# Patient Record
Sex: MALE | Race: WHITE | NOT HISPANIC OR LATINO | Employment: OTHER | URBAN - NONMETROPOLITAN AREA
[De-identification: names, ages, dates, MRNs, and addresses within clinical notes are randomized per-mention and may not be internally consistent; named-entity substitution may affect disease eponyms.]

---

## 2017-01-08 DIAGNOSIS — I10 HTN (HYPERTENSION): Primary | ICD-10-CM

## 2017-01-08 DIAGNOSIS — E11.9 DIABETES MELLITUS, TYPE 2 (H): ICD-10-CM

## 2017-01-10 RX ORDER — GLIMEPIRIDE 1 MG/1
TABLET ORAL
Qty: 90 TABLET | Refills: 0 | Status: SHIPPED | OUTPATIENT
Start: 2017-01-10 | End: 2017-05-03

## 2017-01-10 RX ORDER — POTASSIUM CHLORIDE 1500 MG/1
TABLET, EXTENDED RELEASE ORAL
Qty: 180 TABLET | Refills: 0 | Status: SHIPPED | OUTPATIENT
Start: 2017-01-10 | End: 2017-04-10

## 2017-01-10 NOTE — TELEPHONE ENCOUNTER
Amaryl         Last Written Prescription Date: 12/30/15  Last Fill Quantity: 90, # refills: 1  Last Office Visit with [facility] [place] or [facility] prescribing provider:  12/12/16        BP Readings from Last 3 Encounters:   12/12/16 122/62   10/06/16 130/70   09/30/16 153/90     MICROL       <5   7/15/2016  No results found for this basename: microalbumin  CREATININE   Date Value Ref Range Status   12/12/2016 0.74 0.66 - 1.25 mg/dL Final   ]  GFR ESTIMATE   Date Value Ref Range Status   12/12/2016 >90  Non  GFR Calc   >60 mL/min/1.7m2 Final   09/30/2016 >90  Non  GFR Calc   >60 mL/min/1.7m2 Final   07/15/2016 >90  Non  GFR Calc   >60 mL/min/1.7m2 Final     GFR ESTIMATE IF BLACK   Date Value Ref Range Status   12/12/2016 >90   GFR Calc   >60 mL/min/1.7m2 Final   09/30/2016 >90   GFR Calc   >60 mL/min/1.7m2 Final   07/15/2016 >90   GFR Calc   >60 mL/min/1.7m2 Final     CHOL      162   12/12/2016  HDL       44   12/12/2016  LDL       93   12/12/2016  TRIG      126   12/12/2016  CHOLHDLRATIO      3.8   3/10/2015  AST       44   12/12/2016  ALT       75   12/12/2016  A1C      6.1   12/12/2016  A1C      6.2   7/15/2016  A1C      6.4   2/8/2016  A1C      6.0   8/11/2015  A1C      5.9   3/10/2015  POTASSIUM   Date Value Ref Range Status   12/12/2016 3.4 3.4 - 5.3 mmol/L Final       Klor-Con M 20 ER      Last Written Prescription Date: 10/12/16  Last Fill Quantity: 180, # refills: 0  Last Office Visit with [facility] or [facility] prescribing provider: 12/12/16       POTASSIUM   Date Value Ref Range Status   12/12/2016 3.4 3.4 - 5.3 mmol/L Final     CREATININE   Date Value Ref Range Status   12/12/2016 0.74 0.66 - 1.25 mg/dL Final     BP Readings from Last 3 Encounters:   12/12/16 122/62   10/06/16 130/70   09/30/16 153/90

## 2017-01-30 DIAGNOSIS — E78.5 HYPERLIPIDEMIA LDL GOAL <100: Primary | ICD-10-CM

## 2017-02-01 RX ORDER — EZETIMIBE 10 MG
TABLET ORAL
Qty: 90 TABLET | Refills: 2 | Status: SHIPPED | OUTPATIENT
Start: 2017-02-01 | End: 2017-10-16

## 2017-02-06 ENCOUNTER — OFFICE VISIT (OUTPATIENT)
Dept: FAMILY MEDICINE | Facility: OTHER | Age: 77
End: 2017-02-06
Attending: FAMILY MEDICINE
Payer: MEDICARE

## 2017-02-06 VITALS
BODY MASS INDEX: 33 KG/M2 | RESPIRATION RATE: 17 BRPM | HEART RATE: 76 BPM | TEMPERATURE: 96.4 F | DIASTOLIC BLOOD PRESSURE: 74 MMHG | SYSTOLIC BLOOD PRESSURE: 132 MMHG | WEIGHT: 230 LBS

## 2017-02-06 DIAGNOSIS — S13.9XXA SPRAIN OF NECK, INITIAL ENCOUNTER: ICD-10-CM

## 2017-02-06 DIAGNOSIS — M54.2 CERVICALGIA: Primary | ICD-10-CM

## 2017-02-06 PROCEDURE — 99213 OFFICE O/P EST LOW 20 MIN: CPT | Performed by: FAMILY MEDICINE

## 2017-02-06 PROCEDURE — 99212 OFFICE O/P EST SF 10 MIN: CPT

## 2017-02-06 RX ORDER — CYCLOBENZAPRINE HCL 5 MG
TABLET ORAL
Qty: 30 TABLET | Refills: 1 | Status: SHIPPED | OUTPATIENT
Start: 2017-02-06 | End: 2017-05-16

## 2017-02-06 ASSESSMENT — PAIN SCALES - GENERAL: PAINLEVEL: SEVERE PAIN (6)

## 2017-02-06 NOTE — PATIENT INSTRUCTIONS
Neck Problems: Relieving Your Symptoms  The first goal of treatment is to relieve your symptoms. Your healthcare provider may recommend self-care treatments. These include resting, applying ice and heat, taking medicine, and doing exercises. Your healthcare provider may also recommend that you see a physical therapist who can teach you ways to care for and strengthen your neck.     Heat relaxes sore muscles and helps relieve spasms.   Self-care treatments  Pain can end quickly or last awhile. Either way, you ll want relief as soon as possible. Your healthcare provider can tell you which treatments to do at home to help relieve your pain.    Lying down for a short time takes pressure from the head off the neck.    Ice and heat can help reduce pain. To bring down swelling, rest an ice pack wrapped in a thin towel on your neck for 10 to 15 minutes. To relax sore muscles, apply a warm, wet towel to the area. Or you can take a warm bath or shower.    Over-the-counter medicines, such as ibuprofen, naproxen, and aspirin, can help reduce pain and swelling. Acetaminophen can help relieve pain. Use these only as directed.    Exercises can relax muscles and ease stiffness. To prepare, drape a warm, wet towel around your neck and shoulders for 5 minutes. Remove the towel. Then do any exercises recommended to you by your healthcare provider.  Physical therapy  If self-care treatments aren t helping relieve neck pain, your healthcare provider may suggest physical therapy. Physical therapy is done by a specialist trained to treat injuries. Your physical therapist (PT) will teach you how to strengthen muscles, improve the spine s alignment, and help you move properly. Treatment methods used in physical therapy may include:    Heat. A special heating pad called a neck pack may be applied to your neck.    Exercises. Your PT will teach you exercises to help strengthen your neck and improve its range of motion.    Joint  mobilization. The PT gently moves your vertebrae to help restore motion in your neck joints and reduce neck pain.    Soft tissue mobilization. The PT massages and stretches the muscles in your neck and shoulders.    Electrical stimulation. Electrical impulses are sent into your neck. This helps reduce soreness and inflammation.    Education in body mechanics. The PT shows you ways to position and move your body that protect the neck.  Other treatments  If physical therapy doesn t relieve your neck pain, your healthcare provider may suggest other treatments. For example, medicines or injections can help relieve pain and swelling. In some cases, surgery may be needed to treat neck problems.    [id number] [employer]. [address] [place] [address], [place] [address]. All rights reserved. This information is not intended as a substitute for professional medical care. Always follow your healthcare professional's instructions.        Neck Sprain or Strain  A sudden force that causes turning or bending of the neck can cause sprain or strain. An example would be the force from a car accident. This can stretch or tear muscles called a strain. It can also stretch or tear ligaments called a sprain. Either of these can cause neck pain. Sometimes neck pain occurs after a simple awkward movement. In either case, muscle spasm is commonly present and contributes to the pain.    Unless you had a forceful physical injury (for example, a car accident or fall), X-rays are usually not ordered for the initial evaluation of neck pain. If pain continues and dose not respond to medical treatment, X-rays and other tests may be performed at a later time.  Home care    You may feel more soreness and spasm the first few days after the injury. Rest until symptoms begin to improve.    When lying down, use a comfortable pillow or a rolled towel that supports the head and keeps the spine in a neutral position. The position of the head should  not be tilted forward or backward.    Apply an ice pack over the injured area for 15 to 20 minutes every 3 to 6 hours. You should do this for the first 24 to 48 hours. You can make an ice pack by filling a plastic bag that seals at the top with ice cubes and then wrapping it with a thin towel. After 48 hours, apply heat (warm shower or warm bath) for 15 to 20 minutes several times a day, or alternate ice and heat.    You may use over-the-counter pain medicine to control pain, unless another pain medicine was prescribed. If you have chronic liver or kidney disease or ever had a stomach ulcer or GI bleeding, talk with your healthcare provider before using these medicines.    If a soft cervical collar was prescribed, it should be worn only for periods of increased pain. It should not be worn for more than 3 hours a day, or for a period longer than 1 to 2 weeks.  Follow-up care  Follow up with your healthcare provider as directed. Physical therapy may be needed.  Sometimes fractures don t show up on the first X-ray. Bruises and sprains can sometimes hurt as much as a fracture. These injuries can take time to heal completely. If your symptoms don t improve or they get worse, talk with your healthcare provider. You may need a repeat X-ray or other tests. If X-rays were taken, you will be told of any new findings that may affect your care.  Call 911  Call 911 if you have:     Neck swelling, difficulty or painful swallowing    Difficulty breathing    Chest pain  When to seek medical advice  Call your healthcare provider right away if any of these occur:    Pain becomes worse or spreads into your arms    Weakness or numbness in one or both arms    [id number] [employer]. [address] [place] [address], [place] [address]. All rights reserved. This information is not intended as a substitute for professional medical care. Always follow your healthcare professional's instructions.

## 2017-02-06 NOTE — NURSING NOTE
"Chief Complaint   Patient presents with     Musculoskeletal Problem       Initial /74 mmHg  Pulse 76  Temp(Src) 96.4  F (35.8  C)  Resp 17  Wt 230 lb (104.327 kg) Estimated body mass index is 33 kg/(m^2) as calculated from the following:    Height as of 12/12/16: 5' 10\" (1.778 m).    Weight as of this encounter: 230 lb (104.327 kg).  Medication Reconciliation: complete  "

## 2017-02-06 NOTE — PROGRESS NOTES
SUBJECTIVE:                                                    Carrillo Carranza is a 76 year old male who presents to clinic today for the following health issues:      Musculoskeletal problem/pain      Duration: 1 week    Description  Location:  Neck, base of neck     Intensity:  moderate    Accompanying signs and symptoms: radiation of pain to bilateral shoulder blades, stiffness     History  Previous similar problem: no   Previous evaluation:  none    Precipitating or alleviating factors:  Trauma or overuse: no   Aggravating factors include: sitting, movement of arms     Therapies tried and outcome: NSAID - Advil      Pt has been doing a lot of work cutting wood  Pain is base of neck and going to both shoulders  Hard time sleeping   No pain or weakness in arms  No Fall or trauma- lots of use.   Advil not helping       Problem list and histories reviewed & adjusted, as indicated.  Additional history: as documented    Problem list, Medication list, Allergies, and Medical/Social/Surgical histories reviewed in EPIC and updated as appropriate.    ROS:  C: NEGATIVE for fever, chills, change in weight  R: NEGATIVE for significant cough or SOB  CV: NEGATIVE for chest pain, palpitations or peripheral edema    OBJECTIVE:                                                    /74 mmHg  Pulse 76  Temp(Src) 96.4  F (35.8  C)  Resp 17  Wt 230 lb (104.327 kg)  Body mass index is 33 kg/(m^2).   GENERAL: healthy, alert, well nourished, well hydrated, no distress  NECK: posterior  tenderness, no adenopathy, no asymmetry, no masses, no stiffness; thyroid- normal to palpation, decrease ROM and stiffness  MS: extremities- UE- no gross deformities noted, no edema, Good strength          ASSESSMENT/PLAN:                                                        ICD-10-CM    1. Cervicalgia M54.2 cyclobenzaprine (FLEXERIL) 5 MG tablet     PHYSICAL THERAPY REFERRAL   2. Sprain of neck, initial encounter S13.9XXA cyclobenzaprine  (FLEXERIL) 5 MG tablet     PHYSICAL THERAPY REFERRAL     Does not seem Disck related. Seems msk/soft tissue. Discussed in length conservative measures of OTC medications for pain, Ice/Heat, elevation and the concept of R.I.C.E.. Continue behavioral changes and proper body mechanics to allow for healing. Follow up as directed.   Symptomatic treatment was discussed along when patient should call and/or come back into the clinic or go to ER/Urgent care. All questions answered.   OTC advil 3 tabs TID discussed. F/u prn.     See Patient Instructions    Mata Richardson MD  [facility]

## 2017-02-13 ENCOUNTER — HOSPITAL ENCOUNTER (OUTPATIENT)
Dept: PHYSICAL THERAPY | Facility: HOSPITAL | Age: 77
Setting detail: THERAPIES SERIES
End: 2017-02-13
Attending: FAMILY MEDICINE
Payer: MEDICARE

## 2017-02-13 PROCEDURE — G8982 BODY POS GOAL STATUS: HCPCS | Mod: GP,CI

## 2017-02-13 PROCEDURE — 40000718 ZZHC STATISTIC PT DEPARTMENT ORTHO VISIT

## 2017-02-13 PROCEDURE — 97140 MANUAL THERAPY 1/> REGIONS: CPT | Mod: GP

## 2017-02-13 PROCEDURE — G8981 BODY POS CURRENT STATUS: HCPCS | Mod: GP,CJ

## 2017-02-13 PROCEDURE — 97161 PT EVAL LOW COMPLEX 20 MIN: CPT | Mod: GP

## 2017-02-13 PROCEDURE — 97110 THERAPEUTIC EXERCISES: CPT | Mod: GP

## 2017-02-13 NOTE — PROGRESS NOTES
02/13/17 1259   General Information   Type of Visit Initial OP Ortho PT Evaluation   Start of Care Date 02/13/17   Referring Physician Dr. Richardson   Patient/Family Goals Statement get rid of pain   Orders Evaluate and Treat   Orders Comment Eval and Treat, good HEP   Insurance Type Medicare   Medical Diagnosis cervicalgia, sprain of neck   Surgical/Medical history reviewed Yes   Precautions/Limitations no known precautions/limitations   General Information Comments PMH: diabetes, past episode of vertigo that resolved.    Body Part(s)   Body Part(s) Cervical Spine   Presentation and Etiology   Pertinent history of current problem (include personal factors and/or comorbidities that impact the POC) Patient was cutting and splitting wood last Monday and he had pain in neck down into shoulder blades afterward. Patient was unable to sleep due to pain. Pain has now improved, but still present impacting L and R sides evenly. Yesterday patient lifted big bags of salt into the conditioner and developed pain/soreness after. Patient is very active and splits wood and lifts during outdoor yardwork often. Denies numbness and tinling in hands. Patient initially treated symptoms with rest, heat and Advil. Muscle relaxer also used occasionally. Patient has not had to take pain meds or advil in last two days. Is starting to sleep better. Patient is R hand dominant. Patient has tension down both shoulder blades and up neck to base of skull. No HA present. Patient has also noted some clicking in neck on R side. Can replicate clicks in neck and with turning head this morning patient experienced a zinger with pain extendeing down side of neck and feeling like a rubberband type sensation. 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 Patient is a side sleeper and reports that he consumes a large amount of water.    Impairments A. Pain   Functional Limitations perform  activities of daily living;perform desired leisure / sports activities   Symptom Location Bilateral neck and bilateral shoulder blade regions   How/Where did it occur With repetition/overuse   Onset date of current episode/exacerbation 02/06/17   Chronicity New   Pain rating (0-10 point scale) Best (/10);Worst (/10)   Best (/10) 1   Worst (/10) 10   Pain quality C. Aching;A. Sharp   Frequency of pain/symptoms B. Intermittent   Pain/symptoms exacerbated by C. Lifting;G. Certain positions;H. Overhead reach;D. Carrying   Pain/symptoms eased by C. Rest;I. OTC medication(s)   Progression of symptoms since onset: Improved   Prior Level of Function   Prior Level of Function-Mobility Prior to onset of symptoms patient had no pain or limitation with lifting, reaching, splitting wood and carrying tasks.    Current Level of Function   Patient role/employment history A. Employed   Employment Comments part time at [employer]   Fall Risk Screen   Per patient - Fall with injury in past year? No   Is patient a fall risk? No   Cervical Spine   Observation No acute distress   Posture Forward head posture noted   Cervical Flexion %   Cervical Extension ROM 60% limited by pain   Cervical Right Side Bending ROM 80%   Cervical Left Side Bending ROM 60% limited by pain   Cervical Right Rotation ROM 60% limited by pain   Cervical Left Rotation ROM 80%   Shoulder AROM Screen No significant deficits identified.    Shoulder Shrug (C2-C4) Strength 5   Shoulder Abd (C5) Strength 5   Shoulder Add (C7) Strength 5   Shoulder ER (C5, C6) Strength 5   Shoulder IR (C5, C6) Strength 5   Elbow Flexion (C5, C6) Strength 5   Elbow Extension (C7) Strength 5   Wrist Extension (C6) Strength 5   Wrist Flexion (C7) Strength 5   Thumb Abd (C8) Strength 5   5th Finger Add (T1) Strength 5   Shoulder/Wrist/Hand Strength Comments No significant deficits present between R and L UE   Upper Trapezius Flexibility Significant tension present bilaterally.     Levator Scapula Flexibility No significant tension appreciated.    Scalene Flexibility Significant tension present bilaterally   Cervical Flexibility Comments AROM limited by scalene tension.    Cervical Distraction Test No significant change in symptoms.    Segmental Mobility-Cervical Decreased mobility present at CT junction T1, C7, C6, C5   Palpation Tenderness present at bilateral scalenes and UT   Planned Therapy Interventions   Planned Therapy Interventions manual therapy;ROM;strengthening;stretching;neuromuscular re-education   Planned Therapy Interventions Comment Ther ex and manual techniques to decrease pain, decrease tension and improve cervical mobility.    Planned Modality Interventions   Planned Modality Interventions Hot packs;Traction;Ultrasound   Planned Modality Interventions Comments as needed.    Clinical Impression   Criteria for Skilled Therapeutic Interventions Met yes, treatment indicated   PT Diagnosis Patient presents with acute cervical strain with limited passive and AROM and muscle tension impacting patient's functional activity level.    Influenced by the following impairments Pain, muscle tension, loss of AROM and PROM   Functional limitations due to impairments Pain with sleep. Pain and decreased ability to perform lifting, carrying and reaching tasks. Decreased tolerance to cervical rotation and neck movement during daily tasks.    Clinical Presentation Stable/Uncomplicated   Clinical Presentation Rationale One body region impacted. No comorbidities present.    Clinical Decision Making (Complexity) Low complexity   Therapy Frequency 2 times/Week   Predicted Duration of Therapy Intervention (days/wks) 60 days   Risk & Benefits of therapy have been explained Yes   Patient, Family & other staff in agreement with plan of care Yes   Clinical Impression Comments Patient presents with acute cervical strain impacting bilateral UT and scalene. Patient has segmental hypomobility also present  at CT junction and lower cervical segments. Postural contribution present. Patient is very active and would like to return to high activity level pain free.    Education Assessment   Barriers to Learning No barriers   ORTHO GOALS   PT Ortho Eval Goals 1;2;3;4   Ortho Goal 1   Goal Identifier STG 1   Goal Description Patient will be independent and compliant with HEP.    Target Date 02/27/17   Ortho Goal 2   Goal Identifier LTG 1   Goal Description Patient will display pain free functional cervical motion allowing patient to turn and look without limitation of pain during driving tasks, household tasks and outdoor yardwork.    Target Date 04/14/17   Ortho Goal 3   Goal Identifier LTG 2   Goal Description Patient will be able to perform lifting, reaching and carrying tasks at prior activity level without limtiation from symptoms during or after.    Target Date 04/14/17   Ortho Goal 4   Goal Identifier LTG 3   Goal Description Patient will be able to sleep throughout the night consistently without limitation from cervical pain.    Target Date 04/14/17   Total Evaluation Time   Total Evaluation Time 20   Therapy Certification   Certification date from 02/13/17   Certification date to 04/14/17   Medical Diagnosis cervicalgia, sprain of neck   I certify the need for these services furnished under this plan of treatment and while under my care. (Physician co-signature of this document indicates review and certification of the therapy plan).

## 2017-02-13 NOTE — PROGRESS NOTES
02/13/17 1400   Neck Disability Index (  Alok ROBLES and Reena GROSS. 1991. All rights reserved.; used with permission)   SECTION 1 - PAIN INTENSITY 3   SECTION 2 - PERSONAL CARE 0   SECTION 3 - LIFTING 1   SECTION 4 - READING 0   SECTION 5 - HEADACHES 0   SECTION 6 - CONCENTRATION 0   SECTION 7 - WORK 3   SECTION 8 - DRIVING 1   SECTION 9 - SLEEPING 3   SECTION 10 - RECREATION 1   Count 10   Sum 12   Raw Score: /50 24   Neck Disability Index Score: (%) 24 %

## 2017-02-16 ENCOUNTER — HOSPITAL ENCOUNTER (OUTPATIENT)
Dept: PHYSICAL THERAPY | Facility: HOSPITAL | Age: 77
Setting detail: THERAPIES SERIES
End: 2017-02-16
Attending: FAMILY MEDICINE
Payer: MEDICARE

## 2017-02-16 PROCEDURE — 97110 THERAPEUTIC EXERCISES: CPT | Mod: GP

## 2017-02-16 PROCEDURE — 97035 APP MDLTY 1+ULTRASOUND EA 15: CPT | Mod: GP

## 2017-02-16 PROCEDURE — 40000718 ZZHC STATISTIC PT DEPARTMENT ORTHO VISIT

## 2017-02-20 DIAGNOSIS — E11.9 TYPE 2 DIABETES MELLITUS WITHOUT COMPLICATION (H): ICD-10-CM

## 2017-02-20 RX ORDER — METFORMIN HCL 500 MG
TABLET, EXTENDED RELEASE 24 HR ORAL
Qty: 180 TABLET | Refills: 1 | Status: SHIPPED | OUTPATIENT
Start: 2017-02-20 | End: 2017-05-16

## 2017-02-20 NOTE — TELEPHONE ENCOUNTER
Metformin 500mg tab         Last Written Prescription Date: 11-  Last Fill Quantity: 180, # refills: 0  Last Office Visit with G, P or  Health prescribing provider:  2-6-2017   Next 5 appointments (look out 90 days)     May 16, 2017  1:30 PM CDT   (Arrive by 1:15 PM)   SHORT with Mata Richardson MD   [facility]    [address] [facility] [address]   [phone]                   BP Readings from Last 3 Encounters:   02/06/17 132/74   12/12/16 122/62   10/06/16 130/70     Lab Results   Component Value Date    MICROL <5 07/15/2016     No results found for: MICROALBUMIN  Creatinine   Date Value Ref Range Status   12/12/2016 0.74 0.66 - 1.25 mg/dL Final   ]  GFR Estimate   Date Value Ref Range Status   12/12/2016 >90  Non  GFR Calc   >60 mL/min/1.7m2 Final   09/30/2016 >90  Non  GFR Calc   >60 mL/min/1.7m2 Final   07/15/2016 >90  Non  GFR Calc   >60 mL/min/1.7m2 Final     GFR Estimate If Black   Date Value Ref Range Status   12/12/2016 >90   GFR Calc   >60 mL/min/1.7m2 Final   09/30/2016 >90   GFR Calc   >60 mL/min/1.7m2 Final   07/15/2016 >90   GFR Calc   >60 mL/min/1.7m2 Final     Lab Results   Component Value Date    CHOL 162 12/12/2016     Lab Results   Component Value Date    HDL 44 12/12/2016     Lab Results   Component Value Date    LDL 93 12/12/2016     Lab Results   Component Value Date    TRIG 126 12/12/2016     Lab Results   Component Value Date    CHOLHDLRATIO 3.8 03/10/2015     Lab Results   Component Value Date    AST 44 12/12/2016     Lab Results   Component Value Date    ALT 75 12/12/2016     Lab Results   Component Value Date    A1C 6.1 12/12/2016    A1C 6.2 07/15/2016    A1C 6.4 02/08/2016    A1C 6.0 08/11/2015    A1C 5.9 03/10/2015     Potassium   Date Value Ref Range Status   12/12/2016 3.4 3.4 - 5.3 mmol/L Final

## 2017-02-21 ENCOUNTER — HOSPITAL ENCOUNTER (OUTPATIENT)
Dept: PHYSICAL THERAPY | Facility: HOSPITAL | Age: 77
Setting detail: THERAPIES SERIES
End: 2017-02-21
Attending: FAMILY MEDICINE
Payer: MEDICARE

## 2017-02-21 PROCEDURE — 97110 THERAPEUTIC EXERCISES: CPT | Mod: GP

## 2017-02-21 PROCEDURE — 97035 APP MDLTY 1+ULTRASOUND EA 15: CPT | Mod: GP

## 2017-02-21 PROCEDURE — 40000718 ZZHC STATISTIC PT DEPARTMENT ORTHO VISIT

## 2017-02-24 ENCOUNTER — HOSPITAL ENCOUNTER (OUTPATIENT)
Dept: PHYSICAL THERAPY | Facility: HOSPITAL | Age: 77
Setting detail: THERAPIES SERIES
End: 2017-02-24
Attending: FAMILY MEDICINE
Payer: MEDICARE

## 2017-02-24 PROCEDURE — 97035 APP MDLTY 1+ULTRASOUND EA 15: CPT | Mod: GP

## 2017-02-24 PROCEDURE — 97110 THERAPEUTIC EXERCISES: CPT | Mod: GP

## 2017-02-24 PROCEDURE — 40000718 ZZHC STATISTIC PT DEPARTMENT ORTHO VISIT

## 2017-02-27 ENCOUNTER — HOSPITAL ENCOUNTER (OUTPATIENT)
Dept: PHYSICAL THERAPY | Facility: HOSPITAL | Age: 77
Setting detail: THERAPIES SERIES
End: 2017-02-27
Attending: FAMILY MEDICINE
Payer: MEDICARE

## 2017-02-27 PROCEDURE — 40000718 ZZHC STATISTIC PT DEPARTMENT ORTHO VISIT

## 2017-02-27 PROCEDURE — 97140 MANUAL THERAPY 1/> REGIONS: CPT | Mod: GP

## 2017-02-27 PROCEDURE — 97110 THERAPEUTIC EXERCISES: CPT | Mod: GP

## 2017-03-02 ENCOUNTER — HOSPITAL ENCOUNTER (OUTPATIENT)
Dept: PHYSICAL THERAPY | Facility: HOSPITAL | Age: 77
Setting detail: THERAPIES SERIES
End: 2017-03-02
Attending: FAMILY MEDICINE
Payer: MEDICARE

## 2017-03-02 PROCEDURE — 40000718 ZZHC STATISTIC PT DEPARTMENT ORTHO VISIT

## 2017-03-02 PROCEDURE — 97110 THERAPEUTIC EXERCISES: CPT | Mod: GP

## 2017-03-02 PROCEDURE — 97140 MANUAL THERAPY 1/> REGIONS: CPT | Mod: GP

## 2017-03-07 ENCOUNTER — HOSPITAL ENCOUNTER (OUTPATIENT)
Dept: PHYSICAL THERAPY | Facility: HOSPITAL | Age: 77
Setting detail: THERAPIES SERIES
End: 2017-03-07
Attending: FAMILY MEDICINE
Payer: MEDICARE

## 2017-03-07 PROCEDURE — 40000268 ZZH STATISTIC NO CHARGES

## 2017-03-07 PROCEDURE — G8983 BODY POS D/C STATUS: HCPCS | Mod: GP,CH

## 2017-03-07 PROCEDURE — G8982 BODY POS GOAL STATUS: HCPCS | Mod: GP,CI

## 2017-03-07 NOTE — PROGRESS NOTES
03/07/17 1300   Neck Disability Index (  Alok MARTINEZ. and Reena GROSS. 1991. All rights reserved.; used with permission)   SECTION 1 - PAIN INTENSITY 0   SECTION 2 - PERSONAL CARE 0   SECTION 3 - LIFTING 0   SECTION 4 - READING 0   SECTION 5 - HEADACHES 0   SECTION 6 - CONCENTRATION 0   SECTION 7 - WORK 0   SECTION 8 - DRIVING 0   SECTION 9 - SLEEPING 0   SECTION 10 - RECREATION 0   Count 10   Sum 0   Raw Score: /50 0   Neck Disability Index Score: (%) 0 %

## 2017-04-10 DIAGNOSIS — I10 HTN (HYPERTENSION): ICD-10-CM

## 2017-04-11 RX ORDER — POTASSIUM CHLORIDE 1500 MG/1
TABLET, EXTENDED RELEASE ORAL
Qty: 180 TABLET | Refills: 1 | Status: SHIPPED | OUTPATIENT
Start: 2017-04-11 | End: 2017-10-11

## 2017-04-11 NOTE — TELEPHONE ENCOUNTER
Potassium      Last Written Prescription Date: 1/10/2017  Last Fill Quantity: 180, # refills: 0  Last Office Visit with G, P or [facility] prescribing provider: 02/06/2017  Next 5 appointments (look out 90 days)     May 16, 2017  1:30 PM CDT   (Arrive by 1:15 PM)   SHORT with Mata Richardson MD   [facility] ([facility])    [address] [place] [id number]   [phone]                   Potassium   Date Value Ref Range Status   12/12/2016 3.4 3.4 - 5.3 mmol/L Final     Creatinine   Date Value Ref Range Status   12/12/2016 0.74 0.66 - 1.25 mg/dL Final     BP Readings from Last 3 Encounters:   02/06/17 132/74   12/12/16 122/62   10/06/16 130/70

## 2017-04-20 ENCOUNTER — TELEPHONE (OUTPATIENT)
Dept: FAMILY MEDICINE | Facility: OTHER | Age: 77
End: 2017-04-20

## 2017-04-20 DIAGNOSIS — E11.9 TYPE 2 DIABETES MELLITUS WITHOUT COMPLICATION, WITHOUT LONG-TERM CURRENT USE OF INSULIN (H): Primary | ICD-10-CM

## 2017-04-20 NOTE — TELEPHONE ENCOUNTER
Patient coming in for his annual diabetes appointment with Baylee Calixto and the previous referral  will Dr Richardson put in a new referral for diabetes and the patient appointment is on May 22, 2017.

## 2017-05-03 DIAGNOSIS — E11.9 DIABETES MELLITUS, TYPE 2 (H): ICD-10-CM

## 2017-05-08 RX ORDER — GLIMEPIRIDE 1 MG/1
TABLET ORAL
Qty: 90 TABLET | Refills: 0 | Status: SHIPPED | OUTPATIENT
Start: 2017-05-08 | End: 2017-08-15

## 2017-05-16 ENCOUNTER — OFFICE VISIT (OUTPATIENT)
Dept: FAMILY MEDICINE | Facility: OTHER | Age: 77
End: 2017-05-16
Attending: FAMILY MEDICINE
Payer: MEDICARE

## 2017-05-16 VITALS
WEIGHT: 230 LBS | HEART RATE: 68 BPM | OXYGEN SATURATION: 94 % | SYSTOLIC BLOOD PRESSURE: 137 MMHG | RESPIRATION RATE: 17 BRPM | TEMPERATURE: 97.5 F | DIASTOLIC BLOOD PRESSURE: 74 MMHG | BODY MASS INDEX: 33 KG/M2

## 2017-05-16 DIAGNOSIS — E78.5 HYPERLIPIDEMIA WITH TARGET LDL LESS THAN 100: ICD-10-CM

## 2017-05-16 DIAGNOSIS — I10 ESSENTIAL HYPERTENSION WITH GOAL BLOOD PRESSURE LESS THAN 140/90: ICD-10-CM

## 2017-05-16 DIAGNOSIS — Z23 NEED FOR VACCINATION: ICD-10-CM

## 2017-05-16 DIAGNOSIS — N40.1 BPH WITH OBSTRUCTION/LOWER URINARY TRACT SYMPTOMS: ICD-10-CM

## 2017-05-16 DIAGNOSIS — N13.8 BPH WITH OBSTRUCTION/LOWER URINARY TRACT SYMPTOMS: ICD-10-CM

## 2017-05-16 DIAGNOSIS — R35.1 NOCTURIA: ICD-10-CM

## 2017-05-16 DIAGNOSIS — E11.9 TYPE 2 DIABETES MELLITUS WITHOUT COMPLICATION, WITHOUT LONG-TERM CURRENT USE OF INSULIN (H): Primary | ICD-10-CM

## 2017-05-16 LAB
ALBUMIN SERPL-MCNC: 3.8 G/DL (ref 3.4–5)
ALP SERPL-CCNC: 70 U/L (ref 40–150)
ALT SERPL W P-5'-P-CCNC: 88 U/L (ref 0–70)
ANION GAP SERPL CALCULATED.3IONS-SCNC: 8 MMOL/L (ref 3–14)
AST SERPL W P-5'-P-CCNC: 55 U/L (ref 0–45)
BASOPHILS # BLD AUTO: 0 10E9/L (ref 0–0.2)
BASOPHILS NFR BLD AUTO: 0.7 %
BILIRUB SERPL-MCNC: 0.5 MG/DL (ref 0.2–1.3)
BUN SERPL-MCNC: 15 MG/DL (ref 7–30)
CALCIUM SERPL-MCNC: 9 MG/DL (ref 8.5–10.1)
CHLORIDE SERPL-SCNC: 100 MMOL/L (ref 94–109)
CO2 SERPL-SCNC: 29 MMOL/L (ref 20–32)
CREAT SERPL-MCNC: 0.65 MG/DL (ref 0.66–1.25)
DIFFERENTIAL METHOD BLD: ABNORMAL
EOSINOPHIL # BLD AUTO: 0.2 10E9/L (ref 0–0.7)
EOSINOPHIL NFR BLD AUTO: 4.1 %
ERYTHROCYTE [DISTWIDTH] IN BLOOD BY AUTOMATED COUNT: 12.2 % (ref 10–15)
EST. AVERAGE GLUCOSE BLD GHB EST-MCNC: 140 MG/DL
GFR SERPL CREATININE-BSD FRML MDRD: ABNORMAL ML/MIN/1.7M2
GLUCOSE SERPL-MCNC: 187 MG/DL (ref 70–99)
HBA1C MFR BLD: 6.5 % (ref 4.3–6)
HCT VFR BLD AUTO: 42.1 % (ref 40–53)
HGB BLD-MCNC: 15.5 G/DL (ref 13.3–17.7)
IMM GRANULOCYTES # BLD: 0 10E9/L (ref 0–0.4)
IMM GRANULOCYTES NFR BLD: 0.2 %
LYMPHOCYTES # BLD AUTO: 1.8 10E9/L (ref 0.8–5.3)
LYMPHOCYTES NFR BLD AUTO: 33.7 %
MCH RBC QN AUTO: 31.4 PG (ref 26.5–33)
MCHC RBC AUTO-ENTMCNC: 36.8 G/DL (ref 31.5–36.5)
MCV RBC AUTO: 85 FL (ref 78–100)
MONOCYTES # BLD AUTO: 0.5 10E9/L (ref 0–1.3)
MONOCYTES NFR BLD AUTO: 9.6 %
NEUTROPHILS # BLD AUTO: 2.8 10E9/L (ref 1.6–8.3)
NEUTROPHILS NFR BLD AUTO: 51.7 %
NRBC # BLD AUTO: 0 10*3/UL
NRBC BLD AUTO-RTO: 0 /100
PLATELET # BLD AUTO: 195 10E9/L (ref 150–450)
POTASSIUM SERPL-SCNC: 3.6 MMOL/L (ref 3.4–5.3)
PROT SERPL-MCNC: 7.5 G/DL (ref 6.8–8.8)
PSA SERPL-MCNC: 1.21 UG/L (ref 0–4)
RBC # BLD AUTO: 4.93 10E12/L (ref 4.4–5.9)
SODIUM SERPL-SCNC: 137 MMOL/L (ref 133–144)
WBC # BLD AUTO: 5.4 10E9/L (ref 4–11)

## 2017-05-16 PROCEDURE — 40000788 ZZHCL STATISTIC ESTIMATED AVERAGE GLUCOSE: Mod: ZL | Performed by: FAMILY MEDICINE

## 2017-05-16 PROCEDURE — 83036 HEMOGLOBIN GLYCOSYLATED A1C: CPT | Mod: ZL | Performed by: FAMILY MEDICINE

## 2017-05-16 PROCEDURE — 99212 OFFICE O/P EST SF 10 MIN: CPT

## 2017-05-16 PROCEDURE — 90670 PCV13 VACCINE IM: CPT | Performed by: FAMILY MEDICINE

## 2017-05-16 PROCEDURE — 99214 OFFICE O/P EST MOD 30 MIN: CPT | Performed by: FAMILY MEDICINE

## 2017-05-16 PROCEDURE — 80053 COMPREHEN METABOLIC PANEL: CPT | Mod: ZL | Performed by: FAMILY MEDICINE

## 2017-05-16 PROCEDURE — 84153 ASSAY OF PSA TOTAL: CPT | Mod: ZL | Performed by: FAMILY MEDICINE

## 2017-05-16 PROCEDURE — 85025 COMPLETE CBC W/AUTO DIFF WBC: CPT | Mod: ZL | Performed by: FAMILY MEDICINE

## 2017-05-16 PROCEDURE — G0009 ADMIN PNEUMOCOCCAL VACCINE: HCPCS | Performed by: FAMILY MEDICINE

## 2017-05-16 PROCEDURE — 36415 COLL VENOUS BLD VENIPUNCTURE: CPT | Mod: ZL | Performed by: FAMILY MEDICINE

## 2017-05-16 RX ORDER — METFORMIN HCL 500 MG
TABLET, EXTENDED RELEASE 24 HR ORAL
Qty: 180 TABLET | Refills: 3 | Status: SHIPPED | OUTPATIENT
Start: 2017-05-16 | End: 2018-05-01

## 2017-05-16 RX ORDER — DUTASTERIDE 0.5 MG/1
0.5 CAPSULE, LIQUID FILLED ORAL DAILY
Qty: 90 CAPSULE | Refills: 3 | Status: SHIPPED | OUTPATIENT
Start: 2017-05-16 | End: 2017-09-14

## 2017-05-16 ASSESSMENT — PAIN SCALES - GENERAL: PAINLEVEL: NO PAIN (0)

## 2017-05-16 ASSESSMENT — PATIENT HEALTH QUESTIONNAIRE - PHQ9: 5. POOR APPETITE OR OVEREATING: NOT AT ALL

## 2017-05-16 ASSESSMENT — ANXIETY QUESTIONNAIRES
2. NOT BEING ABLE TO STOP OR CONTROL WORRYING: NOT AT ALL
3. WORRYING TOO MUCH ABOUT DIFFERENT THINGS: NOT AT ALL
6. BECOMING EASILY ANNOYED OR IRRITABLE: NOT AT ALL
7. FEELING AFRAID AS IF SOMETHING AWFUL MIGHT HAPPEN: NOT AT ALL
1. FEELING NERVOUS, ANXIOUS, OR ON EDGE: SEVERAL DAYS
5. BEING SO RESTLESS THAT IT IS HARD TO SIT STILL: NOT AT ALL
GAD7 TOTAL SCORE: 1
IF YOU CHECKED OFF ANY PROBLEMS ON THIS QUESTIONNAIRE, HOW DIFFICULT HAVE THESE PROBLEMS MADE IT FOR YOU TO DO YOUR WORK, TAKE CARE OF THINGS AT HOME, OR GET ALONG WITH OTHER PEOPLE: NOT DIFFICULT AT ALL

## 2017-05-16 NOTE — MR AVS SNAPSHOT
After Visit Summary   5/16/2017    Carrillo Carranza    MRN: [id number]           Patient Information     Date Of Birth          1940        Visit Information        Provider Department      5/16/2017 1:30 PM Mata Richardson MD [facility]        Today's Diagnoses     Type 2 diabetes mellitus without complication, without long-term current use of insulin (H)    -  1    Essential hypertension with goal blood pressure less than 140/90        Hyperlipidemia with target LDL less than 100        Nocturia        Need for vaccination        BPH with obstruction/lower urinary tract symptoms           Follow-ups after your visit        Your next 10 appointments already scheduled     Oct 23, 2017  8:30 AM CDT   (Arrive by 8:15 AM)   SHORT with Mata Richardson MD   [facility] ([facility])    [address]  [facility] [address]   [phone]              Who to contact     If you have questions or need follow up information about today's clinic visit or your schedule please contact [facility] directly at [phone].  Normal or non-critical lab and imaging results will be communicated to you by 500Shopshart, letter or phone within 4 business days after the clinic has received the results. If you do not hear from us within 7 days, please contact the clinic through iYogit or phone. If you have a critical or abnormal lab result, we will notify you by phone as soon as possible.  Submit refill requests through MagicEvent or call your pharmacy and they will forward the refill request to us. Please allow 3 business days for your refill to be completed.          Additional Information About Your Visit        [id number] Information     MagicEvent gives you secure access to your electronic health record. If you see a primary care provider, you can also send messages to your care team and make appointments. If you have questions, please call your primary care clinic.  If you do  not have a primary care provider, please call [phone] and they will assist you.        Care EveryWhere ID     This is your Care EveryWhere ID. This could be used by other organizations to access your [facility] medical records  [id number]        Your Vitals Were     Pulse Temperature Respirations Pulse Oximetry BMI (Body Mass Index)       68 97.5  F (36.4  C) 17 94% 33 kg/m2        Blood Pressure from Last 3 Encounters:   05/22/17 146/79   05/16/17 137/74   02/06/17 132/74    Weight from Last 3 Encounters:   05/22/17 239 lb (108.4 kg)   05/16/17 230 lb (104.3 kg)   02/06/17 230 lb (104.3 kg)              We Performed the Following     ADMIN: Vaccine, Initial (94399)     C FOOT EXAM  NO CHARGE     CBC with platelets differential     Comprehensive metabolic panel     Estimated Average Glucose     Hemoglobin A1c     Pneumococcal vaccine 13 valent PCV13 IM (Prevnar) [[id number]]     PSA tumor marker          Today's Medication Changes          These changes are accurate as of: 5/16/17 11:59 PM.  If you have any questions, ask your nurse or doctor.               Start taking these medicines.        Dose/Directions    dutasteride 0.5 MG capsule   Commonly known as:  AVODART   Used for:  BPH with obstruction/lower urinary tract symptoms   Started by:  Mata Richardson MD        Dose:  0.5 mg   Take 1 capsule (0.5 mg) by mouth daily   Quantity:  90 capsule   Refills:  3       STATIN NOT PRESCRIBED (INTENTIONAL)   Used for:  Hyperlipidemia with target LDL less than 100   Started by:  Mata Richardson MD        Please choose reason not prescribed, below   Refills:  0         These medicines have changed or have updated prescriptions.        Dose/Directions    glimepiride 1 MG tablet   Commonly known as:  AMARYL   This may have changed:  See the new instructions.   Used for:  Diabetes mellitus, type 2 (H)        TAKE ONE TABLET BY MOUTH ONCE DAILY IN THE MORNING BEFORE  BREAKFAST   Quantity:  90 tablet   Refills:  0        metFORMIN 500 MG 24 hr tablet   Commonly known as:  GLUCOPHAGE-XR   This may have changed:  See the new instructions.   Used for:  Type 2 diabetes mellitus without complication, without long-term current use of insulin (H)   Changed by:  Mata Richardson MD        TAKE ONE TABLET BY MOUTH TWICE DAILY WITH MEALS   Quantity:  180 tablet   Refills:  3            Where to get your medicines      These medications were sent to [id number] - [place], [place] - [address]   [id number] , [place] [address]     Phone:  [phone]     dutasteride 0.5 MG capsule    metFORMIN 500 MG 24 hr tablet         Some of these will need a paper prescription and others can be bought over the counter.  Ask your nurse if you have questions.     You don't need a prescription for these medications     STATIN NOT PRESCRIBED (INTENTIONAL)                Primary Care Provider Office Phone # Fax #    Mata Richardson [phone]       [facility] [address] [place] [address]  [place] [address]        Thank you!     Thank you for choosing [facility] (formerly [facility])  for your care. Our goal is always to provide you with excellent care. Hearing back from our patients is one way we can continue to improve our services. Please take a few minutes to complete the written survey that you may receive in the mail after your visit with us. Thank you!             Your Updated Medication List - Protect others around you: Learn how to safely use, store and throw away your medicines at www.disposemymeds.org.          This list is accurate as of: 5/16/17 11:59 PM.  Always use your most recent med list.                   Brand Name Dispense Instructions for use    ACCU-CHEK ANGELI PLUS test strip   Generic drug:  blood glucose monitoring     100 each    USE ONE STRIP TO CHECH GLUCOSE ONCE DAILY       ADVIL PO      Take by mouth every 4 hours as needed for moderate pain       ASPIR-81 PO      Take 1 tablet by mouth daily        atenolol-chlorthalidone 50-25 MG per tablet    TENORETIC    90 tablet    TAKE 1 TABLET DAILY       blood glucose calibration solution     1 each    USE CONTROL SOLUTION AS DIRECTED ONCE MONTHLY TO CHECK ACCURACY OF METER       blood glucose monitoring lancets     102 Box    Use to test blood sugar once daily       dutasteride 0.5 MG capsule    AVODART    90 capsule    Take 1 capsule (0.5 mg) by mouth daily       glimepiride 1 MG tablet    AMARYL    90 tablet    TAKE ONE TABLET BY MOUTH ONCE DAILY IN THE MORNING BEFORE  BREAKFAST       magnesium oxide 400 MG Caps     180 capsule    Take 400 mg by mouth 2 times daily       metFORMIN 500 MG 24 hr tablet    GLUCOPHAGE-XR    180 tablet    TAKE ONE TABLET BY MOUTH TWICE DAILY WITH MEALS       MULTIVITAMINS PO      Take 1 tablet by mouth daily       OMEGA-3 FISH OIL PO      Take 1 g by mouth       pantoprazole 40 MG EC tablet    PROTONIX    90 tablet    TAKE 1 TABLET DAILY       potassium chloride 20 MEQ CR tablet   Generic drug:  potassium chloride SA     180 tablet    TAKE ONE TABLET BY MOUTH TWICE DAILY WITH MEALS       STATIN NOT PRESCRIBED (INTENTIONAL)      Please choose reason not prescribed, below       STOOL SOFTENER 100 MG capsule   Generic drug:  docusate sodium      Take 1 capsule by mouth 2 times daily       traZODone 50 MG tablet    DESYREL    90 tablet    Take 1 tablet (50 mg) by mouth At Bedtime       VITAMIN D PO      Take 1 capsule by mouth daily       ZETIA 10 MG tablet   Generic drug:  ezetimibe     90 tablet    TAKE 1 TABLET DAILY

## 2017-05-16 NOTE — PROGRESS NOTES
SUBJECTIVE:                                                    Carrillo Carranza is a 76 year old male who presents to clinic today for the following health issues:        Diabetes Follow-up      Patient is checking blood sugars: rarely.  Results range from 120's    Diabetic concerns: None     Symptoms of hypoglycemia (low blood sugar): none     Paresthesias (numbness or burning in feet) or sores: No     Date of last diabetic eye exam: due next month      Hyperlipidemia Follow-Up      Rate your low fat/cholesterol diet?: good    Taking statin? yes    Other lipid medications/supplements?:  Fish oil/Omega 3, dose  without side effects     Hypertension Follow-up      Outpatient blood pressures are not being checked.    Low Salt Diet: no added salt         Amount of exercise or physical activity: 6-7 days/week for an average of 30-45 minutes    Problems taking medications regularly: No    Medication side effects: none    Diet: diabetic      Abdominal Pain      Duration: 3 weeks     Description (location/character/radiation): midline        Associated flank pain: None    Intensity:  mild    Accompanying signs and symptoms:        Fever/Chills: no        Gas/Bloating: no        Nausea/vomitting: no        Diarrhea: no        Dysuria or Hematuria: no     History (previous similar pain/trauma/previous testing): patient wondering if it is anxiety but [place]     Precipitating or alleviating factors:       Pain worse with eating/BM/urination: no        Pain relieved by BM: no     Therapies tried and outcome: None    LMP:  not applicable    Resolved over several days- happened after long sauna and swim [place]          Patient also would like a prostate check.     Problem list and histories reviewed & adjusted, as indicated.  Additional history: as documented          Labs reviewed in EPIC    ROS:  C: NEGATIVE for fever, chills, change in weight  E/M: NEGATIVE for ear, mouth and throat  problems  R: NEGATIVE for significant cough or SOB  CV: NEGATIVE for chest pain, palpitations or peripheral edema  ROS otherwise negative    OBJECTIVE:                                                    /74  Pulse 68  Temp 97.5  F (36.4  C)  Resp 17  Wt 230 lb (104.3 kg)  SpO2 94%  BMI 33 kg/m2  Body mass index is 33 kg/(m^2).   GENERAL: healthy, alert, well nourished, well hydrated, no distress  HENT: ear canals- normal; TMs- normal; Nose- normal; Mouth- no ulcers, no lesions  NECK: no tenderness, no adenopathy, no asymmetry, no masses, no stiffness; thyroid- normal to palpation  RESP: lungs clear to auscultation - no rales, no rhonchi, no wheezes  CV: regular rates and rhythm, normal S1 S2, no S3 or S4 and no murmur, no click or rub -  ABDOMEN: soft, no tenderness, no  hepatosplenomegaly, no masses, normal bowel sounds  MS: extremities- no gross deformities noted, no edema  Diabetic foot exam: normal DP and PT pulses, no trophic changes or ulcerative lesions, normal sensory exam and normal monofilament exam    Results for orders placed or performed in visit on 05/16/17 (from the past 24 hour(s))   Comprehensive metabolic panel   Result Value Ref [facility]    Sodium 137 133 - 144 mmol/L    Potassium 3.6 3.4 - 5.3 mmol/L    Chloride 100 94 - 109 mmol/L    Carbon Dioxide 29 20 - 32 mmol/L    Anion Gap 8 3 - 14 mmol/L    Glucose 187 (H) 70 - 99 mg/dL    Urea Nitrogen 15 7 - 30 mg/dL    Creatinine 0.65 (L) 0.66 - 1.25 mg/dL    GFR Estimate >90  Non  GFR Calc   >60 mL/min/1.7m2    GFR Estimate If Black >90   GFR Calc   >60 mL/min/1.7m2    Calcium 9.0 8.5 - 10.1 mg/dL    Bilirubin Total 0.5 0.2 - 1.3 mg/dL    Albumin 3.8 3.4 - 5.0 g/dL    Protein Total 7.5 6.8 - 8.8 g/dL    Alkaline Phosphatase 70 40 - 150 U/L    ALT 88 (H) 0 - 70 U/L    AST 55 (H) 0 - 45 U/L   CBC with platelets differential   Result Value Ref [facility]    WBC 5.4 4.0 - 11.0 10e9/L    RBC Count 4.93 4.4 - 5.9 10e12/L     Hemoglobin 15.5 13.3 - 17.7 g/dL    Hematocrit 42.1 40.0 - 53.0 %    MCV 85 78 - 100 fl    MCH 31.4 26.5 - 33.0 pg    MCHC 36.8 (H) 31.5 - 36.5 g/dL    RDW 12.2 10.0 - 15.0 %    Platelet Count 195 150 - 450 10e9/L    Diff Method Automated Method     % Neutrophils 51.7 %    % Lymphocytes 33.7 %    % Monocytes 9.6 %    % Eosinophils 4.1 %    % Basophils 0.7 %    % Immature Granulocytes 0.2 %    Nucleated RBCs 0 0 /100    Absolute Neutrophil 2.8 1.6 - 8.3 10e9/L    Absolute Lymphocytes 1.8 0.8 - 5.3 10e9/L    Absolute Monocytes 0.5 0.0 - 1.3 10e9/L    Absolute Eosinophils 0.2 0.0 - 0.7 10e9/L    Absolute Basophils 0.0 0.0 - 0.2 10e9/L    Abs Immature Granulocytes 0.0 0 - 0.4 10e9/L    Absolute Nucleated RBC 0.0    PSA tumor marker   Result Value Ref [facility]    PSA 1.21 0 - 4 ug/L   Hemoglobin A1c   Result Value Ref [facility]    Hemoglobin A1C 6.5 (H) 4.3 - 6.0 %          ASSESSMENT/PLAN:                                                    1. Type 2 diabetes mellitus without complication, without long-term current use of insulin (H)  Awesome control - Continue current medications and behavioral changes.   F/u in 5-6 months   - Comprehensive metabolic panel; Future  - CBC with platelets differential; Future  - Hemoglobin A1c; Future  - Comprehensive metabolic panel  - CBC with platelets differential  - Hemoglobin A1c  - Estimated Average Glucose    2. Essential hypertension with goal blood pressure less than 140/90  Stable - Continue current medications and behavioral changes.   - Comprehensive metabolic panel; Future  - CBC with platelets differential; Future  - Comprehensive metabolic panel  - CBC with platelets differential    3. Hyperlipidemia with target LDL less than 100  Stable - not due till next visit.   - Comprehensive metabolic panel; Future  - CBC with platelets differential; Future  - Comprehensive metabolic panel  - CBC with platelets differential    4. Nocturia  Moderate sx of BPH see below  - psa ok   - PSA  tumor marker; Future  - PSA tumor marker    5. Need for vaccination  Shot given  - Pneumococcal vaccine 13 valent PCV13 IM (Prevnar) [[id number]]  - ADMIN: Vaccine, Initial (22054)    BPH with LUTS-- add avodart . Risk and benefits of avodart vs flomax  was discussed and verbal consent to proceed was given.       See Patient Instructions    Mata Richardson MD  [facility]

## 2017-05-16 NOTE — NURSING NOTE
"No chief complaint on file.      Initial /74  Pulse 68  Temp 97.5  F (36.4  C)  Resp 17  Wt 230 lb (104.3 kg)  SpO2 94%  BMI 33 kg/m2 Estimated body mass index is 33 kg/(m^2) as calculated from the following:    Height as of 12/12/16: 5' 10\" (1.778 m).    Weight as of this encounter: 230 lb (104.3 kg).  Medication Reconciliation: complete  "

## 2017-05-17 ASSESSMENT — ANXIETY QUESTIONNAIRES: GAD7 TOTAL SCORE: 1

## 2017-05-17 ASSESSMENT — PATIENT HEALTH QUESTIONNAIRE - PHQ9: SUM OF ALL RESPONSES TO PHQ QUESTIONS 1-9: 2

## 2017-05-22 ENCOUNTER — HOSPITAL ENCOUNTER (OUTPATIENT)
Dept: EDUCATION SERVICES | Facility: HOSPITAL | Age: 77
Discharge: HOME | End: 2017-05-22
Attending: FAMILY MEDICINE | Admitting: FAMILY MEDICINE
Payer: MEDICARE

## 2017-05-22 VITALS
HEIGHT: 70 IN | SYSTOLIC BLOOD PRESSURE: 146 MMHG | WEIGHT: 239 LBS | BODY MASS INDEX: 34.22 KG/M2 | OXYGEN SATURATION: 94 % | DIASTOLIC BLOOD PRESSURE: 79 MMHG | HEART RATE: 71 BPM

## 2017-05-22 PROCEDURE — G0109 DIAB MANAGE TRN IND/GROUP: HCPCS

## 2017-05-22 ASSESSMENT — PAIN SCALES - GENERAL: PAINLEVEL: NO PAIN (0)

## 2017-05-22 NOTE — NURSING NOTE
"Chief Complaint   Patient presents with     Diabetes     annual       Initial There were no vitals taken for this visit. Estimated body mass index is 33 kg/(m^2) as calculated from the following:    Height as of 12/12/16: 1.778 m (5' 10\").    Weight as of 5/16/17: 104.3 kg (230 lb).  Medication Reconciliation: complete   Radha Garza      "

## 2017-05-26 NOTE — PROGRESS NOTES
"Carrillo is here for annual review.     BG readings as follows: fastin-137. Denies lows.    Lab Results   Component Value Date    A1C 6.5 2017    A1C 6.1 2016    A1C 6.2 07/15/2016    A1C 6.4 2016    A1C 6.0 2015       Blood pressure 146/79, pulse 71, height 1.778 m (5' 10\"), weight 108.4 kg (239 lb), SpO2 94 %.    Current diabetes medications: Metformin 500 mg bid, Glimepiride 1/2 of a 1 mg tablet daily    Readiness to learn: eager    Barriers to learning: none    Pt actively participated and demonstrated adequate understanding of topics discussed. Reviewed food logs. Andrew is selecting appropriate foods and portion sizes.    Topics covered: reviewed blood glucose/A1c targets, testing times, problem solving techniques, discussed risks and reduction of complications related to diabetes and co-morbidities, reviewed diabetes check-list, foot care, sick day management, stress & depression risks and management, managing a chronic disease, quality check of meter, review medications, and importance of Diabetes self-management.     Meter accuracy checked.     Diabetes self-management support plan: Contact PCP and Diabetes Ed for questions/concerns    Plan: Continue current plan.    Follow up: Annually and prn    Time spent with patient: 60 minutes      "

## 2017-06-01 ENCOUNTER — TELEPHONE (OUTPATIENT)
Dept: FAMILY MEDICINE | Facility: OTHER | Age: 77
End: 2017-06-01

## 2017-06-01 NOTE — TELEPHONE ENCOUNTER
9:32 AM    Reason for Call: Phone Call    Description: Patient would like to discuss the extreme pain in lower back since starting the medication for the prostate. If you could call back at your earliest convenience [phone]. Patient is aware that Dr Richardson is out of office until 6-2-17    Was an appointment offered for this call? No    Preferred method for responding to this message: Telephone Call    If we cannot reach you directly, may we leave a detailed response at the number you provided? Yes    Can this message wait until your PCP/provider returns, if available today? YES    Brittney Fitzgerald

## 2017-06-02 NOTE — TELEPHONE ENCOUNTER
Has been having some left sided low back pain around kidney area for 1 week, is wondering if avodart could be causing this?

## 2017-06-14 DIAGNOSIS — I10 BENIGN ESSENTIAL HYPERTENSION: ICD-10-CM

## 2017-06-19 ENCOUNTER — TELEPHONE (OUTPATIENT)
Dept: FAMILY MEDICINE | Facility: OTHER | Age: 77
End: 2017-06-19

## 2017-06-19 RX ORDER — ATENOLOL AND CHLORTHALIDONE TABLET 50; 25 MG/1; MG/1
TABLET ORAL
Qty: 90 TABLET | Refills: 1 | Status: SHIPPED | OUTPATIENT
Start: 2017-06-19 | End: 2017-11-26

## 2017-06-19 NOTE — TELEPHONE ENCOUNTER
Reason for call:  Medication    1. Medication Name? atenolol-chlorthalidone (TENORETIC) 50-25 MG per tablet  2. Is this request for a refill? Yes  3. What Pharmacy do you use? [facility]  4. Have you contacted your pharmacy? Yes  06/13/2017  5. If yes, when?  (Please note that the turn-around-time for prescriptions is 72 business hours; I am sending your request at this time. SEND TO  [facility]  )  Description: Pt's spouse called to see if the pt's refill request was received when faxed the beginning of last week.  Nurse please advise.  Was an appointment offered for this a call? No   Preferred method for responding to this messageTelephone Call: [phone]  If we cannot reach you directly, may we leave a detailed response at the number you provided? Yes  Can this message wait until your PCP/Provider returns if not available today? Not applicable, PCP Padmini is in today

## 2017-07-03 ENCOUNTER — TELEPHONE (OUTPATIENT)
Dept: FAMILY MEDICINE | Facility: OTHER | Age: 77
End: 2017-07-03

## 2017-07-03 NOTE — TELEPHONE ENCOUNTER
9:17 AM    Reason for Call: OVERBOOK    Patient is having the following symptoms: Personal/prostate issue, uncomfortable/medication issue for 2 weeks.    The patient is requesting an appointment for future overbook with Dylan - unable to come in on Monday or Wednesday due to work    Was an appointment offered for this call? Yes 07/24    Preferred method for responding to this message: Telephone Call    If we cannot reach you directly, may we leave a detailed response at the number you provided? Yes    Can this message wait until your PCP/provider returns, if unavailable today? Yes - Patient is aware that provider is not in until next week. Stated that patient stopped taking his medication dutasteride, 2 weeks ago due to having issues, but the symptoms did not go away after he stopped taking it. Patient did not want to explain symptoms and just stated it was personal and was a prostate issue and that he was uncomfortable. Appt offered for 07/24, but pt does not want to wait 3 weeks.    Delmy Louise

## 2017-07-11 ENCOUNTER — OFFICE VISIT (OUTPATIENT)
Dept: FAMILY MEDICINE | Facility: OTHER | Age: 77
End: 2017-07-11
Attending: FAMILY MEDICINE
Payer: MEDICARE

## 2017-07-11 VITALS
OXYGEN SATURATION: 97 % | BODY MASS INDEX: 33.15 KG/M2 | WEIGHT: 231 LBS | SYSTOLIC BLOOD PRESSURE: 142 MMHG | TEMPERATURE: 96.6 F | HEART RATE: 69 BPM | DIASTOLIC BLOOD PRESSURE: 72 MMHG

## 2017-07-11 DIAGNOSIS — N40.1 BPH WITH OBSTRUCTION/LOWER URINARY TRACT SYMPTOMS: Primary | ICD-10-CM

## 2017-07-11 DIAGNOSIS — N13.8 BPH WITH OBSTRUCTION/LOWER URINARY TRACT SYMPTOMS: Primary | ICD-10-CM

## 2017-07-11 PROCEDURE — 99212 OFFICE O/P EST SF 10 MIN: CPT

## 2017-07-11 PROCEDURE — 99213 OFFICE O/P EST LOW 20 MIN: CPT | Performed by: FAMILY MEDICINE

## 2017-07-11 ASSESSMENT — ANXIETY QUESTIONNAIRES
4. TROUBLE RELAXING: NOT AT ALL
7. FEELING AFRAID AS IF SOMETHING AWFUL MIGHT HAPPEN: NOT AT ALL
5. BEING SO RESTLESS THAT IT IS HARD TO SIT STILL: NOT AT ALL
6. BECOMING EASILY ANNOYED OR IRRITABLE: NOT AT ALL
1. FEELING NERVOUS, ANXIOUS, OR ON EDGE: NOT AT ALL
GAD7 TOTAL SCORE: 0
2. NOT BEING ABLE TO STOP OR CONTROL WORRYING: NOT AT ALL
3. WORRYING TOO MUCH ABOUT DIFFERENT THINGS: NOT AT ALL

## 2017-07-11 ASSESSMENT — PAIN SCALES - GENERAL: PAINLEVEL: NO PAIN (0)

## 2017-07-11 NOTE — PROGRESS NOTES
SUBJECTIVE:                                                    Carrillo Carranza is a 76 year old male who presents to clinic today for the following health issues:      Medication problem    Duration: 5/16/17    Description (location/character/radiation): prostate problem after taking dutasteride    Intensity:  N/A    Accompanying signs and symptoms: will discuss with doctor    History (similar episodes/previous evaluation): None    Precipitating or alleviating factors: None    Therapies tried and outcome: None    Decrease libido and poor or no ejaculate     Stopped 3 weeks ago and still not better.           Problem list and histories reviewed & adjusted, as indicated.  Additional history:         Reviewed and updated as needed this visit by clinical staff  Tobacco  Allergies  Meds  Med Hx  Surg Hx  Fam Hx  Soc Hx      Reviewed and updated as needed this visit by Provider         ROS:  C: NEGATIVE for fever, chills, change in weight    OBJECTIVE:                                                    /72 (BP Location: Left arm, Patient Position: Chair, Cuff Size: Adult Large)  Pulse 69  Temp 96.6  F (35.9  C) (Tympanic)  Wt 231 lb (104.8 kg)  SpO2 97%  BMI 33.15 kg/m2  Body mass index is 33.15 kg/(m^2).   GENERAL: healthy, alert, well nourished, well hydrated, no distress         ASSESSMENT/PLAN:                                                    (N40.1,  N13.8) BPH with obstruction/lower urinary tract symptoms  (primary encounter diagnosis)  Comment: Medication side effects to Avodart  Plan: Reviewed PDR- common side effects. Stop avodart and should come back.  Consider proscar vs flomax if wants to try another when s.e. Resolve.             Mata Richardson MD  [facility]

## 2017-07-11 NOTE — NURSING NOTE
"Chief Complaint   Patient presents with     Medication Problem       Initial /72 (BP Location: Left arm, Patient Position: Chair, Cuff Size: Adult Large)  Pulse 69  Temp 96.6  F (35.9  C) (Tympanic)  Wt 231 lb (104.8 kg)  SpO2 97%  BMI 33.15 kg/m2 Estimated body mass index is 33.15 kg/(m^2) as calculated from the following:    Height as of 5/22/17: 5' 10\" (1.778 m).    Weight as of this encounter: 231 lb (104.8 kg).  Medication Reconciliation: complete     Reyna Garcia   "

## 2017-07-11 NOTE — MR AVS SNAPSHOT
After Visit Summary   7/11/2017    Carrillo Carranza    MRN: [id number]           Patient Information     Date Of Birth          1940        Visit Information        Provider Department      7/11/2017 1:15 PM Mata Richardson MD [facility]        Today's Diagnoses     BPH with obstruction/lower urinary tract symptoms    -  1       Follow-ups after your visit        Your next 10 appointments already scheduled     Aug 01, 2017 11:30 AM CDT   (Arrive by 11:15 AM)   New Visit with Sarah Lorenzo DPM   [facility] [place] [facility]    [address]  [place] [address]   [phone]            Oct 23, 2017  8:30 AM CDT   (Arrive by 8:15 AM)   SHORT with Mata Richardson MD   [facility] ([facility] [place] )    [address]  [place] [address]   [phone]              Who to contact     If you have questions or need follow up information about today's clinic visit or your schedule please contact [facility] directly at [phone].  Normal or non-critical lab and imaging results will be communicated to you by [employer], letter or phone within 4 business days after the clinic has received the results. If you do not hear from us within 7 days, please contact the clinic through [employer] or phone. If you have a critical or abnormal lab result, we will notify you by phone as soon as possible.  Submit refill requests through [employer] or call your pharmacy and they will forward the refill request to us. Please allow 3 business days for your refill to be completed.          Additional Information About Your Visit        MyChart Information     [employer] gives you secure access to your electronic health record. If you see a primary care provider, you can also send messages to your care team and make appointments. If you have questions, please call your primary care clinic.  If you do not have a primary care provider, please  call [phone] and they will assist you.        Care EveryWhere ID     This is your Care EveryWhere ID. This could be used by other organizations to access your [place] medical records  [id number]        Your Vitals Were     Pulse Temperature Pulse Oximetry BMI (Body Mass Index)          69 96.6  F (35.9  C) (Tympanic) 97% 33.15 kg/m2         Blood Pressure from Last 3 Encounters:   07/11/17 142/72   05/22/17 146/79   05/16/17 137/74    Weight from Last 3 Encounters:   07/11/17 231 lb (104.8 kg)   05/22/17 239 lb (108.4 kg)   05/16/17 230 lb (104.3 kg)              Today, you had the following     No orders found for display         Today's Medication Changes          These changes are accurate as of: 7/11/17  1:26 PM.  If you have any questions, ask your nurse or doctor.               These medicines have changed or have updated prescriptions.        Dose/Directions    glimepiride 1 MG tablet   Commonly known as:  AMARYL   This may have changed:  See the new instructions.   Used for:  Diabetes mellitus, type 2 (H)        TAKE ONE TABLET BY MOUTH ONCE DAILY IN THE MORNING BEFORE  BREAKFAST   Quantity:  90 tablet   Refills:  0                Primary Care Provider Office Phone # Fax #    Mata Richardson [phone]       [facility] [address] [facility] [address]        Equal Access to Services     RONAK GARCIA AH: Hadii kennedy saenz hadlorenao Sojade, waaxda luqadaha, qaybta kaalmada tay, david norton. So [facility] [phone].    ATENCIÓN: Si habla español, tiene a palacios disposición servicios gratuitos de asistencia lingüística. Quan al [phone].    We comply with applicable federal civil rights laws and [place] laws. We do not discriminate on the basis of race, color, national origin, age, disability sex, sexual orientation or gender identity.            Thank you!     Thank you for choosing [facility]  for your care. Our goal is always  to provide you with excellent care. Hearing back from our patients is one way we can continue to improve our services. Please take a few minutes to complete the written survey that you may receive in the mail after your visit with us. Thank you!             Your Updated Medication List - Protect others around you: Learn how to safely use, store and throw away your medicines at www.disposemymeds.org.          This list is accurate as of: 7/11/17  1:26 PM.  Always use your most recent med list.                   Brand Name Dispense Instructions for use Diagnosis    ACCU-CHEK ANGELI PLUS test strip   Generic drug:  blood glucose monitoring     100 each    USE ONE STRIP TO CHECH GLUCOSE ONCE DAILY    Diabetes mellitus, type 2 (H)       ADVIL PO      Take by mouth every 4 hours as needed for moderate pain        ASPIR-81 PO      Take 1 tablet by mouth daily        atenolol-chlorthalidone 50-25 MG per tablet    TENORETIC    90 tablet    TAKE 1 TABLET DAILY    Benign essential hypertension       blood glucose calibration solution     1 each    USE CONTROL SOLUTION AS DIRECTED ONCE MONTHLY TO CHECK ACCURACY OF METER    Diabetes mellitus, type 2 (H)       blood glucose monitoring lancets     102 Box    Use to test blood sugar once daily    Diabetes mellitus, type 2 (H)       dutasteride 0.5 MG capsule    AVODART    90 capsule    Take 1 capsule (0.5 mg) by mouth daily    BPH with obstruction/lower urinary tract symptoms       glimepiride 1 MG tablet    AMARYL    90 tablet    TAKE ONE TABLET BY MOUTH ONCE DAILY IN THE MORNING BEFORE  BREAKFAST    Diabetes mellitus, type 2 (H)       magnesium oxide 400 MG Caps     180 capsule    Take 400 mg by mouth 2 times daily    Hypomagnesemia       metFORMIN 500 MG 24 hr tablet    GLUCOPHAGE-XR    180 tablet    TAKE ONE TABLET BY MOUTH TWICE DAILY WITH MEALS    Type 2 diabetes mellitus without complication, without long-term current use of insulin (H)       MULTIVITAMINS PO      Take 1  tablet by mouth daily        OMEGA-3 FISH OIL PO      Take 1 g by mouth        pantoprazole 40 MG EC tablet    PROTONIX    90 tablet    TAKE 1 TABLET DAILY    Baca esophagus       potassium chloride 20 MEQ CR tablet   Generic drug:  potassium chloride SA     180 tablet    TAKE ONE TABLET BY MOUTH TWICE DAILY WITH MEALS    HTN (hypertension)       STATIN NOT PRESCRIBED (INTENTIONAL)      Please choose reason not prescribed, below    Hyperlipidemia with target LDL less than 100       STOOL SOFTENER 100 MG capsule   Generic drug:  docusate sodium      Take 1 capsule by mouth 2 times daily        traZODone 50 MG tablet    DESYREL    90 tablet    Take 1 tablet (50 mg) by mouth At Bedtime    Diagnosis unknown       VITAMIN D PO      Take 1 capsule by mouth daily        ZETIA 10 MG tablet   Generic drug:  ezetimibe     90 tablet    TAKE 1 TABLET DAILY    Hyperlipidemia LDL goal <100

## 2017-07-12 ASSESSMENT — ANXIETY QUESTIONNAIRES: GAD7 TOTAL SCORE: 0

## 2017-07-12 ASSESSMENT — PATIENT HEALTH QUESTIONNAIRE - PHQ9: SUM OF ALL RESPONSES TO PHQ QUESTIONS 1-9: 0

## 2017-07-14 ENCOUNTER — TRANSFERRED RECORDS (OUTPATIENT)
Dept: HEALTH INFORMATION MANAGEMENT | Facility: HOSPITAL | Age: 77
End: 2017-07-14

## 2017-07-17 DIAGNOSIS — E11.9 DIABETES MELLITUS, TYPE 2 (H): ICD-10-CM

## 2017-07-18 RX ORDER — BLOOD SUGAR DIAGNOSTIC
STRIP MISCELLANEOUS
Qty: 100 STRIP | Refills: 3 | Status: SHIPPED | OUTPATIENT
Start: 2017-07-18 | End: 2018-11-06

## 2017-08-01 ENCOUNTER — OFFICE VISIT (OUTPATIENT)
Dept: CARE COORDINATION | Facility: OTHER | Age: 77
End: 2017-08-01
Attending: FAMILY MEDICINE
Payer: MEDICARE

## 2017-08-01 PROCEDURE — 11750 EXCISION NAIL&NAIL MATRIX: CPT | Mod: T5 | Performed by: PODIATRIST

## 2017-08-01 NOTE — MR AVS SNAPSHOT
After Visit Summary   8/1/2017    Carrillo Carranza    MRN: [id number]           Patient Information     Date Of Birth          1940        Visit Information        Provider Department      8/1/2017 11:30 AM Sarah Lorenzo DPM [facility] [place]         Follow-ups after your visit        Your next 10 appointments already scheduled     Oct 23, 2017  8:30 AM CDT   (Arrive by 8:15 AM)   SHORT with Mata Richardson MD   [facility] [place] ([facility] [place] )    [address]  [place] [address]   [phone]              Who to contact     If you have questions or need follow up information about today's clinic visit or your schedule please contact [facility] directly at [phone].  Normal or non-critical lab and imaging results will be communicated to you by MyChart, letter or phone within 4 business days after the clinic has received the results. If you do not hear from us within 7 days, please contact the clinic through MyChart or phone. If you have a critical or abnormal lab result, we will notify you by phone as soon as possible.  Submit refill requests through [employer] or call your pharmacy and they will forward the refill request to us. Please allow 3 business days for your refill to be completed.          Additional Information About Your Visit        MyChart Information     [employer] gives you secure access to your electronic health record. If you see a primary care provider, you can also send messages to your care team and make appointments. If you have questions, please call your primary care clinic.  If you do not have a primary care provider, please call [phone] and they will assist you.        Care EveryWhere ID     This is your Care EveryWhere ID. This could be used by other organizations to access your [place] medical records  [id number]         Blood Pressure from Last 3 Encounters:   07/11/17 142/72   05/22/17 146/79   05/16/17  137/74    Weight from Last 3 Encounters:   07/11/17 104.8 kg (231 lb)   05/22/17 108.4 kg (239 lb)   05/16/17 104.3 kg (230 lb)              Today, you had the following     No orders found for display         Today's Medication Changes          These changes are accurate as of: 8/1/17 11:59 PM.  If you have any questions, ask your nurse or doctor.               These medicines have changed or have updated prescriptions.        Dose/Directions    glimepiride 1 MG tablet   Commonly known as:  AMARYL   This may have changed:  See the new instructions.   Used for:  Diabetes mellitus, type 2 (H)        TAKE ONE TABLET BY MOUTH ONCE DAILY IN THE MORNING BEFORE  BREAKFAST   Quantity:  90 tablet   Refills:  0                Primary Care Provider Office Phone # Fax #    Mata Richardson [phone]       [facility] [address] [facility] [address]        Equal Access to Services     [facility] : Hadii kennedy saenz hadasho Sojade, waaxda luqadaha, qaybta kaalmada adeegyada, david dawkins . So [facility] [phone].    ATENCIÓN: Si habla español, tiene a palacios disposición servicios gratuitos de asistencia lingüística. Quan al [phone].    We comply with applicable federal civil rights laws and [place] laws. We do not discriminate on the basis of race, color, national origin, age, disability sex, sexual orientation or gender identity.            Thank you!     Thank you for choosing [facility]  for your care. Our goal is always to provide you with excellent care. Hearing back from our patients is one way we can continue to improve our services. Please take a few minutes to complete the written survey that you may receive in the mail after your visit with us. Thank you!             Your Updated Medication List - Protect others around you: Learn how to safely use, store and throw away your medicines at www.disposemymeds.org.          This list is accurate as  of: 8/1/17 11:59 PM.  Always use your most recent med list.                   Brand Name Dispense Instructions for use Diagnosis    ACCU-CHEK ANGELI PLUS test strip   Generic drug:  blood glucose monitoring     100 strip    USE ONE STRIP ONCE DAILY    Diabetes mellitus, type 2 (H)       ADVIL PO      Take by mouth every 4 hours as needed for moderate pain        ASPIR-81 PO      Take 1 tablet by mouth daily        atenolol-chlorthalidone 50-25 MG per tablet    TENORETIC    90 tablet    TAKE 1 TABLET DAILY    Benign essential hypertension       blood glucose calibration solution     1 each    USE CONTROL SOLUTION AS DIRECTED ONCE MONTHLY TO CHECK ACCURACY OF METER    Diabetes mellitus, type 2 (H)       blood glucose monitoring lancets     102 Box    Use to test blood sugar once daily    Diabetes mellitus, type 2 (H)       dutasteride 0.5 MG capsule    AVODART    90 capsule    Take 1 capsule (0.5 mg) by mouth daily    BPH with obstruction/lower urinary tract symptoms       glimepiride 1 MG tablet    AMARYL    90 tablet    TAKE ONE TABLET BY MOUTH ONCE DAILY IN THE MORNING BEFORE  BREAKFAST    Diabetes mellitus, type 2 (H)       magnesium oxide 400 MG Caps     180 capsule    Take 400 mg by mouth 2 times daily    Hypomagnesemia       metFORMIN 500 MG 24 hr tablet    GLUCOPHAGE-XR    180 tablet    TAKE ONE TABLET BY MOUTH TWICE DAILY WITH MEALS    Type 2 diabetes mellitus without complication, without long-term current use of insulin (H)       MULTIVITAMINS PO      Take 1 tablet by mouth daily        OMEGA-3 FISH OIL PO      Take 1 g by mouth        pantoprazole 40 MG EC tablet    PROTONIX    90 tablet    TAKE 1 TABLET DAILY    Baca esophagus       potassium chloride 20 MEQ CR tablet   Generic drug:  potassium chloride SA     180 tablet    TAKE ONE TABLET BY MOUTH TWICE DAILY WITH MEALS    HTN (hypertension)       STATIN NOT PRESCRIBED (INTENTIONAL)      Please choose reason not prescribed, below    Hyperlipidemia with  target LDL less than 100       STOOL SOFTENER 100 MG capsule   Generic drug:  docusate sodium      Take 1 capsule by mouth 2 times daily        traZODone 50 MG tablet    DESYREL    90 tablet    Take 1 tablet (50 mg) by mouth At Bedtime    Diagnosis unknown       VITAMIN D PO      Take 1 capsule by mouth daily        ZETIA 10 MG tablet   Generic drug:  ezetimibe     90 tablet    TAKE 1 TABLET DAILY    Hyperlipidemia LDL goal <100

## 2017-08-15 DIAGNOSIS — E11.9 DIABETES MELLITUS, TYPE 2 (H): ICD-10-CM

## 2017-08-16 RX ORDER — GLIMEPIRIDE 1 MG/1
TABLET ORAL
Qty: 90 TABLET | Refills: 0 | Status: SHIPPED | OUTPATIENT
Start: 2017-08-16 | End: 2017-10-25

## 2017-08-27 NOTE — PROGRESS NOTES
Outpatient Physical Therapy Discharge Note     Patient: Carrillo Carranza  : 1940    Beginning/End Dates of Reporting Period:  17 to 3/7/2017    Referring Provider: Dr. Richardson    Therapy Diagnosis: cervicalgia, Sprain of neck     Client Self Report: Patient reports he is symptoms free and has been enjoying his exercises very much. New strengthening exercises are going well.         Goals:  Goal Identifier STG 1   Goal Description Patient will be independent and compliant with HEP.    Target Date 17   Date Met      Progress:     Goal Identifier LTG 1   Goal Description Patient will display pain free functional cervical motion allowing patient to turn and look without limitation of pain during driving tasks, household tasks and outdoor yardwork.    Target Date 17   Date Met      Progress:     Goal Identifier LTG 2   Goal Description Patient will be able to perform lifting, reaching and carrying tasks at prior activity level without limtiation from symptoms during or after.    Target Date 17   Date Met      Progress:     Goal Identifier LTG 3   Goal Description Patient will be able to sleep throughout the night consistently without limitation from cervical pain.    Target Date 17   Date Met      Progress:     Goal Identifier     Goal Description     Target Date     Date Met      Progress:     Goal Identifier     Goal Description     Target Date     Date Met      Progress:     Goal Identifier     Goal Description     Target Date     Date Met      Progress:     Goal Identifier     Goal Description     Target Date     Date Met      Progress:     Progress Toward Goals:   Progress this reporting period: Reviewed exercises and discussed pillows as patient had question about My Pillow. Patient is symptom free and is doing well with exercises. Recommending hold on PT services. Independent HEP.             Plan:  Discharge from therapy.    Discharge:    Reason for Discharge: Patient has  met all goals.    Equipment Issued: HEP    Discharge Plan: Patient to continue home program.

## 2017-09-12 DIAGNOSIS — R69 DIAGNOSIS UNKNOWN: ICD-10-CM

## 2017-09-12 RX ORDER — TRAZODONE HYDROCHLORIDE 50 MG/1
TABLET, FILM COATED ORAL
Qty: 90 TABLET | Refills: 3 | Status: SHIPPED | OUTPATIENT
Start: 2017-09-12 | End: 2018-09-23

## 2017-09-12 NOTE — TELEPHONE ENCOUNTER
Trazodone      Last Written Prescription Date: 3/07/2016  Last Fill Quantity: 90, # refills: 3  Last Office Visit with G, [facility] or [facility] prescribing provider: 7/11/2017  Next 5 appointments (look out 90 days)     Sep 14, 2017  2:30 PM CDT   (Arrive by 2:15 PM)   SHORT with Mata Richardson MD   [facility] ([facility]    [address] [place] [address]   [phone]            Oct 23, 2017  9:00 AM CDT   (Arrive by 8:45 AM)   SHORT with Mata Richardson MD   [facility] ([facility]    [address] [place] [address]   [phone]                   BP Readings from Last 3 Encounters:   07/11/17 142/72   05/22/17 146/79   05/16/17 137/74

## 2017-09-14 ENCOUNTER — OFFICE VISIT (OUTPATIENT)
Dept: FAMILY MEDICINE | Facility: OTHER | Age: 77
End: 2017-09-14
Attending: FAMILY MEDICINE
Payer: MEDICARE

## 2017-09-14 VITALS
SYSTOLIC BLOOD PRESSURE: 137 MMHG | WEIGHT: 233.8 LBS | OXYGEN SATURATION: 98 % | BODY MASS INDEX: 33.47 KG/M2 | DIASTOLIC BLOOD PRESSURE: 68 MMHG | TEMPERATURE: 96.8 F | HEART RATE: 67 BPM | HEIGHT: 70 IN

## 2017-09-14 DIAGNOSIS — N40.1 BPH WITH OBSTRUCTION/LOWER URINARY TRACT SYMPTOMS: ICD-10-CM

## 2017-09-14 DIAGNOSIS — R68.82 LOW LIBIDO: ICD-10-CM

## 2017-09-14 DIAGNOSIS — N13.8 BPH WITH OBSTRUCTION/LOWER URINARY TRACT SYMPTOMS: ICD-10-CM

## 2017-09-14 DIAGNOSIS — Z23 NEED FOR PROPHYLACTIC VACCINATION AND INOCULATION AGAINST INFLUENZA: Primary | ICD-10-CM

## 2017-09-14 PROCEDURE — 36415 COLL VENOUS BLD VENIPUNCTURE: CPT | Mod: ZL | Performed by: FAMILY MEDICINE

## 2017-09-14 PROCEDURE — 99212 OFFICE O/P EST SF 10 MIN: CPT | Mod: 25

## 2017-09-14 PROCEDURE — G0008 ADMIN INFLUENZA VIRUS VAC: HCPCS | Performed by: FAMILY MEDICINE

## 2017-09-14 PROCEDURE — 99213 OFFICE O/P EST LOW 20 MIN: CPT | Performed by: FAMILY MEDICINE

## 2017-09-14 PROCEDURE — 84270 ASSAY OF SEX HORMONE GLOBUL: CPT | Mod: ZL | Performed by: FAMILY MEDICINE

## 2017-09-14 PROCEDURE — 90662 IIV NO PRSV INCREASED AG IM: CPT | Performed by: FAMILY MEDICINE

## 2017-09-14 PROCEDURE — 84403 ASSAY OF TOTAL TESTOSTERONE: CPT | Mod: ZL | Performed by: FAMILY MEDICINE

## 2017-09-14 RX ORDER — TAMSULOSIN HYDROCHLORIDE 0.4 MG/1
0.4 CAPSULE ORAL DAILY
Qty: 30 CAPSULE | Refills: 11 | Status: SHIPPED | OUTPATIENT
Start: 2017-09-14 | End: 2018-09-11

## 2017-09-14 ASSESSMENT — ANXIETY QUESTIONNAIRES
6. BECOMING EASILY ANNOYED OR IRRITABLE: NOT AT ALL
GAD7 TOTAL SCORE: 0
1. FEELING NERVOUS, ANXIOUS, OR ON EDGE: NOT AT ALL
2. NOT BEING ABLE TO STOP OR CONTROL WORRYING: NOT AT ALL
3. WORRYING TOO MUCH ABOUT DIFFERENT THINGS: NOT AT ALL
4. TROUBLE RELAXING: NOT AT ALL
7. FEELING AFRAID AS IF SOMETHING AWFUL MIGHT HAPPEN: NOT AT ALL
5. BEING SO RESTLESS THAT IT IS HARD TO SIT STILL: NOT AT ALL

## 2017-09-14 ASSESSMENT — PAIN SCALES - GENERAL: PAINLEVEL: NO PAIN (0)

## 2017-09-14 ASSESSMENT — PATIENT HEALTH QUESTIONNAIRE - PHQ9: SUM OF ALL RESPONSES TO PHQ QUESTIONS 1-9: 0

## 2017-09-14 NOTE — MR AVS SNAPSHOT
After Visit Summary   9/14/2017    Carrillo Carranza    MRN: [id number]           Patient Information     Date Of Birth          1940        Visit Information        Provider Department      9/14/2017 2:30 PM Mata Richardson MD [facility] [place]        Today's Diagnoses     Need for prophylactic vaccination and inoculation against influenza    -  1    Low libido        BPH with obstruction/lower urinary tract symptoms           Follow-ups after your visit        Additional Services     UROLOGY ADULT REFERRAL       Your provider has referred you to: DR BRIDGES  only   Please be aware that coverage of these services is subject to the terms and limitations of your health insurance plan.  Call member services at your health plan with any benefit or coverage questions.      Please bring the following with you to your appointment:    (1) Any X-Rays, CTs or MRIs which have been performed.  Contact the facility where they were done to arrange for  prior to your scheduled appointment.    (2) List of current medications  (3) This referral request   (4) Any documents/labs given to you for this referral                  Your next 10 appointments already scheduled     Oct 25, 2017  8:00 AM CDT   (Arrive by 7:45 AM)   SHORT with MD Stewart Blum ([facility] [place] )    [address] [place] [address]   [phone]            Oct 25, 2017  9:00 AM CDT   (Arrive by 8:45 AM)   New Visit with MD Stewart Daniel ([facility] [place] )    [address]  [place] [address]   [phone]              Who to contact     If you have questions or need follow up information about today's clinic visit or your schedule please contact Merkel MARIJA MOONEY directly at [phone].  Normal or non-critical lab and imaging results will be communicated to you by MyChart, letter or phone within 4 business days after the  "clinic has received the results. If you do not hear from us within 7 days, please contact the clinic through ASYM III or phone. If you have a critical or abnormal lab result, we will notify you by phone as soon as possible.  Submit refill requests through ASYM III or call your pharmacy and they will forward the refill request to us. Please allow 3 business days for your refill to be completed.          Additional Information About Your Visit        ItsOnharWix Information     ASYM III gives you secure access to your electronic health record. If you see a primary care provider, you can also send messages to your care team and make appointments. If you have questions, please call your primary care clinic.  If you do not have a primary care provider, please call [phone] and they will assist you.        Care EveryWhere ID     This is your Care EveryWhere ID. This could be used by other organizations to access your [place] medical records  [id number]        Your Vitals Were     Pulse Temperature Height Pulse Oximetry BMI (Body Mass Index)       67 96.8  F (36  C) (Tympanic) 5' 10\" (1.778 m) 98% 33.55 kg/m2        Blood Pressure from Last 3 Encounters:   09/14/17 137/68   07/11/17 142/72   05/22/17 146/79    Weight from Last 3 Encounters:   09/14/17 233 lb 12.8 oz (106.1 kg)   07/11/17 231 lb (104.8 kg)   05/22/17 239 lb (108.4 kg)              We Performed the Following     HC FLU VACCINE, INCREASED ANTIGEN, PRESV FREE     Testosterone Free and Total     UROLOGY ADULT REFERRAL     Vaccine Administration, Initial [03121]          Today's Medication Changes          These changes are accurate as of: 9/14/17  2:46 PM.  If you have any questions, ask your nurse or doctor.               Start taking these medicines.        Dose/Directions    tamsulosin 0.4 MG capsule   Commonly known as:  FLOMAX   Used for:  BPH with obstruction/lower urinary tract symptoms   Started by:  Mata Richardson MD        Dose:  0.4 mg   Take 1 " capsule (0.4 mg) by mouth daily   Quantity:  30 capsule   Refills:  11         Stop taking these medicines if you haven't already. Please contact your care team if you have questions.     dutasteride 0.5 MG capsule   Commonly known as:  AVODART   Stopped by:  Mata Richardson MD                Where to get your medicines      These medications were sent to [id number] - [place], [place] - [address]   [id number] , [place] [address]     Phone:  [phone]     tamsulosin 0.4 MG capsule                Primary Care Provider Office Phone # Fax #    Mata Richardson [phone]       [facility] [address]  [place] [address]        Equal Access to Services     [facility]: Hadii aad ku hadasho Soomaali, waaxda luqadaha, qaybta kaalmada adeegyada, waxay arronin haymaxn lazaro dawkins . So [facility] [place] [facility] [phone].    ATENCIÓN: Si habla español, tiene a palacios disposición servicios gratuitos de asistencia lingüística. [facility] [phone].    We comply with applicable federal civil rights laws and [place] laws. We do not discriminate on the basis of race, color, national origin, age, disability sex, sexual orientation or gender identity.            Thank you!     Thank you for choosing [facility]  for your care. Our goal is always to provide you with excellent care. Hearing back from our patients is one way we can continue to improve our services. Please take a few minutes to complete the written survey that you may receive in the mail after your visit with us. Thank you!             Your Updated Medication List - Protect others around you: Learn how to safely use, store and throw away your medicines at www.disposemymeds.org.          This list is accurate as of: 9/14/17  2:46 PM.  Always use your most recent med list.                   Brand Name Dispense Instructions for use Diagnosis    ACCU-CHEK ANGELI PLUS test strip   Generic drug:  blood glucose monitoring      100 strip    USE ONE STRIP ONCE DAILY    Diabetes mellitus, type 2 (H)       ADVIL PO      Take by mouth every 4 hours as needed for moderate pain        ASPIR-81 PO      Take 1 tablet by mouth daily        atenolol-chlorthalidone 50-25 MG per tablet    TENORETIC    90 tablet    TAKE 1 TABLET DAILY    Benign essential hypertension       blood glucose calibration solution     1 each    USE CONTROL SOLUTION AS DIRECTED ONCE MONTHLY TO CHECK ACCURACY OF METER    Diabetes mellitus, type 2 (H)       blood glucose monitoring lancets     102 each    USE TO TEST BLOOD SUGAR ONCE DAILY    Diabetes mellitus, type 2 (H)       glimepiride 1 MG tablet    AMARYL    90 tablet    TAKE ONE TABLET BY MOUTH ONCE DAILY IN THE MORNING BEFORE BREAKFAST    Diabetes mellitus, type 2 (H)       magnesium oxide 400 MG Caps     180 capsule    Take 400 mg by mouth 2 times daily    Hypomagnesemia       metFORMIN 500 MG 24 hr tablet    GLUCOPHAGE-XR    180 tablet    TAKE ONE TABLET BY MOUTH TWICE DAILY WITH MEALS    Type 2 diabetes mellitus without complication, without long-term current use of insulin (H)       MULTIVITAMINS PO      Take 1 tablet by mouth daily        OMEGA-3 FISH OIL PO      Take 1 g by mouth        pantoprazole 40 MG EC tablet    PROTONIX    90 tablet    TAKE 1 TABLET DAILY    Baca esophagus       potassium chloride 20 MEQ CR tablet   Generic drug:  potassium chloride SA     180 tablet    TAKE ONE TABLET BY MOUTH TWICE DAILY WITH MEALS    HTN (hypertension)       STATIN NOT PRESCRIBED (INTENTIONAL)      Please choose reason not prescribed, below    Hyperlipidemia with target LDL less than 100       STOOL SOFTENER 100 MG capsule   Generic drug:  docusate sodium      Take 1 capsule by mouth 2 times daily        tamsulosin 0.4 MG capsule    FLOMAX    30 capsule    Take 1 capsule (0.4 mg) by mouth daily    BPH with obstruction/lower urinary tract symptoms       traZODone 50 MG tablet    DESYREL    90 tablet    TAKE ONE TABLET  BY MOUTH AT BEDTIME    Diagnosis unknown       VITAMIN D PO      Take 1 capsule by mouth daily        ZETIA 10 MG tablet   Generic drug:  ezetimibe     90 tablet    TAKE 1 TABLET DAILY    Hyperlipidemia LDL goal <100

## 2017-09-14 NOTE — PROGRESS NOTES
"  SUBJECTIVE:   Carrillo Carranza is a 76 year old male who presents to clinic today for the following health issues:      Prostate Problem      Duration: 5/16/17    Description (location/character/radiation): prostate problem happened after taking dutasteride     Intensity:  mild    Accompanying signs and symptoms:     History (similar episodes/previous evaluation): will discuss with doctor    Precipitating or alleviating factors: None    Therapies tried and outcome: None    Still feeling the same, has not gotten worse nor better.    Urinary stream is poor still  But still not better with low libido and decrease ejaculate.   No reason why so sudden other than starting avodart              Problem list and histories reviewed & adjusted, as indicated.  Additional history: as documented    Labs reviewed in EPIC    Reviewed and updated as needed this visit by clinical staffTobacco  Allergies  Meds  Med Hx  Surg Hx  Fam Hx  Soc Hx      Reviewed and updated as needed this visit by Provider         ROS:  C: NEGATIVE for fever, chills, change in weight  R: NEGATIVE for significant cough or SOB  CV: NEGATIVE for chest pain, palpitations or peripheral edema    OBJECTIVE:                                                    /68 (BP Location: Right arm, Patient Position: Chair, Cuff Size: Adult Large)  Pulse 67  Temp 96.8  F (36  C) (Tympanic)  Ht 5' 10\" (1.778 m)  Wt 233 lb 12.8 oz (106.1 kg)  SpO2 98%  BMI 33.55 kg/m2  Body mass index is 33.55 kg/(m^2).   GENERAL: healthy, alert, well nourished, well hydrated, no distress  ABDOMEN: soft, no tenderness, no  hepatosplenomegaly, no masses, normal bowel sounds         ASSESSMENT/PLAN:                                                    (Z23) Need for prophylactic vaccination and inoculation against influenza  (primary encounter diagnosis)  Comment: shot given   Plan: Vaccine Administration, Initial [61418],  FLU        VACCINE, INCREASED ANTIGEN, PRESV FREE      "       (R68.82) Low libido  Comment: etiology unclear WHY so sudden. 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 Stopped Avodart and now will try Flomax   Plan: tamsulosin (FLOMAX) 0.4 MG capsule, UROLOGY         ADULT REFERRAL        See what urology says.           Mata Richardson MD  [facility] [place]  Injectable Influenza Immunization Documentation    1.  Are you sick today? (Fever of 100.5 or higher on the day of the clinic)   No    2.  Have you ever had Guillain-Tyringham Syndrome within 6 weeks of an influenza vaccionation?  No    3. Do you have a life-threatening allergy to eggs?  No    4. Do you have a life-threatening allergy to a component of the vaccine? May include antibiotics, gelatin or latex.  No     5. Have you ever had a reaction to a dose of flu vaccine that needed immediate medical attention?  No     Form completed by Reyna Garcia

## 2017-09-15 ASSESSMENT — ANXIETY QUESTIONNAIRES: GAD7 TOTAL SCORE: 0

## 2017-09-19 LAB
SHBG SERPL-SCNC: 56 NMOL/L (ref 11–80)
TESTOST FREE SERPL-MCNC: 4.96 NG/DL (ref 4.7–24.4)
TESTOST SERPL-MCNC: 354 NG/DL (ref 240–950)

## 2017-10-11 DIAGNOSIS — I10 HTN (HYPERTENSION): ICD-10-CM

## 2017-10-12 NOTE — TELEPHONE ENCOUNTER
Potassium      Last Written Prescription Date: 4/11/17  Last Fill Quantity: 180, # refills: 1  Last Office Visit with [facility], [facility] or [facility] prescribing provider: 9/14/17  Next 5 appointments (look out 90 days)     Oct 25, 2017  8:00 AM CDT   (Arrive by 7:45 AM)   SHORT with Mata Richardson MD   [facility] [place] ([facility] [place] )    [address]  [place] [address]   [phone]                   Potassium   Date Value Ref Range Status   05/16/2017 3.6 3.4 - 5.3 mmol/L Final     Creatinine   Date Value Ref Range Status   05/16/2017 0.65 (L) 0.66 - 1.25 mg/dL Final     BP Readings from Last 3 Encounters:   09/14/17 137/68   07/11/17 142/72   05/22/17 146/79

## 2017-10-13 RX ORDER — POTASSIUM CHLORIDE 1500 MG/1
TABLET, EXTENDED RELEASE ORAL
Qty: 180 TABLET | Refills: 0 | Status: SHIPPED | OUTPATIENT
Start: 2017-10-13 | End: 2018-01-07

## 2017-10-16 DIAGNOSIS — K22.70 BARRETT ESOPHAGUS: ICD-10-CM

## 2017-10-16 DIAGNOSIS — E78.5 HYPERLIPIDEMIA LDL GOAL <100: ICD-10-CM

## 2017-10-19 RX ORDER — PANTOPRAZOLE SODIUM 40 MG/1
TABLET, DELAYED RELEASE ORAL
Qty: 90 TABLET | Refills: 3 | Status: SHIPPED | OUTPATIENT
Start: 2017-10-19 | End: 2018-10-22

## 2017-10-19 RX ORDER — EZETIMIBE 10 MG/1
TABLET ORAL
Qty: 90 TABLET | Refills: 0 | Status: SHIPPED | OUTPATIENT
Start: 2017-10-19 | End: 2018-01-16

## 2017-10-19 NOTE — TELEPHONE ENCOUNTER
Pt due for lipid profile December 2017, orders in [facility].     Recent Labs   Lab Test  12/12/16   0814    03/10/15   0831   CHOL  162   < >  174   TRIG  126   < >  166*   HDL  44   < >  46   LDL  93   < >  95   VLDL   --    --   33*   CHOLHDLRATIO   --    --   3.8    < > = values in this interval not displayed.                    Normal serum ALT on record in past 12 mos           Recent Labs   Lab Test  05/16/17   1327   ALT  88*                Recent or future visit with authorizing provider's specialty       Patient had office visit in the last year or has a visit in the next 30 days with authorizing provider.  See chart review.                      Patient is age 18 years or older

## 2017-10-25 ENCOUNTER — OFFICE VISIT (OUTPATIENT)
Dept: CARE COORDINATION | Facility: OTHER | Age: 77
End: 2017-10-25
Attending: FAMILY MEDICINE
Payer: MEDICARE

## 2017-10-25 ENCOUNTER — AMBULATORY - GICH (OUTPATIENT)
Dept: SCHEDULING | Facility: OTHER | Age: 77
End: 2017-10-25

## 2017-10-25 ENCOUNTER — OFFICE VISIT (OUTPATIENT)
Dept: FAMILY MEDICINE | Facility: OTHER | Age: 77
End: 2017-10-25
Attending: FAMILY MEDICINE
Payer: MEDICARE

## 2017-10-25 VITALS
HEART RATE: 68 BPM | HEIGHT: 70 IN | WEIGHT: 234 LBS | SYSTOLIC BLOOD PRESSURE: 126 MMHG | BODY MASS INDEX: 33.5 KG/M2 | TEMPERATURE: 96.5 F | DIASTOLIC BLOOD PRESSURE: 68 MMHG

## 2017-10-25 VITALS
BODY MASS INDEX: 33.5 KG/M2 | SYSTOLIC BLOOD PRESSURE: 130 MMHG | TEMPERATURE: 96.7 F | OXYGEN SATURATION: 98 % | HEIGHT: 70 IN | WEIGHT: 234 LBS | DIASTOLIC BLOOD PRESSURE: 60 MMHG | HEART RATE: 74 BPM

## 2017-10-25 DIAGNOSIS — R68.82 LOW LIBIDO: ICD-10-CM

## 2017-10-25 DIAGNOSIS — E78.5 HYPERLIPIDEMIA WITH TARGET LDL LESS THAN 100: ICD-10-CM

## 2017-10-25 DIAGNOSIS — N13.8 BPH WITH OBSTRUCTION/LOWER URINARY TRACT SYMPTOMS: ICD-10-CM

## 2017-10-25 DIAGNOSIS — R35.1 NOCTURIA: ICD-10-CM

## 2017-10-25 DIAGNOSIS — E11.9 TYPE 2 DIABETES MELLITUS WITHOUT COMPLICATION, WITHOUT LONG-TERM CURRENT USE OF INSULIN (H): Primary | ICD-10-CM

## 2017-10-25 DIAGNOSIS — I10 ESSENTIAL HYPERTENSION WITH GOAL BLOOD PRESSURE LESS THAN 140/90: ICD-10-CM

## 2017-10-25 DIAGNOSIS — K76.0 FATTY LIVER DISEASE, NONALCOHOLIC: ICD-10-CM

## 2017-10-25 DIAGNOSIS — N40.1 BPH WITH OBSTRUCTION/LOWER URINARY TRACT SYMPTOMS: ICD-10-CM

## 2017-10-25 DIAGNOSIS — J06.9 VIRAL UPPER RESPIRATORY TRACT INFECTION: ICD-10-CM

## 2017-10-25 LAB
ALBUMIN SERPL-MCNC: 3.7 G/DL (ref 3.4–5)
ALP SERPL-CCNC: 66 U/L (ref 40–150)
ALT SERPL W P-5'-P-CCNC: 81 U/L (ref 0–70)
ANION GAP SERPL CALCULATED.3IONS-SCNC: 7 MMOL/L (ref 3–14)
AST SERPL W P-5'-P-CCNC: 45 U/L (ref 0–45)
BASOPHILS # BLD AUTO: 0 10E9/L (ref 0–0.2)
BASOPHILS NFR BLD AUTO: 0.7 %
BILIRUB SERPL-MCNC: 0.4 MG/DL (ref 0.2–1.3)
BUN SERPL-MCNC: 16 MG/DL (ref 7–30)
CALCIUM SERPL-MCNC: 9 MG/DL (ref 8.5–10.1)
CHLORIDE SERPL-SCNC: 99 MMOL/L (ref 94–109)
CHOLEST SERPL-MCNC: 164 MG/DL
CO2 SERPL-SCNC: 31 MMOL/L (ref 20–32)
CREAT SERPL-MCNC: 0.74 MG/DL (ref 0.66–1.25)
DIFFERENTIAL METHOD BLD: NORMAL
EOSINOPHIL # BLD AUTO: 0.2 10E9/L (ref 0–0.7)
EOSINOPHIL NFR BLD AUTO: 3.7 %
ERYTHROCYTE [DISTWIDTH] IN BLOOD BY AUTOMATED COUNT: 12.1 % (ref 10–15)
EST. AVERAGE GLUCOSE BLD GHB EST-MCNC: 126 MG/DL
GFR SERPL CREATININE-BSD FRML MDRD: >90 ML/MIN/1.7M2
GLUCOSE SERPL-MCNC: 109 MG/DL (ref 70–99)
HBA1C MFR BLD: 6 % (ref 4.3–6)
HCT VFR BLD AUTO: 43.4 % (ref 40–53)
HDLC SERPL-MCNC: 46 MG/DL
HGB BLD-MCNC: 15.4 G/DL (ref 13.3–17.7)
IMM GRANULOCYTES # BLD: 0 10E9/L (ref 0–0.4)
IMM GRANULOCYTES NFR BLD: 0.5 %
LDLC SERPL CALC-MCNC: 94 MG/DL
LYMPHOCYTES # BLD AUTO: 1.9 10E9/L (ref 0.8–5.3)
LYMPHOCYTES NFR BLD AUTO: 32 %
MCH RBC QN AUTO: 31.4 PG (ref 26.5–33)
MCHC RBC AUTO-ENTMCNC: 35.5 G/DL (ref 31.5–36.5)
MCV RBC AUTO: 88 FL (ref 78–100)
MONOCYTES # BLD AUTO: 0.6 10E9/L (ref 0–1.3)
MONOCYTES NFR BLD AUTO: 9.2 %
NEUTROPHILS # BLD AUTO: 3.2 10E9/L (ref 1.6–8.3)
NEUTROPHILS NFR BLD AUTO: 53.9 %
NONHDLC SERPL-MCNC: 118 MG/DL
NRBC # BLD AUTO: 0 10*3/UL
NRBC BLD AUTO-RTO: 0 /100
PLATELET # BLD AUTO: 191 10E9/L (ref 150–450)
POTASSIUM SERPL-SCNC: 3.6 MMOL/L (ref 3.4–5.3)
PROT SERPL-MCNC: 7.6 G/DL (ref 6.8–8.8)
PSA SERPL-MCNC: 1.07 UG/L (ref 0–4)
RBC # BLD AUTO: 4.91 10E12/L (ref 4.4–5.9)
SODIUM SERPL-SCNC: 137 MMOL/L (ref 133–144)
TRIGL SERPL-MCNC: 120 MG/DL
TSH SERPL DL<=0.005 MIU/L-ACNC: 1.06 MU/L (ref 0.4–4)
WBC # BLD AUTO: 6 10E9/L (ref 4–11)

## 2017-10-25 PROCEDURE — 40000788 ZZHCL STATISTIC ESTIMATED AVERAGE GLUCOSE: Mod: ZL | Performed by: FAMILY MEDICINE

## 2017-10-25 PROCEDURE — 80061 LIPID PANEL: CPT | Mod: ZL | Performed by: FAMILY MEDICINE

## 2017-10-25 PROCEDURE — 99214 OFFICE O/P EST MOD 30 MIN: CPT | Performed by: FAMILY MEDICINE

## 2017-10-25 PROCEDURE — 36415 COLL VENOUS BLD VENIPUNCTURE: CPT | Mod: ZL | Performed by: FAMILY MEDICINE

## 2017-10-25 PROCEDURE — 99213 OFFICE O/P EST LOW 20 MIN: CPT | Mod: 25,27 | Performed by: UROLOGY

## 2017-10-25 PROCEDURE — 84153 ASSAY OF PSA TOTAL: CPT | Mod: ZL | Performed by: FAMILY MEDICINE

## 2017-10-25 PROCEDURE — 80053 COMPREHEN METABOLIC PANEL: CPT | Mod: ZL | Performed by: FAMILY MEDICINE

## 2017-10-25 PROCEDURE — 51798 US URINE CAPACITY MEASURE: CPT | Performed by: UROLOGY

## 2017-10-25 PROCEDURE — 99212 OFFICE O/P EST SF 10 MIN: CPT

## 2017-10-25 PROCEDURE — 83036 HEMOGLOBIN GLYCOSYLATED A1C: CPT | Mod: ZL | Performed by: FAMILY MEDICINE

## 2017-10-25 PROCEDURE — 85025 COMPLETE CBC W/AUTO DIFF WBC: CPT | Mod: ZL | Performed by: FAMILY MEDICINE

## 2017-10-25 PROCEDURE — 84443 ASSAY THYROID STIM HORMONE: CPT | Mod: ZL | Performed by: FAMILY MEDICINE

## 2017-10-25 ASSESSMENT — ANXIETY QUESTIONNAIRES
7. FEELING AFRAID AS IF SOMETHING AWFUL MIGHT HAPPEN: NOT AT ALL
6. BECOMING EASILY ANNOYED OR IRRITABLE: NOT AT ALL
GAD7 TOTAL SCORE: 0
5. BEING SO RESTLESS THAT IT IS HARD TO SIT STILL: NOT AT ALL
3. WORRYING TOO MUCH ABOUT DIFFERENT THINGS: NOT AT ALL
2. NOT BEING ABLE TO STOP OR CONTROL WORRYING: NOT AT ALL
4. TROUBLE RELAXING: NOT AT ALL
1. FEELING NERVOUS, ANXIOUS, OR ON EDGE: NOT AT ALL
IF YOU CHECKED OFF ANY PROBLEMS ON THIS QUESTIONNAIRE, HOW DIFFICULT HAVE THESE PROBLEMS MADE IT FOR YOU TO DO YOUR WORK, TAKE CARE OF THINGS AT HOME, OR GET ALONG WITH OTHER PEOPLE: NOT DIFFICULT AT ALL

## 2017-10-25 ASSESSMENT — PAIN SCALES - GENERAL
PAINLEVEL: NO PAIN (0)
PAINLEVEL: NO PAIN (0)

## 2017-10-25 ASSESSMENT — PATIENT HEALTH QUESTIONNAIRE - PHQ9: SUM OF ALL RESPONSES TO PHQ QUESTIONS 1-9: 2

## 2017-10-25 NOTE — PATIENT INSTRUCTIONS
Results for orders placed or performed in visit on 10/25/17 (from the past 24 hour(s))   CBC with platelets differential   Result Value Ref Range    WBC 6.0 4.0 - 11.0 10e9/L    RBC Count 4.91 4.4 - 5.9 10e12/L    Hemoglobin 15.4 13.3 - 17.7 g/dL    Hematocrit 43.4 40.0 - 53.0 %    MCV 88 78 - 100 fl    MCH 31.4 26.5 - 33.0 pg    MCHC 35.5 31.5 - 36.5 g/dL    RDW 12.1 10.0 - 15.0 %    Platelet Count 191 150 - 450 10e9/L    Diff Method Automated Method     % Neutrophils 53.9 %    % Lymphocytes 32.0 %    % Monocytes 9.2 %    % Eosinophils 3.7 %    % Basophils 0.7 %    % Immature Granulocytes 0.5 %    Nucleated RBCs 0 0 /100    Absolute Neutrophil 3.2 1.6 - 8.3 10e9/L    Absolute Lymphocytes 1.9 0.8 - 5.3 10e9/L    Absolute Monocytes 0.6 0.0 - 1.3 10e9/L    Absolute Eosinophils 0.2 0.0 - 0.7 10e9/L    Absolute Basophils 0.0 0.0 - 0.2 10e9/L    Abs Immature Granulocytes 0.0 0 - 0.4 10e9/L    Absolute Nucleated RBC 0.0    Comprehensive metabolic panel   Result Value Ref Range    Sodium 137 133 - 144 mmol/L    Potassium 3.6 3.4 - 5.3 mmol/L    Chloride 99 94 - 109 mmol/L    Carbon Dioxide 31 20 - 32 mmol/L    Anion Gap 7 3 - 14 mmol/L    Glucose 109 (H) 70 - 99 mg/dL    Urea Nitrogen 16 7 - 30 mg/dL    Creatinine 0.74 0.66 - 1.25 mg/dL    GFR Estimate >90 >60 mL/min/1.7m2    GFR Estimate If Black >90 >60 mL/min/1.7m2    Calcium 9.0 8.5 - 10.1 mg/dL    Bilirubin Total 0.4 0.2 - 1.3 mg/dL    Albumin 3.7 3.4 - 5.0 g/dL    Protein Total 7.6 6.8 - 8.8 g/dL    Alkaline Phosphatase 66 40 - 150 U/L    ALT 81 (H) 0 - 70 U/L    AST 45 0 - 45 U/L   PSA tumor marker   Result Value Ref Range    PSA 1.07 0 - 4 ug/L   TSH   Result Value Ref Range    TSH 1.06 0.40 - 4.00 mU/L   Hemoglobin A1c   Result Value Ref Range    Hemoglobin A1C 6.0 4.3 - 6.0 %   Lipid Profile   Result Value Ref Range    Cholesterol 164 <200 mg/dL    Triglycerides 120 <150 mg/dL    HDL Cholesterol 46 >39 mg/dL    LDL Cholesterol Calculated 94 <100 mg/dL    Non HDL  Cholesterol 118 <130 mg/dL   Estimated Average Glucose   Result Value Ref Range    Estimated Average Glucose 126 mg/dL

## 2017-10-25 NOTE — MR AVS SNAPSHOT
After Visit Summary   10/25/2017    Carrillo Carranza    MRN: [id number]           Patient Information     Date Of Birth          1940        Visit Information        Provider Department      10/25/2017 8:00 AM Mata Richardson MD [facility] [place]        Today's Diagnoses     Type 2 diabetes mellitus without complication, without long-term current use of insulin (H)    -  1    Essential hypertension with goal blood pressure less than 140/90        Hyperlipidemia with target LDL less than 100        Fatty liver disease, nonalcoholic        Nocturia          Care Instructions    Results for orders placed or performed in visit on 10/25/17 (from the past 24 hour(s))   CBC with platelets differential   Result Value Ref Range    WBC 6.0 4.0 - 11.0 10e9/L    RBC Count 4.91 4.4 - 5.9 10e12/L    Hemoglobin 15.4 13.3 - 17.7 g/dL    Hematocrit 43.4 40.0 - 53.0 %    MCV 88 78 - 100 fl    MCH 31.4 26.5 - 33.0 pg    MCHC 35.5 31.5 - 36.5 g/dL    RDW 12.1 10.0 - 15.0 %    Platelet Count 191 150 - 450 10e9/L    Diff Method Automated Method     % Neutrophils 53.9 %    % Lymphocytes 32.0 %    % Monocytes 9.2 %    % Eosinophils 3.7 %    % Basophils 0.7 %    % Immature Granulocytes 0.5 %    Nucleated RBCs 0 0 /100    Absolute Neutrophil 3.2 1.6 - 8.3 10e9/L    Absolute Lymphocytes 1.9 0.8 - 5.3 10e9/L    Absolute Monocytes 0.6 0.0 - 1.3 10e9/L    Absolute Eosinophils 0.2 0.0 - 0.7 10e9/L    Absolute Basophils 0.0 0.0 - 0.2 10e9/L    Abs Immature Granulocytes 0.0 0 - 0.4 10e9/L    Absolute Nucleated RBC 0.0    Comprehensive metabolic panel   Result Value Ref Range    Sodium 137 133 - 144 mmol/L    Potassium 3.6 3.4 - 5.3 mmol/L    Chloride 99 94 - 109 mmol/L    Carbon Dioxide 31 20 - 32 mmol/L    Anion Gap 7 3 - 14 mmol/L    Glucose 109 (H) 70 - 99 mg/dL    Urea Nitrogen 16 7 - 30 mg/dL    Creatinine 0.74 0.66 - 1.25 mg/dL    GFR Estimate >90 >60 mL/min/1.7m2    GFR Estimate If Black >90 >60 mL/min/1.7m2     Calcium 9.0 8.5 - 10.1 mg/dL    Bilirubin Total 0.4 0.2 - 1.3 mg/dL    Albumin 3.7 3.4 - 5.0 g/dL    Protein Total 7.6 6.8 - 8.8 g/dL    Alkaline Phosphatase 66 40 - 150 U/L    ALT 81 (H) 0 - 70 U/L    AST 45 0 - 45 U/L   PSA tumor marker   Result Value Ref Range    PSA 1.07 0 - 4 ug/L   TSH   Result Value Ref Range    TSH 1.06 0.40 - 4.00 mU/L   Hemoglobin A1c   Result Value Ref Range    Hemoglobin A1C 6.0 4.3 - 6.0 %   Lipid Profile   Result Value Ref Range    Cholesterol 164 <200 mg/dL    Triglycerides 120 <150 mg/dL    HDL Cholesterol 46 >39 mg/dL    LDL Cholesterol Calculated 94 <100 mg/dL    Non HDL Cholesterol 118 <130 mg/dL   Estimated Average Glucose   Result Value Ref Range    Estimated Average Glucose 126 mg/dL               Follow-ups after your visit        Your next 10 appointments already scheduled     Oct 25, 2017  9:00 AM CDT   (Arrive by 8:45 AM)   New Visit with Janes Jimenez MD   [facility] [place] ([facility] )    [address]  [place] [address]   [phone]              Who to contact     If you have questions or need follow up information about today's clinic visit or your schedule please contact [facility] directly at [phone].  Normal or non-critical lab and imaging results will be communicated to you by MyChart, letter or phone within 4 business days after the clinic has received the results. If you do not hear from us within 7 days, please contact the clinic through Settlehart or phone. If you have a critical or abnormal lab result, we will notify you by phone as soon as possible.  Submit refill requests through EverTrue or call your pharmacy and they will forward the refill request to us. Please allow 3 business days for your refill to be completed.          Additional Information About Your Visit        SettleharAstrapi Information     EverTrue gives you secure access to your electronic health record. If you see a primary care provider, you can also  "send messages to your care team and make appointments. If you have questions, please call your primary care clinic.  If you do not have a primary care provider, please call [phone] and they will assist you.        Care EveryWhere ID     This is your Care EveryWhere ID. This could be used by other organizations to access your [place] medical records  [id number]        Your Vitals Were     Pulse Temperature Height BMI (Body Mass Index)          68 96.5  F (35.8  C) 5' 10\" (1.778 m) 33.58 kg/m2         Blood Pressure from Last 3 Encounters:   10/25/17 126/68   09/14/17 137/68   07/11/17 142/72    Weight from Last 3 Encounters:   10/25/17 234 lb (106.1 kg)   09/14/17 233 lb 12.8 oz (106.1 kg)   07/11/17 231 lb (104.8 kg)              We Performed the Following     CBC with platelets differential     Comprehensive metabolic panel     Estimated Average Glucose     Hemoglobin A1c     Lipid Profile     PSA tumor marker     TSH        Primary Care Provider Office Phone # Fax #    Mata Richardson [phone]       [facility] [address]  [facility] [address]        Equal Access to Services     YA[facility] GARCIA [facility]: Hadii aad ku hadasho Sodixonali, waaxda luqadaha, qaybta kaalmada adeegyada, david kimn lazaro norton. So [facility] [phone].    ATENCIÓN: Si habla español, tiene a palacios disposición servicios gratuitos de asistencia lingüística. Ll[facility] Doctors [facility] [phone].    We comply with applicable federal civil rights laws and [place] laws. We do not discriminate on the basis of race, color, national origin, age, disability, sex, sexual orientation, or gender identity.            Thank you!     Thank you for choosing [facility]  for your care. Our goal is always to provide you with excellent care. Hearing back from our patients is one way we can continue to improve our services. Please take a few minutes to complete the written survey that you may receive in the mail after " your visit with us. Thank you!             Your Updated Medication List - Protect others around you: Learn how to safely use, store and throw away your medicines at www.disposemymeds.org.          This list is accurate as of: 10/25/17  8:45 AM.  Always use your most recent med list.                   Brand Name Dispense Instructions for use Diagnosis    ACCU-CHEK ANGELI PLUS test strip   Generic drug:  blood glucose monitoring     100 strip    USE ONE STRIP ONCE DAILY    Diabetes mellitus, type 2 (H)       ADVIL PO      Take by mouth every 4 hours as needed for moderate pain        ASPIR-81 PO      Take 1 tablet by mouth daily        atenolol-chlorthalidone 50-25 MG per tablet    TENORETIC    90 tablet    TAKE 1 TABLET DAILY    Benign essential hypertension       blood glucose calibration solution     1 each    USE CONTROL SOLUTION AS DIRECTED ONCE MONTHLY TO CHECK ACCURACY OF METER    Diabetes mellitus, type 2 (H)       blood glucose monitoring lancets     102 each    USE TO TEST BLOOD SUGAR ONCE DAILY    Diabetes mellitus, type 2 (H)       ezetimibe 10 MG tablet    ZETIA    90 tablet    TAKE 1 TABLET DAILY    Hyperlipidemia LDL goal <100       glimepiride 1 MG tablet    AMARYL    90 tablet    TAKE ONE TABLET BY MOUTH ONCE DAILY IN THE MORNING BEFORE BREAKFAST    Diabetes mellitus, type 2 (H)       magnesium oxide 400 MG Caps     180 capsule    Take 400 mg by mouth 2 times daily    Hypomagnesemia       metFORMIN 500 MG 24 hr tablet    GLUCOPHAGE-XR    180 tablet    TAKE ONE TABLET BY MOUTH TWICE DAILY WITH MEALS    Type 2 diabetes mellitus without complication, without long-term current use of insulin (H)       MULTIVITAMINS PO      Take 1 tablet by mouth daily        OMEGA-3 FISH OIL PO      Take 1 g by mouth        pantoprazole 40 MG EC tablet    PROTONIX    90 tablet    TAKE 1 TABLET DAILY    Baca esophagus       potassium chloride SA 20 MEQ CR tablet    K-DUR/KLOR-CON M    180 tablet    TAKE ONE TABLET BY  MOUTH TWICE DAILY WITH MEALS    HTN (hypertension)       STATIN NOT PRESCRIBED (INTENTIONAL)      Please choose reason not prescribed, below    Hyperlipidemia with target LDL less than 100       STOOL SOFTENER 100 MG capsule   Generic drug:  docusate sodium      Take 1 capsule by mouth 2 times daily        tamsulosin 0.4 MG capsule    FLOMAX    30 capsule    Take 1 capsule (0.4 mg) by mouth daily    BPH with obstruction/lower urinary tract symptoms       traZODone 50 MG tablet    DESYREL    90 tablet    TAKE ONE TABLET BY MOUTH AT BEDTIME    Diagnosis unknown       VITAMIN D PO      Take 1 capsule by mouth daily

## 2017-10-25 NOTE — PROGRESS NOTES
I was asked to see this patient by Dr Richardson and provide my opinion about the following:  Nocturia.    Type of Visit  Consult    Chief Complaint  Nocturia    HPI  Mr. Carranza is a 76 year old male who presents with weak stream and nocturia.  His urinary complaints started about 6 months ago.  He started dutasteride about 1-2 months ago.  Developed ED and discontinued.  He then started Flomax 3 weeks ago and he is very satisfied with the results.  Nocturia down from 5 to 2.  He is having retrograde ejaculation.    He has not had previous prostate procedures.  He has not had catheters in the past due to urinary retention.    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); Contact dermatitis and other eczema, due to unspecified cause (3/1/2011); Dermatophytosis of groin and perianal area (2/1/2006); Esophageal reflux (11/3/2011); Family history of colonic polyps (11/3/2011); Fatty liver disease, nonalcoholic; Generalized osteoarthrosis, involving multiple sites (3/1/2011); HTN (hypertension); Hyperlipidaemia LDL goal < 100; Other and unspecified hyperlipidemia (11/3/2011); Other chronic nonalcoholic liver disease (11/3/2011); Other premature beats (11/3/2011); Other specified cardiac dysrhythmias(427.89) (11/3/2011); Personal history of tobacco use, presenting hazards to health (11/3/2011); Type II or unspecified type diabetes mellitus without mention of complication, not stated as uncontrolled (2/7/2012); Umbilical hernia without mention of obstruction or gangrene (11/3/2011); Unspecified diffuse connective tissue disease (1/29/2013); and Unspecified essential hypertension (11/3/2011).  Patient Active Problem List   Diagnosis     Diffuse connective tissue disease (H)     Fatty liver disease, nonalcoholic     Advanced care planning/counseling discussion     Hyperlipidemia with target LDL less than 100     Hypokalemia     BPPV (benign paroxysmal positional  vertigo)     Hypomagnesemia     Nocturia     Essential hypertension with goal blood pressure less than 140/90     Type 2 diabetes mellitus without complication, without long-term current use of insulin (H)     BPH with obstruction/lower urinary tract symptoms     Low libido       Past Surgical History  He  has a past surgical history that includes Release carpal tunnel; arthroscopy right great toe; upper gi endoscopy (2012); Adenoidectomy; colonoscopy (02-); TKA; upper gi endoscopy; Tonsillectomy; appendectomy; upper gi endoscopy; colonoscopy (2010,2006); hernia repair, umbilical (2012); shoulder bone spur removal; joint replacement; colonoscopy (8-); upper gi endoscopy (2010); Arthroscopy knee; examegd; examegd (2015); and colonoscopy (2015).    Medications  He has a current medication list which includes the following prescription(s): pantoprazole, ezetimibe, potassium chloride sa, tamsulosin, trazodone, blood glucose monitoring, accu-chek charles plus, atenolol-chlorthalidone, STATIN NOT PRESCRIBED, INTENTIONAL,, metformin, omega-3 fatty acids, blood glucose calibration, magnesium oxide, aspirin, ibuprofen, multiple vitamin, cholecalciferol, and docusate sodium.    Allergies  Allergies   Allergen Reactions     Atorvastatin Calcium Other (See Comments)     Lipitor - myalgia     Avodart [Dutasteride] Other (See Comments)     Sexual dsfxn     Simvastatin Other (See Comments)     Zocor - elevated liver function test       Social History  He  reports that he has quit smoking. His smoking use included Cigarettes. He has never used smokeless tobacco. He reports that he does not drink alcohol or use illicit drugs.  No drug abuse.    Family History  Family History   Problem Relation Age of Onset     CEREBROVASCULAR DISEASE Maternal Grandmother      CVA     Heart Failure Father        Review of Systems  I personally reviewed the ROS with the patient.    Nursing Notes:   Teresa Montana LPN  10/25/2017  9:05 AM   "Signed  Chief Complaint   Patient presents with     Consult     BPH with obstruction, LUTS, low libido, Dr. Richardson referring.       Initial /60 (BP Location: Left arm, Patient Position: Chair, Cuff Size: Adult Large)  Pulse 74  Temp 96.7  F (35.9  C) (Tympanic)  Ht 5' 10\" (1.778 m)  Wt 234 lb (106.1 kg)  SpO2 98%  BMI 33.58 kg/m2 Estimated body mass index is 33.58 kg/(m^2) as calculated from the following:    Height as of this encounter: 5' 10\" (1.778 m).    Weight as of this encounter: 234 lb (106.1 kg).  Medication Reconciliation: complete   LORENE VERAS    Review of Systems:    Weight loss:    No     Recent fever/chills:  No   Night sweats:   No  Current skin rash:  No   Recent hair loss:  yes  Heat intolerance:  No   Cold intolerance:  No  Chest pain:   No   Palpitations:   No  Shortness of breath:  No   Wheezing:   No  Constipation:    No   Diarrhea:   No   Nausea:   No   Vomiting:   No   Kidney/side pain:  No   Back pain:   No  Frequent headaches:  No   Dizziness:     No  Leg swelling:   No   Calf pain:    No    Parents, brothers or sisters with history of kidney cancer:   No  Parents, brothers or sisters with history of bladder cancer: No  Father or brother with history of prostate cancer:  No  LORENE VERAS          Physical Exam  Vitals:    10/25/17 0859   BP: 130/60   BP Location: Left arm   Patient Position: Chair   Cuff Size: Adult Large   Pulse: 74   Temp: 96.7  F (35.9  C)   TempSrc: Tympanic   SpO2: 98%   Weight: 234 lb (106.1 kg)   Height: 5' 10\" (1.778 m)     Constitutional: No acute distress.  Alert and cooperative   Head: NCAT  Eyes: Conjunctivae normal  Cardiovascular: Regular rate.  Pulmonary/Chest: Respirations are even and non-labored bilaterally, no audible wheezing  Abdominal: Soft. No distension, tenderness, masses or guarding.   Neurological: A + O x 3.  Cranial Nerves II-XII grossly intact.  Extremities: STEPHANIE x 4, Warm. No clubbing.  No cyanosis.    Skin: Pink, warm and dry.  " No visible rashes noted.  Psychiatric:  Normal mood and affect  Back:  No left CVA tenderness.  No right CVA tenderness.  Genitourinary:  Nonpalpable bladder    Labs  Results for ELIZABETH BRIZUELA (MRN [id number]) as of 10/25/2017 09:08   5/16/2017 13:27 10/25/2017 07:42   PSA 1.21 1.07       Results for orders placed or performed in visit on 10/25/17   CBC with platelets differential   Result Value Ref Range    WBC 6.0 4.0 - 11.0 10e9/L    RBC Count 4.91 4.4 - 5.9 10e12/L    Hemoglobin 15.4 13.3 - 17.7 g/dL    Hematocrit 43.4 40.0 - 53.0 %    MCV 88 78 - 100 fl    MCH 31.4 26.5 - 33.0 pg    MCHC 35.5 31.5 - 36.5 g/dL    RDW 12.1 10.0 - 15.0 %    Platelet Count 191 150 - 450 10e9/L    Diff Method Automated Method     % Neutrophils 53.9 %    % Lymphocytes 32.0 %    % Monocytes 9.2 %    % Eosinophils 3.7 %    % Basophils 0.7 %    % Immature Granulocytes 0.5 %    Nucleated RBCs 0 0 /100    Absolute Neutrophil 3.2 1.6 - 8.3 10e9/L    Absolute Lymphocytes 1.9 0.8 - 5.3 10e9/L    Absolute Monocytes 0.6 0.0 - 1.3 10e9/L    Absolute Eosinophils 0.2 0.0 - 0.7 10e9/L    Absolute Basophils 0.0 0.0 - 0.2 10e9/L    Abs Immature Granulocytes 0.0 0 - 0.4 10e9/L    Absolute Nucleated RBC 0.0    Comprehensive metabolic panel   Result Value Ref Range    Sodium 137 133 - 144 mmol/L    Potassium 3.6 3.4 - 5.3 mmol/L    Chloride 99 94 - 109 mmol/L    Carbon Dioxide 31 20 - 32 mmol/L    Anion Gap 7 3 - 14 mmol/L    Glucose 109 (H) 70 - 99 mg/dL    Urea Nitrogen 16 7 - 30 mg/dL    Creatinine 0.74 0.66 - 1.25 mg/dL    GFR Estimate >90 >60 mL/min/1.7m2    GFR Estimate If Black >90 >60 mL/min/1.7m2    Calcium 9.0 8.5 - 10.1 mg/dL    Bilirubin Total 0.4 0.2 - 1.3 mg/dL    Albumin 3.7 3.4 - 5.0 g/dL    Protein Total 7.6 6.8 - 8.8 g/dL    Alkaline Phosphatase 66 40 - 150 U/L    ALT 81 (H) 0 - 70 U/L    AST 45 0 - 45 U/L   PSA tumor marker   Result Value Ref Range    PSA 1.07 0 - 4 ug/L   TSH   Result Value Ref Range    TSH 1.06 0.40 - 4.00  mU/L   Hemoglobin A1c   Result Value Ref Range    Hemoglobin A1C 6.0 4.3 - 6.0 %   Lipid Profile   Result Value Ref Range    Cholesterol 164 <200 mg/dL    Triglycerides 120 <150 mg/dL    HDL Cholesterol 46 >39 mg/dL    LDL Cholesterol Calculated 94 <100 mg/dL    Non HDL Cholesterol 118 <130 mg/dL   Estimated Average Glucose   Result Value Ref Range    Estimated Average Glucose 126 mg/dL       Post-Void Residual  A post-void residual was measured by ultrasonic bladder scanner.  35 mL    Assessment  Mr. Carranza is a 76 year old male who presents with BPH with nocturia.  He seems to be doing well with Flomax.  I reassured him that retrograde ejaculation is an expected side effect of an alpha blocker and there is no medical concern with this issue.    Plan  Continue Flomax  Follow up with me prn

## 2017-10-25 NOTE — NURSING NOTE
"Chief Complaint   Patient presents with     Diabetes     URI       Initial /68  Pulse 68  Temp 96.5  F (35.8  C)  Ht 5' 10\" (1.778 m)  Wt 234 lb (106.1 kg)  BMI 33.58 kg/m2 Estimated body mass index is 33.58 kg/(m^2) as calculated from the following:    Height as of this encounter: 5' 10\" (1.778 m).    Weight as of this encounter: 234 lb (106.1 kg).  Medication Reconciliation: complete     Jerome Fitzgerald      "

## 2017-10-25 NOTE — PROGRESS NOTES
SUBJECTIVE:   Carrillo Carranza is a 76 year old male who presents to clinic today for the following health issues:      Diabetes Follow-up    Patient is checking blood sugars: every other day.  Results are as follows:       am -     Diabetic concerns: None     Symptoms of hypoglycemia (low blood sugar): none     Paresthesias (numbness or burning in feet) or sores: No     Date of last diabetic eye exam: 7/14/17    Did have possible hypoglycemia yesterday in [place] with some blurry vision and headache sx.     Hyperlipidemia Follow-Up      Rate your low fat/cholesterol diet?: fair    Taking statin?  No    Other lipid medications/supplements?:  Zetia, without side effects    Fish oil/Omega 3, dose 1200 without side effects    Hypertension Follow-up      Outpatient blood pressures are not being checked.  Low Salt Diet: low salt      Amount of exercise or physical activity: 2-3 days/week for an average of 15-30 minutes - [place]     Problems taking medications regularly: No    Medication side effects: none    Diet: regular (no restrictions)    RESPIRATORY SYMPTOMS      Duration: 2 weeks    Description  nasal congestion, rhinorrhea, cough, fatigue/malaise and hoarse voice    Severity: moderate    Accompanying signs and symptoms: None    History (predisposing factors):  none    Precipitating or alleviating factors: None    Therapies tried and outcome:  none    URI that is not getting any better. Started with coughing 2 weeks ago - has been continuous since then. Will cough so hard he gets dizzy. Coughing up phlegm (yellow mucous). Has had runny nose. No fevers or chills, no diarrhea or vomiting, no ear pain, no sore throat. Struggling to sleep since coughing wakes him up - has been better the last two nights. Coughing would improve during the day and get worse at night again. Has been eating soup, drinking orange juice, and doing supportive cares  and nothing has been working.  "No sick contacts.       Problem list and histories reviewed & adjusted, as indicated.  Additional history: as documented      Reviewed and updated as needed this visit by clinical staffTobacco  Allergies  Meds  Med Hx  Surg Hx  Fam Hx  Soc Hx      Reviewed and updated as needed this visit by Provider       ROS:  C: NEGATIVE for fever, chills, change in weight  E/M: NEGATIVE for ear, mouth and throat problems  R: NEGATIVE for SOB. POSITIVE for cough.  CV: NEGATIVE for chest pain, palpitations or peripheral edema    OBJECTIVE:                                                    /68  Pulse 68  Temp 96.5  F (35.8  C)  Ht 5' 10\" (1.778 m)  Wt 234 lb (106.1 kg)  BMI 33.58 kg/m2  Body mass index is 33.58 kg/(m^2).     GENERAL: healthy, alert, well nourished, well hydrated, no distress  HENT: ear canals- normal; TMs- normal; Nose- normal; Mouth- no ulcers, no lesions  NECK: no tenderness, no adenopathy, no asymmetry, no masses, no stiffness; thyroid- normal to palpation  RESP: lungs clear to auscultation - no rales, no rhonchi, no wheezes. Does have diminished breath sounds.   CV: regular rates and rhythm, normal S1 S2, no S3 or S4 and no murmur, no click or rub    Results for orders placed or performed in visit on 10/25/17   CBC with platelets differential   Result Value Ref Range    WBC 6.0 4.0 - 11.0 10e9/L    RBC Count 4.91 4.4 - 5.9 10e12/L    Hemoglobin 15.4 13.3 - 17.7 g/dL    Hematocrit 43.4 40.0 - 53.0 %    MCV 88 78 - 100 fl    MCH 31.4 26.5 - 33.0 pg    MCHC 35.5 31.5 - 36.5 g/dL    RDW 12.1 10.0 - 15.0 %    Platelet Count 191 150 - 450 10e9/L    Diff Method Automated Method     % Neutrophils 53.9 %    % Lymphocytes 32.0 %    % Monocytes 9.2 %    % Eosinophils 3.7 %    % Basophils 0.7 %    % Immature Granulocytes 0.5 %    Nucleated RBCs 0 0 /100    Absolute Neutrophil 3.2 1.6 - 8.3 10e9/L    Absolute Lymphocytes 1.9 0.8 - 5.3 10e9/L    Absolute Monocytes 0.6 0.0 - 1.3 10e9/L    Absolute " Eosinophils 0.2 0.0 - 0.7 10e9/L    Absolute Basophils 0.0 0.0 - 0.2 10e9/L    Abs Immature Granulocytes 0.0 0 - 0.4 10e9/L    Absolute Nucleated RBC 0.0    Comprehensive metabolic panel   Result Value Ref Range    Sodium 137 133 - 144 mmol/L    Potassium 3.6 3.4 - 5.3 mmol/L    Chloride 99 94 - 109 mmol/L    Carbon Dioxide 31 20 - 32 mmol/L    Anion Gap 7 3 - 14 mmol/L    Glucose 109 (H) 70 - 99 mg/dL    Urea Nitrogen 16 7 - 30 mg/dL    Creatinine 0.74 0.66 - 1.25 mg/dL    GFR Estimate >90 >60 mL/min/1.7m2    GFR Estimate If Black >90 >60 mL/min/1.7m2    Calcium 9.0 8.5 - 10.1 mg/dL    Bilirubin Total 0.4 0.2 - 1.3 mg/dL    Albumin 3.7 3.4 - 5.0 g/dL    Protein Total 7.6 6.8 - 8.8 g/dL    Alkaline Phosphatase 66 40 - 150 U/L    ALT 81 (H) 0 - 70 U/L    AST 45 0 - 45 U/L   PSA tumor marker   Result Value Ref Range    PSA 1.07 0 - 4 ug/L   TSH   Result Value Ref Range    TSH 1.06 0.40 - 4.00 mU/L   Hemoglobin A1c   Result Value Ref Range    Hemoglobin A1C 6.0 4.3 - 6.0 %   Lipid Profile   Result Value Ref Range    Cholesterol 164 <200 mg/dL    Triglycerides 120 <150 mg/dL    HDL Cholesterol 46 >39 mg/dL    LDL Cholesterol Calculated 94 <100 mg/dL    Non HDL Cholesterol 118 <130 mg/dL   Estimated Average Glucose   Result Value Ref Range    Estimated Average Glucose 126 mg/dL          ASSESSMENT/PLAN:                                                    1. Type 2 diabetes mellitus without complication, without long-term current use of insulin (H)  Doing well. A1C down from 6.5 to 6. Some concern for hypoglycemia. Will d/c glimepiride. F/u in 3-4 months.   - CBC with platelets differential  - Comprehensive metabolic panel  - TSH  - Hemoglobin A1c    2. Essential hypertension with goal blood pressure less than 140/90  Stable. No concerns.   - CBC with platelets differential  - Comprehensive metabolic panel    3. Hyperlipidemia with target LDL less than 100  Doing well, labs wnl. Not currently on a statin - takes fish oil  and ezetimibe.  - CBC with platelets differential  - Comprehensive metabolic panel  - Lipid Profile    4. Fatty liver disease, nonalcoholic  Stable. ALT mildly elevated, AST wnl.   - CBC with platelets differential  - Comprehensive metabolic panel    5. Nocturia  PSA wnl.   - PSA tumor marker    6. Viral Upper Respiratory Tract Infection  Sx seem to resolving with lingering cough and some excess mucous. Recommended continuing supportive cares and trying Mucinex. Instructed to f/u if worsening. Symptomatic treatment was discussed along when patient should call and/or come back into the clinic or go to ER/Urgent care. All questions answered.   - Supportive care and Mucinex.       See Patient Instructions    Mata Richardson MD  [facility]

## 2017-10-25 NOTE — MR AVS SNAPSHOT
After Visit Summary   10/25/2017    Carrillo Carranza    MRN: [id number]           Patient Information     Date Of Birth          1940        Visit Information        Provider Department      10/25/2017 9:00 AM Janes Jimenez MD [facility]        Today's Diagnoses     BPH with obstruction/lower urinary tract symptoms        Low libido           Follow-ups after your visit        Your next 10 appointments already scheduled     Feb 23, 2018  8:30 AM CST   (Arrive by 8:15 AM)   SHORT with Mata Richardson MD   [facility] [place] ([facility] [place] )    [address]  [place] [address]   [phone]              Who to contact     If you have questions or need follow up information about today's clinic visit or your schedule please contact [facility] directly at [phone].  Normal or non-critical lab and imaging results will be communicated to you by MyChart, letter or phone within 4 business days after the clinic has received the results. If you do not hear from us within 7 days, please contact the clinic through MyChart or phone. If you have a critical or abnormal lab result, we will notify you by phone as soon as possible.  Submit refill requests through [employer] or call your pharmacy and they will forward the refill request to us. Please allow 3 business days for your refill to be completed.          Additional Information About Your Visit        MyChart Information     [employer] gives you secure access to your electronic health record. If you see a primary care provider, you can also send messages to your care team and make appointments. If you have questions, please call your primary care clinic.  If you do not have a primary care provider, please call [phone] and they will assist you.        Care EveryWhere ID     This is your Care EveryWhere ID. This could be used by other organizations to access your [facility]  "records  [id number]        Your Vitals Were     Pulse Temperature Height Pulse Oximetry BMI (Body Mass Index)       74 96.7  F (35.9  C) (Tympanic) 1.778 m (5' 10\") 98% 33.58 kg/m2        Blood Pressure from Last 3 Encounters:   10/25/17 130/60   10/25/17 126/68   09/14/17 137/68    Weight from Last 3 Encounters:   10/25/17 106.1 kg (234 lb)   10/25/17 106.1 kg (234 lb)   09/14/17 106.1 kg (233 lb 12.8 oz)              Today, you had the following     No orders found for display         Today's Medication Changes          These changes are accurate as of: 10/25/17  9:15 AM.  If you have any questions, ask your nurse or doctor.               Stop taking these medicines if you haven't already. Please contact your care team if you have questions.     glimepiride 1 MG tablet   Commonly known as:  AMARYL   Stopped by:  Mata Richardson MD                    Primary Care Provider Office Phone # Fax #    Mata Richardson [phone]       [facility] [address] [facility] [address]        Equal Access to Services     [facility] AH: Hadii kennedy luo Sodixonali, waaxda luqadaha, qaybta kaalmada adeegyada, david norton. So [facility] [phone].    ATENCIÓN: Si habla español, tiene a palacios disposición servicios gratuitos de asistencia lingüística. Llame al [phone].    We comply with applicable federal civil rights laws and [place] laws. We do not discriminate on the basis of race, color, national origin, age, disability, sex, sexual orientation, or gender identity.            Thank you!     Thank you for choosing [facility]  for your care. Our goal is always to provide you with excellent care. Hearing back from our patients is one way we can continue to improve our services. Please take a few minutes to complete the written survey that you may receive in the mail after your visit with us. Thank you!             Your Updated Medication List - Protect " others around you: Learn how to safely use, store and throw away your medicines at www.disposemymeds.org.          This list is accurate as of: 10/25/17  9:15 AM.  Always use your most recent med list.                   Brand Name Dispense Instructions for use Diagnosis    ACCU-CHEK ANGELI PLUS test strip   Generic drug:  blood glucose monitoring     100 strip    USE ONE STRIP ONCE DAILY    Diabetes mellitus, type 2 (H)       ADVIL PO      Take by mouth every 4 hours as needed for moderate pain        ASPIR-81 PO      Take 1 tablet by mouth daily        atenolol-chlorthalidone 50-25 MG per tablet    TENORETIC    90 tablet    TAKE 1 TABLET DAILY    Benign essential hypertension       blood glucose calibration solution     1 each    USE CONTROL SOLUTION AS DIRECTED ONCE MONTHLY TO CHECK ACCURACY OF METER    Diabetes mellitus, type 2 (H)       blood glucose monitoring lancets     102 each    USE TO TEST BLOOD SUGAR ONCE DAILY    Diabetes mellitus, type 2 (H)       ezetimibe 10 MG tablet    ZETIA    90 tablet    TAKE 1 TABLET DAILY    Hyperlipidemia LDL goal <100       magnesium oxide 400 MG Caps     180 capsule    Take 400 mg by mouth 2 times daily    Hypomagnesemia       metFORMIN 500 MG 24 hr tablet    GLUCOPHAGE-XR    180 tablet    TAKE ONE TABLET BY MOUTH TWICE DAILY WITH MEALS    Type 2 diabetes mellitus without complication, without long-term current use of insulin (H)       MULTIVITAMINS PO      Take 1 tablet by mouth daily        OMEGA-3 FISH OIL PO      Take 1 g by mouth        pantoprazole 40 MG EC tablet    PROTONIX    90 tablet    TAKE 1 TABLET DAILY    Baca esophagus       potassium chloride SA 20 MEQ CR tablet    K-DUR/KLOR-CON M    180 tablet    TAKE ONE TABLET BY MOUTH TWICE DAILY WITH MEALS    HTN (hypertension)       STATIN NOT PRESCRIBED (INTENTIONAL)      Please choose reason not prescribed, below    Hyperlipidemia with target LDL less than 100       STOOL SOFTENER 100 MG capsule   Generic drug:   docusate sodium      Take 1 capsule by mouth 2 times daily        tamsulosin 0.4 MG capsule    FLOMAX    30 capsule    Take 1 capsule (0.4 mg) by mouth daily    BPH with obstruction/lower urinary tract symptoms       traZODone 50 MG tablet    DESYREL    90 tablet    TAKE ONE TABLET BY MOUTH AT BEDTIME    Diagnosis unknown       VITAMIN D PO      Take 1 capsule by mouth daily

## 2017-10-25 NOTE — NURSING NOTE
"Chief Complaint   Patient presents with     Consult     BPH with obstruction, LUTS, low libido, Dr. Richardson referring.       Initial /60 (BP Location: Left arm, Patient Position: Chair, Cuff Size: Adult Large)  Pulse 74  Temp 96.7  F (35.9  C) (Tympanic)  Ht 5' 10\" (1.778 m)  Wt 234 lb (106.1 kg)  SpO2 98%  BMI 33.58 kg/m2 Estimated body mass index is 33.58 kg/(m^2) as calculated from the following:    Height as of this encounter: 5' 10\" (1.778 m).    Weight as of this encounter: 234 lb (106.1 kg).  Medication Reconciliation: complete   LORENE VERAS    Review of Systems:    Weight loss:    No     Recent fever/chills:  No   Night sweats:   No  Current skin rash:  No   Recent hair loss:  yes  Heat intolerance:  No   Cold intolerance:  No  Chest pain:   No   Palpitations:   No  Shortness of breath:  No   Wheezing:   No  Constipation:    No   Diarrhea:   No   Nausea:   No   Vomiting:   No   Kidney/side pain:  No   Back pain:   No  Frequent headaches:  No   Dizziness:     No  Leg swelling:   No   Calf pain:    No    Parents, brothers or sisters with history of kidney cancer:   No  Parents, brothers or sisters with history of bladder cancer: No  Father or brother with history of prostate cancer:  No  LORENE VERAS        "

## 2017-10-26 ASSESSMENT — ANXIETY QUESTIONNAIRES: GAD7 TOTAL SCORE: 0

## 2017-11-10 ENCOUNTER — TELEPHONE (OUTPATIENT)
Dept: FAMILY MEDICINE | Facility: OTHER | Age: 77
End: 2017-11-10

## 2017-11-10 NOTE — TELEPHONE ENCOUNTER
Patient going to take a half a tablet over weekend of Glimepride, has not been eating anything that should of brought sugars up.

## 2017-11-10 NOTE — TELEPHONE ENCOUNTER
Andrew states that at his last appointment Dr Richardson took him off Glimepiride 1 mg.  His blood sugars jumped to 150 and would like a call back from the nurse please at [phone].  Thank you

## 2017-11-16 ENCOUNTER — DOCUMENTATION ONLY (OUTPATIENT)
Dept: VASCULAR SURGERY | Facility: CLINIC | Age: 77
End: 2017-11-16

## 2017-11-16 DIAGNOSIS — Z13.6 ENCOUNTER FOR ABDOMINAL AORTIC ANEURYSM (AAA) SCREENING: Primary | ICD-10-CM

## 2017-11-16 DIAGNOSIS — Z87.891 FORMER TOBACCO USE: Primary | ICD-10-CM

## 2017-11-26 DIAGNOSIS — I10 BENIGN ESSENTIAL HYPERTENSION: ICD-10-CM

## 2017-11-30 RX ORDER — ATENOLOL AND CHLORTHALIDONE TABLET 50; 25 MG/1; MG/1
TABLET ORAL
Qty: 90 TABLET | Refills: 3 | Status: SHIPPED | OUTPATIENT
Start: 2017-11-30 | End: 2018-12-05

## 2017-12-10 DIAGNOSIS — E11.9 DIABETES MELLITUS, TYPE 2 (H): ICD-10-CM

## 2017-12-12 NOTE — TELEPHONE ENCOUNTER
glimepiride (AMARYL) 1 MG tablet        Last Written Prescription Date: 08/16/17  Last Fill Quantity: 90,  # refills: 0   Last Office Visit with G, P or [facility] prescribing provider: 10/25/17                                         Next 5 appointments (look out 90 days)     Feb 23, 2018  8:30 AM CST   (Arrive by 8:15 AM)   SHORT with Mata Richardson MD   [facility] [place] ([facility] [place] [facility]    [address] [place] [address]   [phone]               Medication has been discontinued off medication list.

## 2017-12-13 ENCOUNTER — TELEPHONE (OUTPATIENT)
Dept: FAMILY MEDICINE | Facility: OTHER | Age: 77
End: 2017-12-13

## 2017-12-13 DIAGNOSIS — E11.21 TYPE 2 DIABETES MELLITUS WITH DIABETIC NEPHROPATHY, WITHOUT LONG-TERM CURRENT USE OF INSULIN (H): ICD-10-CM

## 2017-12-13 RX ORDER — GLIMEPIRIDE 1 MG/1
TABLET ORAL
Qty: 90 TABLET | Refills: 0 | OUTPATIENT
Start: 2017-12-13

## 2017-12-13 NOTE — TELEPHONE ENCOUNTER
Reason for call:  Medication    1. Medication Name? Glimepiride 1 mg - take 1/2 tab  2. Is this request for a refill? Yes  3. What Pharmacy do you use? [facility]  4. Have you contacted your pharmacy? Yes    5. If yes, when?  (Please note that the turn-around-time for prescriptions is 72 business hours; I am sending your request at this time. SEND TO  [facility]  )  Description: Pt was on med a long time ago and stated he spoke Dr Richardson recently and was told to go back on it and take 1/2 tab. Pt had an old prescription but is now out and needs filled ASAP. Please call him back at [phone] if any questions  Was an appointment offered for this a call? No   Preferred method for responding to this messageTelephone Call  If we cannot reach you directly, may we leave a detailed response at the number you provided? Yes  Can this message wait until your PCP/Provider returns if not available today? Not applicable

## 2017-12-14 RX ORDER — GLIMEPIRIDE 1 MG/1
0.5 TABLET ORAL
Qty: 45 TABLET | Refills: 3 | Status: SHIPPED | OUTPATIENT
Start: 2017-12-14 | End: 2018-10-10

## 2018-01-09 ENCOUNTER — TRANSFERRED RECORDS (OUTPATIENT)
Dept: HEALTH INFORMATION MANAGEMENT | Facility: HOSPITAL | Age: 78
End: 2018-01-09

## 2018-01-16 DIAGNOSIS — E78.5 HYPERLIPIDEMIA LDL GOAL <100: ICD-10-CM

## 2018-01-17 RX ORDER — EZETIMIBE 10 MG/1
TABLET ORAL
Qty: 90 TABLET | Refills: 0 | Status: SHIPPED | OUTPATIENT
Start: 2018-01-17 | End: 2018-04-23

## 2018-01-17 NOTE — TELEPHONE ENCOUNTER
zetia      Last Written Prescription Date:  10/19/17  Last Fill Quantity: 90,   # refills: 0  Last Office Visit: 5/19/17  Future Office visit:    Next 5 appointments (look out 90 days)     Feb 23, 2018  8:30 AM CST   (Arrive by 8:15 AM)   SHORT with Mata Richardson MD   [facility] [place] ([facility] [place] )    [address] [place] [address]   [phone]

## 2018-02-23 ENCOUNTER — OFFICE VISIT (OUTPATIENT)
Dept: FAMILY MEDICINE | Facility: OTHER | Age: 78
End: 2018-02-23
Attending: FAMILY MEDICINE
Payer: MEDICARE

## 2018-02-23 VITALS
DIASTOLIC BLOOD PRESSURE: 70 MMHG | BODY MASS INDEX: 34.06 KG/M2 | WEIGHT: 237.38 LBS | OXYGEN SATURATION: 95 % | SYSTOLIC BLOOD PRESSURE: 132 MMHG | HEART RATE: 72 BPM | TEMPERATURE: 97.7 F

## 2018-02-23 DIAGNOSIS — M72.2 PLANTAR FASCIITIS: ICD-10-CM

## 2018-02-23 DIAGNOSIS — E11.9 TYPE 2 DIABETES MELLITUS WITHOUT COMPLICATION, WITHOUT LONG-TERM CURRENT USE OF INSULIN (H): Primary | ICD-10-CM

## 2018-02-23 DIAGNOSIS — I10 ESSENTIAL HYPERTENSION WITH GOAL BLOOD PRESSURE LESS THAN 140/90: ICD-10-CM

## 2018-02-23 DIAGNOSIS — C44.310 BCC (BASAL CELL CARCINOMA), FACE: ICD-10-CM

## 2018-02-23 DIAGNOSIS — E78.5 HYPERLIPIDEMIA WITH TARGET LDL LESS THAN 100: ICD-10-CM

## 2018-02-23 LAB
ANION GAP SERPL CALCULATED.3IONS-SCNC: 6 MMOL/L (ref 3–14)
BUN SERPL-MCNC: 16 MG/DL (ref 7–30)
CALCIUM SERPL-MCNC: 9.4 MG/DL (ref 8.5–10.1)
CHLORIDE SERPL-SCNC: 99 MMOL/L (ref 94–109)
CO2 SERPL-SCNC: 30 MMOL/L (ref 20–32)
CREAT SERPL-MCNC: 0.71 MG/DL (ref 0.66–1.25)
EST. AVERAGE GLUCOSE BLD GHB EST-MCNC: 143 MG/DL
GFR SERPL CREATININE-BSD FRML MDRD: >90 ML/MIN/1.7M2
GLUCOSE SERPL-MCNC: 213 MG/DL (ref 70–99)
HBA1C MFR BLD: 6.6 % (ref 4.3–6)
POTASSIUM SERPL-SCNC: 3.8 MMOL/L (ref 3.4–5.3)
SODIUM SERPL-SCNC: 135 MMOL/L (ref 133–144)

## 2018-02-23 PROCEDURE — 36415 COLL VENOUS BLD VENIPUNCTURE: CPT | Mod: ZL | Performed by: FAMILY MEDICINE

## 2018-02-23 PROCEDURE — G0463 HOSPITAL OUTPT CLINIC VISIT: HCPCS

## 2018-02-23 PROCEDURE — 83036 HEMOGLOBIN GLYCOSYLATED A1C: CPT | Mod: ZL | Performed by: FAMILY MEDICINE

## 2018-02-23 PROCEDURE — 40000788 ZZHCL STATISTIC ESTIMATED AVERAGE GLUCOSE: Mod: ZL | Performed by: FAMILY MEDICINE

## 2018-02-23 PROCEDURE — 99214 OFFICE O/P EST MOD 30 MIN: CPT | Performed by: FAMILY MEDICINE

## 2018-02-23 PROCEDURE — 80048 BASIC METABOLIC PNL TOTAL CA: CPT | Mod: ZL | Performed by: FAMILY MEDICINE

## 2018-02-23 ASSESSMENT — ANXIETY QUESTIONNAIRES
2. NOT BEING ABLE TO STOP OR CONTROL WORRYING: NOT AT ALL
3. WORRYING TOO MUCH ABOUT DIFFERENT THINGS: NOT AT ALL
5. BEING SO RESTLESS THAT IT IS HARD TO SIT STILL: NOT AT ALL
7. FEELING AFRAID AS IF SOMETHING AWFUL MIGHT HAPPEN: NOT AT ALL
6. BECOMING EASILY ANNOYED OR IRRITABLE: NOT AT ALL
GAD7 TOTAL SCORE: 0
1. FEELING NERVOUS, ANXIOUS, OR ON EDGE: NOT AT ALL
4. TROUBLE RELAXING: NOT AT ALL

## 2018-02-23 ASSESSMENT — PAIN SCALES - GENERAL: PAINLEVEL: NO PAIN (0)

## 2018-02-23 NOTE — PATIENT INSTRUCTIONS
Results for orders placed or performed in visit on 02/23/18 (from the past 24 hour(s))   Basic metabolic panel   Result Value Ref [facility]    Sodium 135 133 - 144 mmol/L    Potassium 3.8 3.4 - 5.3 mmol/L    Chloride 99 94 - 109 mmol/L    Carbon Dioxide 30 20 - 32 mmol/L    Anion Gap 6 3 - 14 mmol/L    Glucose 213 (H) 70 - 99 mg/dL    Urea Nitrogen 16 7 - 30 mg/dL    Creatinine 0.71 0.66 - 1.25 mg/dL    GFR Estimate >90 >60 mL/min/1.7m2    GFR Estimate If Black >90 >60 mL/min/1.7m2    Calcium 9.4 8.5 - 10.1 mg/dL   Hemoglobin A1c   Result Value Ref [facility]    Hemoglobin A1C 6.6 (H) 4.3 - 6.0 %   Estimated Average Glucose   Result Value Ref [facility]    Estimated Average Glucose 143 mg/dL       Understanding Plantar Fasciitis    Plantar fasciitis is a condition that causes foot and heel pain. The plantar fascia is a tough band of tissue that runs across the bottom of the foot from the heel to the toes. This tissue pulls on the heel bone. It supports the arch of the foot as it pushes off the ground. If the tissue becomes irritated or red and swollen (inflamed), it is called plantar fasciitis.  How to say it  PLAN-tuhr [id number]-see-IY-[id number]   What causes plantar fasciitis?  Plantar fasciitis most often occurs from overusing the plantar fascia. The tissue may become damaged from activities that put repeated stress on the heel and foot. Or it may wear down over time with age and ankle stiffness. You are more likely to have plantar fasciitis if you:    Do activities that require a lot of running, jumping, or dancing    Have a job that requires being on your feet for long periods    Are overweight or obese    Have certain foot problems, such as a tight Achilles tendon, flat feet, or high arches    Often wear poorly fitting shoes  Symptoms of plantar fasciitis  The condition most often causes pain in the heel and the bottom of the foot. The pain may occur when you take your first steps in the morning. It may get better as you walk throughout  the day. But as you continue to put weight on the foot, the pain often returns. Pain may also occur after standing or sitting for long periods.  Treating plantar fasciitis  Treatments for plantar fasciitis include:    Resting the foot. This involves limiting movements that make your foot hurt. You may also need to avoid certain sports and types of work for a time.    Using cold packs. Put an ice pack on the heel and foot to help reduce pain and swelling.    Taking pain medicines. Prescription and over-the-counter pain medicines can help relieve pain and swelling.    Using heel cups or foot inserts (orthotics). These are placed in the shoes to help support the heel or arch and cushion the heel. You may also be told to buy proper-fitting shoes with good arch support and cushioned soles.    Taping the foot. This supports the arch and limits the movement of the plantar fascia to help relieve symptoms.    Wearing a night splint. This stretches the plantar fascia and leg muscles while you sleep. This may help relieve pain.    Doing exercises and physical therapy. These stretch and strengthen the plantar fascia and the muscles in the leg that support the heel and foot.    Getting shots of medicine into the foot. These may help relieve symptoms for a time.    Having surgery. This may be needed if other treatments fail to relieve symptoms. During surgery, the surgeon may partially cut the plantar fascia to release tension.  Possible complications of plantar fasciitis  Without proper care and treatment, healing may take longer than normal. Also, symptoms may continue or get worse. Over time, the plantar fascia may be damaged. This can make it hard to walk or even stand without pain.  When to call your healthcare provider  Call your healthcare provider right away if you have any of these:    Fever of 100.4 F (38 C) or higher, or as directed    Symptoms that don t get better with treatment, or get worse    New symptoms, such as  numbness, tingling, or weakness in the foot   Date Last Reviewed: 3/10/2016    6889-1784 [employer]. [address] [place] [address], [place] [address]. All rights reserved. This information is not intended as a substitute for professional medical care. Always follow your healthcare professional's instructions.        Treating Plantar Fasciitis  First, your healthcare provider tries to determine the cause of your problem in order to suggest ways to relieve pain. If your pain is due to poor foot mechanics, custom-made shoe inserts (orthoses) may help.    Reduce symptoms    To relieve mild symptoms, try aspirin, ibuprofen, or other medicines as directed. Rubbing ice on the affected area may also help.    To reduce severe pain and swelling, your healthcare provider may prescribe pills or injections or a walking cast in some instances. Physical therapy, such as ultrasound or a daily stretching program, may also be recommended. Surgery is rarely required.    To reduce symptoms caused by poor foot mechanics, your foot may be taped. This supports the arch and temporarily controls movement. Night splints may also help by stretching the fascia.  Control movement  If taping helps, your healthcare provider may prescribe orthoses. Built from plaster casts of your feet, these inserts control the way your foot moves. As a result, your symptoms should go away.  Reduce overuse  Every time your foot strikes the ground, the plantar fascia is stretched. You can reduce the strain on the plantar fascia and the possibility of overuse by following these suggestions:    Lose any excess weight.    Avoid running on hard or uneven ground.    Use orthoses at all times in your shoes and house slippers.  If surgery is needed  Your healthcare provider may consider surgery if other types of treatment don't control your pain. During surgery, the plantar fascia is partially cut to release tension. As you heal, fibrous tissue fills the space  between the heel bone and the plantar fascia.   Date Last Reviewed: 10/14/2015    7849-2519 [employer]. [address] [place] [address], [place] [address]. All rights reserved. This information is not intended as a substitute for professional medical care. Always follow your healthcare professional's instructions.        Types of Skin Cancer  Skin cancer is a serious disease that can affect anyone. It is the most common form of cancer in the [place] If caught early, skin cancer can often be treated with success. But in some cases, it is life-threatening. To play it safe, talk with your healthcare provider about doing monthly skin checkups. If you see any changes in your skin, contact your doctor right away.   Basal cell cancer is the most common skin cancer. Areas of cancer (lesions) often appear on the face, ears, neck, trunk, or arms. Varying in color, these lesions may be waxy, pearly, scaly, or scarlike. Tiny blood vessels may be seen through the lesion s surface. These lesions sometime bleed easily and might not heal well.  Melanoma is less common, but much more dangerous type of skin cancer. This is because it is more likely to grow and spread than basal or squamous cell cancers.. It is often not easy to tell where a melanoma lesion s borders are. It is often brown or black, but it may be mixed in color. The shape and size of melanoma lesions tend to differ from one side to the other.  Squamous cell cancer is also a common type of skin cancer. Lesions often form on the face, ears, neck, hands, or arms. The lesions are firm, red bumps or flat, scaly, crusty growths.  Lopez s disease is an early stage of squamous cell cancer. The lesions are usually  red, crusty, scaly growths with well-defined borders.  There are other types of skin cancer as well. These include Merkel cell cancer and skin (cutaneous) lymphomas, but these cancers are rare.  A precancerous skin change  Actinic keratosis is not skin cancer. It  is a common, precancerous skin change that can turn into a squamous cell skin cancer if left untreated over a long period of time. Actinic keratosis lesions tend to appear on sun-exposed parts of the body. They can be pink, reddish-brown, or skin-colored. These lesions are most often raised, scaly, and rough, like sandpaper. In some cases, actinic keratosis lesions are painful. Getting early treatment for actinic keratoses almost always cures the lesions.  Date Last Reviewed: 1/1/2017 2000-2017 [employer]. [address] [place] [address]. All rights reserved. This information is not intended as a substitute for professional medical care. Always follow your healthcare professional's instructions.        Recognizing Skin Cancer  Doing monthly skin checkups is the best way to find new marks or skin changes. During your skin checkups, be sure to follow the ABCDEs of skin checks. This means checking moles or other growths for Asymmetry, Border, Color, Diameter, and Evolving (changing). Note, too, any new growths, or, if any of your growths bleed, itch, look different, or are painful.  The ABCDEs of skin checks  Check your moles or growths for signs of melanoma using ABCDE:    Asymmetry: the sides of the mole or growth don t match    Border: the edges are ragged, notched, or blurred    Color: the color within the mole or growth varies    Diameter: the mole or growth is larger than 6 mm (size of a pencil eraser)    Evolving: the size, shape, or color of the mole or growth is changing (evolving is not shown below)       In addition to the ABCDEs, other warning signs of skin cancer include:    A spot or mole that looks different from all other marks on your skin    Changes in how an area feels, such as itching, tenderness, or pain    Changes in the skin's surface, such as oozing, bleeding, or scaliness    A sore that does not heal    New swelling or redness beyond the border of a mole  Who s at  risk?  Anyone can get skin cancer. But you are at greater risk if you have:    Fair skin, light-colored hair, or light-colored eyes    Many moles or abnormal moles on your skin    A history of sunburns from sunlight or tanning beds    A family history of skin cancer    A history of exposure to radiation or chemicals    A weakened immune system  Also, a personal history of skin cancer puts you at risk for recurring skin cancer.  How to check your skin  Do your monthly skin checkups in front of a full-length mirror. Check all parts of your body, including your:    Head (ears, face, neck, and scalp)    Torso (front, back, and sides)    Arms (tops, undersides, upper, and lower armpits)    Hands (palms, backs, and fingers, including under the nails)    Buttocks and genitals    Legs (front, back, and sides)    Feet (tops, soles, toes, including under the nails, and between toes)  If you have a lot of moles, take digital photos of them each month. Make sure to take photos both up close and from a distance. These can help you see if any moles change over time.  When to seek medical treatment  Most skin changes are not cancer. But if you see any changes in your skin, call your doctor right away. Only he or she can diagnose a problem. If you have skin cancer, seeing your doctor can be the first step toward getting the treatment that could save your life.   Date Last Reviewed: 1/1/2017 2000-2017 [employer]. [address] [place] [address], [place] [address]. All rights reserved. This information is not intended as a substitute for professional medical care. Always follow your healthcare professional's instructions.      Take 2-3 advil 3 times a day for 10-14 days for foot.   Stretches as discussed

## 2018-02-23 NOTE — NURSING NOTE
"No chief complaint on file.      Initial /70  Pulse 72  Temp 97.7  F (36.5  C) (Tympanic)  Wt 237 lb 6 oz (107.7 kg)  SpO2 95%  BMI 34.06 kg/m2 Estimated body mass index is 34.06 kg/(m^2) as calculated from the following:    Height as of 10/25/17: 5' 10\" (1.778 m).    Weight as of this encounter: 237 lb 6 oz (107.7 kg).  Medication Reconciliation: complete     Amanda Pereira    "

## 2018-02-23 NOTE — PROGRESS NOTES
SUBJECTIVE:                                                    Carrillo Carranza is a 77 year old male who presents to clinic today for the following health issues:      Diabetes Follow-up    Patient is checking blood sugars: once daily.  Results are as follows:         am - 109-116    Diabetic concerns: None     Symptoms of hypoglycemia (low blood sugar): none     Paresthesias (numbness or burning in feet) or sores: No     Date of last diabetic eye exam: 7-14-17    Hyperlipidemia Follow-Up      Rate your low fat/cholesterol diet?: fair    Taking statin?  No    Other lipid medications/supplements?:  Zetia, without side effects    Fish oil/Omega 3, dose 1g without side effects    Hypertension Follow-up      Outpatient blood pressures are not being checked.    Low Salt Diet: low salt    BP Readings from Last 2 Encounters:   10/25/17 130/60   10/25/17 126/68     Hemoglobin A1C (%)   Date Value   10/25/2017 6.0   05/16/2017 6.5 (H)     LDL Cholesterol Calculated (mg/dL)   Date Value   10/25/2017 94   12/12/2016 93       Amount of exercise or physical activity: 2-3 days/week for an average of 30-45 minutes    Problems taking medications regularly: No    Medication side effects: none    Diet: regular (no restrictions)        Musculoskeletal problem/pain      Duration: about 2 weeks    Description  Location: right foot (heel)    Intensity:  mild    Accompanying signs and symptoms: none    History  Previous similar problem: no   Previous evaluation:  none    Precipitating or alleviating factors:  Trauma or overuse: no   Aggravating factors include: none    Therapies tried and outcome: nothing    Started with hard  Walking in snow      Has non healing lesion of preauricular region for several months- seen first on exam - states it bleeds    Problem list and histories reviewed & adjusted, as indicated.  Additional history:     Labs reviewed in EPIC    ROS:  CONSTITUTIONAL: NEGATIVE for fever, chills, change in weight  RESP:  NEGATIVE for significant cough or SOB  CV: NEGATIVE for chest pain, palpitations or peripheral edema    OBJECTIVE:                                                    /70  Pulse 72  Temp 97.7  F (36.5  C) (Tympanic)  Wt 237 lb 6 oz (107.7 kg)  SpO2 95%  BMI 34.06 kg/m2  Body mass index is 34.06 kg/(m^2).   GENERAL: healthy, alert, well nourished, well hydrated, no distress  NECK: no tenderness, no adenopathy, no asymmetry, no masses, no stiffness; thyroid- normal to palpation  RESP: lungs clear to auscultation - no rales, no rhonchi, no wheezes  CV: regular rates and rhythm, normal S1 S2, no S3 or S4 and no murmur, no click or rub -  MS: extremities- right foot- tender over heel and arch no gross deformities noted, no edema    Skin  1cm classic looking BCC of very preauricular region on right side      ASSESSMENT/PLAN:                                                      (E11.9) Type 2 diabetes mellitus without complication, without long-term current use of insulin (H)  (primary encounter diagnosis)  Comment: great control   Plan: Basic metabolic panel, Hemoglobin A1c,         Estimated Average Glucose        Continue current medications and behavioral changes.   F/u in 4-5 months     (I10) Essential hypertension with goal blood pressure less than 140/90  Comment: stable   Plan: Basic metabolic panel, Hemoglobin A1c        Continue current medications and behavioral changes.     (E78.5) Hyperlipidemia with target LDL less than 100  Comment: stable on statin   Plan: Basic metabolic panel, Hemoglobin A1c        Discussed. Continue current medications and behavioral changes.     (M72.2) Plantar fasciitis  Comment: right foot.   Plan: handout given Discussed in length conservative measures of OTC medications for pain, Ice/Heat, elevation and the concept of R.I.C.E.. Continue behavioral changes and proper body mechanics to allow for healing. Follow up as directed.       (C44.310) BCC (basal cell carcinoma),  face  Comment: classic  Plan: OTOLARYNGOLOGY REFERRAL        Will refer since right over busy area of preauricular region.              Mata Richardson MD  [facility]

## 2018-02-23 NOTE — MR AVS SNAPSHOT
After Visit Summary   2/23/2018    Carrillo Carranza    MRN: [id number]           Patient Information     Date Of Birth          1940        Visit Information        Provider Department      2/23/2018 8:30 AM Mata Richardson MD [facility]        Today's Diagnoses     Type 2 diabetes mellitus without complication, without long-term current use of insulin (H)    -  1    Essential hypertension with goal blood pressure less than 140/90        Hyperlipidemia with target LDL less than 100        Plantar fasciitis        BCC (basal cell carcinoma), face          Care Instructions    Results for orders placed or performed in visit on 02/23/18 (from the past 24 hour(s))   Basic metabolic panel   Result Value Ref Range    Sodium 135 133 - 144 mmol/L    Potassium 3.8 3.4 - 5.3 mmol/L    Chloride 99 94 - 109 mmol/L    Carbon Dioxide 30 20 - 32 mmol/L    Anion Gap 6 3 - 14 mmol/L    Glucose 213 (H) 70 - 99 mg/dL    Urea Nitrogen 16 7 - 30 mg/dL    Creatinine 0.71 0.66 - 1.25 mg/dL    GFR Estimate >90 >60 mL/min/1.7m2    GFR Estimate If Black >90 >60 mL/min/1.7m2    Calcium 9.4 8.5 - 10.1 mg/dL   Hemoglobin A1c   Result Value Ref Range    Hemoglobin A1C 6.6 (H) 4.3 - 6.0 %   Estimated Average Glucose   Result Value Ref Range    Estimated Average Glucose 143 mg/dL       Understanding Plantar Fasciitis    Plantar fasciitis is a condition that causes foot and heel pain. The plantar fascia is a tough band of tissue that runs across the bottom of the foot from the heel to the toes. This tissue pulls on the heel bone. It supports the arch of the foot as it pushes off the ground. If the tissue becomes irritated or red and swollen (inflamed), it is called plantar fasciitis.  How to say it  PLAN-tuhr fa-see-IY-tis   What causes plantar fasciitis?  Plantar fasciitis most often occurs from overusing the plantar fascia. The tissue may become damaged from activities that put repeated stress on the heel and  foot. Or it may wear down over time with age and ankle stiffness. You are more likely to have plantar fasciitis if you:    Do activities that require a lot of running, jumping, or dancing    Have a job that requires being on your feet for long periods    Are overweight or obese    Have certain foot problems, such as a tight Achilles tendon, flat feet, or high arches    Often wear poorly fitting shoes  Symptoms of plantar fasciitis  The condition most often causes pain in the heel and the bottom of the foot. The pain may occur when you take your first steps in the morning. It may get better as you walk throughout the day. But as you continue to put weight on the foot, the pain often returns. Pain may also occur after standing or sitting for long periods.  Treating plantar fasciitis  Treatments for plantar fasciitis include:    Resting the foot. This involves limiting movements that make your foot hurt. You may also need to avoid certain sports and types of work for a time.    Using cold packs. Put an ice pack on the heel and foot to help reduce pain and swelling.    Taking pain medicines. Prescription and over-the-counter pain medicines can help relieve pain and swelling.    Using heel cups or foot inserts (orthotics). These are placed in the shoes to help support the heel or arch and cushion the heel. You may also be told to buy proper-fitting shoes with good arch support and cushioned soles.    Taping the foot. This supports the arch and limits the movement of the plantar fascia to help relieve symptoms.    Wearing a night splint. This stretches the plantar fascia and leg muscles while you sleep. This may help relieve pain.    Doing exercises and physical therapy. These stretch and strengthen the plantar fascia and the muscles in the leg that support the heel and foot.    Getting shots of medicine into the foot. These may help relieve symptoms for a time.    Having surgery. This may be needed if other treatments  fail to relieve symptoms. During surgery, the surgeon may partially cut the plantar fascia to release tension.  Possible complications of plantar fasciitis  Without proper care and treatment, healing may take longer than normal. Also, symptoms may continue or get worse. Over time, the plantar fascia may be damaged. This can make it hard to walk or even stand without pain.  When to call your healthcare provider  Call your healthcare provider right away if you have any of these:    Fever of 100.4 F (38 C) or higher, or as directed    Symptoms that don t get better with treatment, or get worse    New symptoms, such as numbness, tingling, or weakness in the foot   Date Last Reviewed: 3/10/2016    0854-4013 [employer]. [address] [place] [address], [place] [address]. All rights reserved. This information is not intended as a substitute for professional medical care. Always follow your healthcare professional's instructions.        Treating Plantar Fasciitis  First, your healthcare provider tries to determine the cause of your problem in order to suggest ways to relieve pain. If your pain is due to poor foot mechanics, custom-made shoe inserts (orthoses) may help.    Reduce symptoms    To relieve mild symptoms, try aspirin, ibuprofen, or other medicines as directed. Rubbing ice on the affected area may also help.    To reduce severe pain and swelling, your healthcare provider may prescribe pills or injections or a walking cast in some instances. Physical therapy, such as ultrasound or a daily stretching program, may also be recommended. Surgery is rarely required.    To reduce symptoms caused by poor foot mechanics, your foot may be taped. This supports the arch and temporarily controls movement. Night splints may also help by stretching the fascia.  Control movement  If taping helps, your healthcare provider may prescribe orthoses. Built from plaster casts of your feet, these inserts control the way your foot  moves. As a result, your symptoms should go away.  Reduce overuse  Every time your foot strikes the ground, the plantar fascia is stretched. You can reduce the strain on the plantar fascia and the possibility of overuse by following these suggestions:    Lose any excess weight.    Avoid running on hard or uneven ground.    Use orthoses at all times in your shoes and house slippers.  If surgery is needed  Your healthcare provider may consider surgery if other types of treatment don't control your pain. During surgery, the plantar fascia is partially cut to release tension. As you heal, fibrous tissue fills the space between the heel bone and the plantar fascia.   Date Last Reviewed: 10/14/2015    9679-1216 [employer]. [address] [place] [address], [place] [address]. All rights reserved. This information is not intended as a substitute for professional medical care. Always follow your healthcare professional's instructions.        Types of Skin Cancer  Skin cancer is a serious disease that can affect anyone. It is the most common form of cancer in the [place] If caught early, skin cancer can often be treated with success. But in some cases, it is life-threatening. To play it safe, talk with your healthcare provider about doing monthly skin checkups. If you see any changes in your skin, contact your doctor right away.   Basal cell cancer is the most common skin cancer. Areas of cancer (lesions) often appear on the face, ears, neck, trunk, or arms. Varying in color, these lesions may be waxy, pearly, scaly, or scarlike. Tiny blood vessels may be seen through the lesion s surface. These lesions sometime bleed easily and might not heal well.  Melanoma is less common, but much more dangerous type of skin cancer. This is because it is more likely to grow and spread than basal or squamous cell cancers.. It is often not easy to tell where a melanoma lesion s borders are. It is often brown or black, but it may be mixed  in color. The shape and size of melanoma lesions tend to differ from one side to the other.  Squamous cell cancer is also a common type of skin cancer. Lesions often form on the face, ears, neck, hands, or arms. The lesions are firm, red bumps or flat, scaly, crusty growths.  Lopez s disease is an early stage of squamous cell cancer. The lesions are usually  red, crusty, scaly growths with well-defined borders.  There are other types of skin cancer as well. These include Merkel cell cancer and skin (cutaneous) lymphomas, but these cancers are rare.  A precancerous skin change  Actinic keratosis is not skin cancer. It is a common, precancerous skin change that can turn into a squamous cell skin cancer if left untreated over a long period of time. Actinic keratosis lesions tend to appear on sun-exposed parts of the body. They can be pink, reddish-brown, or skin-colored. These lesions are most often raised, scaly, and rough, like sandpaper. In some cases, actinic keratosis lesions are painful. Getting early treatment for actinic keratoses almost always cures the lesions.  Date Last Reviewed: 1/1/2017 2000-2017 [employer]. [address] [place] [address], [place] [address]. All rights reserved. This information is not intended as a substitute for professional medical care. Always follow your healthcare professional's instructions.        Recognizing Skin Cancer  Doing monthly skin checkups is the best way to find new marks or skin changes. During your skin checkups, be sure to follow the ABCDEs of skin checks. This means checking moles or other growths for Asymmetry, Border, Color, Diameter, and Evolving (changing). Note, too, any new growths, or, if any of your growths bleed, itch, look different, or are painful.  The ABCDEs of skin checks  Check your moles or growths for signs of melanoma using ABCDE:    Asymmetry: the sides of the mole or growth don t match    Border: the edges are ragged, notched, or  blurred    Color: the color within the mole or growth varies    Diameter: the mole or growth is larger than 6 mm (size of a pencil eraser)    Evolving: the size, shape, or color of the mole or growth is changing (evolving is not shown below)       In addition to the ABCDEs, other warning signs of skin cancer include:    A spot or mole that looks different from all other marks on your skin    Changes in how an area feels, such as itching, tenderness, or pain    Changes in the skin's surface, such as oozing, bleeding, or scaliness    A sore that does not heal    New swelling or redness beyond the border of a mole  Who s at risk?  Anyone can get skin cancer. But you are at greater risk if you have:    Fair skin, light-colored hair, or light-colored eyes    Many moles or abnormal moles on your skin    A history of sunburns from sunlight or tanning beds    A family history of skin cancer    A history of exposure to radiation or chemicals    A weakened immune system  Also, a personal history of skin cancer puts you at risk for recurring skin cancer.  How to check your skin  Do your monthly skin checkups in front of a full-length mirror. Check all parts of your body, including your:    Head (ears, face, neck, and scalp)    Torso (front, back, and sides)    Arms (tops, undersides, upper, and lower armpits)    Hands (palms, backs, and fingers, including under the nails)    Buttocks and genitals    Legs (front, back, and sides)    Feet (tops, soles, toes, including under the nails, and between toes)  If you have a lot of moles, take digital photos of them each month. Make sure to take photos both up close and from a distance. These can help you see if any moles change over time.  When to seek medical treatment  Most skin changes are not cancer. But if you see any changes in your skin, call your doctor right away. Only he or she can diagnose a problem. If you have skin cancer, seeing your doctor can be the first step toward  getting the treatment that could save your life.   Date Last Reviewed: 1/1/2017 2000-2017 [employer]. [address] [place] [address], [place] [address]. All rights reserved. This information is not intended as a substitute for professional medical care. Always follow your healthcare professional's instructions.      Take 2-3 advil 3 times a day for 10-14 days for foot.   Stretches as discussed          Follow-ups after your visit        Additional Services     OTOLARYNGOLOGY REFERRAL       Your provider has referred you to:[facility] - office surgery   Please be aware that coverage of these services is subject to the terms and limitations of your health insurance plan.  Call member services at your health plan with any benefit or coverage questions.      Please bring the following to your appointment:  >>   Any x-rays, CTs or MRIs which have been performed.  Contact the facility where they were done to arrange for  prior to your scheduled appointment.  Any new CT, MRI or other procedures ordered by your specialist must be performed at a [facility] facility or coordinated by your clinic's referral office.    >>   List of current medications   >>   This referral request   >>   Any documents/labs given to you for this referral                  Who to contact     If you have questions or need follow up information about today's clinic visit or your schedule please contact [facility] directly at [phone].  Normal or non-critical lab and imaging results will be communicated to you by MyChart, letter or phone within 4 business days after the clinic has received the results. If you do not hear from us within 7 days, please contact the clinic through MyChart or phone. If you have a critical or abnormal lab result, we will notify you by phone as soon as possible.  Submit refill requests through [employer] or call your pharmacy and they will forward the refill request to us. Please allow 3 business days  for your refill to be completed.          Additional Information About Your Visit        MyChart Information     [employer] gives you secure access to your electronic health record. If you see a primary care provider, you can also send messages to your care team and make appointments. If you have questions, please call your primary care clinic.  If you do not have a primary care provider, please call [phone] and they will assist you.        Care EveryWhere ID     This is your Care EveryWhere ID. This could be used by other organizations to access your [facility] medical records  [id number]        Your Vitals Were     Pulse Temperature Pulse Oximetry BMI (Body Mass Index)          72 97.7  F (36.5  C) (Tympanic) 95% 34.06 kg/m2         Blood Pressure from Last 3 Encounters:   02/23/18 132/70   10/25/17 130/60   10/25/17 126/68    Weight from Last 3 Encounters:   02/23/18 237 lb 6 oz (107.7 kg)   10/25/17 234 lb (106.1 kg)   10/25/17 234 lb (106.1 kg)              We Performed the Following     Basic metabolic panel     Estimated Average Glucose     Hemoglobin A1c     OTOLARYNGOLOGY REFERRAL        Primary Care Provider Office Phone # Fax #    Mata Richardson [phone] [address] [address]        Equal Access to Services     RONAK GARCIA : Hadii kennedy ku hadasho Soomaali, waaxda luqadaha, qaybta kaalmada adeegyada, david dawkins . So [facility] [phone].    ATENCIÓN: Si habla español, tiene a palacios disposición servicios gratuitos de asistencia lingüística. Llame al [phone].    We comply with applicable federal civil rights laws and [place] laws. We do not discriminate on the basis of race, color, national origin, age, disability, sex, sexual orientation, or gender identity.            Thank you!     Thank you for choosing [facility]  for your care. Our goal is always to provide you with excellent care. Hearing back from our patients  is one way we can continue to improve our services. Please take a few minutes to complete the written survey that you may receive in the mail after your visit with us. Thank you!             Your Updated Medication List - Protect others around you: Learn how to safely use, store and throw away your medicines at www.disposemymeds.org.          This list is accurate as of 2/23/18  9:06 AM.  Always use your most recent med list.                   Brand Name Dispense Instructions for use Diagnosis    ACCU-CHEK ANGELI PLUS test strip   Generic drug:  blood glucose monitoring     100 strip    USE ONE STRIP ONCE DAILY    Diabetes mellitus, type 2 (H)       ADVIL PO      Take by mouth every 4 hours as needed for moderate pain        ASPIR-81 PO      Take 1 tablet by mouth daily        atenolol-chlorthalidone 50-25 MG per tablet    TENORETIC    90 tablet    TAKE 1 TABLET DAILY    Benign essential hypertension       blood glucose calibration solution     1 each    USE CONTROL SOLUTION AS DIRECTED ONCE MONTHLY TO CHECK ACCURACY OF METER    Diabetes mellitus, type 2 (H)       blood glucose monitoring lancets     102 each    USE TO TEST BLOOD SUGAR ONCE DAILY    Diabetes mellitus, type 2 (H)       ezetimibe 10 MG tablet    ZETIA    90 tablet    TAKE 1 TABLET DAILY (DUE   FOR LIPID PROFILE DECEMBER 2017)    Hyperlipidemia LDL goal <100       glimepiride 1 MG tablet    AMARYL    45 tablet    Take 0.5 tablets (0.5 mg) by mouth every morning (before breakfast)    Type 2 diabetes mellitus with diabetic nephropathy, without long-term current use of insulin (H)       KLOR-CON 20 MEQ CR tablet   Generic drug:  potassium chloride SA     180 tablet    TAKE ONE TABLET BY MOUTH TWICE DAILY WITH MEALS    HTN (hypertension)       magnesium oxide 400 MG Caps     180 capsule    Take 400 mg by mouth 2 times daily    Hypomagnesemia       metFORMIN 500 MG 24 hr tablet    GLUCOPHAGE-XR    180 tablet    TAKE ONE TABLET BY MOUTH TWICE DAILY WITH MEALS     Type 2 diabetes mellitus without complication, without long-term current use of insulin (H)       MULTIVITAMINS PO      Take 1 tablet by mouth daily        OMEGA-3 FISH OIL PO      Take 1 g by mouth        pantoprazole 40 MG EC tablet    PROTONIX    90 tablet    TAKE 1 TABLET DAILY    Baca esophagus       STATIN NOT PRESCRIBED (INTENTIONAL)      Please choose reason not prescribed, below    Hyperlipidemia with target LDL less than 100       STOOL SOFTENER 100 MG capsule   Generic drug:  docusate sodium      Take 1 capsule by mouth 2 times daily        tamsulosin 0.4 MG capsule    FLOMAX    30 capsule    Take 1 capsule (0.4 mg) by mouth daily    BPH with obstruction/lower urinary tract symptoms       traZODone 50 MG tablet    DESYREL    90 tablet    TAKE ONE TABLET BY MOUTH AT BEDTIME    Diagnosis unknown       VITAMIN D PO      Take 1 capsule by mouth daily

## 2018-02-24 ASSESSMENT — PATIENT HEALTH QUESTIONNAIRE - PHQ9: SUM OF ALL RESPONSES TO PHQ QUESTIONS 1-9: 0

## 2018-02-24 ASSESSMENT — ANXIETY QUESTIONNAIRES: GAD7 TOTAL SCORE: 0

## 2018-03-01 ENCOUNTER — OFFICE VISIT (OUTPATIENT)
Dept: OTOLARYNGOLOGY | Facility: OTHER | Age: 78
End: 2018-03-01
Attending: FAMILY MEDICINE
Payer: MEDICARE

## 2018-03-01 VITALS
SYSTOLIC BLOOD PRESSURE: 138 MMHG | WEIGHT: 237 LBS | HEART RATE: 72 BPM | TEMPERATURE: 97.3 F | HEIGHT: 70 IN | OXYGEN SATURATION: 98 % | DIASTOLIC BLOOD PRESSURE: 62 MMHG | BODY MASS INDEX: 33.93 KG/M2

## 2018-03-01 DIAGNOSIS — L98.9 SKIN LESION OF FACE: Primary | ICD-10-CM

## 2018-03-01 PROCEDURE — G0463 HOSPITAL OUTPT CLINIC VISIT: HCPCS | Mod: 25

## 2018-03-01 PROCEDURE — 88305 TISSUE EXAM BY PATHOLOGIST: CPT | Mod: TC | Performed by: OTOLARYNGOLOGY

## 2018-03-01 PROCEDURE — 11443 EXC FACE-MM B9+MARG 2.1-3 CM: CPT | Performed by: OTOLARYNGOLOGY

## 2018-03-01 PROCEDURE — 99203 OFFICE O/P NEW LOW 30 MIN: CPT | Mod: 25 | Performed by: OTOLARYNGOLOGY

## 2018-03-01 ASSESSMENT — PAIN SCALES - GENERAL: PAINLEVEL: NO PAIN (0)

## 2018-03-01 NOTE — PATIENT INSTRUCTIONS
Thank you for allowing Dr. Rojas and our ENT team to participate in your care.  If your medications are too expensive, please give the nurse a call.  We can possibly change this medication.  If you have a scheduling or an appointment question please contact [facility] our Health Unit Coordinator at their direct line [phone].   ALL nursing questions or concerns can be directed to your ENT nurse at: [phone] Neto Marianne    Wait until the bandages fall off to start cleaning the area  Follow up with ENT in 1 week for a suture removal      POST PROCEDURE INSTRUCTIONS      Remove your dressing in 24 hours (If you have one)    Wash incision with a mixture of half water and half hydrogen peroxide 3 times daily.    Apply Aquaphor Healing Ointment to the wound 3 times daily. (Unless specified Bacitracin)    Cover with a clean dressing if in a dirty charlie environment or when wet/soiled    Keep incision clean and dry   Do NOT soak in water such as a tub bath or swimming   Do NOT put make-up, powders, hairspray, lotions, etc on the incision       You can apply ice to the surgical area to help reduce swelling. (no longer than 20 minutes at a time)      You can use acetaminophen(Tylenol) or the prescription you received for pain.       If you have any bleeding, cover the wound with clean gauze and hold pressure for 10 Minutes. If the bleeding does not stop or is heavy and profuse, call the clinic or go to the Urgent Care/Emergency Department.    SIGNS OF INFECTION ARE:    Redness, swelling, red streaks, pus, drainage, warmth, fever, increased pain, foul smell.     Contact your primary health care provider if you notice any of the warning signs.     FOLLOW - UP    Follow-up in clinic for a nurse only visit in 7 days for suture removal.     Pathology results will be called to you when they are back. Usually 7-10 days.      6 WEEKS POST PROCEDURE      Apply ANY type of lotion to the suture site(Example - Vaseline Intensive Care  or Vitamin E)    Massage the surgical area 1-2 times daily in a circular motion for 5 minutes, for a period of 2 months. This will help the scar heal better.

## 2018-03-01 NOTE — LETTER
3/1/2018         RE: Carrillo Carranza  [address] [facility] [place] [address]        Dear Colleague,    Thank you for referring your patient, Carrillo Carranza, to the [facility]. Please see a copy of my visit note below.    Otolaryngology Consultation    Patient: Carrillo Carranza  : 1940    Patient presents with:  Consult: BCC face- Versich       This patient presents today for a right facial skin lesion.  Present over 2 months, changing and growing.  No known history of carcinoma skin or melanoma.  Excess sun exposure with sun burns throughout life. No immunodeficiency.  He does not tend to burn  Noted area near his right sideburn that is non-healing        Current Outpatient Rx   Medication Sig Dispense Refill     ezetimibe (ZETIA) 10 MG tablet TAKE 1 TABLET DAILY (DUE   FOR LIPID PROFILE 2017) 90 tablet 0     KLOR-CON 20 MEQ CR tablet TAKE ONE TABLET BY MOUTH TWICE DAILY WITH MEALS 180 tablet 1     glimepiride (AMARYL) 1 MG tablet Take 0.5 tablets (0.5 mg) by mouth every morning (before breakfast) 45 tablet 3     atenolol-chlorthalidone (TENORETIC) 50-25 MG per tablet TAKE 1 TABLET DAILY 90 tablet 3     pantoprazole (PROTONIX) 40 MG EC tablet TAKE 1 TABLET DAILY 90 tablet 3     tamsulosin (FLOMAX) 0.4 MG capsule Take 1 capsule (0.4 mg) by mouth daily 30 capsule 11     traZODone (DESYREL) 50 MG tablet TAKE ONE TABLET BY MOUTH AT BEDTIME 90 tablet 3     blood glucose monitoring (ACCU-CHEK MULTICLIX) lancets USE TO TEST BLOOD SUGAR ONCE DAILY 102 each 5     ACCU-CHEK ANGELI PLUS test strip USE ONE STRIP ONCE DAILY 100 strip 3     STATIN NOT PRESCRIBED, INTENTIONAL, Please choose reason not prescribed, below       metFORMIN (GLUCOPHAGE-XR) 500 MG 24 hr tablet TAKE ONE TABLET BY MOUTH TWICE DAILY WITH MEALS 180 tablet 3     Omega-3 Fatty Acids (OMEGA-3 FISH OIL PO) Take 1 g by mouth       blood glucose calibration (ACCU-CHEK ANGELI) solution USE CONTROL SOLUTION AS DIRECTED ONCE  MONTHLY TO CHECK ACCURACY OF METER 1 each 0     magnesium oxide 400 MG CAPS Take 400 mg by mouth 2 times daily 180 capsule 2     Aspirin (ASPIR-81 PO) Take 1 tablet by mouth daily        Ibuprofen (ADVIL PO) Take by mouth every 4 hours as needed for moderate pain       Multiple Vitamin (MULTIVITAMINS PO) Take 1 tablet by mouth daily       Cholecalciferol (VITAMIN D PO) Take 1 capsule by mouth daily       docusate sodium (STOOL SOFTENER) 100 MG capsule Take 1 capsule by mouth 2 times daily         Allergies: Atorvastatin calcium; Avodart [dutasteride]; and Simvastatin     Past Medical History:   Diagnosis Date     Baca's esophagus 11/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     Contact dermatitis and other eczema, due to unspecified cause 3/1/2011     Dermatophytosis of groin and perianal area 2/1/2006     Esophageal reflux 11/3/2011    GERD     Family history of colonic polyps 11/3/2011     Fatty liver disease, nonalcoholic      Generalized osteoarthrosis, involving multiple sites 3/1/2011     HTN (hypertension)      Hyperlipidaemia LDL goal < 100      Other and unspecified hyperlipidemia 11/3/2011     Other chronic nonalcoholic liver disease 11/3/2011    SANCHES (nonalcoholic steatohepatitis)     Other premature beats 11/3/2011    PVC (premature ventricular contraction)     Other specified cardiac dysrhythmias(427.89) 11/3/2011    ventricular bigeminy under anesthesia     Personal history of tobacco use, presenting hazards to health 11/3/2011    in remission     Type II or unspecified type diabetes mellitus without mention of complication, not stated as uncontrolled 2/7/2012     Umbilical hernia without mention of obstruction or gangrene 11/3/2011     Unspecified diffuse connective tissue disease 1/29/2013     Unspecified essential hypertension 11/3/2011       Past Surgical History:   Procedure Laterality Date     ADENOIDECTOMY       APPENDECTOMY       ARTHROSCOPY KNEE       arthroscopy right great toe      bunion     COLONOSCOPY  02-      "COLONOSCOPY  2010,2006    repeat in 2015     COLONOSCOPY  8-     COLONOSCOPY  2015    Repeat 2020     ESOPHAGOGASTRODUODENOSCOPY      Repeat in 2015     ESOPHAGOGASTRODUODENOSCOPY  2015    Dr Funes/Vivek- Repeat 3yrs     HERNIA REPAIR, UMBILICAL  2012    reduction and repair of umbilical hernia     JOINT REPLACEMENT       RELEASE CARPAL TUNNEL      carpal tunnel syndrome     shoulder bone spur removal       TKA       TONSILLECTOMY       UPPER GI ENDOSCOPY  2012    repeat in 2014     UPPER GI ENDOSCOPY       UPPER GI ENDOSCOPY       UPPER GI ENDOSCOPY  2010    repeat 2012       ENT family history reviewed    Social History   Substance Use Topics     Smoking status: Former Smoker     Types: Cigarettes     Smokeless tobacco: Never Used      Comment: quit in the 80s     Alcohol use No       Review of Systems  ROS: 10 point ROS neg other than the symptoms noted above in the HPI and joint pain    Physical Exam  /62 (BP Location: Right arm, Patient Position: Chair, Cuff Size: Adult Large)  Pulse 72  Temp 97.3  F (36.3  C) (Tympanic)  Ht 5' 10\" (1.778 m)  Wt 237 lb (107.5 kg)  SpO2 98%  BMI 34.01 kg/m2  General - The patient is well nourished and well developed, and appears to have good nutritional status.  Alert and oriented to person and place, answers questions and cooperates with examination appropriately.   Skin-I examined the right preauricular skin under microscopy  There is a superficially ulcerated raised lesion over 1.0 cm and a separate adjacent lesion 1.0 cm right preauricular skin, no palpable parotid masses   Head and Face - Normocephalic and atraumatic, with no gross asymmetry noted.  The facial nerve is intact, with strong symmetric movements.  Voice and Breathing - The patient was breathing comfortably without the use of accessory muscles. There was no wheezing, stridor, or stertor.  The patients voice was clear and strong, and had appropriate pitch and quality.  Eyes - Extraocular " movements intact, and the pupils were reactive to light.  Sclera were not icteric or injected, conjunctiva were pink and moist.  Neck - Normal midline excursion of the laryngotracheal complex during swallowing.  Full range of motion on passive movement.  Palpation of the occipital, submental, submandibular, internal jugular chain, and supraclavicular nodes did not demonstrate any abnormal lymph nodes or masses.  Palpation of the thyroid was soft and smooth, with no nodules or goiter appreciated.  The trachea was mobile and midline.    Office Procedure:   I discussed the risks and complications of skin lesion excision, including local anesthesia, bleeding, infection, injury to major/minor arteries, nerves and veins, scar formation, hypertrophic healing or keloid, numbness to area, recurrence of lesion, benign versus malignant pathology and possible need for further surgery. All questions were answered.    After informed consent was discussed and a time- out taken, I proceeded to cleanse the skin around the lesion with alcohol.  I then demarcated the lesion parallel to relaxed skin tension lines in a natural crease.  The area was prepped and draped in the normal sterile fashion.  I then infiltrated the skin with 1% lidocaine with 1:200,000 epinephrine.  I  used a 15-blade to create an elliptical incision around the lesion, with margins of 2 mm of grossly normal skin.  I then elevated the skin ellipse and a thin cuff of underlying subdermal fat with curved iris scissors.  I dissected down through normal subcutaneous tissue for complete removal of the lesion.  After the lesion was completely removed, I then placed it in formalin for permanent section.  Hemostasis was achieved with direct pressure and hand held cautery. I then irrigated the wound and gently suctioned the area.  I advanced the adjacent skin for tension free closure using tenotomy scissors.  The total length of the incision was  2.8 cm.  I then proceeded  to close the incision by using simple interrupted buried knot sutures, using 4 and 5-0 Vicryl.  The skin edges were then reapproximated using 5-0 nylon, simple interrupted sutures.  Antibiotic ointment was then applied and the wound was dressed.  The patient tolerated the procedure without any difficulty.    A/P  This patient had a skin lesion excised today.  I have instructed the patient on wound care and signs of infection.  Written instructions provided.  We will contact the patient with pathology results.    If any additional surgery needs to be scheduled we will discuss this after pathology results are finalized.     No soaking of the wound for 3 weeks, may shower.     Follow up with LPN in 1 week for suture removal, or as otherwise indicated.      If you prefer, you may remove your own nylon sutures in 1 week per our instructions, followed by careful application of benzoin and steri strips.    If this is a SCCA or BCCA with involved margins he will need aditional excision with frozens an dpossilbe flap in the OR, this was d/w him as well    Sunscreen use and skin cancer preventive measures discussed         Impression and Plan- Carrillo Carranza is a 77 year old male with:    ICD-10-CM    1. Skin lesion of face L98.9 EXC BENIGN SKIN LESION FACE/EARS 2.1-3.0 CM             Kyara Rojas D.O.  Otolaryngology/Head and Neck Surgery  Allergy      Again, thank you for allowing me to participate in the care of your patient.        Sincerely,        Kyara Rojas MD

## 2018-03-01 NOTE — MR AVS SNAPSHOT
After Visit Summary   3/1/2018    Carrillo Carranza    MRN: [id number]           Patient Information     Date Of Birth          1940        Visit Information        Provider Department      3/1/2018 1:15 PM Kyara Rojas MD [facility]        Care Instructions    Thank you for allowing Dr. Rojas and our ENT team to participate in your care.  If your medications are too expensive, please give the nurse a call.  We can possibly change this medication.  If you have a scheduling or an appointment question please contact [facility] Coordinator at their direct line [phone].   ALL nursing questions or concerns can be directed to your ENT nurse at: [phone] - Marianne    Wait until the bandages fall off to start cleaning the area  Follow up with ENT in 1 week for a suture removal      POST PROCEDURE INSTRUCTIONS      Remove your dressing in 24 hours (If you have one)    Wash incision with a mixture of half water and half hydrogen peroxide 3 times daily.    Apply Aquaphor Healing Ointment to the wound 3 times daily. (Unless specified Bacitracin)    Cover with a clean dressing if in a dirty charlie environment or when wet/soiled    Keep incision clean and dry   Do NOT soak in water such as a tub bath or swimming   Do NOT put make-up, powders, hairspray, lotions, etc on the incision       You can apply ice to the surgical area to help reduce swelling. (no longer than 20 minutes at a time)      You can use acetaminophen(Tylenol) or the prescription you received for pain.       If you have any bleeding, cover the wound with clean gauze and hold pressure for 10 Minutes. If the bleeding does not stop or is heavy and profuse, call the clinic or go to the Urgent Care/Emergency Department.    SIGNS OF INFECTION ARE:    Redness, swelling, red streaks, pus, drainage, warmth, fever, increased pain, foul smell.     Contact your primary health care provider if you notice any of  the warning signs.     FOLLOW - UP    Follow-up in clinic for a nurse only visit in 7 days for suture removal.     Pathology results will be called to you when they are back. Usually 7-10 days.      6 WEEKS POST PROCEDURE      Apply ANY type of lotion to the suture site(Example - Vaseline Intensive Care or Vitamin E)    Massage the surgical area 1-2 times daily in a circular motion for 5 minutes, for a period of 2 months. This will help the scar heal better.             Follow-ups after your visit        Your next 10 appointments already scheduled     Jul 06, 2018  8:30 AM CDT   (Arrive by 8:15 AM)   SHORT with Mata Richardson MD   [facility] )    [address]  [facility] [address]   [phone]              Who to contact     If you have questions or need follow up information about today's clinic visit or your schedule please contact [facility] directly at [phone].  Normal or non-critical lab and imaging results will be communicated to you by MyChart, letter or phone within 4 business days after the clinic has received the results. If you do not hear from us within 7 days, please contact the clinic through [employer] or phone. If you have a critical or abnormal lab result, we will notify you by phone as soon as possible.  Submit refill requests through [employer] or call your pharmacy and they will forward the refill request to us. Please allow 3 business days for your refill to be completed.          Additional Information About Your Visit        [employer] Information     [employer] gives you secure access to your electronic health record. If you see a primary care provider, you can also send messages to your care team and make appointments. If you have questions, please call your primary care clinic.  If you do not have a primary care provider, please call [phone] and they will assist you.        Care EveryWhere ID     This is your Care EveryWhere  "ID. This could be used by other organizations to access your [facility] medical records  [id number]        Your Vitals Were     Pulse Temperature Height Pulse Oximetry BMI (Body Mass Index)       72 97.3  F (36.3  C) (Tympanic) 5' 10\" (1.778 m) 98% 34.01 kg/m2        Blood Pressure from Last 3 Encounters:   03/01/18 138/62   02/23/18 132/70   10/25/17 130/60    Weight from Last 3 Encounters:   03/01/18 237 lb (107.5 kg)   02/23/18 237 lb 6 oz (107.7 kg)   10/25/17 234 lb (106.1 kg)              Today, you had the following     No orders found for display       Primary Care Provider Office Phone # Fax #    Mata Richardson [phone] [address] [facility] [address]        Equal Access to Services     [facility]: Hadii kennedy saenz hadasho Sojade, waaxda luqadaha, qaybta kaalmada adeegyada, david dawkins . So [facility] [phone].    ATENCIÓN: Si habla español, tiene a palacios disposición servicios gratuitos de asistencia lingüística. Llame al [phone].    We comply with applicable federal civil rights laws and [place] laws. We do not discriminate on the basis of race, color, national origin, age, disability, sex, sexual orientation, or gender identity.            Thank you!     Thank you for choosing [facility]  for your care. Our goal is always to provide you with excellent care. Hearing back from our patients is one way we can continue to improve our services. Please take a few minutes to complete the written survey that you may receive in the mail after your visit with us. Thank you!             Your Updated Medication List - Protect others around you: Learn how to safely use, store and throw away your medicines at www.disposemymeds.org.          This list is accurate as of 3/1/18  1:53 PM.  Always use your most recent med list.                   Brand Name Dispense Instructions for use Diagnosis    ACCU-CHEK ANGELI PLUS test strip   Generic drug:  " blood glucose monitoring     100 strip    USE ONE STRIP ONCE DAILY    Diabetes mellitus, type 2 (H)       ADVIL PO      Take by mouth every 4 hours as needed for moderate pain        ASPIR-81 PO      Take 1 tablet by mouth daily        atenolol-chlorthalidone 50-25 MG per tablet    TENORETIC    90 tablet    TAKE 1 TABLET DAILY    Benign essential hypertension       blood glucose calibration solution     1 each    USE CONTROL SOLUTION AS DIRECTED ONCE MONTHLY TO CHECK ACCURACY OF METER    Diabetes mellitus, type 2 (H)       blood glucose monitoring lancets     102 each    USE TO TEST BLOOD SUGAR ONCE DAILY    Diabetes mellitus, type 2 (H)       ezetimibe 10 MG tablet    ZETIA    90 tablet    TAKE 1 TABLET DAILY (DUE   FOR LIPID PROFILE DECEMBER 2017)    Hyperlipidemia LDL goal <100       glimepiride 1 MG tablet    AMARYL    45 tablet    Take 0.5 tablets (0.5 mg) by mouth every morning (before breakfast)    Type 2 diabetes mellitus with diabetic nephropathy, without long-term current use of insulin (H)       KLOR-CON 20 MEQ CR tablet   Generic drug:  potassium chloride SA     180 tablet    TAKE ONE TABLET BY MOUTH TWICE DAILY WITH MEALS    HTN (hypertension)       magnesium oxide 400 MG Caps     180 capsule    Take 400 mg by mouth 2 times daily    Hypomagnesemia       metFORMIN 500 MG 24 hr tablet    GLUCOPHAGE-XR    180 tablet    TAKE ONE TABLET BY MOUTH TWICE DAILY WITH MEALS    Type 2 diabetes mellitus without complication, without long-term current use of insulin (H)       MULTIVITAMINS PO      Take 1 tablet by mouth daily        OMEGA-3 FISH OIL PO      Take 1 g by mouth        pantoprazole 40 MG EC tablet    PROTONIX    90 tablet    TAKE 1 TABLET DAILY    Baca esophagus       STATIN NOT PRESCRIBED (INTENTIONAL)      Please choose reason not prescribed, below    Hyperlipidemia with target LDL less than 100       STOOL SOFTENER 100 MG capsule   Generic drug:  docusate sodium      Take 1 capsule by mouth 2 times  daily        tamsulosin 0.4 MG capsule    FLOMAX    30 capsule    Take 1 capsule (0.4 mg) by mouth daily    BPH with obstruction/lower urinary tract symptoms       traZODone 50 MG tablet    DESYREL    90 tablet    TAKE ONE TABLET BY MOUTH AT BEDTIME    Diagnosis unknown       VITAMIN D PO      Take 1 capsule by mouth daily

## 2018-03-01 NOTE — NURSING NOTE
"Chief Complaint   Patient presents with     Consult     BCC face- Versich        Initial /62 (BP Location: Right arm, Patient Position: Chair, Cuff Size: Adult Large)  Pulse 72  Temp 97.3  F (36.3  C) (Tympanic)  Ht 5' 10\" (1.778 m)  Wt 237 lb (107.5 kg)  SpO2 98%  BMI 34.01 kg/m2 Estimated body mass index is 34.01 kg/(m^2) as calculated from the following:    Height as of this encounter: 5' 10\" (1.778 m).    Weight as of this encounter: 237 lb (107.5 kg).  Medication Reconciliation: complete       Antonietta Dickey LPN      "

## 2018-03-01 NOTE — PROGRESS NOTES
Otolaryngology Consultation    Patient: Carrillo Carranza  : 1940    Patient presents with:  Consult: BCC face- Versich       This patient presents today for a right facial skin lesion.  Present over 2 months, changing and growing.  No known history of carcinoma skin or melanoma.  Excess sun exposure with sun burns throughout life. No immunodeficiency.  He does not tend to burn  Noted area near his right sideburn that is non-healing        Current Outpatient Rx   Medication Sig Dispense Refill     ezetimibe (ZETIA) 10 MG tablet TAKE 1 TABLET DAILY (DUE   FOR LIPID PROFILE 2017) 90 tablet 0     KLOR-CON 20 MEQ CR tablet TAKE ONE TABLET BY MOUTH TWICE DAILY WITH MEALS 180 tablet 1     glimepiride (AMARYL) 1 MG tablet Take 0.5 tablets (0.5 mg) by mouth every morning (before breakfast) 45 tablet 3     atenolol-chlorthalidone (TENORETIC) 50-25 MG per tablet TAKE 1 TABLET DAILY 90 tablet 3     pantoprazole (PROTONIX) 40 MG EC tablet TAKE 1 TABLET DAILY 90 tablet 3     tamsulosin (FLOMAX) 0.4 MG capsule Take 1 capsule (0.4 mg) by mouth daily 30 capsule 11     traZODone (DESYREL) 50 MG tablet TAKE ONE TABLET BY MOUTH AT BEDTIME 90 tablet 3     blood glucose monitoring (ACCU-CHEK MULTICLIX) lancets USE TO TEST BLOOD SUGAR ONCE DAILY 102 each 5     ACCU-CHEK ANGELI PLUS test strip USE ONE STRIP ONCE DAILY 100 strip 3     STATIN NOT PRESCRIBED, INTENTIONAL, Please choose reason not prescribed, below       metFORMIN (GLUCOPHAGE-XR) 500 MG 24 hr tablet TAKE ONE TABLET BY MOUTH TWICE DAILY WITH MEALS 180 tablet 3     Omega-3 Fatty Acids (OMEGA-3 FISH OIL PO) Take 1 g by mouth       blood glucose calibration (ACCU-CHEK ANGELI) solution USE CONTROL SOLUTION AS DIRECTED ONCE MONTHLY TO CHECK ACCURACY OF METER 1 each 0     magnesium oxide 400 MG CAPS Take 400 mg by mouth 2 times daily 180 capsule 2     Aspirin (ASPIR-81 PO) Take 1 tablet by mouth daily        Ibuprofen (ADVIL PO) Take by mouth every 4 hours as needed  for moderate pain       Multiple Vitamin (MULTIVITAMINS PO) Take 1 tablet by mouth daily       Cholecalciferol (VITAMIN D PO) Take 1 capsule by mouth daily       docusate sodium (STOOL SOFTENER) 100 MG capsule Take 1 capsule by mouth 2 times daily         Allergies: Atorvastatin calcium; Avodart [dutasteride]; and Simvastatin     Past Medical History:   Diagnosis Date     Baca's esophagus 11/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     Contact dermatitis and other eczema, due to unspecified cause 3/1/2011     Dermatophytosis of groin and perianal area 2/1/2006     Esophageal reflux 11/3/2011    GERD     Family history of colonic polyps 11/3/2011     Fatty liver disease, nonalcoholic      Generalized osteoarthrosis, involving multiple sites 3/1/2011     HTN (hypertension)      Hyperlipidaemia LDL goal < 100      Other and unspecified hyperlipidemia 11/3/2011     Other chronic nonalcoholic liver disease 11/3/2011    SANCHES (nonalcoholic steatohepatitis)     Other premature beats 11/3/2011    PVC (premature ventricular contraction)     Other specified cardiac dysrhythmias(427.89) 11/3/2011    ventricular bigeminy under anesthesia     Personal history of tobacco use, presenting hazards to health 11/3/2011    in remission     Type II or unspecified type diabetes mellitus without mention of complication, not stated as uncontrolled 2/7/2012     Umbilical hernia without mention of obstruction or gangrene 11/3/2011     Unspecified diffuse connective tissue disease 1/29/2013     Unspecified essential hypertension 11/3/2011       Past Surgical History:   Procedure Laterality Date     ADENOIDECTOMY       APPENDECTOMY       ARTHROSCOPY KNEE       arthroscopy right great toe      bunion     COLONOSCOPY  02-     COLONOSCOPY  2010,2006    repeat in 2015     COLONOSCOPY  8-     COLONOSCOPY  2015    Repeat 2020     ESOPHAGOGASTRODUODENOSCOPY      Repeat in 2015     ESOPHAGOGASTRODUODENOSCOPY  2015    Dr Funes/Vivek- Repeat 3yrs     HERNIA REPAIR,  "UMBILICAL  2012    reduction and repair of umbilical hernia     JOINT REPLACEMENT       RELEASE CARPAL TUNNEL      carpal tunnel syndrome     shoulder bone spur removal       TKA       TONSILLECTOMY       UPPER GI ENDOSCOPY  2012    repeat in 2014     UPPER GI ENDOSCOPY       UPPER GI ENDOSCOPY       UPPER GI ENDOSCOPY  2010    repeat 2012       ENT family history reviewed    Social History   Substance Use Topics     Smoking status: Former Smoker     Types: Cigarettes     Smokeless tobacco: Never Used      Comment: quit in the 80s     Alcohol use No       Review of Systems  ROS: 10 point ROS neg other than the symptoms noted above in the HPI and joint pain    Physical Exam  /62 (BP Location: Right arm, Patient Position: Chair, Cuff Size: Adult Large)  Pulse 72  Temp 97.3  F (36.3  C) (Tympanic)  Ht 5' 10\" (1.778 m)  Wt 237 lb (107.5 kg)  SpO2 98%  BMI 34.01 kg/m2  General - The patient is well nourished and well developed, and appears to have good nutritional status.  Alert and oriented to person and place, answers questions and cooperates with examination appropriately.   Skin-I examined the right preauricular skin under microscopy  There is a superficially ulcerated raised lesion over 1.0 cm and a separate adjacent lesion 1.0 cm right preauricular skin, no palpable parotid masses   Head and Face - Normocephalic and atraumatic, with no gross asymmetry noted.  The facial nerve is intact, with strong symmetric movements.  Voice and Breathing - The patient was breathing comfortably without the use of accessory muscles. There was no wheezing, stridor, or stertor.  The patients voice was clear and strong, and had appropriate pitch and quality.  Eyes - Extraocular movements intact, and the pupils were reactive to light.  Sclera were not icteric or injected, conjunctiva were pink and moist.  Neck - Normal midline excursion of the laryngotracheal complex during swallowing.  Full range of motion on passive " movement.  Palpation of the occipital, submental, submandibular, internal jugular chain, and supraclavicular nodes did not demonstrate any abnormal lymph nodes or masses.  Palpation of the thyroid was soft and smooth, with no nodules or goiter appreciated.  The trachea was mobile and midline.    Office Procedure:   I discussed the risks and complications of skin lesion excision, including local anesthesia, bleeding, infection, injury to major/minor arteries, nerves and veins, scar formation, hypertrophic healing or keloid, numbness to area, recurrence of lesion, benign versus malignant pathology and possible need for further surgery. All questions were answered.    After informed consent was discussed and a time- out taken, I proceeded to cleanse the skin around the lesion with alcohol.  I then demarcated the lesion parallel to relaxed skin tension lines in a natural crease.  The area was prepped and draped in the normal sterile fashion.  I then infiltrated the skin with 1% lidocaine with 1:200,000 epinephrine.  I  used a 15-blade to create an elliptical incision around the lesion, with margins of 2 mm of grossly normal skin.  I then elevated the skin ellipse and a thin cuff of underlying subdermal fat with curved iris scissors.  I dissected down through normal subcutaneous tissue for complete removal of the lesion.  After the lesion was completely removed, I then placed it in formalin for permanent section.  Hemostasis was achieved with direct pressure and hand held cautery. I then irrigated the wound and gently suctioned the area.  I advanced the adjacent skin for tension free closure using tenotomy scissors.  The total length of the incision was  2.8 cm.  I then proceeded to close the incision by using simple interrupted buried knot sutures, using 4 and 5-0 Vicryl.  The skin edges were then reapproximated using 5-0 nylon, simple interrupted sutures.  Antibiotic ointment was then applied and the wound was dressed.   The patient tolerated the procedure without any difficulty.    A/P  This patient had a skin lesion excised today.  I have instructed the patient on wound care and signs of infection.  Written instructions provided.  We will contact the patient with pathology results.    If any additional surgery needs to be scheduled we will discuss this after pathology results are finalized.     No soaking of the wound for 3 weeks, may shower.     Follow up with LPN in 1 week for suture removal, or as otherwise indicated.      If you prefer, you may remove your own nylon sutures in 1 week per our instructions, followed by careful application of benzoin and steri strips.    If this is a SCCA or BCCA with involved margins he will need aditional excision with frozens an dpossilbe flap in the OR, this was d/w him as well    Sunscreen use and skin cancer preventive measures discussed         Impression and Plan- Carrillo Carranza is a 77 year old male with:    ICD-10-CM    1. Skin lesion of face L98.9 EXC BENIGN SKIN LESION FACE/EARS 2.1-3.0 CM             Kyara Rojas D.O.  Otolaryngology/Head and Neck Surgery  Allergy

## 2018-03-05 LAB — COPATH REPORT: NORMAL

## 2018-03-07 ENCOUNTER — DOCUMENTATION ONLY (OUTPATIENT)
Dept: FAMILY MEDICINE | Facility: OTHER | Age: 78
End: 2018-03-07

## 2018-03-09 ENCOUNTER — ALLIED HEALTH/NURSE VISIT (OUTPATIENT)
Dept: OTOLARYNGOLOGY | Facility: OTHER | Age: 78
End: 2018-03-09
Attending: PHYSICIAN ASSISTANT
Payer: MEDICARE

## 2018-03-09 ENCOUNTER — TELEPHONE (OUTPATIENT)
Dept: OTOLARYNGOLOGY | Facility: OTHER | Age: 78
End: 2018-03-09

## 2018-03-09 DIAGNOSIS — C44.310 BCC (BASAL CELL CARCINOMA), FACE: Primary | ICD-10-CM

## 2018-03-09 DIAGNOSIS — Z48.02 ENCOUNTER FOR REMOVAL OF SUTURES: Primary | ICD-10-CM

## 2018-03-09 NOTE — PROGRESS NOTES
The patient presents to the clinic today for a nurse only suture removal. All sutures were removed without difficulty. Area appears clean and intact. He will continue wound care instructions at home. A date for surgery has been picked.

## 2018-03-09 NOTE — MR AVS SNAPSHOT
After Visit Summary   3/9/2018    Carrillo Carranza    MRN: [id number]           Patient Information     Date Of Birth          1940        Visit Information        Provider Department      3/9/2018 11:00 AM HC ENT NURSE [facility]        Today's Diagnoses     Encounter for removal of sutures    -  1       Follow-ups after your visit        Your next 10 appointments already scheduled     Mar 12, 2018  7:45 AM CDT   (Arrive by 7:30 AM)   Pre-Op physical with Mata Richardson MD   [facility] ([facility] [place] )    [address]  [place] [address]   [phone]            Mar 20, 2018 10:45 AM CDT   (Arrive by 10:30 AM)   Post Op with DAKOTA Mendoza [facility] [place] ([facility] [place] )    [address]  [place] [address]   [phone]            Jul 06, 2018  8:30 AM CDT   (Arrive by 8:15 AM)   SHORT with Mata Richardson MD   [facility] [place] ([facility] [place] )    [address]  [place] [address]   [phone]              Who to contact     If you have questions or need follow up information about today's clinic visit or your schedule please contact [facility] directly at [phone].  Normal or non-critical lab and imaging results will be communicated to you by MyChart, letter or phone within 4 business days after the clinic has received the results. If you do not hear from us within 7 days, please contact the clinic through [employer] or phone. If you have a critical or abnormal lab result, we will notify you by phone as soon as possible.  Submit refill requests through [employer] or call your pharmacy and they will forward the refill request to us. Please allow 3 business days for your refill to be completed.          Additional Information About Your Visit        [employer] Information     [employer] gives you secure access to your electronic health record. If you see a  primary care provider, you can also send messages to your care team and make appointments. If you have questions, please call your primary care clinic.  If you do not have a primary care provider, please call [phone] and they will assist you.        Care EveryWhere ID     This is your Care EveryWhere ID. This could be used by other organizations to access your [place] medical records  [id number]         Blood Pressure from Last 3 Encounters:   03/01/18 138/62   02/23/18 132/70   10/25/17 130/60    Weight from Last 3 Encounters:   03/01/18 237 lb (107.5 kg)   02/23/18 237 lb 6 oz (107.7 kg)   10/25/17 234 lb (106.1 kg)              Today, you had the following     No orders found for display       Primary Care Provider Office Phone # Fax #    Mata Richardson [phone]       [facility] [address]        Equal Access to Services     RONAK GARCIA AH: Hadii kennedy ku hadasho Soomaali, waaxda luqadaha, qaybta kaalmada adeegyada, waxay arronin hayjigna dawkins . So [facility] [phone].    ATENCIÓN: Si teresala espraquel, tiene a palacios disposición servicios gratuitos de asistencia lingüística. Llame al [phone].    We comply with applicable federal civil rights laws and [place] laws. We do not discriminate on the basis of race, color, national origin, age, disability, sex, sexual orientation, or gender identity.            Thank you!     Thank you for choosing [facility]  for your care. Our goal is always to provide you with excellent care. Hearing back from our patients is one way we can continue to improve our services. Please take a few minutes to complete the written survey that you may receive in the mail after your visit with us. Thank you!             Your Updated Medication List - Protect others around you: Learn how to safely use, store and throw away your medicines at www.disposemymeds.org.          This list is accurate as of 3/9/18 11:27 AM.  Always use  your most recent med list.                   Brand Name Dispense Instructions for use Diagnosis    ACCU-CHEK ANGELI PLUS test strip   Generic drug:  blood glucose monitoring     100 strip    USE ONE STRIP ONCE DAILY    Diabetes mellitus, type 2 (H)       ADVIL PO      Take by mouth every 4 hours as needed for moderate pain        ASPIR-81 PO      Take 1 tablet by mouth daily        atenolol-chlorthalidone 50-25 MG per tablet    TENORETIC    90 tablet    TAKE 1 TABLET DAILY    Benign essential hypertension       blood glucose calibration solution     1 each    USE CONTROL SOLUTION AS DIRECTED ONCE MONTHLY TO CHECK ACCURACY OF METER    Diabetes mellitus, type 2 (H)       blood glucose monitoring lancets     102 each    USE TO TEST BLOOD SUGAR ONCE DAILY    Diabetes mellitus, type 2 (H)       ezetimibe 10 MG tablet    ZETIA    90 tablet    TAKE 1 TABLET DAILY (DUE   FOR LIPID PROFILE DECEMBER 2017)    Hyperlipidemia LDL goal <100       glimepiride 1 MG tablet    AMARYL    45 tablet    Take 0.5 tablets (0.5 mg) by mouth every morning (before breakfast)    Type 2 diabetes mellitus with diabetic nephropathy, without long-term current use of insulin (H)       KLOR-CON 20 MEQ CR tablet   Generic drug:  potassium chloride SA     180 tablet    TAKE ONE TABLET BY MOUTH TWICE DAILY WITH MEALS    HTN (hypertension)       magnesium oxide 400 MG Caps     180 capsule    Take 400 mg by mouth 2 times daily    Hypomagnesemia       metFORMIN 500 MG 24 hr tablet    GLUCOPHAGE-XR    180 tablet    TAKE ONE TABLET BY MOUTH TWICE DAILY WITH MEALS    Type 2 diabetes mellitus without complication, without long-term current use of insulin (H)       MULTIVITAMINS PO      Take 1 tablet by mouth daily        OMEGA-3 FISH OIL PO      Take 1 g by mouth        pantoprazole 40 MG EC tablet    PROTONIX    90 tablet    TAKE 1 TABLET DAILY    Baca esophagus       STATIN NOT PRESCRIBED (INTENTIONAL)      Please choose reason not prescribed, below     Hyperlipidemia with target LDL less than 100       STOOL SOFTENER 100 MG capsule   Generic drug:  docusate sodium      Take 1 capsule by mouth 2 times daily        tamsulosin 0.4 MG capsule    FLOMAX    30 capsule    Take 1 capsule (0.4 mg) by mouth daily    BPH with obstruction/lower urinary tract symptoms       traZODone 50 MG tablet    DESYREL    90 tablet    TAKE ONE TABLET BY MOUTH AT BEDTIME    Diagnosis unknown       VITAMIN D PO      Take 1 capsule by mouth daily

## 2018-03-12 ENCOUNTER — OFFICE VISIT (OUTPATIENT)
Dept: FAMILY MEDICINE | Facility: OTHER | Age: 78
End: 2018-03-12
Attending: FAMILY MEDICINE
Payer: MEDICARE

## 2018-03-12 VITALS
HEART RATE: 73 BPM | SYSTOLIC BLOOD PRESSURE: 132 MMHG | DIASTOLIC BLOOD PRESSURE: 72 MMHG | BODY MASS INDEX: 33.49 KG/M2 | WEIGHT: 233.38 LBS | OXYGEN SATURATION: 96 % | TEMPERATURE: 97 F

## 2018-03-12 DIAGNOSIS — C44.310 BCC (BASAL CELL CARCINOMA), FACE: ICD-10-CM

## 2018-03-12 DIAGNOSIS — Z01.810 PRE-OPERATIVE CARDIOVASCULAR EXAMINATION: Primary | ICD-10-CM

## 2018-03-12 DIAGNOSIS — M77.31 HEEL SPUR, RIGHT: ICD-10-CM

## 2018-03-12 DIAGNOSIS — I10 ESSENTIAL HYPERTENSION WITH GOAL BLOOD PRESSURE LESS THAN 140/90: ICD-10-CM

## 2018-03-12 DIAGNOSIS — E11.9 TYPE 2 DIABETES MELLITUS WITHOUT COMPLICATION, WITHOUT LONG-TERM CURRENT USE OF INSULIN (H): ICD-10-CM

## 2018-03-12 LAB
BASOPHILS # BLD AUTO: 0 10E9/L (ref 0–0.2)
BASOPHILS NFR BLD AUTO: 0.6 %
DIFFERENTIAL METHOD BLD: NORMAL
EOSINOPHIL # BLD AUTO: 0.2 10E9/L (ref 0–0.7)
EOSINOPHIL NFR BLD AUTO: 3.1 %
ERYTHROCYTE [DISTWIDTH] IN BLOOD BY AUTOMATED COUNT: 12.2 % (ref 10–15)
HCT VFR BLD AUTO: 43.6 % (ref 40–53)
HGB BLD-MCNC: 15.6 G/DL (ref 13.3–17.7)
IMM GRANULOCYTES # BLD: 0 10E9/L (ref 0–0.4)
IMM GRANULOCYTES NFR BLD: 0.3 %
LYMPHOCYTES # BLD AUTO: 1.9 10E9/L (ref 0.8–5.3)
LYMPHOCYTES NFR BLD AUTO: 27.8 %
MCH RBC QN AUTO: 30.9 PG (ref 26.5–33)
MCHC RBC AUTO-ENTMCNC: 35.8 G/DL (ref 31.5–36.5)
MCV RBC AUTO: 86 FL (ref 78–100)
MONOCYTES # BLD AUTO: 0.7 10E9/L (ref 0–1.3)
MONOCYTES NFR BLD AUTO: 9.6 %
NEUTROPHILS # BLD AUTO: 4 10E9/L (ref 1.6–8.3)
NEUTROPHILS NFR BLD AUTO: 58.6 %
NRBC # BLD AUTO: 0 10*3/UL
NRBC BLD AUTO-RTO: 0 /100
PLATELET # BLD AUTO: 187 10E9/L (ref 150–450)
RBC # BLD AUTO: 5.05 10E12/L (ref 4.4–5.9)
WBC # BLD AUTO: 6.8 10E9/L (ref 4–11)

## 2018-03-12 PROCEDURE — 93005 ELECTROCARDIOGRAM TRACING: CPT

## 2018-03-12 PROCEDURE — 99214 OFFICE O/P EST MOD 30 MIN: CPT | Mod: 25 | Performed by: FAMILY MEDICINE

## 2018-03-12 PROCEDURE — 85025 COMPLETE CBC W/AUTO DIFF WBC: CPT | Mod: ZL | Performed by: FAMILY MEDICINE

## 2018-03-12 PROCEDURE — G0463 HOSPITAL OUTPT CLINIC VISIT: HCPCS | Mod: 25

## 2018-03-12 PROCEDURE — 36415 COLL VENOUS BLD VENIPUNCTURE: CPT | Mod: ZL | Performed by: FAMILY MEDICINE

## 2018-03-12 PROCEDURE — 93010 ELECTROCARDIOGRAM REPORT: CPT | Performed by: INTERNAL MEDICINE

## 2018-03-12 ASSESSMENT — PAIN SCALES - GENERAL: PAINLEVEL: NO PAIN (0)

## 2018-03-12 ASSESSMENT — ANXIETY QUESTIONNAIRES
7. FEELING AFRAID AS IF SOMETHING AWFUL MIGHT HAPPEN: NOT AT ALL
5. BEING SO RESTLESS THAT IT IS HARD TO SIT STILL: NOT AT ALL
GAD7 TOTAL SCORE: 0
2. NOT BEING ABLE TO STOP OR CONTROL WORRYING: NOT AT ALL
1. FEELING NERVOUS, ANXIOUS, OR ON EDGE: NOT AT ALL
6. BECOMING EASILY ANNOYED OR IRRITABLE: NOT AT ALL
3. WORRYING TOO MUCH ABOUT DIFFERENT THINGS: NOT AT ALL
4. TROUBLE RELAXING: NOT AT ALL

## 2018-03-12 NOTE — NURSING NOTE
"Chief Complaint   Patient presents with     Pre-Op Exam       Initial /72  Pulse 73  Temp 97  F (36.1  C) (Tympanic)  Wt 233 lb 6 oz (105.9 kg)  SpO2 96%  BMI 33.49 kg/m2 Estimated body mass index is 33.49 kg/(m^2) as calculated from the following:    Height as of 3/1/18: 5' 10\" (1.778 m).    Weight as of this encounter: 233 lb 6 oz (105.9 kg).  Medication Reconciliation: complete     Amanda Pereira    "

## 2018-03-12 NOTE — PROGRESS NOTES
[facility] [place]  [address]  [place] [address]  [phone]  Dept: [phone]    PRE-OP EVALUATION:  Today's date: 3/12/2018    Carrillo Carranza (: 1940) presents for pre-operative evaluation assessment as requested by Dr. Rojas.  He requires evaluation and anesthesia risk assessment prior to undergoing surgery/procedure for treatment of basel cell removal .    Proposed Surgery/ Procedure: basel cell removal  Date of Surgery/ Procedure: 3-13-18  Time of Surgery/ Procedure: [facility]  Hospital/Surgical Facility: [facility]  Fax number for surgical facility: unknown  Primary Physician: Mata Richardson  Type of Anesthesia Anticipated: to be determined    Patient has a Health Care Directive or Living Will:  NO    1. NO - Do you have a history of heart attack, stroke, stent, bypass or surgery on an artery in the head, neck, heart or legs?  2. NO - Do you ever have any pain or discomfort in your chest?  3. NO - Do you have a history of  Heart Failure?  4. NO - Are you troubled by shortness of breath when: walking on the level, up a slight hill or at night?  5. NO - Do you currently have a cold, bronchitis or other respiratory infection?  6. NO - Do you have a cough, shortness of breath or wheezing?  7. NO - Do you sometimes get pains in the calves of your legs when you walk?  8. NO - Do you or anyone in your family have previous history of blood clots?  9. NO - Do you or does anyone in your family have a serious bleeding problem such as prolonged bleeding following surgeries or cuts?  10. NO - Have you ever had problems with anemia or been told to take iron pills?  11. NO - Have you had any abnormal blood loss such as black, tarry or bloody stools, or abnormal vaginal bleeding?  12. NO - Have you ever had a blood transfusion?  13. NO - Have you or any of your relatives ever had problems with anesthesia?  14. YES - DO YOU HAVE SLEEP APNEA, EXCESSIVE SNORING OR DAYTIME DROWSINESS? sores  15. NO - Do  you have any prosthetic heart valves?  16. YES - DO YOU HAVE PROSTHETIC JOINTS? Both knees  17. NO - Is there any chance that you may be pregnant?      HPI:     HPI related to upcoming procedure:   78 yO male  presents for cardiopulmonary/general clearance to undergo the above procedure.  His surgeon listed above has asked for this clearance to undergo anesthesia. Pt has had this condition for approximately 6 months .   Overall pt is of good health and has not  had any perioperative complications with previous surgeries.          See problem list for active medical problems.  Problems all longstanding and stable, except as noted/documented.  See ROS for pertinent symptoms related to these conditions.                                                                                                  .    MEDICAL HISTORY:     Patient Active Problem List    Diagnosis Date Noted     Low libido 09/14/2017     Priority: Medium     BPH with obstruction/lower urinary tract symptoms 05/16/2017     Priority: Medium     Type 2 diabetes mellitus without complication, without long-term current use of insulin (H) 12/12/2016     Priority: Medium     Essential hypertension with goal blood pressure less than 140/90 07/15/2016     Priority: Medium     Nocturia 10/09/2014     Priority: Medium     Hypomagnesemia 06/23/2014     Priority: Medium     BPPV (benign paroxysmal positional vertigo) 06/13/2014     Priority: Medium     Hypokalemia 06/02/2014     Priority: Medium     Hyperlipidemia with target LDL less than 100      Priority: Medium     Diagnosis updated by automated process. Provider to review and confirm.       Fatty liver disease, nonalcoholic      Priority: Medium     Diffuse connective tissue disease (H) 01/29/2013     Priority: Medium     Problem list name updated by automated process. Provider to review       Advanced care planning/counseling discussion 06/12/2012     Priority: Medium      Past Medical History:   Diagnosis  Date     Baca's esophagus 11/3/2011     Contact dermatitis and other eczema, due to unspecified cause 3/1/2011     Dermatophytosis of groin and perianal area 2/1/2006     Esophageal reflux 11/3/2011    GERD     Family history of colonic polyps 11/3/2011     Fatty liver disease, nonalcoholic      Generalized osteoarthrosis, involving multiple sites 3/1/2011     HTN (hypertension)      Hyperlipidaemia LDL goal < 100      Other and unspecified hyperlipidemia 11/3/2011     Other chronic nonalcoholic liver disease 11/3/2011    SANCHES (nonalcoholic steatohepatitis)     Other premature beats 11/3/2011    PVC (premature ventricular contraction)     Other specified cardiac dysrhythmias(427.89) 11/3/2011    ventricular bigeminy under anesthesia     Personal history of tobacco use, presenting hazards to health 11/3/2011    in remission     Type II or unspecified type diabetes mellitus without mention of complication, not stated as uncontrolled 2/7/2012     Umbilical hernia without mention of obstruction or gangrene 11/3/2011     Unspecified diffuse connective tissue disease 1/29/2013     Unspecified essential hypertension 11/3/2011     Past Surgical History:   Procedure Laterality Date     ADENOIDECTOMY       APPENDECTOMY       ARTHROSCOPY KNEE       arthroscopy right great toe      bunion     COLONOSCOPY  02-     COLONOSCOPY  2010,2006    repeat in 2015     COLONOSCOPY  8-     COLONOSCOPY  2015    Repeat 2020     ESOPHAGOGASTRODUODENOSCOPY      Repeat in 2015     ESOPHAGOGASTRODUODENOSCOPY  2015    Dr Funes/Vivek- Repeat 3yrs     HERNIA REPAIR, UMBILICAL  2012    reduction and repair of umbilical hernia     JOINT REPLACEMENT       RELEASE CARPAL TUNNEL      carpal tunnel syndrome     shoulder bone spur removal       TKA       TONSILLECTOMY       UPPER GI ENDOSCOPY  2012    repeat in 2014     UPPER GI ENDOSCOPY       UPPER GI ENDOSCOPY       UPPER GI ENDOSCOPY  2010    repeat 2012     Current Outpatient  Prescriptions   Medication Sig Dispense Refill     ezetimibe (ZETIA) 10 MG tablet TAKE 1 TABLET DAILY (DUE   FOR LIPID PROFILE DECEMBER 2017) 90 tablet 0     KLOR-CON 20 MEQ CR tablet TAKE ONE TABLET BY MOUTH TWICE DAILY WITH MEALS 180 tablet 1     glimepiride (AMARYL) 1 MG tablet Take 0.5 tablets (0.5 mg) by mouth every morning (before breakfast) 45 tablet 3     atenolol-chlorthalidone (TENORETIC) 50-25 MG per tablet TAKE 1 TABLET DAILY 90 tablet 3     pantoprazole (PROTONIX) 40 MG EC tablet TAKE 1 TABLET DAILY 90 tablet 3     tamsulosin (FLOMAX) 0.4 MG capsule Take 1 capsule (0.4 mg) by mouth daily 30 capsule 11     traZODone (DESYREL) 50 MG tablet TAKE ONE TABLET BY MOUTH AT BEDTIME 90 tablet 3     blood glucose monitoring (ACCU-CHEK MULTICLIX) lancets USE TO TEST BLOOD SUGAR ONCE DAILY 102 each 5     ACCU-CHEK ANGELI PLUS test strip USE ONE STRIP ONCE DAILY 100 strip 3     STATIN NOT PRESCRIBED, INTENTIONAL, Please choose reason not prescribed, below       metFORMIN (GLUCOPHAGE-XR) 500 MG 24 hr tablet TAKE ONE TABLET BY MOUTH TWICE DAILY WITH MEALS 180 tablet 3     Omega-3 Fatty Acids (OMEGA-3 FISH OIL PO) Take 1 g by mouth       blood glucose calibration (ACCU-CHEK ANGELI) solution USE CONTROL SOLUTION AS DIRECTED ONCE MONTHLY TO CHECK ACCURACY OF METER 1 each 0     magnesium oxide 400 MG CAPS Take 400 mg by mouth 2 times daily 180 capsule 2     Aspirin (ASPIR-81 PO) Take 1 tablet by mouth daily        Ibuprofen (ADVIL PO) Take by mouth every 4 hours as needed for moderate pain       Multiple Vitamin (MULTIVITAMINS PO) Take 1 tablet by mouth daily       Cholecalciferol (VITAMIN D PO) Take 1 capsule by mouth daily       docusate sodium (STOOL SOFTENER) 100 MG capsule Take 1 capsule by mouth 2 times daily       OTC products: None, except as noted above    Allergies   Allergen Reactions     Atorvastatin Calcium Other (See Comments)     Lipitor - myalgia     Avodart [Dutasteride] Other (See Comments)     Sexual dsfxn      Simvastatin Other (See Comments)     Zocor - elevated liver function test      Latex Allergy: NO    Social History   Substance Use Topics     Smoking status: Former Smoker     Types: Cigarettes     Smokeless tobacco: Never Used      Comment: quit in the 80s     Alcohol use No     History   Drug Use No       REVIEW OF SYSTEMS:   Constitutional, neuro, ENT, endocrine, pulmonary, cardiac, gastrointestinal, genitourinary, musculoskeletal, integument and psychiatric systems are negative, except as otherwise noted.  C/o increase right heel pain   EXAM:   /72  Pulse 73  Temp 97  F (36.1  C) (Tympanic)  Wt 233 lb 6 oz (105.9 kg)  SpO2 96%  BMI 33.49 kg/m2    GENERAL APPEARANCE: healthy, alert and no distress     EYES: EOMI, PERRL     HENT: ear canals and TM's normal and nose and mouth without ulcers or lesions     NECK: no adenopathy, no asymmetry, masses, or scars and thyroid normal to palpation     RESP: lungs clear to auscultation - no rales, rhonchi or wheezes     CV: regular rates and rhythm, normal S1 S2, no S3 or S4 and no murmur, click or rub     ABDOMEN:  soft, nontender, no HSM or masses and bowel sounds normal     MS: extremities normal- no gross deformities noted, no evidence of inflammation in joints, FROM in all extremities.  Right heel tenderness.      SKIN: no suspicious lesions or rashes     NEURO: Normal strength and tone, sensory exam grossly normal, mentation intact and speech normal     PSYCH: mentation appears normal. and affect normal/bright     LYMPHATICS: No cervical adenopathy    DIAGNOSTICS:   EKG: appears normal, NSR, Normal Sinus Rhythm, 1st degree block , normal axis, normal intervals, no acute ST/T changes c/w ischemia, no LVH by voltage criteria, unchanged from previous tracings    Recent Labs   Lab Test  02/23/18   0814  10/25/17   0742  05/16/17   1327   HGB   --   15.4  15.5   PLT   --   191  195   NA  135  137  137   POTASSIUM  3.8  3.6  3.6   CR  0.71  0.74  0.65*   A1C   6.6*  6.0  6.5*      Results for orders placed or performed in visit on 03/12/18 (from the past 24 hour(s))   CBC with platelets differential   Result Value Ref Range    WBC 6.8 4.0 - 11.0 10e9/L    RBC Count 5.05 4.4 - 5.9 10e12/L    Hemoglobin 15.6 13.3 - 17.7 g/dL    Hematocrit 43.6 40.0 - 53.0 %    MCV 86 78 - 100 fl    MCH 30.9 26.5 - 33.0 pg    MCHC 35.8 31.5 - 36.5 g/dL    RDW 12.2 10.0 - 15.0 %    Platelet Count 187 150 - 450 10e9/L    Diff Method Automated Method     % Neutrophils 58.6 %    % Lymphocytes 27.8 %    % Monocytes 9.6 %    % Eosinophils 3.1 %    % Basophils 0.6 %    % Immature Granulocytes 0.3 %    Nucleated RBCs 0 0 /100    Absolute Neutrophil 4.0 1.6 - 8.3 10e9/L    Absolute Lymphocytes 1.9 0.8 - 5.3 10e9/L    Absolute Monocytes 0.7 0.0 - 1.3 10e9/L    Absolute Eosinophils 0.2 0.0 - 0.7 10e9/L    Absolute Basophils 0.0 0.0 - 0.2 10e9/L    Abs Immature Granulocytes 0.0 0 - 0.4 10e9/L    Absolute Nucleated RBC 0.0      Component Results   Component Collected Lab   Copath Report 03/01/2018  1:35 PM 88   Patient Name: ELIZABETH BRIZUELA   MR#: [id number]   Specimen #:    Collected: 3/1/2018   Received: 3/2/2018   Reported: 3/5/2018 14:35   Ordering Phy(s): RASHIDA GRACIA   Additional Phy(s): CASIMIRO SORTO     For improved result formatting, select 'View Enhanced Report Format' under    Linked Documents section.     SPECIMEN(S):   Skin, right preauricular     FINAL DIAGNOSIS:   Skin, right preauricular, excision   - Basal cell carcinoma, excised     I have personally reviewed all specimens and/or slides, including the   listed special stains, and used them   with my medical judgement to determine or confirm the final diagnosis.     Electronically signed out by:     Piotr Goldman M.D.     CLINICAL HISTORY:   Skin lesion; skin lesion of face.     GROSS:   There is an ellipse of tan skin which is approximately 2 x 1 x 0.5 cm.  It    has a slightly irregular light and   dark tan surface.   After the margin is inked it is cross sectioned. (7 TE   in 1 block). (Dictated by: Piotr Goldman MD 3/2/2018 05:01 PM)     MICROSCOPIC:   Microscopic sections show skin with a basal cell carcinoma.  There are   nests and islands of basaloid cells   which are attached to the basal layer of the squamous epithelium.  The   tumor is focally calcified and excised.     CPT Codes   A: [id number]     TESTING LAB LOCATION:   [address] [address]   [phone]     COLLECTION SITE:   Client: [facility]   Location: [facility] ()          IMPRESSION:   Reason for surgery/procedure: Right preauricular BCC  Diagnosis/reason for consult: Cardiopulmonary /endocrine clearance     The proposed surgical procedure is considered LOW risk.    REVISED CARDIAC RISK INDEX  The patient has the following serious cardiovascular risks for perioperative complications such as (MI, PE, VFib and 3  AV Block):  No serious cardiac risks  INTERPRETATION: 1 risks: Class II (low risk - 0.9% complication rate)    The patient has the following additional risks for perioperative complications:  No identified additional risks      ICD-10-CM    1. Pre-operative cardiovascular examination Z01.810 CBC with platelets differential     EKG 12-lead complete w/read - Clinics   2. BCC (basal cell carcinoma), face C44.310 CBC with platelets differential     EKG 12-lead complete w/read - Clinics   3. Heel spur, right M77.31    4. Essential hypertension with goal blood pressure less than 140/90 I10    5. Type 2 diabetes mellitus without complication, without long-term current use of insulin (H) E11.9        RECOMMENDATIONS:     Discussed and given Rx for heel cups or viscus peds insole for heel spurs     PT IS VERY CLAUSTROPHOBIC AND CONCERNED ABOUT OXYGEN MASK ON FACE- ANESTHESIA TO BE AWARE.     --Patient is to take all scheduled medications on the day of surgery EXCEPT for modifications listed  below.  Will hold diabetic meds / supplements / BP meds the morning of surgery     APPROVAL GIVEN to proceed with proposed procedure, without further diagnostic evaluation       Signed Electronically by: Mata Richardson MD    Copy of this evaluation report is provided to requesting physician.    [place] Preop Guidelines

## 2018-03-12 NOTE — PATIENT INSTRUCTIONS
Before Your Surgery      Call your surgeon if there is any change in your health. This includes signs of a cold or flu (such as a sore throat, runny nose, cough, rash or fever).    Do not smoke, drink alcohol or take over the counter medicine (unless your surgeon or primary care doctor tells you to) for the 24 hours before and after surgery.    If you take prescribed drugs: Follow your doctor s orders about which medicines to take and which to stop until after surgery.    Eating and drinking prior to surgery: follow the instructions from your surgeon    Take a shower or bath the night before surgery. Use the soap your surgeon gave you to gently clean your skin. If you do not have soap from your surgeon, use your regular soap. Do not shave or scrub the surgery site.  Wear clean pajamas and have clean sheets on your bed.     Results for orders placed or performed in visit on 03/12/18 (from the past 24 hour(s))   CBC with platelets differential   Result Value Ref Range    WBC 6.8 4.0 - 11.0 10e9/L    RBC Count 5.05 4.4 - 5.9 10e12/L    Hemoglobin 15.6 13.3 - 17.7 g/dL    Hematocrit 43.6 40.0 - 53.0 %    MCV 86 78 - 100 fl    MCH 30.9 26.5 - 33.0 pg    MCHC 35.8 31.5 - 36.5 g/dL    RDW 12.2 10.0 - 15.0 %    Platelet Count 187 150 - 450 10e9/L    Diff Method Automated Method     % Neutrophils 58.6 %    % Lymphocytes 27.8 %    % Monocytes 9.6 %    % Eosinophils 3.1 %    % Basophils 0.6 %    % Immature Granulocytes 0.3 %    Nucleated RBCs 0 0 /100    Absolute Neutrophil 4.0 1.6 - 8.3 10e9/L    Absolute Lymphocytes 1.9 0.8 - 5.3 10e9/L    Absolute Monocytes 0.7 0.0 - 1.3 10e9/L    Absolute Eosinophils 0.2 0.0 - 0.7 10e9/L    Absolute Basophils 0.0 0.0 - 0.2 10e9/L    Abs Immature Granulocytes 0.0 0 - 0.4 10e9/L    Absolute Nucleated RBC 0.0

## 2018-03-12 NOTE — H&P (VIEW-ONLY)
[facility] [place]  [address]  [place] [address]  [phone]  Dept: [phone]    PRE-OP EVALUATION:  Today's date: 3/12/2018    Carrillo Carranza (: 1940) presents for pre-operative evaluation assessment as requested by Dr. Rojas.  He requires evaluation and anesthesia risk assessment prior to undergoing surgery/procedure for treatment of basel cell removal .    Proposed Surgery/ Procedure: basel cell removal  Date of Surgery/ Procedure: 3-13-18  Time of Surgery/ Procedure: [facility]  Hospital/Surgical Facility: [facility]  Fax number for surgical facility: unknown  Primary Physician: Mata Richardson  Type of Anesthesia Anticipated: to be determined    Patient has a Health Care Directive or Living Will:  NO    1. NO - Do you have a history of heart attack, stroke, stent, bypass or surgery on an artery in the head, neck, heart or legs?  2. NO - Do you ever have any pain or discomfort in your chest?  3. NO - Do you have a history of  Heart Failure?  4. NO - Are you troubled by shortness of breath when: walking on the level, up a slight hill or at night?  5. NO - Do you currently have a cold, bronchitis or other respiratory infection?  6. NO - Do you have a cough, shortness of breath or wheezing?  7. NO - Do you sometimes get pains in the calves of your legs when you walk?  8. NO - Do you or anyone in your family have previous history of blood clots?  9. NO - Do you or does anyone in your family have a serious bleeding problem such as prolonged bleeding following surgeries or cuts?  10. NO - Have you ever had problems with anemia or been told to take iron pills?  11. NO - Have you had any abnormal blood loss such as black, tarry or bloody stools, or abnormal vaginal bleeding?  12. NO - Have you ever had a blood transfusion?  13. NO - Have you or any of your relatives ever had problems with anesthesia?  14. YES - DO YOU HAVE SLEEP APNEA, EXCESSIVE SNORING OR DAYTIME DROWSINESS? sores  15. NO - Do  you have any prosthetic heart valves?  16. YES - DO YOU HAVE PROSTHETIC JOINTS? Both knees  17. NO - Is there any chance that you may be pregnant?      HPI:     HPI related to upcoming procedure:   76 yO male  presents for cardiopulmonary/general clearance to undergo the above procedure.  His surgeon listed above has asked for this clearance to undergo anesthesia. Pt has had this condition for approximately 6 months .   Overall pt is of good health and has not  had any perioperative complications with previous surgeries.          See problem list for active medical problems.  Problems all longstanding and stable, except as noted/documented.  See ROS for pertinent symptoms related to these conditions.                                                                                                  .    MEDICAL HISTORY:     Patient Active Problem List    Diagnosis Date Noted     Low libido 09/14/2017     Priority: Medium     BPH with obstruction/lower urinary tract symptoms 05/16/2017     Priority: Medium     Type 2 diabetes mellitus without complication, without long-term current use of insulin (H) 12/12/2016     Priority: Medium     Essential hypertension with goal blood pressure less than 140/90 07/15/2016     Priority: Medium     Nocturia 10/09/2014     Priority: Medium     Hypomagnesemia 06/23/2014     Priority: Medium     BPPV (benign paroxysmal positional vertigo) 06/13/2014     Priority: Medium     Hypokalemia 06/02/2014     Priority: Medium     Hyperlipidemia with target LDL less than 100      Priority: Medium     Diagnosis updated by automated process. Provider to review and confirm.       Fatty liver disease, nonalcoholic      Priority: Medium     Diffuse connective tissue disease (H) 01/29/2013     Priority: Medium     Problem list name updated by automated process. Provider to review       Advanced care planning/counseling discussion 06/12/2012     Priority: Medium      Past Medical History:   Diagnosis  Date     Baca's esophagus 11/3/2011     Contact dermatitis and other eczema, due to unspecified cause 3/1/2011     Dermatophytosis of groin and perianal area 2/1/2006     Esophageal reflux 11/3/2011    GERD     Family history of colonic polyps 11/3/2011     Fatty liver disease, nonalcoholic      Generalized osteoarthrosis, involving multiple sites 3/1/2011     HTN (hypertension)      Hyperlipidaemia LDL goal < 100      Other and unspecified hyperlipidemia 11/3/2011     Other chronic nonalcoholic liver disease 11/3/2011    SANCHES (nonalcoholic steatohepatitis)     Other premature beats 11/3/2011    PVC (premature ventricular contraction)     Other specified cardiac dysrhythmias(427.89) 11/3/2011    ventricular bigeminy under anesthesia     Personal history of tobacco use, presenting hazards to health 11/3/2011    in remission     Type II or unspecified type diabetes mellitus without mention of complication, not stated as uncontrolled 2/7/2012     Umbilical hernia without mention of obstruction or gangrene 11/3/2011     Unspecified diffuse connective tissue disease 1/29/2013     Unspecified essential hypertension 11/3/2011     Past Surgical History:   Procedure Laterality Date     ADENOIDECTOMY       APPENDECTOMY       ARTHROSCOPY KNEE       arthroscopy right great toe      bunion     COLONOSCOPY  02-     COLONOSCOPY  2010,2006    repeat in 2015     COLONOSCOPY  8-     COLONOSCOPY  2015    Repeat 2020     ESOPHAGOGASTRODUODENOSCOPY      Repeat in 2015     ESOPHAGOGASTRODUODENOSCOPY  2015    Dr Funes/Vivek- Repeat 3yrs     HERNIA REPAIR, UMBILICAL  2012    reduction and repair of umbilical hernia     JOINT REPLACEMENT       RELEASE CARPAL TUNNEL      carpal tunnel syndrome     shoulder bone spur removal       TKA       TONSILLECTOMY       UPPER GI ENDOSCOPY  2012    repeat in 2014     UPPER GI ENDOSCOPY       UPPER GI ENDOSCOPY       UPPER GI ENDOSCOPY  2010    repeat 2012     Current Outpatient  Prescriptions   Medication Sig Dispense Refill     ezetimibe (ZETIA) 10 MG tablet TAKE 1 TABLET DAILY (DUE   FOR LIPID PROFILE DECEMBER 2017) 90 tablet 0     KLOR-CON 20 MEQ CR tablet TAKE ONE TABLET BY MOUTH TWICE DAILY WITH MEALS 180 tablet 1     glimepiride (AMARYL) 1 MG tablet Take 0.5 tablets (0.5 mg) by mouth every morning (before breakfast) 45 tablet 3     atenolol-chlorthalidone (TENORETIC) 50-25 MG per tablet TAKE 1 TABLET DAILY 90 tablet 3     pantoprazole (PROTONIX) 40 MG EC tablet TAKE 1 TABLET DAILY 90 tablet 3     tamsulosin (FLOMAX) 0.4 MG capsule Take 1 capsule (0.4 mg) by mouth daily 30 capsule 11     traZODone (DESYREL) 50 MG tablet TAKE ONE TABLET BY MOUTH AT BEDTIME 90 tablet 3     blood glucose monitoring (ACCU-CHEK MULTICLIX) lancets USE TO TEST BLOOD SUGAR ONCE DAILY 102 each 5     ACCU-CHEK ANGELI PLUS test strip USE ONE STRIP ONCE DAILY 100 strip 3     STATIN NOT PRESCRIBED, INTENTIONAL, Please choose reason not prescribed, below       metFORMIN (GLUCOPHAGE-XR) 500 MG 24 hr tablet TAKE ONE TABLET BY MOUTH TWICE DAILY WITH MEALS 180 tablet 3     Omega-3 Fatty Acids (OMEGA-3 FISH OIL PO) Take 1 g by mouth       blood glucose calibration (ACCU-CHEK ANGELI) solution USE CONTROL SOLUTION AS DIRECTED ONCE MONTHLY TO CHECK ACCURACY OF METER 1 each 0     magnesium oxide 400 MG CAPS Take 400 mg by mouth 2 times daily 180 capsule 2     Aspirin (ASPIR-81 PO) Take 1 tablet by mouth daily        Ibuprofen (ADVIL PO) Take by mouth every 4 hours as needed for moderate pain       Multiple Vitamin (MULTIVITAMINS PO) Take 1 tablet by mouth daily       Cholecalciferol (VITAMIN D PO) Take 1 capsule by mouth daily       docusate sodium (STOOL SOFTENER) 100 MG capsule Take 1 capsule by mouth 2 times daily       OTC products: None, except as noted above    Allergies   Allergen Reactions     Atorvastatin Calcium Other (See Comments)     Lipitor - myalgia     Avodart [Dutasteride] Other (See Comments)     Sexual dsfxn      Simvastatin Other (See Comments)     Zocor - elevated liver function test      Latex Allergy: NO    Social History   Substance Use Topics     Smoking status: Former Smoker     Types: Cigarettes     Smokeless tobacco: Never Used      Comment: quit in the 80s     Alcohol use No     History   Drug Use No       REVIEW OF SYSTEMS:   Constitutional, neuro, ENT, endocrine, pulmonary, cardiac, gastrointestinal, genitourinary, musculoskeletal, integument and psychiatric systems are negative, except as otherwise noted.  C/o increase right heel pain   EXAM:   /72  Pulse 73  Temp 97  F (36.1  C) (Tympanic)  Wt 233 lb 6 oz (105.9 kg)  SpO2 96%  BMI 33.49 kg/m2    GENERAL APPEARANCE: healthy, alert and no distress     EYES: EOMI, PERRL     HENT: ear canals and TM's normal and nose and mouth without ulcers or lesions     NECK: no adenopathy, no asymmetry, masses, or scars and thyroid normal to palpation     RESP: lungs clear to auscultation - no rales, rhonchi or wheezes     CV: regular rates and rhythm, normal S1 S2, no S3 or S4 and no murmur, click or rub     ABDOMEN:  soft, nontender, no HSM or masses and bowel sounds normal     MS: extremities normal- no gross deformities noted, no evidence of inflammation in joints, FROM in all extremities.  Right heel tenderness.      SKIN: no suspicious lesions or rashes     NEURO: Normal strength and tone, sensory exam grossly normal, mentation intact and speech normal     PSYCH: mentation appears normal. and affect normal/bright     LYMPHATICS: No cervical adenopathy    DIAGNOSTICS:   EKG: appears normal, NSR, Normal Sinus Rhythm, 1st degree block , normal axis, normal intervals, no acute ST/T changes c/w ischemia, no LVH by voltage criteria, unchanged from previous tracings    Recent Labs   Lab Test  02/23/18   0814  10/25/17   0742  05/16/17   1327   HGB   --   15.4  15.5   PLT   --   191  195   NA  135  137  137   POTASSIUM  3.8  3.6  3.6   CR  0.71  0.74  0.65*   A1C   6.6*  6.0  6.5*      Results for orders placed or performed in visit on 03/12/18 (from the past 24 hour(s))   CBC with platelets differential   Result Value Ref Range    WBC 6.8 4.0 - 11.0 10e9/L    RBC Count 5.05 4.4 - 5.9 10e12/L    Hemoglobin 15.6 13.3 - 17.7 g/dL    Hematocrit 43.6 40.0 - 53.0 %    MCV 86 78 - 100 fl    MCH 30.9 26.5 - 33.0 pg    MCHC 35.8 31.5 - 36.5 g/dL    RDW 12.2 10.0 - 15.0 %    Platelet Count 187 150 - 450 10e9/L    Diff Method Automated Method     % Neutrophils 58.6 %    % Lymphocytes 27.8 %    % Monocytes 9.6 %    % Eosinophils 3.1 %    % Basophils 0.6 %    % Immature Granulocytes 0.3 %    Nucleated RBCs 0 0 /100    Absolute Neutrophil 4.0 1.6 - 8.3 10e9/L    Absolute Lymphocytes 1.9 0.8 - 5.3 10e9/L    Absolute Monocytes 0.7 0.0 - 1.3 10e9/L    Absolute Eosinophils 0.2 0.0 - 0.7 10e9/L    Absolute Basophils 0.0 0.0 - 0.2 10e9/L    Abs Immature Granulocytes 0.0 0 - 0.4 10e9/L    Absolute Nucleated RBC 0.0      Component Results   Component Collected Lab   Copath Report 03/01/2018  1:35 PM 88   Patient Name: ELIZABETH BRIZUELA   MR#: [id number]   Specimen #:    Collected: 3/1/2018   Received: 3/2/2018   Reported: 3/5/2018 14:35   Ordering Phy(s): RASHIDA GRACIA   Additional Phy(s): CASIMIRO SORTO     For improved result formatting, select 'View Enhanced Report Format' under    Linked Documents section.     SPECIMEN(S):   Skin, right preauricular     FINAL DIAGNOSIS:   Skin, right preauricular, excision   - Basal cell carcinoma, excised     I have personally reviewed all specimens and/or slides, including the   listed special stains, and used them   with my medical judgement to determine or confirm the final diagnosis.     Electronically signed out by:     Piotr Goldman M.D.     CLINICAL HISTORY:   Skin lesion; skin lesion of face.     GROSS:   There is an ellipse of tan skin which is approximately 2 x 1 x 0.5 cm.  It    has a slightly irregular light and   dark tan surface.   After the margin is inked it is cross sectioned. (7 TE   in 1 block). (Dictated by: Piotr Goldmna MD 3/2/2018 05:01 PM)     MICROSCOPIC:   Microscopic sections show skin with a basal cell carcinoma.  There are   nests and islands of basaloid cells   which are attached to the basal layer of the squamous epithelium.  The   tumor is focally calcified and excised.     CPT Codes   A: 50281-WR8     TESTING LAB LOCATION:   [address] [address]   [phone]     COLLECTION SITE:   Client: [facility]   Location: [facility] ()          IMPRESSION:   Reason for surgery/procedure: Right preauricular BCC  Diagnosis/reason for consult: Cardiopulmonary /endocrine clearance     The proposed surgical procedure is considered LOW risk.    REVISED CARDIAC RISK INDEX  The patient has the following serious cardiovascular risks for perioperative complications such as (MI, PE, VFib and 3  AV Block):  No serious cardiac risks  INTERPRETATION: 1 risks: Class II (low risk - 0.9% complication rate)    The patient has the following additional risks for perioperative complications:  No identified additional risks      ICD-10-CM    1. Pre-operative cardiovascular examination Z01.810 CBC with platelets differential     EKG 12-lead complete w/read - Clinics   2. BCC (basal cell carcinoma), face C44.310 CBC with platelets differential     EKG 12-lead complete w/read - Clinics   3. Heel spur, right M77.31    4. Essential hypertension with goal blood pressure less than 140/90 I10    5. Type 2 diabetes mellitus without complication, without long-term current use of insulin (H) E11.9        RECOMMENDATIONS:     Discussed and given Rx for heel cups or viscus peds insole for heel spurs     PT IS VERY CLAUSTROPHOBIC AND CONCERNED ABOUT OXYGEN MASK ON FACE- ANESTHESIA TO BE AWARE.     --Patient is to take all scheduled medications on the day of surgery EXCEPT for modifications listed  below.  Will hold diabetic meds / supplements / BP meds the morning of surgery     APPROVAL GIVEN to proceed with proposed procedure, without further diagnostic evaluation       Signed Electronically by: Mata Richardson MD    Copy of this evaluation report is provided to requesting physician.    [facility] Preop Guidelines

## 2018-03-13 ENCOUNTER — SURGERY (OUTPATIENT)
Age: 78
End: 2018-03-13

## 2018-03-13 ENCOUNTER — ANESTHESIA (OUTPATIENT)
Dept: SURGERY | Facility: HOSPITAL | Age: 78
End: 2018-03-13
Payer: MEDICARE

## 2018-03-13 ENCOUNTER — HOSPITAL ENCOUNTER (OUTPATIENT)
Facility: HOSPITAL | Age: 78
Discharge: HOME | End: 2018-03-13
Attending: OTOLARYNGOLOGY | Admitting: OTOLARYNGOLOGY
Payer: MEDICARE

## 2018-03-13 ENCOUNTER — ANESTHESIA EVENT (OUTPATIENT)
Dept: SURGERY | Facility: HOSPITAL | Age: 78
End: 2018-03-13
Payer: MEDICARE

## 2018-03-13 VITALS
SYSTOLIC BLOOD PRESSURE: 123 MMHG | WEIGHT: 236 LBS | TEMPERATURE: 97.8 F | BODY MASS INDEX: 33.86 KG/M2 | OXYGEN SATURATION: 97 % | DIASTOLIC BLOOD PRESSURE: 69 MMHG | RESPIRATION RATE: 16 BRPM

## 2018-03-13 DIAGNOSIS — Z98.890 POST-OPERATIVE STATE: Primary | ICD-10-CM

## 2018-03-13 PROCEDURE — 14041 TIS TRNFR F/C/C/M/N/A/G/H/F: CPT | Performed by: OTOLARYNGOLOGY

## 2018-03-13 PROCEDURE — 37000008 ZZH ANESTHESIA TECHNICAL FEE, 1ST 30 MIN: Performed by: OTOLARYNGOLOGY

## 2018-03-13 PROCEDURE — 25000125 ZZHC RX 250: Performed by: NURSE ANESTHETIST, CERTIFIED REGISTERED

## 2018-03-13 PROCEDURE — 27210794 ZZH OR GENERAL SUPPLY STERILE: Performed by: OTOLARYNGOLOGY

## 2018-03-13 PROCEDURE — 25000128 H RX IP 250 OP 636: Performed by: NURSE ANESTHETIST, CERTIFIED REGISTERED

## 2018-03-13 PROCEDURE — 99100 ANES PT EXTEME AGE<1 YR&>70: CPT | Performed by: NURSE ANESTHETIST, CERTIFIED REGISTERED

## 2018-03-13 PROCEDURE — 14040 TIS TRNFR F/C/C/M/N/A/G/H/F: CPT | Performed by: NURSE ANESTHETIST, CERTIFIED REGISTERED

## 2018-03-13 PROCEDURE — 40000306 ZZH STATISTIC PRE PROC ASSESS II: Performed by: OTOLARYNGOLOGY

## 2018-03-13 PROCEDURE — 88305 TISSUE EXAM BY PATHOLOGIST: CPT | Mod: TC | Performed by: OTOLARYNGOLOGY

## 2018-03-13 PROCEDURE — 88331 PATH CONSLTJ SURG 1 BLK 1SPC: CPT | Mod: TC | Performed by: OTOLARYNGOLOGY

## 2018-03-13 PROCEDURE — 71000027 ZZH RECOVERY PHASE 2 EACH 15 MINS: Performed by: OTOLARYNGOLOGY

## 2018-03-13 PROCEDURE — 27110028 ZZH OR GENERAL SUPPLY NON-STERILE: Performed by: OTOLARYNGOLOGY

## 2018-03-13 PROCEDURE — 37000009 ZZH ANESTHESIA TECHNICAL FEE, EACH ADDTL 15 MIN: Performed by: OTOLARYNGOLOGY

## 2018-03-13 PROCEDURE — 36000052 ZZH SURGERY LEVEL 2 EA 15 ADDTL MIN: Performed by: OTOLARYNGOLOGY

## 2018-03-13 PROCEDURE — 25000125 ZZHC RX 250: Performed by: OTOLARYNGOLOGY

## 2018-03-13 PROCEDURE — 36000050 ZZH SURGERY LEVEL 2 1ST 30 MIN: Performed by: OTOLARYNGOLOGY

## 2018-03-13 RX ORDER — ONDANSETRON 2 MG/ML
4 INJECTION INTRAMUSCULAR; INTRAVENOUS EVERY 30 MIN PRN
Status: DISCONTINUED | OUTPATIENT
Start: 2018-03-13 | End: 2018-03-13 | Stop reason: HOSPADM

## 2018-03-13 RX ORDER — HYDROCODONE BITARTRATE AND ACETAMINOPHEN 7.5; 325 MG/1; MG/1
1 TABLET ORAL EVERY 6 HOURS PRN
Qty: 5 TABLET | Refills: 0 | Status: SHIPPED | OUTPATIENT
Start: 2018-03-13 | End: 2019-01-29

## 2018-03-13 RX ORDER — FENTANYL CITRATE 50 UG/ML
25-50 INJECTION, SOLUTION INTRAMUSCULAR; INTRAVENOUS
Status: DISCONTINUED | OUTPATIENT
Start: 2018-03-13 | End: 2018-03-13 | Stop reason: HOSPADM

## 2018-03-13 RX ORDER — MEPERIDINE HYDROCHLORIDE 25 MG/ML
12.5 INJECTION INTRAMUSCULAR; INTRAVENOUS; SUBCUTANEOUS
Status: DISCONTINUED | OUTPATIENT
Start: 2018-03-13 | End: 2018-03-13 | Stop reason: HOSPADM

## 2018-03-13 RX ORDER — ONDANSETRON 4 MG/1
4 TABLET, ORALLY DISINTEGRATING ORAL EVERY 30 MIN PRN
Status: DISCONTINUED | OUTPATIENT
Start: 2018-03-13 | End: 2018-03-13 | Stop reason: HOSPADM

## 2018-03-13 RX ORDER — FENTANYL CITRATE 50 UG/ML
INJECTION, SOLUTION INTRAMUSCULAR; INTRAVENOUS PRN
Status: DISCONTINUED | OUTPATIENT
Start: 2018-03-13 | End: 2018-03-13

## 2018-03-13 RX ORDER — LABETALOL HYDROCHLORIDE 5 MG/ML
10 INJECTION, SOLUTION INTRAVENOUS
Status: CANCELLED | OUTPATIENT
Start: 2018-03-13

## 2018-03-13 RX ORDER — SODIUM CHLORIDE, SODIUM LACTATE, POTASSIUM CHLORIDE, CALCIUM CHLORIDE 600; 310; 30; 20 MG/100ML; MG/100ML; MG/100ML; MG/100ML
INJECTION, SOLUTION INTRAVENOUS CONTINUOUS
Status: DISCONTINUED | OUTPATIENT
Start: 2018-03-13 | End: 2018-03-13 | Stop reason: HOSPADM

## 2018-03-13 RX ORDER — LIDOCAINE HYDROCHLORIDE 20 MG/ML
INJECTION, SOLUTION INFILTRATION; PERINEURAL PRN
Status: DISCONTINUED | OUTPATIENT
Start: 2018-03-13 | End: 2018-03-13

## 2018-03-13 RX ORDER — HYDRALAZINE HYDROCHLORIDE 20 MG/ML
2.5-5 INJECTION INTRAMUSCULAR; INTRAVENOUS EVERY 10 MIN PRN
Status: CANCELLED | OUTPATIENT
Start: 2018-03-13

## 2018-03-13 RX ORDER — NALOXONE HYDROCHLORIDE 0.4 MG/ML
.1-.4 INJECTION, SOLUTION INTRAMUSCULAR; INTRAVENOUS; SUBCUTANEOUS
Status: DISCONTINUED | OUTPATIENT
Start: 2018-03-13 | End: 2018-03-13 | Stop reason: HOSPADM

## 2018-03-13 RX ORDER — CEPHALEXIN 500 MG/1
500 CAPSULE ORAL 3 TIMES DAILY
Qty: 15 CAPSULE | Refills: 0 | Status: SHIPPED | OUTPATIENT
Start: 2018-03-13 | End: 2019-01-29

## 2018-03-13 RX ORDER — PROPOFOL 10 MG/ML
INJECTION, EMULSION INTRAVENOUS CONTINUOUS PRN
Status: DISCONTINUED | OUTPATIENT
Start: 2018-03-13 | End: 2018-03-13

## 2018-03-13 RX ORDER — BACITRACIN ZINC 500 [USP'U]/G
OINTMENT TOPICAL PRN
Status: DISCONTINUED | OUTPATIENT
Start: 2018-03-13 | End: 2018-03-13 | Stop reason: HOSPADM

## 2018-03-13 RX ORDER — METOPROLOL TARTRATE 1 MG/ML
INJECTION, SOLUTION INTRAVENOUS PRN
Status: DISCONTINUED | OUTPATIENT
Start: 2018-03-13 | End: 2018-03-13

## 2018-03-13 RX ORDER — ALBUTEROL SULFATE 0.83 MG/ML
2.5 SOLUTION RESPIRATORY (INHALATION) EVERY 4 HOURS PRN
Status: CANCELLED | OUTPATIENT
Start: 2018-03-13

## 2018-03-13 RX ADMIN — PROPOFOL 100 MCG/KG/MIN: 10 INJECTION, EMULSION INTRAVENOUS at 13:15

## 2018-03-13 RX ADMIN — METOPROLOL TARTRATE 1 MG: 1 INJECTION, SOLUTION INTRAVENOUS at 14:15

## 2018-03-13 RX ADMIN — METOPROLOL TARTRATE 1 MG: 1 INJECTION, SOLUTION INTRAVENOUS at 14:00

## 2018-03-13 RX ADMIN — Medication 28.4 G: at 14:43

## 2018-03-13 RX ADMIN — FENTANYL CITRATE 25 MCG: 50 INJECTION, SOLUTION INTRAMUSCULAR; INTRAVENOUS at 14:18

## 2018-03-13 RX ADMIN — FENTANYL CITRATE 25 MCG: 50 INJECTION, SOLUTION INTRAMUSCULAR; INTRAVENOUS at 14:10

## 2018-03-13 RX ADMIN — SODIUM CHLORIDE, POTASSIUM CHLORIDE, SODIUM LACTATE AND CALCIUM CHLORIDE: 600; 310; 30; 20 INJECTION, SOLUTION INTRAVENOUS at 12:45

## 2018-03-13 RX ADMIN — FENTANYL CITRATE 100 MCG: 50 INJECTION, SOLUTION INTRAMUSCULAR; INTRAVENOUS at 13:13

## 2018-03-13 RX ADMIN — LIDOCAINE HYDROCHLORIDE 17 ML: 10; .005 INJECTION, SOLUTION EPIDURAL; INFILTRATION; INTRACAUDAL; PERINEURAL at 14:22

## 2018-03-13 RX ADMIN — MIDAZOLAM 2 MG: 1 INJECTION INTRAMUSCULAR; INTRAVENOUS at 13:13

## 2018-03-13 RX ADMIN — FENTANYL CITRATE 50 MCG: 50 INJECTION, SOLUTION INTRAMUSCULAR; INTRAVENOUS at 13:46

## 2018-03-13 RX ADMIN — LIDOCAINE HYDROCHLORIDE 40 MG: 20 INJECTION, SOLUTION INFILTRATION; PERINEURAL at 13:18

## 2018-03-13 ASSESSMENT — PATIENT HEALTH QUESTIONNAIRE - PHQ9: SUM OF ALL RESPONSES TO PHQ QUESTIONS 1-9: 0

## 2018-03-13 ASSESSMENT — LIFESTYLE VARIABLES: TOBACCO_USE: 1

## 2018-03-13 ASSESSMENT — ANXIETY QUESTIONNAIRES: GAD7 TOTAL SCORE: 0

## 2018-03-13 NOTE — ANESTHESIA CARE TRANSFER NOTE
Patient: Carrillo Carranza    Procedure(s):  EXCISION OF PREAURICULAR BASAL CELL CARCINOMA WITH FROZENS, FLAP REPAIR ( 2.6 x 2cm Defect), ( 3.5 x 3.5cmTissue Rearrangement) - Wound Class: I-Clean    Diagnosis: Basal Cell Carcinoma,face  Diagnosis Additional Information: No value filed.    Anesthesia Type:   MAC     Note:  Airway :Room Air  Patient transferred to:Phase II  Handoff Report: Identifed the Patient, Identified the Reponsible Provider, Reviewed the pertinent medical history, Discussed the surgical course, Reviewed Intra-OP anesthesia mangement and issues during anesthesia, Set expectations for post-procedure period and Allowed opportunity for questions and acknowledgement of understanding      Vitals: (Last set prior to Anesthesia Care Transfer)    CRNA VITALS  3/13/2018 1410 - 3/13/2018 1443      3/13/2018             Pulse: 85    SpO2: 95 %    Resp Rate (set): 8                Electronically Signed By: DAKOTA Bloom CRNA  March 13, 2018  2:43 PM

## 2018-03-13 NOTE — OP NOTE
PREOPERATIVE DIAGNOSES:   1. Right preauricular basal cell carcinoma  POSTOPERATIVE DIAGNOSES:   1.  Same  PROCEDURE PERFORMED: excision right preauricular basal cell carcinoma with defect measuring 5.2 cm2 (2.6 x 2 cm)  and closure using rhomboid transposition flap repair.    Total tissue rearrangement of 12.25 cm  (3.5 x 3.5 cm)  SURGEON: Kyara Rojas D.O.  BLOOD LOSS: Less than 5 mL  COMPLICATIONS: None.   SPECIMENS: None.   FINDINGS:  4 mm circumferential margins clear of basal cell carcinoma  ANESTHESIA: MAC with local    OPERATIVE PROCEDURE: After surgical consent was obtained, the patient was taken to the operating room and laid in a comfortable supine position.  The patient was then administered anesthesia.  I then demarcated the lesion with a 4 mm cuff of normal appearing skin and infiltrated the skin with 2% lidocaine with 1:100,000 epinephrine.  Surgical loops were worn throughout the procedure.  A also planned a rhomboid flap and this tissue was also anesthetized in a similar fashion.  A timeout was taken.  I then used a 15-blade to create a roberta shape  incision around the lesion, with margins of 4mm of grossly normal skin.  I then elevated the skin ellipse and a thin cuff of underlying subdermal fat with the scalpel.  His curve iris scissors to undermine circumferentially and put a marking stitch at 12:00 after the lesion was completely removed, it was sent to pathology for frozen section analysis .  Frozen section was returned as all margins clear of basal cell carcinoma. H bipolar cautery at 10 emostasis was achieved with direct pressure and hand held cautery .  The eyes in the corner that mouth are visible throughout the procedure as dissection was just caudal to the zygoma.   The defect measured 5.2 cm(2.6 x 2 cm).  I then proceeded to create rhomboid flap for closure in a standard fashion the flap was undermined widely.  After creating the flap, the total tissue rearrangement was  12.25 cm  (3.5 x 3.5 cm).  I then inset the flap into the defect for tension free closure.  I then closed the incision using 4 and 5-0 Vicryl to close the subcutaneous tissue and the skin edges were then reapproximated using 5-0 nylon, simple interrupted sutures.  Antibiotic ointment was then applied and the wound was dressed.  The patient tolerated the procedure without any difficulty.

## 2018-03-13 NOTE — ANESTHESIA PREPROCEDURE EVALUATION
Anesthesia Evaluation     . Pt has had prior anesthetic. Type: General and MAC    No history of anesthetic complications          ROS/MED HX    ENT/Pulmonary:     (+)CEDRICK risk factors snores loudly, hypertension, tobacco use, Past use , . .    Neurologic:     (+)other neuro BPPV    Cardiovascular: Comment: Hx dysrhythmias    (+) Dyslipidemia, hypertension----. : . . . :. . Previous cardiac testing date:results:date: results:ECG reviewed date:3/12/18 results: date: results:          METS/Exercise Tolerance:  4 - Raking leaves, gardening   Hematologic:  - neg hematologic  ROS       Musculoskeletal:  - neg musculoskeletal ROS (+) , , other musculoskeletal- Connective tissue disease      GI/Hepatic:     (+) GERD Asymptomatic on medication, liver disease, Other GI/Hepatic Barretts esophagus      Renal/Genitourinary:  - ROS Renal section negative   (+) Other Renal/ Genitourinary, BPH      Endo:     (+) type II DM Not using insulin - not using insulin pump Normal glucose range: 100-140 not previously admitted for DM/DKA Obesity, .      Psychiatric:     (+) psychiatric history other (comment) (claustrophobic)      Infectious Disease:  - neg infectious disease ROS       Malignancy:   (+) Malignancy History of Skin  Skin CA status post Surgery,         Other:    - neg other ROS                 Physical Exam  Normal systems: cardiovascular and pulmonary    Airway   Mallampati: II  TM distance: >3 FB  Neck ROM: full    Dental   (+) upper dentures    Cardiovascular   Rhythm and rate: regular and normal      Pulmonary    breath sounds clear to auscultation                    Anesthesia Plan      History & Physical Review  History and physical reviewed and following examination; no interval change.    ASA Status:  3 .    NPO Status:  > 8 hours    Plan for MAC with Intravenous and Propofol induction. Maintenance will be TIVA.    PONV prophylaxis:  Ondansetron (or other 5HT-3) and Dexamethasone or Solumedrol  Risks and benefits of  MAC anesthetic discussed including dental damage, aspiration, loss of airway, conversion to general anesthetic, CV complications, MI, stroke, death. Pt wishes to proceed.       Postoperative Care  Postoperative pain management:  IV analgesics and Oral pain medications.      Consents  Anesthetic plan, risks, benefits and alternatives discussed with:  Patient..                          .

## 2018-03-13 NOTE — OR NURSING
Patient and responsible adult given discharge instructions with no questions regarding instructions. Qi score 20 Pain level 0/10.  Discharged from unit via ambulatory. Patient discharged to home.

## 2018-03-13 NOTE — ANESTHESIA POSTPROCEDURE EVALUATION
Patient: Carrillo Carranza    Procedure(s):  EXCISION OF PREAURICULAR BASAL CELL CARCINOMA WITH FROZENS, FLAP REPAIR ( 2.6 x 2cm Defect), ( 3.5 x 3.5cmTissue Rearrangement) - Wound Class: I-Clean    Diagnosis:Basal Cell Carcinoma,face  Diagnosis Additional Information: No value filed.    Anesthesia Type:  MAC    Note:  Anesthesia Post Evaluation    Patient location during evaluation: Bedside  Patient participation: Able to fully participate in evaluation  Level of consciousness: awake and alert  Pain management: adequate  Airway patency: patent  Cardiovascular status: acceptable  Respiratory status: acceptable  Hydration status: acceptable  PONV: none     Anesthetic complications: None          Last vitals:  Vitals:    03/13/18 1145   BP: 156/84   Resp: 16   Temp: 97.8  F (36.6  C)   SpO2: 97%         Electronically Signed By: DAKOTA Bloom CRNA  March 13, 2018  3:15 PM

## 2018-03-13 NOTE — IP AVS SNAPSHOT
MRN:[id number]                      After Visit Summary   3/13/2018    Carrillo Carranza    MRN: [id number]           Thank you!     Thank you for choosing [facility] for your care. Our goal is always to provide you with excellent care. Hearing back from our patients is one way we can continue to improve our services. Please take a few minutes to complete the written survey that you may receive in the mail after you visit with us. Thank you!        Patient Information     Date Of Birth          1940        About your hospital stay     You were admitted on:  March 13, 2018 You last received care in the:  [facility] Preop/Phase II    You were discharged on:  March 13, 2018       Who to Call     For medical emergencies, please call 911.  For non-urgent questions about your medical care, please call your primary care provider or clinic, [phone]  For questions related to your surgery, please call your surgery clinic        Attending Provider     Provider Specialty    Kyara Rojas MD Otolaryngology       Primary Care Provider Office Phone # Fax #    Mata Richardson [phone]      Your next 10 appointments already scheduled     Mar 20, 2018 10:45 AM CDT   (Arrive by 10:30 AM)   Post Op with DAKOTA Mendoza CNP   [facility] ([facility] [place] [facility] [place] )    [address]  [place] [address]   [phone]            Jul 06, 2018  8:30 AM CDT   (Arrive by 8:15 AM)   SHORT with Mata Richardson MD   [facility] ([facility] [place] [facility] [place] )    [address]  [place] [address]   [phone]              Further instructions from your care team           Post-Anesthesia Patient Instructions    IMMEDIATELY FOLLOWING SURGERY:  Do not drive or operate machinery for the first twenty four hours after surgery.  Do not make any important decisions for twenty four hours after surgery or while taking narcotic pain medications or sedatives.   If you develop intractable nausea and vomiting or a severe headache please notify your doctor immediately.    FOLLOW-UP:  Please make an appointment with your surgeon as instructed. You do not need to follow up with anesthesia unless specifically instructed to do so.    WOUND CARE INSTRUCTIONS (if applicable):  Keep a dry clean dressing on the anesthesia/puncture wound site if there is drainage.  Once the wound has quit draining you may leave it open to air.  Generally you should leave the bandage intact for twenty four hours unless there is drainage.  If the epidural site drains for more than 36-48 hours please call the anesthesia department.    QUESTIONS?:  Please feel free to call your physician or the hospital  if you have any questions, and they will be happy to assist you.   Follow up with Alissa Dang NP in 1 week  Call [phone] if you need an appointment.  The skin was closed with surgical glue, there are dissolveable deep stitches under the skin.    No soaking of the wound.  You may shower normally but keep the incision absolutely dry for 1 week.   Leave the dressing on for 72 hours you may then remove it and start cleaning the wound per instructions below    After 1 week you may remove there steri strips.  If they are not easily removed, allow them to fall off on their own.  You may trim the edges of the steri strips if they curl with a clean cosmetic scissors.   After steri strips are removed:    Cleanse wound if not covered with diluted peroxide 3 times daily and then apply bacitracin or bactroban antibiotic ointment for 1 week.    Use lubricating eyedrops and tape ear right eye at night  See Alissa Dang or Terra auscultation in 1 week  2 weeks after surgery,  apply aquaphor ointment to the wound 3 times a day for 1 month then as needed.  This can be purchased over the counter.    No swimming or soaking of incision for 3 weeks.     For any heavy bleeding or excessive swelling  please  contact our office at [phone] or present to the emergency room.            Pending Results     Date and Time Order Name Status Description    3/13/2018 1345 Surgical pathology exam In process             Admission Information     Date & Time Provider Department Dept. Phone    3/13/2018 Kyara Rojas MD HI Preop/Phase [phone]      Your Vitals Were     Blood Pressure Temperature Respirations Weight Pulse Oximetry BMI (Body Mass Index)    156/84 97.8  F (36.6  C) (Temporal) 16 107 kg (236 lb) 97% 33.86 kg/m2      MyChart Information     [employer] gives you secure access to your electronic health record. If you see a primary care provider, you can also send messages to your care team and make appointments. If you have questions, please call your primary care clinic.  If you do not have a primary care provider, please call [phone] and they will assist you.        Care EveryWhere ID     This is your Care EveryWhere ID. This could be used by other organizations to access your [place] medical records  [id number]        Equal Access to Services     RONAK GARCIA : Hadii kennedy luo Sojade, waaxda luqadaha, qaybta kaalmasteve cross, david dawkins . So [facility] [phone].    ATENCIÓN: Si habla español, tiene a palacios disposición servicios gratuitos de asistencia lingüística. Llame al [phone].    We comply with applicable federal civil rights laws and [place] laws. We do not discriminate on the basis of race, color, national origin, age, disability, sex, sexual orientation, or gender identity.               Review of your medicines      START taking        Dose / Directions    cephALEXin 500 MG capsule   Commonly known as:  KEFLEX   Used for:  Post-operative state        Dose:  500 mg   Take 1 capsule (500 mg) by mouth 3 times daily for 5 days   Quantity:  15 capsule   Refills:  0       HYDROcodone-acetaminophen 7.5-325 MG per tablet   Commonly known as:  NORCO   Used  for:  Post-operative state        Dose:  1 tablet   Take 1 tablet by mouth every 6 hours as needed for moderate to severe pain   Quantity:  5 tablet   Refills:  0         CONTINUE these medicines which have NOT CHANGED        Dose / Directions    ACCU-CHEK ANGELI PLUS test strip   Used for:  Diabetes mellitus, type 2 (H)   Generic drug:  blood glucose monitoring        USE ONE STRIP ONCE DAILY   Quantity:  100 strip   Refills:  3       ADVIL PO        Take by mouth every 4 hours as needed for moderate pain   Refills:  0       ASPIR-81 PO        Dose:  1 tablet   Take 1 tablet by mouth daily   Refills:  0       atenolol-chlorthalidone 50-25 MG per tablet   Commonly known as:  TENORETIC   Used for:  Benign essential hypertension        TAKE 1 TABLET DAILY   Quantity:  90 tablet   Refills:  3       blood glucose calibration solution   Used for:  Diabetes mellitus, type 2 (H)        USE CONTROL SOLUTION AS DIRECTED ONCE MONTHLY TO CHECK ACCURACY OF METER   Quantity:  1 each   Refills:  0       blood glucose monitoring lancets   Used for:  Diabetes mellitus, type 2 (H)        USE TO TEST BLOOD SUGAR ONCE DAILY   Quantity:  102 each   Refills:  5       ezetimibe 10 MG tablet   Commonly known as:  ZETIA   Used for:  Hyperlipidemia LDL goal <100        TAKE 1 TABLET DAILY (DUE   FOR LIPID PROFILE DECEMBER 2017)   Quantity:  90 tablet   Refills:  0       glimepiride 1 MG tablet   Commonly known as:  AMARYL   Used for:  Type 2 diabetes mellitus with diabetic nephropathy, without long-term current use of insulin (H)        Dose:  0.5 mg   Take 0.5 tablets (0.5 mg) by mouth every morning (before breakfast)   Quantity:  45 tablet   Refills:  3       KLOR-CON 20 MEQ CR tablet   Used for:  HTN (hypertension)   Generic drug:  potassium chloride SA        TAKE ONE TABLET BY MOUTH TWICE DAILY WITH MEALS   Quantity:  180 tablet   Refills:  1       magnesium oxide 400 MG Caps   Used for:  Hypomagnesemia        Dose:  400 mg   Take 400  mg by mouth 2 times daily   Quantity:  180 capsule   Refills:  2       metFORMIN 500 MG 24 hr tablet   Commonly known as:  GLUCOPHAGE-XR   Used for:  Type 2 diabetes mellitus without complication, without long-term current use of insulin (H)        TAKE ONE TABLET BY MOUTH TWICE DAILY WITH MEALS   Quantity:  180 tablet   Refills:  3       MULTIVITAMINS PO        Dose:  1 tablet   Take 1 tablet by mouth daily   Refills:  0       OMEGA-3 FISH OIL PO        Dose:  1 g   Take 1 g by mouth   Refills:  0       pantoprazole 40 MG EC tablet   Commonly known as:  PROTONIX   Used for:  Baca esophagus        TAKE 1 TABLET DAILY   Quantity:  90 tablet   Refills:  3       STATIN NOT PRESCRIBED (INTENTIONAL)   Used for:  Hyperlipidemia with target LDL less than 100        Please choose reason not prescribed, below   Refills:  0       STOOL SOFTENER 100 MG capsule   Generic drug:  docusate sodium        Dose:  1 capsule   Take 1 capsule by mouth 2 times daily   Refills:  0       tamsulosin 0.4 MG capsule   Commonly known as:  FLOMAX   Used for:  BPH with obstruction/lower urinary tract symptoms        Dose:  0.4 mg   Take 1 capsule (0.4 mg) by mouth daily   Quantity:  30 capsule   Refills:  11       traZODone 50 MG tablet   Commonly known as:  DESYREL   Used for:  Diagnosis unknown        TAKE ONE TABLET BY MOUTH AT BEDTIME   Quantity:  90 tablet   Refills:  3       VITAMIN D PO        Dose:  1 capsule   Take 1 capsule by mouth daily   Refills:  0            Where to get your medicines      These medications were sent to [id number]  [place], [place] - [address] [id number], [place] [address]     Phone:  [phone]     cephALEXin 500 MG capsule         Some of these will need a paper prescription and others can be bought over the counter. Ask your nurse if you have questions.     Bring a paper prescription for each of these medications     HYDROcodone-acetaminophen 7.5-325 MG per tablet                Protect  others around you: Learn how to safely use, store and throw away your medicines at www.disposemymeds.org.        ANTIBIOTIC INSTRUCTION     You've Been Prescribed an Antibiotic - Now What?  Your healthcare team thinks that you or your loved one might have an infection. Some infections can be treated with antibiotics, which are powerful, life-saving drugs. Like all medications, antibiotics have side effects and should only be used when necessary. There are some important things you should know about your antibiotic treatment.      Your healthcare team may run tests before you start taking an antibiotic.    Your team may take samples (e.g., from your blood, urine or other areas) to run tests to look for bacteria. These test can be important to determine if you need an antibiotic at all and, if you do, which antibiotic will work best.      Within a few days, your healthcare team might change or even stop your antibiotic.    Your team may start you on an antibiotic while they are working to find out what is making you sick.    Your team might change your antibiotic because test results show that a different antibiotic would be better to treat your infection.    In some cases, once your team has more information, they learn that you do not need an antibiotic at all. They may find out that you don't have an infection, or that the antibiotic you're taking won't work against your infection. For example, an infection caused by a virus can't be treated with antibiotics. Staying on an antibiotic when you don't need it is more likely to be harmful than helpful.      You may experience side effects from your antibiotic.    Like all medications, antibiotics have side effects. Some of these can be serious.    Let you healthcare team know if you have any known allergies when you are admitted to the hospital.    One significant side effect of nearly all antibiotics is the risk of severe and sometimes deadly diarrhea caused by  Clostridium difficile (C. Difficile). This occurs when a person takes antibiotics because some good germs are destroyed. Antibiotic use allows C. diificile to take over, putting patients at high risk for this serious infection.    As a patient or caregiver, it is important to understand your or your loved one's antibiotic treatment. It is especially important for caregivers to speak up when patients can't speak for themselves. Here are some important questions to ask your healthcare team.    What infection is this antibiotic treating and how do you know I have that infection?    What side effects might occur from this antibiotic?    How long will I need to take this antibiotic?    Is it safe to take this antibiotic with other medications or supplements (e.g., vitamins) that I am taking?     Are there any special directions I need to know about taking this antibiotic? For example, should I take it with food?    How will I be monitored to know whether my infection is responding to the antibiotic?    What tests may help to make sure the right antibiotic is prescribed for me?      Information provided by:  www.cdc.gov/getsmart  U.S. Department of Health and Human Services  Centers for disease Control and Prevention  National Center for Emerging and Zoonotic Infectious Diseases  Division of Healthcare Quality Promotion        Information about OPIOIDS     PRESCRIPTION OPIOIDS: WHAT YOU NEED TO KNOW    Prescription opioids can be used to help relieve moderate to severe pain and are often prescribed following a surgery or injury, or for certain health conditions. These medications can be an important part of treatment but also come with serious risks. It is important to work with your health care provider to make sure you are getting the safest, most effective care.    WHAT ARE THE RISKS AND SIDE EFFECTS OF OPIOID USE?  Prescription opioids carry serious risks of addiction and overdose, especially with prolonged use. An  opioid overdose, often marked by slowed breathing can cause sudden death. The use of prescription opioids can have a number of side effects as well, even when taken as directed:      Tolerance - meaning you might need to take more of a medication for the same pain relief    Physical dependence - meaning you have symptoms of withdrawal when a medication is stopped    Increased sensitivity to pain    Constipation    Nausea, vomiting, and dry mouth    Sleepiness and dizziness    Confusion    Depression    Low levels of testosterone that can result in lower sex drive, energy, and strength    Itching and sweating    RISKS ARE GREATER WITH:    History of drug misuse, substance use disorder, or overdose    Mental health conditions (such as depression or anxiety)    Sleep apnea    Older age (65 years or older)    Pregnancy    Avoid alcohol while taking prescription opioids.   Also, unless specifically advised by your health care provider, medications to avoid include:    Benzodiazepines (such as Xanax or Valium)    Muscle relaxants (such as Soma or Flexeril)    Hypnotics (such as Ambien or Lunesta)    Other prescription opioids    KNOW YOUR OPTIONS:  Talk to your health care provider about ways to manage your pain that do not involve prescription opioids. Some of these options may actually work better and have fewer risks and side effects:    Pain relievers such as acetaminophen, ibuprofen, and naproxen    Some medications that are also used for depression or seizures    Physical therapy and exercise    Cognitive behavioral therapy, a psychological, goal-directed approach, in which patients learn how to modify physical, behavioral, and emotional triggers of pain and stress    IF YOU ARE PRESCRIBED OPIOIDS FOR PAIN:    Never take opioids in greater amounts or more often than prescribed    Follow up with your primary health care provider and work together to create a plan on how to manage your pain.    Talk about ways to help  manage your pain that do not involve prescription opioids    Talk about all concerns and side effects    Help prevent misuse and abuse    Never sell or share prescription opioids    Never use another person's prescription opioids    Store prescription opioids in a secure place and out of reach of others (this may include visitors, children, friends, and family)    Visit www.cdc.gov/drugoverdose to learn about risks of opioid abuse and overdose    If you believe you may be struggling with addiction, tell your health care provider and ask for guidance or call [facility]'s National Helpline at 4-404-089-HELP    LEARN MORE / www.cdc.gov/drugoverdose/prescribing/guideline.html    Safely dispose of unused prescription opioids: Find your local drug take-back programs and more information about the importance of safe disposal at www.doseofreality.[place].gov             Medication List: This is a list of all your medications and when to take them. Check marks below indicate your daily home schedule. Keep this list as a reference.      Medications           Morning Afternoon Evening Bedtime As Needed    ACCU-CHEK ANGELI PLUS test strip   USE ONE STRIP ONCE DAILY   Generic drug:  blood glucose monitoring                                ADVIL PO   Take by mouth every 4 hours as needed for moderate pain                                ASPIR-81 PO   Take 1 tablet by mouth daily                                atenolol-chlorthalidone 50-25 MG per tablet   Commonly known as:  TENORETIC   TAKE 1 TABLET DAILY                                blood glucose calibration solution   USE CONTROL SOLUTION AS DIRECTED ONCE MONTHLY TO CHECK ACCURACY OF METER                                blood glucose monitoring lancets   USE TO TEST BLOOD SUGAR ONCE DAILY                                cephALEXin 500 MG capsule   Commonly known as:  KEFLEX   Take 1 capsule (500 mg) by mouth 3 times daily for 5 days                                ezetimibe 10 MG tablet    Commonly known as:  ZETIA   TAKE 1 TABLET DAILY (DUE   FOR LIPID PROFILE DECEMBER 2017)                                glimepiride 1 MG tablet   Commonly known as:  AMARYL   Take 0.5 tablets (0.5 mg) by mouth every morning (before breakfast)                                HYDROcodone-acetaminophen 7.5-325 MG per tablet   Commonly known as:  NORCO   Take 1 tablet by mouth every 6 hours as needed for moderate to severe pain                                KLOR-CON 20 MEQ CR tablet   TAKE ONE TABLET BY MOUTH TWICE DAILY WITH MEALS   Generic drug:  potassium chloride SA                                magnesium oxide 400 MG Caps   Take 400 mg by mouth 2 times daily                                metFORMIN 500 MG 24 hr tablet   Commonly known as:  GLUCOPHAGE-XR   TAKE ONE TABLET BY MOUTH TWICE DAILY WITH MEALS                                MULTIVITAMINS PO   Take 1 tablet by mouth daily                                OMEGA-3 FISH OIL PO   Take 1 g by mouth                                pantoprazole 40 MG EC tablet   Commonly known as:  PROTONIX   TAKE 1 TABLET DAILY                                STATIN NOT PRESCRIBED (INTENTIONAL)   Please choose reason not prescribed, below                                STOOL SOFTENER 100 MG capsule   Take 1 capsule by mouth 2 times daily   Generic drug:  docusate sodium                                tamsulosin 0.4 MG capsule   Commonly known as:  FLOMAX   Take 1 capsule (0.4 mg) by mouth daily                                traZODone 50 MG tablet   Commonly known as:  DESYREL   TAKE ONE TABLET BY MOUTH AT BEDTIME                                VITAMIN D PO   Take 1 capsule by mouth daily

## 2018-03-13 NOTE — IP AVS SNAPSHOT
HI Preop/Phase II    [address] [address]    Phone:  [phone]                                       After Visit Summary   3/13/2018    Carrillo Carranza    MRN: [id number]           After Visit Summary Signature Page     I have received my discharge instructions, and my questions have been answered. I have discussed any challenges I see with this plan with the nurse or doctor.    ..........................................................................................................................................  Patient/Patient Representative Signature      ..........................................................................................................................................  Patient Representative Print Name and Relationship to Patient    ..................................................               ................................................  Date                                            Time    ..........................................................................................................................................  Reviewed by Signature/Title    ...................................................              ..............................................  Date                                                            Time

## 2018-03-13 NOTE — DISCHARGE INSTRUCTIONS
Post-Anesthesia Patient Instructions    IMMEDIATELY FOLLOWING SURGERY:  Do not drive or operate machinery for the first twenty four hours after surgery.  Do not make any important decisions for twenty four hours after surgery or while taking narcotic pain medications or sedatives.  If you develop intractable nausea and vomiting or a severe headache please notify your doctor immediately.    FOLLOW-UP:  Please make an appointment with your surgeon as instructed. You do not need to follow up with anesthesia unless specifically instructed to do so.    WOUND CARE INSTRUCTIONS (if applicable):  Keep a dry clean dressing on the anesthesia/puncture wound site if there is drainage.  Once the wound has quit draining you may leave it open to air.  Generally you should leave the bandage intact for twenty four hours unless there is drainage.  If the epidural site drains for more than 36-48 hours please call the anesthesia department.    QUESTIONS?:  Please feel free to call your physician or the hospital  if you have any questions, and they will be happy to assist you.   Follow up with Alissa Dang NP in 1 week  Call [phone] if you need an appointment.  The skin was closed with surgical glue, there are dissolveable deep stitches under the skin.    No soaking of the wound.  You may shower normally but keep the incision absolutely dry for 1 week.   Leave the dressing on for 72 hours you may then remove it and start cleaning the wound per instructions below    After 1 week you may remove there steri strips.  If they are not easily removed, allow them to fall off on their own.  You may trim the edges of the steri strips if they curl with a clean cosmetic scissors.   After steri strips are removed:    Cleanse wound if not covered with diluted peroxide 3 times daily and then apply bacitracin or bactroban antibiotic ointment for 1 week.    Use lubricating eyedrops and tape ear right eye at night  See Alissa Dang or  Terra auscultation in 1 week  2 weeks after surgery,  apply aquaphor ointment to the wound 3 times a day for 1 month then as needed.  This can be purchased over the counter.    No swimming or soaking of incision for 3 weeks.     For any heavy bleeding or excessive swelling  please contact our office at [phone] or present to the emergency room.

## 2018-03-14 LAB — COPATH REPORT: NORMAL

## 2018-03-20 ENCOUNTER — OFFICE VISIT (OUTPATIENT)
Dept: OTOLARYNGOLOGY | Facility: OTHER | Age: 78
End: 2018-03-20
Attending: NURSE PRACTITIONER
Payer: MEDICARE

## 2018-03-20 VITALS
BODY MASS INDEX: 33.79 KG/M2 | OXYGEN SATURATION: 96 % | DIASTOLIC BLOOD PRESSURE: 64 MMHG | SYSTOLIC BLOOD PRESSURE: 132 MMHG | WEIGHT: 236 LBS | TEMPERATURE: 97.8 F | HEIGHT: 70 IN | HEART RATE: 72 BPM

## 2018-03-20 DIAGNOSIS — Z09 S/P EXCISION OF SKIN LESION, FOLLOW-UP EXAM: Primary | ICD-10-CM

## 2018-03-20 DIAGNOSIS — R22.1 LUMP IN NECK: ICD-10-CM

## 2018-03-20 DIAGNOSIS — C44.310 BCC (BASAL CELL CARCINOMA), FACE: ICD-10-CM

## 2018-03-20 PROCEDURE — G0463 HOSPITAL OUTPT CLINIC VISIT: HCPCS

## 2018-03-20 PROCEDURE — 99024 POSTOP FOLLOW-UP VISIT: CPT | Performed by: NURSE PRACTITIONER

## 2018-03-20 ASSESSMENT — PAIN SCALES - GENERAL: PAINLEVEL: NO PAIN (0)

## 2018-03-20 NOTE — MR AVS SNAPSHOT
After Visit Summary   3/20/2018    Carrillo Carranza    MRN: [id number]           Patient Information     Date Of Birth          1940        Visit Information        Provider Department      3/20/2018 10:45 AM Alissa Dang APRN CNP [facility]        Today's Diagnoses     S/P excision of skin lesion, follow-up exam    -  1    BCC (basal cell carcinoma), face        Lump in neck          Care Instructions    Sutures out.    Continue with hydrogen peroxide/water three times daily x 1 week with bacitracin to area twice daily.    In one week, can use hydrogen peroxide as needed. Apply Aquaphor to area twice daily x 4 weeks, then use Vitamin E with gentle massage twice daily.     Referral to dermatology placed and order for ultrasound of neck. You will get call to set these up.    Follow up in ENT as needed.      Thank you for allowing Alissa Dang CNP and our ENT team to participate in your care.  If your medications are too expensive, please give the nurse a call.  We can possibly change this medication.  If you have a scheduling or an appointment question please contact [facility] Health Unit Coordinator at their direct line [phone].   ALL nursing questions or concerns can be directed to your ENT nurse at: [phone]- Ishmael            Follow-ups after your visit        Additional Services     DERMATOLOGY REFERRAL       Your provider has referred you to: [facility]     Please be aware that coverage of these services is subject to the terms and limitations of your health insurance plan.  Call member services at your health plan with any benefit or coverage questions.      Please bring the following to your appointment:  Any x-rays, CTs or MRIs which have been performed.  Contact the facility where they were done to arrange for  prior to your scheduled appointment.  Any new CT, MRI or other procedures ordered by your specialist must be performed at a [facility]  facility or coordinated by your clinic's referral office.    List of current medications   This referral request   Any documents/labs given to you for this referral                  Follow-up notes from your care team     Return if symptoms worsen or fail to improve.      Your next 10 appointments already scheduled     Jul 06, 2018  8:30 AM CDT   (Arrive by 8:15 AM)   SHORT with Mata Richardson MD   [facility] ([facility] )    [address] [place] [address]   [phone]              Future tests that were ordered for you today     Open Future Orders        Priority Expected Expires Ordered    US Head Neck Soft Tissue Routine  3/20/2019 3/20/2018            Who to contact     If you have questions or need follow up information about today's clinic visit or your schedule please contact [facility] directly at [phone].  Normal or non-critical lab and imaging results will be communicated to you by MyChart, letter or phone within 4 business days after the clinic has received the results. If you do not hear from us within 7 days, please contact the clinic through MyChart or phone. If you have a critical or abnormal lab result, we will notify you by phone as soon as possible.  Submit refill requests through [facility] or call your pharmacy and they will forward the refill request to us. Please allow 3 business days for your refill to be completed.          Additional Information About Your Visit        [employer] Information     [employer] gives you secure access to your electronic health record. If you see a primary care provider, you can also send messages to your care team and make appointments. If you have questions, please call your primary care clinic.  If you do not have a primary care provider, please call [phone] and they will assist you.        Care EveryWhere ID     This is your Care EveryWhere ID. This could be used by other organizations to access your  "[facility] medical records  [id number]        Your Vitals Were     Pulse Temperature Height Pulse Oximetry BMI (Body Mass Index)       72 97.8  F (36.6  C) (Tympanic) 5' 10\" (1.778 m) 96% 33.86 kg/m2        Blood Pressure from Last 3 Encounters:   03/20/18 132/64   03/13/18 123/69   03/12/18 132/72    Weight from Last 3 Encounters:   03/20/18 236 lb (107 kg)   03/13/18 236 lb (107 kg)   03/12/18 233 lb 6 oz (105.9 kg)              We Performed the Following     DERMATOLOGY REFERRAL        Primary Care Provider Office Phone # Fax #    Mata Richardson [phone] [address] [address]        Equal Access to Services     [facility]: Hadii kennedy khan Sojade, waaxda luqadaha, qaybta kaalmada adeegyada, david dawkins . So [facility] [phone].    ATENCIÓN: Si habla español, tiene a palacios disposición servicios gratuitos de asistencia lingüística. Llame al [phone].    We comply with applicable federal civil rights laws and [place] laws. We do not discriminate on the basis of race, color, national origin, age, disability, sex, sexual orientation, or gender identity.            Thank you!     Thank you for choosing [facility]  for your care. Our goal is always to provide you with excellent care. Hearing back from our patients is one way we can continue to improve our services. Please take a few minutes to complete the written survey that you may receive in the mail after your visit with us. Thank you!             Your Updated Medication List - Protect others around you: Learn how to safely use, store and throw away your medicines at www.disposemymeds.org.          This list is accurate as of 3/20/18 11:13 AM.  Always use your most recent med list.                   Brand Name Dispense Instructions for use Diagnosis    ACCU-CHEK ANGELI PLUS test strip   Generic drug:  blood glucose monitoring     100 strip    USE ONE STRIP ONCE DAILY    " Diabetes mellitus, type 2 (H)       ADVIL PO      Take by mouth every 4 hours as needed for moderate pain        ASPIR-81 PO      Take 1 tablet by mouth daily        atenolol-chlorthalidone 50-25 MG per tablet    TENORETIC    90 tablet    TAKE 1 TABLET DAILY    Benign essential hypertension       blood glucose calibration solution     1 each    USE CONTROL SOLUTION AS DIRECTED ONCE MONTHLY TO CHECK ACCURACY OF METER    Diabetes mellitus, type 2 (H)       blood glucose monitoring lancets     102 each    USE TO TEST BLOOD SUGAR ONCE DAILY    Diabetes mellitus, type 2 (H)       ezetimibe 10 MG tablet    ZETIA    90 tablet    TAKE 1 TABLET DAILY (DUE   FOR LIPID PROFILE DECEMBER 2017)    Hyperlipidemia LDL goal <100       glimepiride 1 MG tablet    AMARYL    45 tablet    Take 0.5 tablets (0.5 mg) by mouth every morning (before breakfast)    Type 2 diabetes mellitus with diabetic nephropathy, without long-term current use of insulin (H)       KLOR-CON 20 MEQ CR tablet   Generic drug:  potassium chloride SA     180 tablet    TAKE ONE TABLET BY MOUTH TWICE DAILY WITH MEALS    HTN (hypertension)       magnesium oxide 400 MG Caps     180 capsule    Take 400 mg by mouth 2 times daily    Hypomagnesemia       metFORMIN 500 MG 24 hr tablet    GLUCOPHAGE-XR    180 tablet    TAKE ONE TABLET BY MOUTH TWICE DAILY WITH MEALS    Type 2 diabetes mellitus without complication, without long-term current use of insulin (H)       MULTIVITAMINS PO      Take 1 tablet by mouth daily        OMEGA-3 FISH OIL PO      Take 1 g by mouth        pantoprazole 40 MG EC tablet    PROTONIX    90 tablet    TAKE 1 TABLET DAILY    Baca esophagus       STATIN NOT PRESCRIBED (INTENTIONAL)      Please choose reason not prescribed, below    Hyperlipidemia with target LDL less than 100       STOOL SOFTENER 100 MG capsule   Generic drug:  docusate sodium      Take 1 capsule by mouth 2 times daily        tamsulosin 0.4 MG capsule    FLOMAX    30 capsule     Take 1 capsule (0.4 mg) by mouth daily    BPH with obstruction/lower urinary tract symptoms       traZODone 50 MG tablet    DESYREL    90 tablet    TAKE ONE TABLET BY MOUTH AT BEDTIME    Diagnosis unknown       VITAMIN D PO      Take 1 capsule by mouth daily

## 2018-03-20 NOTE — LETTER
3/20/2018         RE: Carrillo Carranza  [address] [facility] [address]        Dear Colleague,    Thank you for referring your patient, Carrillo Carranza, to the [facility]. Please see a copy of my visit note below.    Otolaryngology Progress Note           Chief Complaint:     Patient presents with:  Surgical Followup: s/p excision lesion external ear         History of Present Illness:     Carrillo Carranza is a 77 year old male s/p excision of right lesion external ear, performed on 3/13/18 by Dr. Rojas. Today is post operative day 7. Initial path in clinic showed basal cell carcinoma. Final surgical pathology showed right pre-auricular excision with no evidence of basal cell.    Carrillo is here today for follow up. He has no questions/concerns. He denies fever, drainage, swelling, erythema. Mild post surgical discomfort to area. He notices numbness around area. For skin care, he has been doing half water/half peroxide TID with bacitracin TID.     He recently noticed a lump on right side of neck. Not sure how long it has been there for. No pain, redness or tenderness. No dysphagia. No unintentional weight loss, night sweats or fever. No known trauma to area.     PREOPERATIVE DIAGNOSES:   1. Right preauricular basal cell carcinoma  POSTOPERATIVE DIAGNOSES:   1.  Same  PROCEDURE PERFORMED: excision right preauricular basal cell carcinoma with defect measuring 5.2 cm2 (2.6 x 2 cm)  and closure using rhomboid transposition flap repair.    Total tissue rearrangement of 12.25 cm  (3.5 x 3.5 cm)  SURGEON: Kyara Rojas D.O.  BLOOD LOSS: Less than 5 mL  COMPLICATIONS: None.   SPECIMENS: None.   FINDINGS:  4 mm circumferential margins clear of basal cell carcinoma  ANESTHESIA: MAC with local         Review of Systems:     See HPI         Physical Exam:     /64 (BP Location: Right arm, Patient Position: Chair, Cuff Size: Adult Regular)  Pulse 72  Temp 97.8  F (36.6  C)  "(Tympanic)  Ht 5' 10\" (1.778 m)  Wt 236 lb (107 kg)  SpO2 96%  BMI 33.86 kg/m2    General - The patient is well nourished and well developed, and appears to have good nutritional status.  Alert and oriented to person and place, interactive.  Head and Face - Normocephalic and atraumatic, with no gross asymmetry noted of the contour of the facial features.  The facial nerve is intact, with strong symmetric movements.  Neck-No thyroid mass.  Trachea is midline.  2-3 cm firm/fixed superficial lump right neck, superior to mandible. No warmth, erythema or pain.   Eyes - Extraocular movements intact.   Ears- External ears normal. Canals clear. Right tympanic membrane intact without effusion, worrisome retraction or mass. Left tympanic membrane intact without effusion, worrisome retraction or mass.    Nose - Nasal mucosa is pink and moist with no abnormal mucus.  The septum was grossly midline and non-obstructive, turbinates of normal size and position.  No polyps, masses, or purulence noted on examination.  Mouth - Examination of the oral cavity shows pink, healthy, moist mucosa. Dentition in good condition.  No lesions or ulceration noted. The tongue is mobile and midline.    Throat - The walls of the oropharynx were smooth, pink, moist, symmetric, and had no lesions or ulcerations.  The tonsillar pillars and soft palate were symmetric.  The uvula was midline on elevation.    Neuro: Facial nerve examination intact besides mild numbness around surgical incision.  Skin - Surgical incision margins with no erythema and skin edges are well approximated with sutures. No wound dehiscence. No drainage. No signs of hematoma or seroma formation.     Area cleansed with hydrogen peroxide. Removed all sutures without difficulty. Photograph taken for permanent record. Bacitracin applied.          Assessment and Plan:       ICD-10-CM    1. S/P excision of skin lesion, follow-up exam Z09    2. BCC (basal cell carcinoma), face C44.310 " DERMATOLOGY REFERRAL    Totally excised (per path) from right pre-aurical area in surgery 3/13/18   3. Lump in neck R22.1 US Head Neck Soft Tissue    right side, 3 cm hard/fixed     Surgical incision healing well without difficulty. No signs of infection.  Reassured numbness is expected, and should start to improve over time.    Continue with hydrogen peroxide/water TID with Bacitracin to area BID x 1 more week, then, start Aquaphor to area BID x 4 weeks.  At 6 weeks post op, apply Vitamin E to surgical incision and perform gentle ROM for 5 minutes 2-3 times daily to help with any scar formation.    Will get ultrasound right neck for further evaluation. Could be lymph node? Will call with results.    Referral to dermatology for full body skin check, recommend once yearly.    Encouraged good skin protection, wear hat, SPF 50.    Follow up in ENT as needed.     Alissa Dang NP  ENT  [facility], [place]  [phone]      Again, thank you for allowing me to participate in the care of your patient.        Sincerely,        Alissa Dang, DAKOTA CNP

## 2018-03-20 NOTE — PROGRESS NOTES
"Otolaryngology Progress Note           Chief Complaint:     Patient presents with:  Surgical Followup: s/p excision lesion external ear         History of Present Illness:     Carrillo Carranza is a 77 year old male s/p excision of right lesion external ear, performed on 3/13/18 by Dr. Rojas. Today is post operative day 7. Initial path in clinic showed basal cell carcinoma. Final surgical pathology showed right pre-auricular excision with no evidence of basal cell.    Carrillo is here today for follow up. He has no questions/concerns. He denies fever, drainage, swelling, erythema. Mild post surgical discomfort to area. He notices numbness around area. For skin care, he has been doing half water/half peroxide TID with bacitracin TID.     He recently noticed a lump on right side of neck. Not sure how long it has been there for. No pain, redness or tenderness. No dysphagia. No unintentional weight loss, night sweats or fever. No known trauma to area.     PREOPERATIVE DIAGNOSES:   1. Right preauricular basal cell carcinoma  POSTOPERATIVE DIAGNOSES:   1.  Same  PROCEDURE PERFORMED: excision right preauricular basal cell carcinoma with defect measuring 5.2 cm2 (2.6 x 2 cm)  and closure using rhomboid transposition flap repair.    Total tissue rearrangement of 12.25 cm  (3.5 x 3.5 cm)  SURGEON: Kyara Rojas D.O.  BLOOD LOSS: Less than 5 mL  COMPLICATIONS: None.   SPECIMENS: None.   FINDINGS:  4 mm circumferential margins clear of basal cell carcinoma  ANESTHESIA: MAC with local         Review of Systems:     See HPI         Physical Exam:     /64 (BP Location: Right arm, Patient Position: Chair, Cuff Size: Adult Regular)  Pulse 72  Temp 97.8  F (36.6  C) (Tympanic)  Ht 5' 10\" (1.778 m)  Wt 236 lb (107 kg)  SpO2 96%  BMI 33.86 kg/m2    General - The patient is well nourished and well developed, and appears to have good nutritional status.  Alert and oriented to person and place, interactive.  Head " and Face - Normocephalic and atraumatic, with no gross asymmetry noted of the contour of the facial features.  The facial nerve is intact, with strong symmetric movements.  Neck-No thyroid mass.  Trachea is midline.  2-3 cm firm/fixed superficial lump right neck, superior to mandible. No warmth, erythema or pain.   Eyes - Extraocular movements intact.   Ears- External ears normal. Canals clear. Right tympanic membrane intact without effusion, worrisome retraction or mass. Left tympanic membrane intact without effusion, worrisome retraction or mass.    Nose - Nasal mucosa is pink and moist with no abnormal mucus.  The septum was grossly midline and non-obstructive, turbinates of normal size and position.  No polyps, masses, or purulence noted on examination.  Mouth - Examination of the oral cavity shows pink, healthy, moist mucosa. Dentition in good condition.  No lesions or ulceration noted. The tongue is mobile and midline.    Throat - The walls of the oropharynx were smooth, pink, moist, symmetric, and had no lesions or ulcerations.  The tonsillar pillars and soft palate were symmetric.  The uvula was midline on elevation.    Neuro: Facial nerve examination intact besides mild numbness around surgical incision.  Skin - Surgical incision margins with no erythema and skin edges are well approximated with sutures. No wound dehiscence. No drainage. No signs of hematoma or seroma formation.     Area cleansed with hydrogen peroxide. Removed all sutures without difficulty. Photograph taken for permanent record. Bacitracin applied.          Assessment and Plan:       ICD-10-CM    1. S/P excision of skin lesion, follow-up exam Z09    2. BCC (basal cell carcinoma), face C44.310 DERMATOLOGY REFERRAL    Totally excised (per path) from right pre-aurical area in surgery 3/13/18   3. Lump in neck R22.1 US Head Neck Soft Tissue    right side, 3 cm hard/fixed     Surgical incision healing well without difficulty. No signs of  infection.  Reassured numbness is expected, and should start to improve over time.    Continue with hydrogen peroxide/water TID with Bacitracin to area BID x 1 more week, then, start Aquaphor to area BID x 4 weeks.  At 6 weeks post op, apply Vitamin E to surgical incision and perform gentle ROM for 5 minutes 2-3 times daily to help with any scar formation.    Will get ultrasound right neck for further evaluation. Could be lymph node? Will call with results.    Referral to dermatology for full body skin check, recommend once yearly.    Encouraged good skin protection, wear hat, SPF 50.    Follow up in ENT as needed.     Alissa Dang NP  ENT  [facility], [place]  [phone]

## 2018-03-20 NOTE — PATIENT INSTRUCTIONS
Sutures out.    Continue with hydrogen peroxide/water three times daily x 1 week with bacitracin to area twice daily.    In one week, can use hydrogen peroxide as needed. Apply Aquaphor to area twice daily x 4 weeks, then use Vitamin E with gentle massage twice daily.     Referral to dermatology placed and order for ultrasound of neck. You will get call to set these up.    Follow up in ENT as needed.      Thank you for allowing Alissa Dang CNP and our ENT team to participate in your care.  If your medications are too expensive, please give the nurse a call.  We can possibly change this medication.  If you have a scheduling or an appointment question please contact [facility] our Health Unit Coordinator at their direct line [phone].   ALL nursing questions or concerns can be directed to your ENT nurse at: [phone]- alex

## 2018-03-21 ENCOUNTER — HOSPITAL ENCOUNTER (OUTPATIENT)
Dept: ULTRASOUND IMAGING | Facility: HOSPITAL | Age: 78
Discharge: HOME | End: 2018-03-21
Attending: NURSE PRACTITIONER | Admitting: NURSE PRACTITIONER
Payer: MEDICARE

## 2018-03-21 DIAGNOSIS — R22.1 LUMP IN NECK: ICD-10-CM

## 2018-03-21 PROCEDURE — 76536 US EXAM OF HEAD AND NECK: CPT | Mod: TC

## 2018-03-21 NOTE — PROGRESS NOTES
Please let pt know neck ultrasound shows normal lymph noted. Follow up with progressive enlargement. Thanks

## 2018-04-03 ENCOUNTER — HOSPITAL ENCOUNTER (OUTPATIENT)
Dept: EDUCATION SERVICES | Facility: HOSPITAL | Age: 78
Discharge: HOME | End: 2018-04-03
Attending: FAMILY MEDICINE | Admitting: FAMILY MEDICINE
Payer: MEDICARE

## 2018-04-03 VITALS
HEART RATE: 68 BPM | HEIGHT: 70 IN | DIASTOLIC BLOOD PRESSURE: 82 MMHG | BODY MASS INDEX: 34.06 KG/M2 | SYSTOLIC BLOOD PRESSURE: 137 MMHG | WEIGHT: 237.9 LBS | OXYGEN SATURATION: 97 %

## 2018-04-03 DIAGNOSIS — E11.65 TYPE 2 DIABETES MELLITUS WITH HYPERGLYCEMIA, WITHOUT LONG-TERM CURRENT USE OF INSULIN (H): Primary | ICD-10-CM

## 2018-04-03 PROCEDURE — G0109 DIAB MANAGE TRN IND/GROUP: HCPCS

## 2018-04-03 ASSESSMENT — PAIN SCALES - GENERAL: PAINLEVEL: MILD PAIN (3)

## 2018-04-03 NOTE — PROGRESS NOTES
"Carrillo is here for annual review. He is accompanied by his wife.    BG readings as follows: fastin-141    Lab Results   Component Value Date    A1C 6.6 2018    A1C 6.0 10/25/2017    A1C 6.5 2017    A1C 6.1 2016    A1C 6.2 07/15/2016       Blood pressure 137/82, pulse 68, height 1.778 m (5' 10\"), weight 107.9 kg (237 lb 14.4 oz), SpO2 97 %.    Current diabetes medications: Metformin  mg bid and Glimepiride 0.5 mg daily    Readiness to learn: eager    Barriers to learning: none    Carrillo actively participated and demonstrated adequate understanding of topics discussed.     Reviewed usual food eaten. Carrillo and his wife have been working on food plan, portions since last A1C. Breakfast:Cheerios, 1/2 banana, milk, lunch: sandwich, cottage cheese or hard boiled eggs, V-8 juice, evening meal: meat or seafood, potatoes, vegetable, pudding. Occasional afternoon or bedtime snack: toast with peanut butter. Beverages: milk, V-8 juice, soda    Carrillo has had trouble with being active due to plantar fasciitis.    Topics covered: reviewed blood glucose/A1c targets, testing times, problem solving techniques, discussed risks and reduction of complications related to diabetes and co-morbidities, reviewed diabetes check-list, foot care, sick day management, stress & depression risks and management, managing a chronic disease, quality check of meter, review medications, and importance of Diabetes self-management.     Meter accuracy checked.     Goal review: Lose weight    Plan:   Test BG 2 hours after evening meal    Exercise as able when plantar fasciitis resolves    Continue meal plan.    Follow up: Annually and prn    Time spent with patient: 60 minutes      "

## 2018-04-03 NOTE — NURSING NOTE
"Chief Complaint   Patient presents with     Diabetes     annual       Initial /82  Pulse 68  Ht 1.778 m (5' 10\")  Wt 107.9 kg (237 lb 14.4 oz)  SpO2 97%  BMI 34.14 kg/m2 Estimated body mass index is 34.14 kg/(m^2) as calculated from the following:    Height as of this encounter: 1.778 m (5' 10\").    Weight as of this encounter: 107.9 kg (237 lb 14.4 oz).  Medication Reconciliation: complete   Radha Garza      "

## 2018-04-23 DIAGNOSIS — E78.5 HYPERLIPIDEMIA LDL GOAL <100: ICD-10-CM

## 2018-04-23 NOTE — TELEPHONE ENCOUNTER
zetia      Last Written Prescription Date:  1/17/18  Last Fill Quantity: 90,   # refills: 0  Last Office Visit: 3/12/18  Future Office visit:    Next 5 appointments (look out 90 days)     Jul 06, 2018  8:30 AM CDT   (Arrive by 8:15 AM)   SHORT with Mata Richardson MD   [facility] ([facility]    [address] [place] [address]   [phone]

## 2018-04-24 RX ORDER — EZETIMIBE 10 MG/1
TABLET ORAL
Qty: 90 TABLET | Refills: 1 | Status: SHIPPED | OUTPATIENT
Start: 2018-04-24 | End: 2018-10-22

## 2018-05-09 ENCOUNTER — TELEPHONE (OUTPATIENT)
Dept: FAMILY MEDICINE | Facility: OTHER | Age: 78
End: 2018-05-09

## 2018-05-09 NOTE — TELEPHONE ENCOUNTER
3:56 PM    Reason for Call: OVERBOOK    Patient is having the following symptoms: Skin spot for 1 days. 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    Was an appointment offered for this call? Yes  If yes : Appointment type short              Date  06/05/18    Preferred method for responding to this message: Telephone Call  What is your phone number ?  [phone]    If we cannot reach you directly, may we leave a detailed response at the number you provided? Yes    Can this message wait until your PCP/provider returns, if unavailable today? YES, Pt aware provider out today    Merline Howard

## 2018-05-15 ENCOUNTER — OFFICE VISIT (OUTPATIENT)
Dept: FAMILY MEDICINE | Facility: OTHER | Age: 78
End: 2018-05-15
Attending: FAMILY MEDICINE
Payer: MEDICARE

## 2018-05-15 VITALS
WEIGHT: 230 LBS | HEIGHT: 70 IN | BODY MASS INDEX: 32.93 KG/M2 | TEMPERATURE: 98 F | DIASTOLIC BLOOD PRESSURE: 62 MMHG | SYSTOLIC BLOOD PRESSURE: 132 MMHG | HEART RATE: 72 BPM | OXYGEN SATURATION: 96 %

## 2018-05-15 DIAGNOSIS — Z96.653 STATUS POST TOTAL BILATERAL KNEE REPLACEMENT: ICD-10-CM

## 2018-05-15 DIAGNOSIS — L82.1 SEBORRHEIC KERATOSES: Primary | ICD-10-CM

## 2018-05-15 PROCEDURE — 99213 OFFICE O/P EST LOW 20 MIN: CPT | Performed by: FAMILY MEDICINE

## 2018-05-15 PROCEDURE — G0463 HOSPITAL OUTPT CLINIC VISIT: HCPCS

## 2018-05-15 RX ORDER — AMOXICILLIN 500 MG/1
2000 CAPSULE ORAL ONCE
Qty: 4 CAPSULE | Refills: 3 | Status: SHIPPED | OUTPATIENT
Start: 2018-05-15 | End: 2018-05-15

## 2018-05-15 ASSESSMENT — ANXIETY QUESTIONNAIRES
2. NOT BEING ABLE TO STOP OR CONTROL WORRYING: NOT AT ALL
5. BEING SO RESTLESS THAT IT IS HARD TO SIT STILL: NOT AT ALL
1. FEELING NERVOUS, ANXIOUS, OR ON EDGE: NOT AT ALL
6. BECOMING EASILY ANNOYED OR IRRITABLE: NOT AT ALL
7. FEELING AFRAID AS IF SOMETHING AWFUL MIGHT HAPPEN: NOT AT ALL
GAD7 TOTAL SCORE: 0
3. WORRYING TOO MUCH ABOUT DIFFERENT THINGS: NOT AT ALL
4. TROUBLE RELAXING: NOT AT ALL

## 2018-05-15 ASSESSMENT — PAIN SCALES - GENERAL: PAINLEVEL: NO PAIN (0)

## 2018-05-15 NOTE — PROGRESS NOTES
SUBJECTIVE:   Carrillo Carranza is a 77 year old male who presents to clinic today for the following health issues:      lesion      Duration: just noticed it    Description (location/character/radiation): left side of face    Intensity:  moderate    Accompanying signs and symptoms: feels like bump in there    History (similar episodes/previous evaluation): None    Precipitating or alleviating factors: None    Therapies tried and outcome: None     Hx of skin cancer on right side of face anterior to ear lobe - basal cell. Not sure when this appeared. Noticed after beard trimming. Not bleeding, doesn't hurt. Hasn't grown.     Surgery coming up.     Problem list and histories reviewed & adjusted, as indicated.  Additional history: as documented    Patient Active Problem List   Diagnosis     Diffuse connective tissue disease (H)     Fatty liver disease, nonalcoholic     Advanced care planning/counseling discussion     Hyperlipidemia with target LDL less than 100     Hypokalemia     BPPV (benign paroxysmal positional vertigo)     Hypomagnesemia     Nocturia     Essential hypertension with goal blood pressure less than 140/90     Type 2 diabetes mellitus without complication, without long-term current use of insulin (H)     BPH with obstruction/lower urinary tract symptoms     Low libido     Past Surgical History:   Procedure Laterality Date     ADENOIDECTOMY       APPENDECTOMY       ARTHROSCOPY KNEE       arthroscopy right great toe      bunion     COLONOSCOPY  02-     COLONOSCOPY  2010,2006    repeat in 2015     COLONOSCOPY  8-     COLONOSCOPY  2015    Repeat 2020     ESOPHAGOGASTRODUODENOSCOPY      Repeat in 2015     ESOPHAGOGASTRODUODENOSCOPY  2015    Dr Funes/Vivek- Repeat 3yrs     EXCISE LESION EAR EXTERNAL Right 3/13/2018    Procedure: EXCISE LESION EAR EXTERNAL;  EXCISION OF PREAURICULAR BASAL CELL CARCINOMA WITH FROZENS, FLAP REPAIR ( 2.6 x 2cm Defect), ( 3.5 x 3.5cmTissue Rearrangement);   Surgeon: Kyara Rojas MD;  Location: [facility]     HERNIA REPAIR, UMBILICAL  2012    reduction and repair of umbilical hernia     JOINT REPLACEMENT       RELEASE CARPAL TUNNEL      carpal tunnel syndrome     shoulder bone spur removal       TKA       TONSILLECTOMY       UPPER GI ENDOSCOPY  2012    repeat in 2014     UPPER GI ENDOSCOPY       UPPER GI ENDOSCOPY       UPPER GI ENDOSCOPY  2010    repeat 2012       Social History   Substance Use Topics     Smoking status: Former Smoker     Types: Cigarettes     Smokeless tobacco: Never Used      Comment: quit in the 80s     Alcohol use No     Family History   Problem Relation Age of Onset     CEREBROVASCULAR DISEASE Maternal Grandmother      CVA     Heart Failure Father          Current Outpatient Prescriptions   Medication Sig Dispense Refill     ACCU-CHEK ANGELI PLUS test strip USE ONE STRIP ONCE DAILY 100 strip 3     Aspirin (ASPIR-81 PO) Take 1 tablet by mouth daily        atenolol-chlorthalidone (TENORETIC) 50-25 MG per tablet TAKE 1 TABLET DAILY 90 tablet 3     blood glucose calibration (ACCU-CHEK ANGELI) solution USE CONTROL SOLUTION AS DIRECTED ONCE MONTHLY TO CHECK ACCURACY OF METER 1 each 0     blood glucose monitoring (ACCU-CHEK MULTICLIX) lancets USE TO TEST BLOOD SUGAR ONCE DAILY 102 each 5     Cholecalciferol (VITAMIN D PO) Take 1 capsule by mouth daily       docusate sodium (STOOL SOFTENER) 100 MG capsule Take 1 capsule by mouth 2 times daily       ezetimibe (ZETIA) 10 MG tablet TAKE 1 TABLET DAILY (DUE   FOR LIPID PROFILE DECEMBER 2017) 90 tablet 1     glimepiride (AMARYL) 1 MG tablet Take 0.5 tablets (0.5 mg) by mouth every morning (before breakfast) 45 tablet 3     Ibuprofen (ADVIL PO) Take by mouth every 4 hours as needed for moderate pain       KLOR-CON 20 MEQ CR tablet TAKE ONE TABLET BY MOUTH TWICE DAILY WITH MEALS 180 tablet 1     magnesium oxide 400 MG CAPS Take 400 mg by mouth 2 times daily 180 capsule 2     MELOXICAM PO Take 15 mg by mouth  "One daily for 14 days       metFORMIN (GLUCOPHAGE-XR) 500 MG 24 hr tablet TAKE ONE TABLET BY MOUTH TWICE DAILY WITH MEALS 180 tablet 0     Multiple Vitamin (MULTIVITAMINS PO) Take 1 tablet by mouth daily       Omega-3 Fatty Acids (OMEGA-3 FISH OIL PO) Take 1 g by mouth       pantoprazole (PROTONIX) 40 MG EC tablet TAKE 1 TABLET DAILY 90 tablet 3     STATIN NOT PRESCRIBED, INTENTIONAL, Please choose reason not prescribed, below       tamsulosin (FLOMAX) 0.4 MG capsule Take 1 capsule (0.4 mg) by mouth daily 30 capsule 11     traZODone (DESYREL) 50 MG tablet TAKE ONE TABLET BY MOUTH AT BEDTIME 90 tablet 3       Reviewed and updated as needed this visit by clinical staff  Tobacco  Allergies  Meds  Med Hx  Surg Hx  Fam Hx  Soc Hx      Reviewed and updated as needed this visit by Provider       ROS:  CONSTITUTIONAL: NEGATIVE for fever, chills, change in weight  INTEGUMENTARY/SKIN: NEGATIVE for worrisome rashes. POSITIVE for concerning lesion on left face near angle of jaw.     OBJECTIVE:                                                    /62  Pulse 72  Temp 98  F (36.7  C)  Ht 5' 10\" (1.778 m)  Wt 230 lb (104.3 kg)  SpO2 96%  BMI 33 kg/m2  Body mass index is 33 kg/(m^2).     GENERAL: healthy, alert, well nourished, well hydrated, no distress  SKIN: 2cm brown papule with stuck on appearance and dried out \"brainy\" appearance on angle of jaw, left side. No concerning lesions on back or ears.        ASSESSMENT/PLAN:                                                    (L82.1) Seborrheic keratoses  (primary encounter diagnosis)  Comment: appears to be benign SK on left jaw. Instructed to watch for changes in color or size. F/u as needed.   Plan:  - watch for changes    (Z96.653) Status post total bilateral knee replacement  Comment: Has upcoming endoscopy - gave amoxicillin to take 1 hr before the procedure.   Plan: amoxicillin (AMOXIL) 500 MG capsule                Mata Richardson MD  [facility] " [place]

## 2018-05-15 NOTE — MR AVS SNAPSHOT
After Visit Summary   5/15/2018    Carrillo Carranza    MRN: [id number]           Patient Information     Date Of Birth          1940        Visit Information        Provider Department      5/15/2018 9:40 AM Mata Richardson MD [facility]        Today's Diagnoses     Seborrheic keratoses    -  1    Status post total bilateral knee replacement           Follow-ups after your visit        Your next 10 appointments already scheduled     Jul 06, 2018  8:30 AM CDT   (Arrive by 8:15 AM)   SHORT with Mata Richardson MD   [facility] ([facility] )    [address] [place] [id number]   [phone]            Jul 17, 2018 10:15 AM CDT   (Arrive by 10:00 AM)   New Visit with JUAN Batres MD   [facility] ([facility] )    [address] [place] [id number] [phone]            Apr 04, 2019  1:00 PM CDT   (Arrive by 12:45 PM)   Return Visit with Baylee Calixto RN   [facility] ([facility] )    [address] [place] [id number] [phone]              Who to contact     If you have questions or need follow up information about today's clinic visit or your schedule please contact [facility] directly at [phone].  Normal or non-critical lab and imaging results will be communicated to you by MyChart, letter or phone within 4 business days after the clinic has received the results. If you do not hear from us within 7 days, please contact the clinic through MyChart or phone. If you have a critical or abnormal lab result, we will notify you by phone as soon as possible.  Submit refill requests through [email or web address] or call your pharmacy and they will forward the refill request to us. Please allow 3 business days for your refill to be completed.          Additional Information About Your Visit        MyChart Information     [email or web address] gives you secure access to your  "electronic health record. If you see a primary care provider, you can also send messages to your care team and make appointments. If you have questions, please call your primary care clinic.  If you do not have a primary care provider, please call [phone] and they will assist you.        Care EveryWhere ID     This is your Care EveryWhere ID. This could be used by other organizations to access your [facility] medical records  [id number]        Your Vitals Were     Pulse Temperature Height Pulse Oximetry BMI (Body Mass Index)       72 98  F (36.7  C) 5' 10\" (1.778 m) 96% 33 kg/m2        Blood Pressure from Last 3 Encounters:   05/15/18 132/62   04/03/18 137/82   03/20/18 132/64    Weight from Last 3 Encounters:   05/15/18 230 lb (104.3 kg)   04/03/18 237 lb 14.4 oz (107.9 kg)   03/20/18 236 lb (107 kg)              Today, you had the following     No orders found for display         Today's Medication Changes          These changes are accurate as of 5/15/18 11:41 AM.  If you have any questions, ask your nurse or doctor.               Start taking these medicines.        Dose/Directions    amoxicillin 500 MG capsule   Commonly known as:  AMOXIL   Used for:  Status post total bilateral knee replacement   Started by:  Mata Richardson MD        Dose:  2000 mg   Take 4 capsules (2,000 mg) by mouth once for 1 dose   Quantity:  4 capsule   Refills:  3            Where to get your medicines      These medications were sent to [id number] - [place] - [address]  [address] [facility] [address], [place] [address]     Phone:  [phone]     amoxicillin 500 MG capsule                Primary Care Provider Office Phone # Fax #    Mata Richardson [phone]       [address] [place] [address]  [place] [address]        Equal Access to Services     RONAK GARCIA AH: Tomasa Olivera, ray lassiter, qatiffanieta kadavid carrero. So [facility] [phone].    ATENCIÓN: Si " vernon carpio, tiene a palacios disposición servicios gratuitos de asistencia lingüística. Quan tilley [phone].    We comply with applicable federal civil rights laws and [place] laws. We do not discriminate on the basis of race, color, national origin, age, disability, sex, sexual orientation, or gender identity.            Thank you!     Thank you for choosing [facility]  for your care. Our goal is always to provide you with excellent care. Hearing back from our patients is one way we can continue to improve our services. Please take a few minutes to complete the written survey that you may receive in the mail after your visit with us. Thank you!             Your Updated Medication List - Protect others around you: Learn how to safely use, store and throw away your medicines at www.disposemymeds.org.          This list is accurate as of 5/15/18 11:41 AM.  Always use your most recent med list.                   Brand Name Dispense Instructions for use Diagnosis    ACCU-CHEK ANGELI PLUS test strip   Generic drug:  blood glucose monitoring     100 strip    USE ONE STRIP ONCE DAILY    Diabetes mellitus, type 2 (H)       ADVIL PO      Take by mouth every 4 hours as needed for moderate pain        amoxicillin 500 MG capsule    AMOXIL    4 capsule    Take 4 capsules (2,000 mg) by mouth once for 1 dose    Status post total bilateral knee replacement       ASPIR-81 PO      Take 1 tablet by mouth daily        atenolol-chlorthalidone 50-25 MG per tablet    TENORETIC    90 tablet    TAKE 1 TABLET DAILY    Benign essential hypertension       blood glucose calibration solution     1 each    USE CONTROL SOLUTION AS DIRECTED ONCE MONTHLY TO CHECK ACCURACY OF METER    Diabetes mellitus, type 2 (H)       blood glucose monitoring lancets     102 each    USE TO TEST BLOOD SUGAR ONCE DAILY    Diabetes mellitus, type 2 (H)       ezetimibe 10 MG tablet    ZETIA    90 tablet    TAKE 1 TABLET DAILY (DUE   FOR LIPID PROFILE  DECEMBER 2017)    Hyperlipidemia LDL goal <100       glimepiride 1 MG tablet    AMARYL    45 tablet    Take 0.5 tablets (0.5 mg) by mouth every morning (before breakfast)    Type 2 diabetes mellitus with diabetic nephropathy, without long-term current use of insulin (H)       KLOR-CON 20 MEQ CR tablet   Generic drug:  potassium chloride SA     180 tablet    TAKE ONE TABLET BY MOUTH TWICE DAILY WITH MEALS    HTN (hypertension)       magnesium oxide 400 MG Caps     180 capsule    Take 400 mg by mouth 2 times daily    Hypomagnesemia       MELOXICAM PO      Take 15 mg by mouth One daily for 14 days        metFORMIN 500 MG 24 hr tablet    GLUCOPHAGE-XR    180 tablet    TAKE ONE TABLET BY MOUTH TWICE DAILY WITH MEALS    Type 2 diabetes mellitus without complication, without long-term current use of insulin (H)       MULTIVITAMINS PO      Take 1 tablet by mouth daily        OMEGA-3 FISH OIL PO      Take 1 g by mouth        pantoprazole 40 MG EC tablet    PROTONIX    90 tablet    TAKE 1 TABLET DAILY    Baca esophagus       STATIN NOT PRESCRIBED (INTENTIONAL)      Please choose reason not prescribed, below    Hyperlipidemia with target LDL less than 100       STOOL SOFTENER 100 MG capsule   Generic drug:  docusate sodium      Take 1 capsule by mouth 2 times daily        tamsulosin 0.4 MG capsule    FLOMAX    30 capsule    Take 1 capsule (0.4 mg) by mouth daily    BPH with obstruction/lower urinary tract symptoms       traZODone 50 MG tablet    DESYREL    90 tablet    TAKE ONE TABLET BY MOUTH AT BEDTIME    Diagnosis unknown       VITAMIN D PO      Take 1 capsule by mouth daily

## 2018-05-16 ASSESSMENT — PATIENT HEALTH QUESTIONNAIRE - PHQ9: SUM OF ALL RESPONSES TO PHQ QUESTIONS 1-9: 0

## 2018-05-16 ASSESSMENT — ANXIETY QUESTIONNAIRES: GAD7 TOTAL SCORE: 0

## 2018-05-21 ENCOUNTER — HOSPITAL ENCOUNTER (EMERGENCY)
Facility: HOSPITAL | Age: 78
Discharge: HOME | End: 2018-05-21
Attending: PHYSICIAN ASSISTANT | Admitting: PHYSICIAN ASSISTANT
Payer: MEDICARE

## 2018-05-21 ENCOUNTER — APPOINTMENT (OUTPATIENT)
Dept: GENERAL RADIOLOGY | Facility: HOSPITAL | Age: 78
End: 2018-05-21
Attending: PHYSICIAN ASSISTANT
Payer: MEDICARE

## 2018-05-21 VITALS
HEIGHT: 70 IN | RESPIRATION RATE: 16 BRPM | BODY MASS INDEX: 32.21 KG/M2 | OXYGEN SATURATION: 95 % | SYSTOLIC BLOOD PRESSURE: 137 MMHG | DIASTOLIC BLOOD PRESSURE: 73 MMHG | HEART RATE: 96 BPM | TEMPERATURE: 97.6 F | WEIGHT: 225 LBS

## 2018-05-21 DIAGNOSIS — S69.90XA THUMB INJURY, INITIAL ENCOUNTER: ICD-10-CM

## 2018-05-21 DIAGNOSIS — S62.525B OPEN NONDISPLACED FRACTURE OF DISTAL PHALANX OF LEFT THUMB, INITIAL ENCOUNTER: ICD-10-CM

## 2018-05-21 DIAGNOSIS — S61.319A LACERATION OF NAIL BED OF FINGER, INITIAL ENCOUNTER: ICD-10-CM

## 2018-05-21 PROCEDURE — 90715 TDAP VACCINE 7 YRS/> IM: CPT | Performed by: PHYSICIAN ASSISTANT

## 2018-05-21 PROCEDURE — 99284 EMERGENCY DEPT VISIT MOD MDM: CPT | Mod: 25

## 2018-05-21 PROCEDURE — 73140 X-RAY EXAM OF FINGER(S): CPT | Mod: TC,LT

## 2018-05-21 PROCEDURE — 12041 INTMD RPR N-HF/GENIT 2.5CM/<: CPT

## 2018-05-21 PROCEDURE — 96365 THER/PROPH/DIAG IV INF INIT: CPT | Mod: XU

## 2018-05-21 PROCEDURE — 12041 INTMD RPR N-HF/GENIT 2.5CM/<: CPT | Performed by: PHYSICIAN ASSISTANT

## 2018-05-21 PROCEDURE — 25000128 H RX IP 250 OP 636: Performed by: PHYSICIAN ASSISTANT

## 2018-05-21 PROCEDURE — 96375 TX/PRO/DX INJ NEW DRUG ADDON: CPT | Mod: XU

## 2018-05-21 PROCEDURE — 90471 IMMUNIZATION ADMIN: CPT

## 2018-05-21 RX ORDER — OXYCODONE AND ACETAMINOPHEN 5; 325 MG/1; MG/1
1-2 TABLET ORAL EVERY 4 HOURS PRN
Qty: 20 TABLET | Refills: 0 | Status: SHIPPED | OUTPATIENT
Start: 2018-05-21 | End: 2019-01-29

## 2018-05-21 RX ORDER — CEPHALEXIN 500 MG/1
500 CAPSULE ORAL 4 TIMES DAILY
Qty: 28 CAPSULE | Refills: 0 | Status: SHIPPED | OUTPATIENT
Start: 2018-05-21 | End: 2019-01-29

## 2018-05-21 RX ORDER — AMPICILLIN AND SULBACTAM 2; 1 G/1; G/1
3 INJECTION, POWDER, FOR SOLUTION INTRAMUSCULAR; INTRAVENOUS ONCE
Status: DISCONTINUED | OUTPATIENT
Start: 2018-05-21 | End: 2018-05-21

## 2018-05-21 RX ORDER — HYDROMORPHONE HYDROCHLORIDE 1 MG/ML
0.5 INJECTION, SOLUTION INTRAMUSCULAR; INTRAVENOUS; SUBCUTANEOUS ONCE
Status: COMPLETED | OUTPATIENT
Start: 2018-05-21 | End: 2018-05-21

## 2018-05-21 RX ADMIN — CLOSTRIDIUM TETANI TOXOID ANTIGEN (FORMALDEHYDE INACTIVATED), CORYNEBACTERIUM DIPHTHERIAE TOXOID ANTIGEN (FORMALDEHYDE INACTIVATED), BORDETELLA PERTUSSIS TOXOID ANTIGEN (GLUTARALDEHYDE INACTIVATED), BORDETELLA PERTUSSIS FILAMENTOUS HEMAGGLUTININ ANTIGEN (FORMALDEHYDE INACTIVATED), BORDETELLA PERTUSSIS PERTACTIN ANTIGEN, AND BORDETELLA PERTUSSIS FIMBRIAE 2/3 ANTIGEN 0.5 ML: 5; 2; 2.5; 5; 3; 5 INJECTION, SUSPENSION INTRAMUSCULAR at 21:50

## 2018-05-21 RX ADMIN — HYDROMORPHONE HYDROCHLORIDE 0.5 MG: 1 INJECTION, SOLUTION INTRAMUSCULAR; INTRAVENOUS; SUBCUTANEOUS at 21:10

## 2018-05-21 RX ADMIN — SODIUM CHLORIDE 3 G: 9 INJECTION, SOLUTION INTRAVENOUS at 20:36

## 2018-05-21 ASSESSMENT — ENCOUNTER SYMPTOMS: BRUISES/BLEEDS EASILY: 0

## 2018-05-21 NOTE — ED AVS SNAPSHOT
[facility]    [address] [address]    Phone:  [phone]                                       Carrillo Carranza   MRN: [id number]    Department:  [facility]   Date of Visit:  5/21/2018           After Visit Summary Signature Page     I have received my discharge instructions, and my questions have been answered. I have discussed any challenges I see with this plan with the nurse or doctor.    ..........................................................................................................................................  Patient/Patient Representative Signature      ..........................................................................................................................................  Patient Representative Print Name and Relationship to Patient    ..................................................               ................................................  Date                                            Time    ..........................................................................................................................................  Reviewed by Signature/Title    ...................................................              ..............................................  Date                                                            Time

## 2018-05-21 NOTE — ED AVS SNAPSHOT
[facility]    [address] [address]    Phone:  [phone]                                       Carrillo Carranza   MRN: [id number]    Department:  [facility]   Date of Visit:  5/21/2018           Patient Information     Date Of Birth          1940        Your diagnoses for this visit were:     Thumb injury, initial encounter     Laceration of nail bed of finger, initial encounter     Open nondisplaced fracture of distal phalanx of left thumb, initial encounter        You were seen by Taz Mora PA-C.      Follow-up Information     Schedule an appointment as soon as possible for a visit with Ever Faust DO.    Specialty:  Orthopedics    Contact information:    [address] [place] [id number]  [phone]          Follow up with [facility].    Specialty:  EMERGENCY MEDICINE    Why:  If symptoms worsen    Contact information:    [address] [place] [id number] [phone]    Additional information:    From [facility]: Take [address]. Turn left at [address] [facility]. Turn left at the first stoplight on [address]. At the first stop sign, take a right onto [address]. Take a left into the parking lot and continue through until you reach the North enterance of the building.       From [place]: Take [address]. Take the [address] ramp towards [place]. Turn left onto [address]. Take a slight right onto [address] [facility]. Turn left at the first stoplight on [address]. At the first stop sign, take a right onto [address]. Take a left into the parking lot and continue through until you reach the North enterance of the building.       From [place]: Take [address]. Take a right at [address]. At the first stop sign, take a right onto [address]. Take a left into the parking lot and continue through until you reach the North enterance of the building.          Discharge Instructions       Keep the bandage in place for 48 hours or until you see orthopedics.     Pain medications to keep pain tolerable.     Antibiotics as prescribed.     Please follow-up with orthopedics this week for recheck. Call for an appointment tomorrow.     Please return here for ANY intolerable pain, advancing redness, heat, fevers, or any other concerns.     Your next 10 appointments already scheduled     Jul 06, 2018  8:30 AM CDT   (Arrive by 8:15 AM)   SHORT with Mata Richardson MD   [facility] ([facility] )    [address] [place] [address]   [phone]            Jul 17, 2018 10:15 AM CDT   (Arrive by 10:00 AM)   New Visit with JUAN Batres MD   [facility] ([facility] )    [address] [place] [phone]            Apr 04, 2019  1:00 PM CDT   (Arrive by 12:45 PM)   Return Visit with Baylee Calixto RN   [facility] )    [address] [place] [phone]              ED Discharge Orders     ORTHOPEDICS ADULT REFERRAL       Your provider has referred you to: [facility] [phone]  [email or web address]    Please be aware that coverage of these services is subject to the terms and limitations of your health insurance plan.  Call member services at your health plan with any benefit or coverage questions.      Please bring the following to your appointment:    >>   Any x-rays, CTs or MRIs which have been performed.  Contact the facility where they were done to arrange for  prior to your scheduled appointment.    >>   List of current medications   >>   This referral request   >>   Any documents/labs given to you for this referral                     Review of your medicines      START taking        Dose / Directions Last dose taken    cephALEXin 500 MG capsule   Commonly  known as:  KEFLEX   Dose:  500 mg   Quantity:  28 capsule        Take 1 capsule (500 mg) by mouth 4 times daily for 7 days   Refills:  0        oxyCODONE 5 mg   Commonly known as:  ROXICODONE   Dose:  5 mg   Quantity:  6 tablet        Take 1 tablet (5 mg) by mouth every 4 hours as needed for moderate to severe pain   Refills:  0        oxyCODONE-acetaminophen 5-325 MG per tablet   Commonly known as:  PERCOCET   Dose:  1-2 tablet   Quantity:  20 tablet        Take 1-2 tablets by mouth every 4 hours as needed for pain   Refills:  0          Our records show that you are taking the medicines listed below. If these are incorrect, please call your family doctor or clinic.        Dose / Directions Last dose taken    ACCU-CHEK ANGELI PLUS test strip   Quantity:  100 strip   Generic drug:  blood glucose monitoring        USE ONE STRIP ONCE DAILY   Refills:  3        ADVIL PO        Take by mouth every 4 hours as needed for moderate pain   Refills:  0        ASPIR-81 PO   Dose:  1 tablet        Take 1 tablet by mouth daily   Refills:  0        atenolol-chlorthalidone 50-25 MG per tablet   Commonly known as:  TENORETIC   Quantity:  90 tablet        TAKE 1 TABLET DAILY   Refills:  3        blood glucose calibration solution   Quantity:  1 each        USE CONTROL SOLUTION AS DIRECTED ONCE MONTHLY TO CHECK ACCURACY OF METER   Refills:  0        blood glucose monitoring lancets   Quantity:  102 each        USE TO TEST BLOOD SUGAR ONCE DAILY   Refills:  5        ezetimibe 10 MG tablet   Commonly known as:  ZETIA   Quantity:  90 tablet        TAKE 1 TABLET DAILY (DUE   FOR LIPID PROFILE DECEMBER 2017)   Refills:  1        glimepiride 1 MG tablet   Commonly known as:  AMARYL   Dose:  0.5 mg   Quantity:  45 tablet        Take 0.5 tablets (0.5 mg) by mouth every morning (before breakfast)   Refills:  3        KLOR-CON 20 MEQ CR tablet   Quantity:  180 tablet   Generic drug:  potassium chloride SA        TAKE ONE TABLET BY MOUTH TWICE  DAILY WITH MEALS   Refills:  1        magnesium oxide 400 MG Caps   Dose:  400 mg   Quantity:  180 capsule        Take 400 mg by mouth 2 times daily   Refills:  2        MELOXICAM PO   Dose:  15 mg        Take 15 mg by mouth One daily for 14 days   Refills:  0        metFORMIN 500 MG 24 hr tablet   Commonly known as:  GLUCOPHAGE-XR   Quantity:  180 tablet        TAKE ONE TABLET BY MOUTH TWICE DAILY WITH MEALS   Refills:  0        MULTIVITAMINS PO   Dose:  1 tablet        Take 1 tablet by mouth daily   Refills:  0        OMEGA-3 FISH OIL PO   Dose:  1 g        Take 1 g by mouth   Refills:  0        pantoprazole 40 MG EC tablet   Commonly known as:  PROTONIX   Quantity:  90 tablet        TAKE 1 TABLET DAILY   Refills:  3        STATIN NOT PRESCRIBED (INTENTIONAL)        Please choose reason not prescribed, below   Refills:  0        STOOL SOFTENER 100 MG capsule   Dose:  1 capsule   Generic drug:  docusate sodium        Take 1 capsule by mouth 2 times daily   Refills:  0        tamsulosin 0.4 MG capsule   Commonly known as:  FLOMAX   Dose:  0.4 mg   Quantity:  30 capsule        Take 1 capsule (0.4 mg) by mouth daily   Refills:  11        traZODone 50 MG tablet   Commonly known as:  DESYREL   Quantity:  90 tablet        TAKE ONE TABLET BY MOUTH AT BEDTIME   Refills:  3        VITAMIN D PO   Dose:  1 capsule        Take 1 capsule by mouth daily   Refills:  0                Information about OPIOIDS     PRESCRIPTION OPIOIDS: WHAT YOU NEED TO KNOW   You have a prescription for an opioid (narcotic) pain medicine. Opioids can cause addiction. If you have a history of chemical dependency of any type, you are at a higher risk of becoming addicted to opioids. Only take this medicine after all other options have been tried. Take it for as short a time and as few doses as possible.     Do not:    Drive. If you drive while taking these medicines, you could be arrested for driving under the influence (DUI).    Operate heavy  machinery    Do any other dangerous activities while taking these medicines.     Drink any alcohol while taking these medicines.      Take with any other medicines that contain acetaminophen. Read all labels carefully. Look for the word  acetaminophen  or  Tylenol.  Ask your pharmacist if you have questions or are unsure.    Store your pills in a secure place, locked if possible. We will not replace any lost or stolen medicine. If you don t finish your medicine, please throw away (dispose) as directed by your pharmacist. The [place] Pollution Control Agency has more information about safe disposal: [email or web address]-medications    All opioids tend to cause constipation. Drink plenty of water and eat foods that have a lot of fiber, such as fruits, vegetables, prune juice, apple juice and high-fiber cereal. Take a laxative (Miralax, milk of magnesia, Colace, Senna) if you don t move your bowels at least every other day.         Prescriptions were sent or printed at these locations (3 Prescriptions)                   [facility] [address] [place], [place] - [address] [facility] [address]   [address] [facility] [address], [facility] [address]    Telephone:  [phone]   Fax:  [phone]   Hours:                  E-Prescribed (1 of 3)         cephALEXin (KEFLEX) 500 MG capsule                 Printed at Department/Unit printer (2 of 3)         oxyCODONE (ROXICODONE) 5 mg               oxyCODONE-acetaminophen (PERCOCET) 5-325 MG per tablet                Procedures and tests performed during your visit     Fingers XR, 2-3 views, left      Orders Needing Specimen Collection     None      Pending Results     No orders found from 5/19/2018 to 5/22/2018.            Pending Culture Results     No orders found from 5/19/2018 to 5/22/2018.            Thank you for choosing [facility]       Thank you for choosing [facility] for your care. Our goal is always to provide you with excellent care. Hearing back from our patients is  one way we can continue to improve our services. Please take a few minutes to complete the written survey that you may receive in the mail after you visit with us. Thank you!        [facility] Information     [facility] gives you secure access to your electronic health record. If you see a primary care provider, you can also send messages to your care team and make appointments. If you have questions, please call your primary care clinic.  If you do not have a primary care provider, please call [phone] and they will assist you.        Care EveryWhere ID     This is your Care EveryWhere ID. This could be used by other organizations to access your [facility] medical records  [id number]        Equal Access to Services     RONAK GARCIA : Tomasa Olivera, ray lassiter, maren cross, david norton. So [facility] [phone].    ATENCIÓN: Si habla español, tiene a palacios disposición servicios gratuitos de asistencia lingüística. Llame al [phone].    We comply with applicable federal civil rights laws and [place] laws. We do not discriminate on the basis of race, color, national origin, age, disability, sex, sexual orientation, or gender identity.            After Visit Summary       This is your record. Keep this with you and show to your community pharmacist(s) and doctor(s) at your next visit.

## 2018-05-22 NOTE — ED PROVIDER NOTES
History     Chief Complaint   Patient presents with     Hand Injury     left thumb. crush injury with a wood splitter     The history is provided by the patient.     Carrillo Carranza is a 77 year old male who presents to the emergency department ambulatory along with wife for evaluation of a left thumb injury.  Was cutting wood with a wood splitter and got his thumb in between the wood and the splitting maul.    Problem List:    Patient Active Problem List    Diagnosis Date Noted     Low libido 09/14/2017     Priority: Medium     BPH with obstruction/lower urinary tract symptoms 05/16/2017     Priority: Medium     Type 2 diabetes mellitus without complication, without long-term current use of insulin (H) 12/12/2016     Priority: Medium     Essential hypertension with goal blood pressure less than 140/90 07/15/2016     Priority: Medium     Nocturia 10/09/2014     Priority: Medium     Hypomagnesemia 06/23/2014     Priority: Medium     BPPV (benign paroxysmal positional vertigo) 06/13/2014     Priority: Medium     Hypokalemia 06/02/2014     Priority: Medium     Hyperlipidemia with target LDL less than 100      Priority: Medium     Diagnosis updated by automated process. Provider to review and confirm.       Fatty liver disease, nonalcoholic      Priority: Medium     Diffuse connective tissue disease (H) 01/29/2013     Priority: Medium     Problem list name updated by automated process. Provider to review       Advanced care planning/counseling discussion 06/12/2012     Priority: Medium        Past Medical History:    Past Medical History:   Diagnosis Date     Baca's esophagus 11/3/2011     Contact dermatitis and other eczema, due to unspecified cause 3/1/2011     Dermatophytosis of groin and perianal area 2/1/2006     Esophageal reflux 11/3/2011     Family history of colonic polyps 11/3/2011     Fatty liver disease, nonalcoholic      Generalized osteoarthrosis, involving multiple sites 3/1/2011     HTN  (hypertension)      Hyperlipidaemia LDL goal < 100      Other and unspecified hyperlipidemia 11/3/2011     Other chronic nonalcoholic liver disease 11/3/2011     Other premature beats 11/3/2011     Other specified cardiac dysrhythmias(427.89) 11/3/2011     Personal history of tobacco use, presenting hazards to health 11/3/2011     Type II or unspecified type diabetes mellitus without mention of complication, not stated as uncontrolled 2/7/2012     Umbilical hernia without mention of obstruction or gangrene 11/3/2011     Unspecified diffuse connective tissue disease 1/29/2013     Unspecified essential hypertension 11/3/2011       Past Surgical History:    Past Surgical History:   Procedure Laterality Date     ADENOIDECTOMY       APPENDECTOMY       ARTHROSCOPY KNEE       arthroscopy right great toe      bunion     COLONOSCOPY  02-     COLONOSCOPY  2010,2006    repeat in 2015     COLONOSCOPY  8-     COLONOSCOPY  2015    Repeat 2020     ESOPHAGOGASTRODUODENOSCOPY      Repeat in 2015     ESOPHAGOGASTRODUODENOSCOPY  2015    Dr Funes/Vivek- Repeat 3yrs     EXCISE LESION EAR EXTERNAL Right 3/13/2018    Procedure: EXCISE LESION EAR EXTERNAL;  EXCISION OF PREAURICULAR BASAL CELL CARCINOMA WITH FROZENS, FLAP REPAIR ( 2.6 x 2cm Defect), ( 3.5 x 3.5cmTissue Rearrangement);  Surgeon: Kyara Rojas MD;  Location: [facility]     HERNIA REPAIR, UMBILICAL  2012    reduction and repair of umbilical hernia     JOINT REPLACEMENT       RELEASE CARPAL TUNNEL      carpal tunnel syndrome     shoulder bone spur removal       TKA       TONSILLECTOMY       UPPER GI ENDOSCOPY  2012    repeat in 2014     UPPER GI ENDOSCOPY       UPPER GI ENDOSCOPY       UPPER GI ENDOSCOPY  2010    repeat 2012       Family History:    Family History   Problem Relation Age of Onset     CEREBROVASCULAR DISEASE Maternal Grandmother      CVA     Heart Failure Father        Social History:  Marital Status:   [2]  Social History   Substance  "Use Topics     Smoking status: Former Smoker     Types: Cigarettes     Smokeless tobacco: Never Used      Comment: quit in the 80s     Alcohol use No        Medications:      ACCU-CHEK ANGELI PLUS test strip   Aspirin (ASPIR-81 PO)   atenolol-chlorthalidone (TENORETIC) 50-25 MG per tablet   blood glucose calibration (ACCU-CHEK ANGELI) solution   blood glucose monitoring (ACCU-CHEK MULTICLIX) lancets   cephALEXin (KEFLEX) 500 MG capsule   Cholecalciferol (VITAMIN D PO)   docusate sodium (STOOL SOFTENER) 100 MG capsule   ezetimibe (ZETIA) 10 MG tablet   glimepiride (AMARYL) 1 MG tablet   Ibuprofen (ADVIL PO)   KLOR-CON 20 MEQ CR tablet   magnesium oxide 400 MG CAPS   metFORMIN (GLUCOPHAGE-XR) 500 MG 24 hr tablet   Multiple Vitamin (MULTIVITAMINS PO)   Omega-3 Fatty Acids (OMEGA-3 FISH OIL PO)   oxyCODONE (ROXICODONE) 5 mg   oxyCODONE-acetaminophen (PERCOCET) 5-325 MG per tablet   pantoprazole (PROTONIX) 40 MG EC tablet   STATIN NOT PRESCRIBED, INTENTIONAL,   tamsulosin (FLOMAX) 0.4 MG capsule   traZODone (DESYREL) 50 MG tablet   MELOXICAM PO         Review of Systems   Musculoskeletal:        Left thumb injury   Hematological: Does not bruise/bleed easily.       Physical Exam   BP: 161/70  Pulse: 96  Temp: 96.5  F (35.8  C)  Resp: 20  Height: 177.8 cm (5' 10\")  Weight: 102.1 kg (225 lb)  SpO2: 94 %      Physical Exam   Constitutional: He is oriented to person, place, and time. He appears well-developed and well-nourished.   Cardiovascular: Normal rate and regular rhythm.    Pulmonary/Chest: Effort normal.   Musculoskeletal:   Laceration and fingernail injury to the left thumb.   Neurological: He is alert and oriented to person, place, and time.   Skin: Skin is warm and dry.   Psychiatric: He has a normal mood and affect.   Nursing note and vitals reviewed.      ED Course     ED Course     Procedures               Critical Care time:  none               Results for orders placed or performed during the hospital encounter " of 05/21/18 (from the past 24 hour(s))   Fingers XR, 2-3 views, left    Narrative    PROCEDURE:  XR FINGER LT G/E 2 VW    HISTORY: left thumb; injury. Left thumb pain.     COMPARISON:  None.    TECHNIQUE:  3 views of the left were obtained.    FINDINGS:  Comminuted fracture of the terminal tuft of the thumb is  seen. The proximal phalanx is intact. Interphalangeal and metacarpal  phalangeal joint appears normal.       Impression    IMPRESSION: Egg shell fracture of the terminal tuft of the thumb      CHARI TERAN MD       Medications   ampicillin-sulbactam (UNASYN) 3 g in sodium chloride 0.9 % 100 mL intermittent infusion (3 g Intravenous New Bag 5/21/18 2036)   HYDROmorphone (PF) (DILAUDID) injection 0.5 mg (not administered)       Assessments & Plan (with Medical Decision Making)   After verbal consent the thumb was cleansed extensively and anesthetized using 5 ml of 2% lidocaine with excellent anesthetic result.  The fingernail had a subungual hematoma of approximately 75% and was detached at the proximal fold.  The fingernail was removed and the underlying sterile matrix of the nail bed was examined.  The injury virtually pulverized the sterile matrix across the medial aspect.  There was a large laceration running through the center of the lateral aspect of the nailbed including through the distal phalanx.  The nailbed and wound were cleansed extensively with sterile saline under pressure from a syringe approx 100ml.  Also cleaned with Betadine.  There was a small 1.5 cm laceration on the medial nail fold as well.  The medial nail fold laceration was closed with #2, 5-0 Vicryl sutures.  The remaining nailbed was approximated with #2, 5-0 Vicryl sutures as well.  As stated above the medial aspect could not be repaired as the sterile matrix was entirely pulverized.  The fingernail was cleaned and debrided and replaced underneath the proximal nail fold with #2, 3-0 Ethilon sutures.  Bandaged with Xeroform gauze  and Kerlix.  Splint placed using fabricated moldable aluminum.  Treated with IV Dilaudid and IV Unasyn.  Home with pain medications and Keflex.  Close orthopedic follow-up.  Return here for any intolerable pain, fevers, redness, heat, or any other concerns.  Carrillo and his wife voiced complete understanding and was happy and agreeable.       I have reviewed the nursing notes.    I have reviewed the findings, diagnosis, plan and need for follow up with the patient.       New Prescriptions    CEPHALEXIN (KEFLEX) 500 MG CAPSULE    Take 1 capsule (500 mg) by mouth 4 times daily for 7 days    OXYCODONE (ROXICODONE) 5 MG    Take 1 tablet (5 mg) by mouth every 4 hours as needed for moderate to severe pain    OXYCODONE-ACETAMINOPHEN (PERCOCET) 5-325 MG PER TABLET    Take 1-2 tablets by mouth every 4 hours as needed for pain       Final diagnoses:   Thumb injury, initial encounter   Laceration of nail bed of finger, initial encounter   Open nondisplaced fracture of distal phalanx of left thumb, initial encounter       5/21/2018   [facility]     Taz Mora PA-C  05/21/18 1299

## 2018-05-22 NOTE — DISCHARGE INSTRUCTIONS
Keep the bandage in place for 48 hours or until you see orthopedics.     Pain medications to keep pain tolerable.     Antibiotics as prescribed.     Please follow-up with orthopedics this week for recheck. Call for an appointment tomorrow.     Please return here for ANY intolerable pain, advancing redness, heat, fevers, or any other concerns.

## 2018-05-22 NOTE — ED NOTES
D/c instructions reviewed with patient and his wife. Take home oxycodone given and explained how to safely take and labeled correctly for pt.  Rx also given for Percocet to fill at pharmacy of choice.  Rx for abx e-scribed to pharmacy of choice. Encouraged pt to f/u with PCP or return to UC/ED for worsening signs or symptoms. Pt has no questions or concerns. Pain 4/10 but reports its tolerable.

## 2018-05-23 ENCOUNTER — HOSPITAL ENCOUNTER (EMERGENCY)
Facility: HOSPITAL | Age: 78
Discharge: HOME | End: 2018-05-23
Attending: PHYSICIAN ASSISTANT | Admitting: PHYSICIAN ASSISTANT
Payer: MEDICARE

## 2018-05-23 VITALS
OXYGEN SATURATION: 96 % | DIASTOLIC BLOOD PRESSURE: 69 MMHG | TEMPERATURE: 96.1 F | RESPIRATION RATE: 16 BRPM | SYSTOLIC BLOOD PRESSURE: 144 MMHG

## 2018-05-23 DIAGNOSIS — Z51.89 VISIT FOR WOUND CHECK: ICD-10-CM

## 2018-05-23 PROCEDURE — 99024 POSTOP FOLLOW-UP VISIT: CPT | Performed by: PHYSICIAN ASSISTANT

## 2018-05-23 PROCEDURE — G0463 HOSPITAL OUTPT CLINIC VISIT: HCPCS

## 2018-05-23 ASSESSMENT — ENCOUNTER SYMPTOMS: FEVER: 0

## 2018-05-23 NOTE — ED AVS SNAPSHOT
[facility]    [address] [address]    Phone:  [phone]                                       Carrillo Carranza   MRN: [id number]    Department:  [facility]   Date of Visit:  5/23/2018           Patient Information     Date Of Birth          1940        Your diagnoses for this visit were:     Visit for wound check        You were seen by Taz Mora PA-C.      Follow-up Information     Follow up with Ever Faust DO In 1 day.    Specialty:  Orthopedics    Contact information:    [address] [address] [phone]          Follow up with [facility].    Specialty:  EMERGENCY MEDICINE    Why:  If symptoms worsen    Contact information:    [address] [address] [phone]    Additional information:    From [facility]: Take [address]. Turn left at [address]. Turn left at the first stoplight on [address]. At the first stop sign, take a right onto [address]. Take a left into the parking lot and continue through until you reach the North enterance of the building.       From [place]: Take [address]. Take the [address] ramp towards [place]. Turn left onto [address]. Take a slight right onto [address]. Turn left at the first stoplight on [address]. At the first stop sign, take a right onto [address]. Take a left into the parking lot and continue through until you reach the North enterance of the building.       From [place]: Take [address]. Take a right at [address]. At the first stop sign, take a right onto [address]. Take a left into the parking lot and continue through until you reach the North enterance of the building.         Discharge Instructions       Keep dry.     Please follow-up with Dr. Faust tomorrow.       Your next 10 appointments already scheduled     May 24, 2018  2:30 PM CDT   (Arrive by  2:15 PM)   New Visit with Ever Faust DO    ORTHOPEDICS ([facility] )    [address] [facility] [id number] [phone]            Jul 06, 2018  8:30 AM CDT   (Arrive by 8:15 AM)   SHORT with Mata Richardson MD   [facility] ([facility] )    [address] [facility] [address] [phone]            Jul 17, 2018 10:15 AM CDT   (Arrive by 10:00 AM)   New Visit with JUAN Batres MD   [facility] ([facility] )    [address] [facility] [address] [phone]            Apr 04, 2019  1:00 PM CDT   (Arrive by 12:45 PM)   Return Visit with Baylee Calixto RN   [facility] ([facility] )    [address] [facility] [address] [phone]                 Review of your medicines      Our records show that you are taking the medicines listed below. If these are incorrect, please call your family doctor or clinic.        Dose / Directions Last dose taken    ACCU-CHEK ANGELI PLUS test strip   Quantity:  100 strip   Generic drug:  blood glucose monitoring        USE ONE STRIP ONCE DAILY   Refills:  3        ADVIL PO        Take by mouth every 4 hours as needed for moderate pain   Refills:  0        ASPIR-81 PO   Dose:  1 tablet        Take 1 tablet by mouth daily   Refills:  0        atenolol-chlorthalidone 50-25 MG per tablet   Commonly known as:  TENORETIC   Quantity:  90 tablet        TAKE 1 TABLET DAILY   Refills:  3        blood glucose calibration solution   Quantity:  1 each        USE CONTROL SOLUTION AS DIRECTED ONCE MONTHLY TO CHECK ACCURACY OF METER   Refills:  0        blood glucose monitoring lancets   Quantity:  102 each        USE TO TEST BLOOD SUGAR ONCE DAILY   Refills:  5        cephALEXin 500 MG capsule   Commonly known as:  KEFLEX   Dose:  500 mg   Quantity:  28 capsule        Take 1 capsule (500 mg) by mouth 4 times daily for 7 days   Refills:  0        ezetimibe  10 MG tablet   Commonly known as:  ZETIA   Quantity:  90 tablet        TAKE 1 TABLET DAILY (DUE   FOR LIPID PROFILE DECEMBER 2017)   Refills:  1        glimepiride 1 MG tablet   Commonly known as:  AMARYL   Dose:  0.5 mg   Quantity:  45 tablet        Take 0.5 tablets (0.5 mg) by mouth every morning (before breakfast)   Refills:  3        KLOR-CON 20 MEQ CR tablet   Quantity:  180 tablet   Generic drug:  potassium chloride SA        TAKE ONE TABLET BY MOUTH TWICE DAILY WITH MEALS   Refills:  1        magnesium oxide 400 MG Caps   Dose:  400 mg   Quantity:  180 capsule        Take 400 mg by mouth 2 times daily   Refills:  2        MELOXICAM PO   Dose:  15 mg        Take 15 mg by mouth One daily for 14 days   Refills:  0        metFORMIN 500 MG 24 hr tablet   Commonly known as:  GLUCOPHAGE-XR   Quantity:  180 tablet        TAKE ONE TABLET BY MOUTH TWICE DAILY WITH MEALS   Refills:  0        MULTIVITAMINS PO   Dose:  1 tablet        Take 1 tablet by mouth daily   Refills:  0        OMEGA-3 FISH OIL PO   Dose:  1 g        Take 1 g by mouth   Refills:  0        oxyCODONE 5 mg   Commonly known as:  ROXICODONE   Dose:  5 mg   Quantity:  6 tablet        Take 1 tablet (5 mg) by mouth every 4 hours as needed for moderate to severe pain   Refills:  0        oxyCODONE-acetaminophen 5-325 MG per tablet   Commonly known as:  PERCOCET   Dose:  1-2 tablet   Quantity:  20 tablet        Take 1-2 tablets by mouth every 4 hours as needed for pain   Refills:  0        pantoprazole 40 MG EC tablet   Commonly known as:  PROTONIX   Quantity:  90 tablet        TAKE 1 TABLET DAILY   Refills:  3        STATIN NOT PRESCRIBED (INTENTIONAL)        Please choose reason not prescribed, below   Refills:  0        STOOL SOFTENER 100 MG capsule   Dose:  1 capsule   Generic drug:  docusate sodium        Take 1 capsule by mouth 2 times daily   Refills:  0        tamsulosin 0.4 MG capsule   Commonly known as:  FLOMAX   Dose:  0.4 mg   Quantity:  30  capsule        Take 1 capsule (0.4 mg) by mouth daily   Refills:  11        traZODone 50 MG tablet   Commonly known as:  DESYREL   Quantity:  90 tablet        TAKE ONE TABLET BY MOUTH AT BEDTIME   Refills:  3        VITAMIN D PO   Dose:  1 capsule        Take 1 capsule by mouth daily   Refills:  0                Orders Needing Specimen Collection     None      Pending Results     No orders found from 5/21/2018 to 5/24/2018.            Pending Culture Results     No orders found from 5/21/2018 to 5/24/2018.            Thank you for choosing [facility]       Thank you for choosing [facility] for your care. Our goal is always to provide you with excellent care. Hearing back from our patients is one way we can continue to improve our services. Please take a few minutes to complete the written survey that you may receive in the mail after you visit with us. Thank you!        [address] Information     [employer] gives you secure access to your electronic health record. If you see a primary care provider, you can also send messages to your care team and make appointments. If you have questions, please call your primary care clinic.  If you do not have a primary care provider, please call [phone] and they will assist you.        Care EveryWhere ID     This is your Care EveryWhere ID. This could be used by other organizations to access your [facility] medical records  [id number]        Equal Access to Services     RONAK GARCIA : Tomasa Olivera, wayolandada raheemadaha, qaybta kaalmada tay, david norton. So [facility] [phone].    ATENCIÓN: Si habla español, tiene a palacios disposición servicios gratuitos de asistencia lingüística. Llame al [phone].    We comply with applicable federal civil rights laws and [place] laws. We do not discriminate on the basis of race, color, national origin, age, disability, sex, sexual orientation, or gender identity.            After Visit Summary        This is your record. Keep this with you and show to your community pharmacist(s) and doctor(s) at your next visit.

## 2018-05-23 NOTE — ED PROVIDER NOTES
"  History     Chief Complaint   Patient presents with     Wound Check     lt thumb injury on monday nite, notes sutures placed and splint, concerned due to notes some bleeding. states \"I have been bumping it\"     The history is provided by the patient.     Carrillo Carranza is a 77 year old male who presented to the  ambulatory along with wife for a wound check.  I took care of him on Monday for a thumb injury.  Please see that note.  Here today for some bleeding.  Pain is 1/10 scale.        Problem List:    Patient Active Problem List    Diagnosis Date Noted     Low libido 09/14/2017     Priority: Medium     BPH with obstruction/lower urinary tract symptoms 05/16/2017     Priority: Medium     Type 2 diabetes mellitus without complication, without long-term current use of insulin (H) 12/12/2016     Priority: Medium     Essential hypertension with goal blood pressure less than 140/90 07/15/2016     Priority: Medium     Nocturia 10/09/2014     Priority: Medium     Hypomagnesemia 06/23/2014     Priority: Medium     BPPV (benign paroxysmal positional vertigo) 06/13/2014     Priority: Medium     Hypokalemia 06/02/2014     Priority: Medium     Hyperlipidemia with target LDL less than 100      Priority: Medium     Diagnosis updated by automated process. Provider to review and confirm.       Fatty liver disease, nonalcoholic      Priority: Medium     Diffuse connective tissue disease (H) 01/29/2013     Priority: Medium     Problem list name updated by automated process. Provider to review       Advanced care planning/counseling discussion 06/12/2012     Priority: Medium        Past Medical History:    Past Medical History:   Diagnosis Date     Baca's esophagus 11/3/2011     Contact dermatitis and other eczema, due to unspecified cause 3/1/2011     Dermatophytosis of groin and perianal area 2/1/2006     Esophageal reflux 11/3/2011     Family history of colonic polyps 11/3/2011     Fatty liver disease, nonalcoholic      " Generalized osteoarthrosis, involving multiple sites 3/1/2011     HTN (hypertension)      Hyperlipidaemia LDL goal < 100      Other and unspecified hyperlipidemia 11/3/2011     Other chronic nonalcoholic liver disease 11/3/2011     Other premature beats 11/3/2011     Other specified cardiac dysrhythmias(427.89) 11/3/2011     Personal history of tobacco use, presenting hazards to health 11/3/2011     Type II or unspecified type diabetes mellitus without mention of complication, not stated as uncontrolled 2/7/2012     Umbilical hernia without mention of obstruction or gangrene 11/3/2011     Unspecified diffuse connective tissue disease 1/29/2013     Unspecified essential hypertension 11/3/2011       Past Surgical History:    Past Surgical History:   Procedure Laterality Date     ADENOIDECTOMY       APPENDECTOMY       ARTHROSCOPY KNEE       arthroscopy right great toe      bunion     COLONOSCOPY  02-     COLONOSCOPY  2010,2006    repeat in 2015     COLONOSCOPY  8-     COLONOSCOPY  2015    Repeat 2020     ESOPHAGOGASTRODUODENOSCOPY      Repeat in 2015     ESOPHAGOGASTRODUODENOSCOPY  2015    Dr Funes/Vivek- Repeat 3yrs     EXCISE LESION EAR EXTERNAL Right 3/13/2018    Procedure: EXCISE LESION EAR EXTERNAL;  EXCISION OF PREAURICULAR BASAL CELL CARCINOMA WITH FROZENS, FLAP REPAIR ( 2.6 x 2cm Defect), ( 3.5 x 3.5cmTissue Rearrangement);  Surgeon: Kyara Rojas MD;  Location: [facility]     HERNIA REPAIR, UMBILICAL  2012    reduction and repair of umbilical hernia     JOINT REPLACEMENT       RELEASE CARPAL TUNNEL      carpal tunnel syndrome     shoulder bone spur removal       TKA       TONSILLECTOMY       UPPER GI ENDOSCOPY  2012    repeat in 2014     UPPER GI ENDOSCOPY       UPPER GI ENDOSCOPY       UPPER GI ENDOSCOPY  2010    repeat 2012       Family History:    Family History   Problem Relation Age of Onset     CEREBROVASCULAR DISEASE Maternal Grandmother      CVA     Heart Failure Father         Social History:  Marital Status:   [2]  Social History   Substance Use Topics     Smoking status: Former Smoker     Types: Cigarettes     Smokeless tobacco: Never Used      Comment: quit in the 80s     Alcohol use No        Medications:      ACCU-CHEK ANGELI PLUS test strip   Aspirin (ASPIR-81 PO)   atenolol-chlorthalidone (TENORETIC) 50-25 MG per tablet   blood glucose calibration (ACCU-CHEK ANGELI) solution   blood glucose monitoring (ACCU-CHEK MULTICLIX) lancets   cephALEXin (KEFLEX) 500 MG capsule   Cholecalciferol (VITAMIN D PO)   docusate sodium (STOOL SOFTENER) 100 MG capsule   ezetimibe (ZETIA) 10 MG tablet   glimepiride (AMARYL) 1 MG tablet   Ibuprofen (ADVIL PO)   KLOR-CON 20 MEQ CR tablet   magnesium oxide 400 MG CAPS   MELOXICAM PO   metFORMIN (GLUCOPHAGE-XR) 500 MG 24 hr tablet   Multiple Vitamin (MULTIVITAMINS PO)   Omega-3 Fatty Acids (OMEGA-3 FISH OIL PO)   oxyCODONE (ROXICODONE) 5 mg   oxyCODONE-acetaminophen (PERCOCET) 5-325 MG per tablet   pantoprazole (PROTONIX) 40 MG EC tablet   STATIN NOT PRESCRIBED, INTENTIONAL,   tamsulosin (FLOMAX) 0.4 MG capsule   traZODone (DESYREL) 50 MG tablet         Review of Systems   Constitutional: Negative for fever.   Musculoskeletal:        Left thumb bleeding        Physical Exam   BP: 144/69  Heart Rate: 69  Temp: 96.1  F (35.6  C)  Resp: 16  SpO2: 96 %      Physical Exam   Constitutional: He is oriented to person, place, and time. He appears well-developed and well-nourished.   Neurological: He is alert and oriented to person, place, and time.   Skin: Skin is warm and dry.   Psychiatric: He has a normal mood and affect.   Nursing note and vitals reviewed.      ED Course     ED Course     Procedures               Critical Care time:  none               No results found for this or any previous visit (from the past 24 hour(s)).    Medications - No data to display    Assessments & Plan (with Medical Decision Making)   Bandage removed from thumb.  Wound  looks good.  No bleeding.  No erythema.  New bandage applied.  He has a follow-up with orthopedics tomorrow.     I have reviewed the nursing notes.    I have reviewed the findings, diagnosis, plan and need for follow up with the patient.       New Prescriptions    No medications on file       Final diagnoses:   Visit for wound check       5/23/2018   [facility]     Taz Mora PA-C  05/23/18 1226

## 2018-05-23 NOTE — ED AVS SNAPSHOT
[facility]    [address] [address]    Phone:  [phone]                                       Carrillo Carranza   MRN: [id number]    Department:  [facility]   Date of Visit:  5/23/2018           After Visit Summary Signature Page     I have received my discharge instructions, and my questions have been answered. I have discussed any challenges I see with this plan with the nurse or doctor.    ..........................................................................................................................................  Patient/Patient Representative Signature      ..........................................................................................................................................  Patient Representative Print Name and Relationship to Patient    ..................................................               ................................................  Date                                            Time    ..........................................................................................................................................  Reviewed by Signature/Title    ...................................................              ..............................................  Date                                                            Time

## 2018-05-24 ENCOUNTER — OFFICE VISIT (OUTPATIENT)
Dept: ORTHOPEDICS | Facility: OTHER | Age: 78
End: 2018-05-24
Attending: PHYSICIAN ASSISTANT
Payer: MEDICARE

## 2018-05-24 VITALS
OXYGEN SATURATION: 96 % | DIASTOLIC BLOOD PRESSURE: 70 MMHG | BODY MASS INDEX: 32.21 KG/M2 | HEIGHT: 70 IN | TEMPERATURE: 97.9 F | WEIGHT: 225 LBS | SYSTOLIC BLOOD PRESSURE: 118 MMHG | HEART RATE: 76 BPM

## 2018-05-24 DIAGNOSIS — S62.522B OPEN DISPLACED FRACTURE OF DISTAL PHALANX OF LEFT THUMB, INITIAL ENCOUNTER: Primary | ICD-10-CM

## 2018-05-24 PROCEDURE — G0463 HOSPITAL OUTPT CLINIC VISIT: HCPCS

## 2018-05-24 PROCEDURE — 26750 TREAT FINGER FRACTURE EACH: CPT | Mod: LT

## 2018-05-24 PROCEDURE — 26750 TREAT FINGER FRACTURE EACH: CPT | Mod: LT | Performed by: ORTHOPAEDIC SURGERY

## 2018-05-24 PROCEDURE — G0463 HOSPITAL OUTPT CLINIC VISIT: HCPCS | Mod: 25

## 2018-05-24 ASSESSMENT — PAIN SCALES - GENERAL: PAINLEVEL: MILD PAIN (2)

## 2018-05-24 NOTE — MR AVS SNAPSHOT
After Visit Summary   5/24/2018    Carrillo Carranza    MRN: [id number]           Patient Information     Date Of Birth          1940        Visit Information        Provider Department      5/24/2018 2:30 PM Ever Faust DO  ORTHOPEDICS        Today's Diagnoses     Open displaced fracture of distal phalanx of left thumb, initial encounter    -  1       Follow-ups after your visit        Your next 10 appointments already scheduled     May 31, 2018  2:00 PM CDT   (Arrive by 1:45 PM)   Return Visit with Ever Faust DO    ORTHOPEDICS ([facility] )    [address] [facility] [address]   [phone]            Jul 06, 2018  8:30 AM CDT   (Arrive by 8:15 AM)   SHORT with Mata Richardson MD   [facility] ([facility] )    [address] [facility] [address]   [phone]            Jul 17, 2018 10:15 AM CDT   (Arrive by 10:00 AM)   New Visit with JUAN Batres MD   [facility] ([facility] )    [address] [place] [id number] [phone]            Apr 04, 2019  1:00 PM CDT   (Arrive by 12:45 PM)   Return Visit with Baylee Calixto RN   [facility] ([facility] )    [address] [facility] [id number] [phone]              Who to contact     If you have questions or need follow up information about today's clinic visit or your schedule please contact  ORTHOPEDICS directly at [phone].  Normal or non-critical lab and imaging results will be communicated to you by MyChart, letter or phone within 4 business days after the clinic has received the results. If you do not hear from us within 7 days, please contact the clinic through MyChart or phone. If you have a critical or abnormal lab result, we will notify you by phone as soon as possible.  Submit refill requests through [employer] or call your pharmacy and they will forward the refill request to us.  "Please allow 3 business days for your refill to be completed.          Additional Information About Your Visit        MyChart Information     [facility] gives you secure access to your electronic health record. If you see a primary care provider, you can also send messages to your care team and make appointments. If you have questions, please call your primary care clinic.  If you do not have a primary care provider, please call [phone] and they will assist you.        Care EveryWhere ID     This is your Care EveryWhere ID. This could be used by other organizations to access your [facility] medical records  [id number]        Your Vitals Were     Pulse Temperature Height Pulse Oximetry BMI (Body Mass Index)       76 97.9  F (36.6  C) (Tympanic) 5' 10\" (1.778 m) 96% 32.28 kg/m2        Blood Pressure from Last 3 Encounters:   05/24/18 118/70   05/23/18 144/69   05/21/18 137/73    Weight from Last 3 Encounters:   05/24/18 225 lb (102.1 kg)   05/21/18 225 lb (102.1 kg)   05/15/18 230 lb (104.3 kg)              Today, you had the following     No orders found for display       Primary Care Provider Office Phone # Fax #    Mata Richardson [phone]       [address] [place] [address]        Equal Access to Services     [facility]: Hadii kennedy saenz hadasho Sojade, waaxda luqadaha, qaybta kaalmada adeegyada, david dawkins . So [facility] [phone].    ATENCIÓN: Si habla español, tiene a palacios disposición servicios gratuitos de asistencia lingüística. Llame al [phone].    We comply with applicable federal civil rights laws and [place] laws. We do not discriminate on the basis of race, color, national origin, age, disability, sex, sexual orientation, or gender identity.            Thank you!     Thank you for choosing  ORTHOPEDICS  for your care. Our goal is always to provide you with excellent care. Hearing back from our patients is one way we can continue to improve " our services. Please take a few minutes to complete the written survey that you may receive in the mail after your visit with us. Thank you!             Your Updated Medication List - Protect others around you: Learn how to safely use, store and throw away your medicines at www.disposemymeds.org.          This list is accurate as of 5/24/18  2:49 PM.  Always use your most recent med list.                   Brand Name Dispense Instructions for use Diagnosis    ACCU-CHEK ANGELI PLUS test strip   Generic drug:  blood glucose monitoring     100 strip    USE ONE STRIP ONCE DAILY    Diabetes mellitus, type 2 (H)       ADVIL PO      Take by mouth every 4 hours as needed for moderate pain        ASPIR-81 PO      Take 1 tablet by mouth daily        atenolol-chlorthalidone 50-25 MG per tablet    TENORETIC    90 tablet    TAKE 1 TABLET DAILY    Benign essential hypertension       blood glucose calibration solution     1 each    USE CONTROL SOLUTION AS DIRECTED ONCE MONTHLY TO CHECK ACCURACY OF METER    Diabetes mellitus, type 2 (H)       blood glucose monitoring lancets     102 each    USE TO TEST BLOOD SUGAR ONCE DAILY    Diabetes mellitus, type 2 (H)       cephALEXin 500 MG capsule    KEFLEX    28 capsule    Take 1 capsule (500 mg) by mouth 4 times daily for 7 days        ezetimibe 10 MG tablet    ZETIA    90 tablet    TAKE 1 TABLET DAILY (DUE   FOR LIPID PROFILE DECEMBER 2017)    Hyperlipidemia LDL goal <100       glimepiride 1 MG tablet    AMARYL    45 tablet    Take 0.5 tablets (0.5 mg) by mouth every morning (before breakfast)    Type 2 diabetes mellitus with diabetic nephropathy, without long-term current use of insulin (H)       KLOR-CON 20 MEQ CR tablet   Generic drug:  potassium chloride SA     180 tablet    TAKE ONE TABLET BY MOUTH TWICE DAILY WITH MEALS    HTN (hypertension)       magnesium oxide 400 MG Caps     180 capsule    Take 400 mg by mouth 2 times daily    Hypomagnesemia       MELOXICAM PO      Take 15 mg by  mouth One daily for 14 days        metFORMIN 500 MG 24 hr tablet    GLUCOPHAGE-XR    180 tablet    TAKE ONE TABLET BY MOUTH TWICE DAILY WITH MEALS    Type 2 diabetes mellitus without complication, without long-term current use of insulin (H)       MULTIVITAMINS PO      Take 1 tablet by mouth daily        OMEGA-3 FISH OIL PO      Take 1 g by mouth        oxyCODONE 5 mg    ROXICODONE    6 tablet    Take 1 tablet (5 mg) by mouth every 4 hours as needed for moderate to severe pain        oxyCODONE-acetaminophen 5-325 MG per tablet    PERCOCET    20 tablet    Take 1-2 tablets by mouth every 4 hours as needed for pain        pantoprazole 40 MG EC tablet    PROTONIX    90 tablet    TAKE 1 TABLET DAILY    Baca esophagus       STATIN NOT PRESCRIBED (INTENTIONAL)      Please choose reason not prescribed, below    Hyperlipidemia with target LDL less than 100       STOOL SOFTENER 100 MG capsule   Generic drug:  docusate sodium      Take 1 capsule by mouth 2 times daily        tamsulosin 0.4 MG capsule    FLOMAX    30 capsule    Take 1 capsule (0.4 mg) by mouth daily    BPH with obstruction/lower urinary tract symptoms       traZODone 50 MG tablet    DESYREL    90 tablet    TAKE ONE TABLET BY MOUTH AT BEDTIME    Diagnosis unknown       VITAMIN D PO      Take 1 capsule by mouth daily

## 2018-05-24 NOTE — NURSING NOTE
"Chief Complaint   Patient presents with     Musculoskeletal Problem     Left Thumb Injury  5/21/18       Initial /70  Pulse 76  Temp 97.9  F (36.6  C) (Tympanic)  Ht 5' 10\" (1.778 m)  Wt 225 lb (102.1 kg)  SpO2 96%  BMI 32.28 kg/m2 Estimated body mass index is 32.28 kg/(m^2) as calculated from the following:    Height as of this encounter: 5' 10\" (1.778 m).    Weight as of this encounter: 225 lb (102.1 kg).  Medication Reconciliation: complete    Alexandra Swan LPN    "

## 2018-05-24 NOTE — PROGRESS NOTES
"Patient presents today for care of his left nondominant thumb.  Possibly 3 days ago he was using a Greer bladder and his thumb got caught in a hydraulic press of the splitter.  He sustained a slightly displaced comminuted distal phalanx fracture of his left thumb that was open.  He was treated in the emergency room with exploration, thorough cleansing, nail bed repair and laceration repair.  He is referred to orthopedics for continued monitoring until complete healing.  He was also given an update of his tetanus booster and prophylactic antibiotic.    Physical exam: The dressing is removed today, the closed wounds are clean and free of any evidence of infection, the nail has been replaced as a splint for the underlying nail bed.  Sensation is intact on the cuff.  The x-rays were reviewed with the patient as well as precautions.  He was placed in a new sterile dressing with a stack splint and will do a dressing change himself over the weekend.  He'll follow up in 1 week to make certain that the digit continues to heal properly or when necessary any evidence of infection or other complication.  Patient is diabetic and is therefore a slightly higher risk of infection./70  Pulse 76  Temp 97.9  F (36.6  C) (Tympanic)  Ht 5' 10\" (1.778 m)  Wt 225 lb (102.1 kg)  SpO2 96%  BMI 32.28 kg/m2  "

## 2018-05-31 ENCOUNTER — OFFICE VISIT (OUTPATIENT)
Dept: ORTHOPEDICS | Facility: OTHER | Age: 78
End: 2018-05-31
Attending: ORTHOPAEDIC SURGERY
Payer: MEDICARE

## 2018-05-31 VITALS
BODY MASS INDEX: 32.21 KG/M2 | DIASTOLIC BLOOD PRESSURE: 70 MMHG | WEIGHT: 225 LBS | HEART RATE: 80 BPM | SYSTOLIC BLOOD PRESSURE: 136 MMHG | HEIGHT: 70 IN | OXYGEN SATURATION: 97 % | TEMPERATURE: 98.2 F

## 2018-05-31 DIAGNOSIS — S62.522D OPEN DISPLACED FRACTURE OF DISTAL PHALANX OF LEFT THUMB WITH ROUTINE HEALING, SUBSEQUENT ENCOUNTER: Primary | ICD-10-CM

## 2018-05-31 PROCEDURE — G0463 HOSPITAL OUTPT CLINIC VISIT: HCPCS

## 2018-05-31 PROCEDURE — 99207 ZZC FRACTURE CARE IN GLOBAL PERIOD: CPT | Performed by: ORTHOPAEDIC SURGERY

## 2018-05-31 ASSESSMENT — PAIN SCALES - GENERAL: PAINLEVEL: MILD PAIN (3)

## 2018-05-31 NOTE — MR AVS SNAPSHOT
After Visit Summary   5/31/2018    Carrillo Carranza    MRN: [id number]           Patient Information     Date Of Birth          1940        Visit Information        Provider Department      5/31/2018 2:00 PM Ever Faust DO  ORTHOPEDICS        Today's Diagnoses     Open displaced fracture of distal phalanx of left thumb with routine healing, subsequent encounter    -  1       Follow-ups after your visit        Your next 10 appointments already scheduled     Jun 21, 2018  2:40 PM CDT   (Arrive by 2:25 PM)   Return Visit with Ever Faust DO    ORTHOPEDICS ([facility] )    [address] [facility] [address]   [phone]            Jul 06, 2018  8:30 AM CDT   (Arrive by 8:15 AM)   SHORT with Mata Richardson MD   [facility] ([facility] )    [address] [facility] [address]   [phone]            Jul 17, 2018 10:15 AM CDT   (Arrive by 10:00 AM)   New Visit with JUAN Batres MD   [facility] ([facility] )    [address] [facility] [id number] [phone]            Apr 04, 2019  1:00 PM CDT   (Arrive by 12:45 PM)   Return Visit with Baylee Calixto RN   [facility] ([facility] )    [address] [facility] [id number] [phone]              Who to contact     If you have questions or need follow up information about today's clinic visit or your schedule please contact  ORTHOPEDICS directly at [phone].  Normal or non-critical lab and imaging results will be communicated to you by MyChart, letter or phone within 4 business days after the clinic has received the results. If you do not hear from us within 7 days, please contact the clinic through MyChart or phone. If you have a critical or abnormal lab result, we will notify you by phone as soon as possible.  Submit refill requests through MyVR or call your pharmacy and they will forward the  "refill request to us. Please allow 3 business days for your refill to be completed.          Additional Information About Your Visit        Joy[employer]art Information     [employer] gives you secure access to your electronic health record. If you see a primary care provider, you can also send messages to your care team and make appointments. If you have questions, please call your primary care clinic.  If you do not have a primary care provider, please call [phone] and they will assist you.        Care EveryWhere ID     This is your Care EveryWhere ID. This could be used by other organizations to access your [place] medical records  [id number]        Your Vitals Were     Pulse Temperature Height Pulse Oximetry BMI (Body Mass Index)       80 98.2  F (36.8  C) (Tympanic) 5' 10\" (1.778 m) 97% 32.28 kg/m2        Blood Pressure from Last 3 Encounters:   05/31/18 136/70   05/24/18 118/70   05/23/18 144/69    Weight from Last 3 Encounters:   05/31/18 225 lb (102.1 kg)   05/24/18 225 lb (102.1 kg)   05/21/18 225 lb (102.1 kg)              Today, you had the following     No orders found for display       Primary Care Provider Office Phone # Fax #    Mata Richardson [phone]       [facility] [address]        Equal Access to Services     [facility] RADHA : Hadii kennedy ku hadasho Soomaali, waaxda luqadaha, qaybta kaalmada tay, david norton. So [facility] [phone].    ATENCIÓN: Si habla español, tiene a palacios disposición servicios gratuitos de asistencia lingüística. Llame al [phone].    We comply with applicable federal civil rights laws and [place] laws. We do not discriminate on the basis of race, color, national origin, age, disability, sex, sexual orientation, or gender identity.            Thank you!     Thank you for choosing  ORTHOPEDICS  for your care. Our goal is always to provide you with excellent care. Hearing back from our patients is one way we " can continue to improve our services. Please take a few minutes to complete the written survey that you may receive in the mail after your visit with us. Thank you!             Your Updated Medication List - Protect others around you: Learn how to safely use, store and throw away your medicines at www.disposemymeds.org.          This list is accurate as of 5/31/18  2:02 PM.  Always use your most recent med list.                   Brand Name Dispense Instructions for use Diagnosis    ACCU-CHEK ANGELI PLUS test strip   Generic drug:  blood glucose monitoring     100 strip    USE ONE STRIP ONCE DAILY    Diabetes mellitus, type 2 (H)       ADVIL PO      Take by mouth every 4 hours as needed for moderate pain        ASPIR-81 PO      Take 1 tablet by mouth daily        atenolol-chlorthalidone 50-25 MG per tablet    TENORETIC    90 tablet    TAKE 1 TABLET DAILY    Benign essential hypertension       blood glucose calibration solution     1 each    USE CONTROL SOLUTION AS DIRECTED ONCE MONTHLY TO CHECK ACCURACY OF METER    Diabetes mellitus, type 2 (H)       blood glucose monitoring lancets     102 each    USE TO TEST BLOOD SUGAR ONCE DAILY    Diabetes mellitus, type 2 (H)       ezetimibe 10 MG tablet    ZETIA    90 tablet    TAKE 1 TABLET DAILY (DUE   FOR LIPID PROFILE DECEMBER 2017)    Hyperlipidemia LDL goal <100       glimepiride 1 MG tablet    AMARYL    45 tablet    Take 0.5 tablets (0.5 mg) by mouth every morning (before breakfast)    Type 2 diabetes mellitus with diabetic nephropathy, without long-term current use of insulin (H)       KLOR-CON 20 MEQ CR tablet   Generic drug:  potassium chloride SA     180 tablet    TAKE ONE TABLET BY MOUTH TWICE DAILY WITH MEALS    HTN (hypertension)       magnesium oxide 400 MG Caps     180 capsule    Take 400 mg by mouth 2 times daily    Hypomagnesemia       MELOXICAM PO      Take 15 mg by mouth One daily for 14 days        metFORMIN 500 MG 24 hr tablet    GLUCOPHAGE-XR    180  tablet    TAKE ONE TABLET BY MOUTH TWICE DAILY WITH MEALS    Type 2 diabetes mellitus without complication, without long-term current use of insulin (H)       MULTIVITAMINS PO      Take 1 tablet by mouth daily        OMEGA-3 FISH OIL PO      Take 1 g by mouth        oxyCODONE 5 mg    ROXICODONE    6 tablet    Take 1 tablet (5 mg) by mouth every 4 hours as needed for moderate to severe pain        oxyCODONE-acetaminophen 5-325 MG per tablet    PERCOCET    20 tablet    Take 1-2 tablets by mouth every 4 hours as needed for pain        pantoprazole 40 MG EC tablet    PROTONIX    90 tablet    TAKE 1 TABLET DAILY    Baca esophagus       STATIN NOT PRESCRIBED (INTENTIONAL)      Please choose reason not prescribed, below    Hyperlipidemia with target LDL less than 100       STOOL SOFTENER 100 MG capsule   Generic drug:  docusate sodium      Take 1 capsule by mouth 2 times daily        tamsulosin 0.4 MG capsule    FLOMAX    30 capsule    Take 1 capsule (0.4 mg) by mouth daily    BPH with obstruction/lower urinary tract symptoms       traZODone 50 MG tablet    DESYREL    90 tablet    TAKE ONE TABLET BY MOUTH AT BEDTIME    Diagnosis unknown       VITAMIN D PO      Take 1 capsule by mouth daily

## 2018-05-31 NOTE — PROGRESS NOTES
"Patient resisted follow-up for his complex crush injury to his left thumb distal phalanx.  His thumb swelling is gone down considerably there is no evidence of infection.  The sutures holding the nail in place were removed today, we'll allow the new nail to push the old nail off.  He is going to continue washing the wound and dressing it daily and wearing his stack splint for protection, follow-up when necessary any evidence of complication or routinely in approximately 3-4 weeks for a repeat x-ray to make certain that the distal phalanx is healing properly./70  Pulse 80  Temp 98.2  F (36.8  C) (Tympanic)  Ht 5' 10\" (1.778 m)  Wt 225 lb (102.1 kg)  SpO2 97%  BMI 32.28 kg/m2  "

## 2018-05-31 NOTE — NURSING NOTE
"Chief Complaint   Patient presents with     RECHECK     Left Thumb       Initial /70  Pulse 80  Temp 98.2  F (36.8  C) (Tympanic)  Ht 5' 10\" (1.778 m)  Wt 225 lb (102.1 kg)  SpO2 97%  BMI 32.28 kg/m2 Estimated body mass index is 32.28 kg/(m^2) as calculated from the following:    Height as of this encounter: 5' 10\" (1.778 m).    Weight as of this encounter: 225 lb (102.1 kg).  Medication Reconciliation: complete    Alexandra Swan LPN    "

## 2018-06-07 ENCOUNTER — TELEPHONE (OUTPATIENT)
Dept: FAMILY MEDICINE | Facility: OTHER | Age: 78
End: 2018-06-07

## 2018-06-07 NOTE — TELEPHONE ENCOUNTER
1:36 PM    Reason for Call: OVERBOOK    Patient is having the following symptoms knee up to his right buttox has a sharp pain in it for 3 days.    The patient is requesting an appointment for ASAP with .    Was an appointment offered for this call? Yes  If yes : Appointment type short               Date 07/03/18    Preferred method for responding to this message: Telephone Call  What is your phone number ?[phone]    If we cannot reach you directly, may we leave a detailed response at the number you provided? Yes    Can this message wait until your PCP/provider returns, if unavailable today? No, PCP is out    Lennie Ramos

## 2018-06-08 ENCOUNTER — OFFICE VISIT (OUTPATIENT)
Dept: FAMILY MEDICINE | Facility: OTHER | Age: 78
End: 2018-06-08
Attending: FAMILY MEDICINE
Payer: MEDICARE

## 2018-06-08 VITALS
OXYGEN SATURATION: 97 % | DIASTOLIC BLOOD PRESSURE: 72 MMHG | TEMPERATURE: 97.9 F | BODY MASS INDEX: 33.43 KG/M2 | SYSTOLIC BLOOD PRESSURE: 132 MMHG | HEART RATE: 67 BPM | WEIGHT: 233 LBS

## 2018-06-08 DIAGNOSIS — S76.311A RIGHT HAMSTRING MUSCLE STRAIN, INITIAL ENCOUNTER: Primary | ICD-10-CM

## 2018-06-08 DIAGNOSIS — S69.92XA INJURY OF LEFT THUMB: Primary | ICD-10-CM

## 2018-06-08 PROCEDURE — 99213 OFFICE O/P EST LOW 20 MIN: CPT | Performed by: FAMILY MEDICINE

## 2018-06-08 PROCEDURE — G0463 HOSPITAL OUTPT CLINIC VISIT: HCPCS

## 2018-06-08 ASSESSMENT — PAIN SCALES - GENERAL: PAINLEVEL: MODERATE PAIN (5)

## 2018-06-08 NOTE — MR AVS SNAPSHOT
After Visit Summary   6/8/2018    Carrillo Carranza    MRN: [id number]           Patient Information     Date Of Birth          1940        Visit Information        Provider Department      6/8/2018 9:40 AM Mata Richardson MD [facility]        Today's Diagnoses     Right hamstring muscle strain, initial encounter    -  1       Follow-ups after your visit        Your next 10 appointments already scheduled     Jun 21, 2018  2:40 PM CDT   (Arrive by 2:25 PM)   Return Visit with Ever Faust DO    ORTHOPEDICS ([facility] )    [address] [facility] [address]   [phone]            Jul 06, 2018  8:30 AM CDT   (Arrive by 8:15 AM)   SHORT with Mata Richardson MD   [facility] ([facility] )    [address] [place] [address]   [phone]            Jul 17, 2018 10:15 AM CDT   (Arrive by 10:00 AM)   New Visit with JUAN Batres MD   [facility] ([facility] )    [address] [place] [id number] [phone]            Apr 04, 2019  1:00 PM CDT   (Arrive by 12:45 PM)   Return Visit with Baylee Calixto RN   [facility] ([facility] )    [address] [facility] [id number] [phone]              Who to contact     If you have questions or need follow up information about today's clinic visit or your schedule please contact [facility] directly at [phone].  Normal or non-critical lab and imaging results will be communicated to you by MyChart, letter or phone within 4 business days after the clinic has received the results. If you do not hear from us within 7 days, please contact the clinic through MyChart or phone. If you have a critical or abnormal lab result, we will notify you by phone as soon as possible.  Submit refill requests through [employer] or call your pharmacy and they will forward the refill request to us. Please  allow 3 business days for your refill to be completed.          Additional Information About Your Visit        MyChart Information     [employer] gives you secure access to your electronic health record. If you see a primary care provider, you can also send messages to your care team and make appointments. If you have questions, please call your primary care clinic.  If you do not have a primary care provider, please call [phone] and they will assist you.        Care EveryWhere ID     This is your Care EveryWhere ID. This could be used by other organizations to access your [place] medical records  [id number]        Your Vitals Were     Pulse Temperature Pulse Oximetry BMI (Body Mass Index)          67 97.9  F (36.6  C) (Tympanic) 97% 33.43 kg/m2         Blood Pressure from Last 3 Encounters:   06/08/18 132/72   05/31/18 136/70   05/24/18 118/70    Weight from Last 3 Encounters:   06/08/18 233 lb (105.7 kg)   05/31/18 225 lb (102.1 kg)   05/24/18 225 lb (102.1 kg)              Today, you had the following     No orders found for display       Primary Care Provider Office Phone # Fax #    Mata Richardson [phone]       [address] [place], [place] [id number]        Equal Access to Services     RONAK GARCIA : Hadii kennedy ku hadasho Soomaali, waaxda luqadaha, qaybta kaalmada adeegyada, david norton. So [facility] [phone].    ATENCIÓN: Si habla español, tiene a palacios disposición servicios gratuitos de asistencia lingüística. Llame al [phone].    We comply with applicable federal civil rights laws and [place] laws. We do not discriminate on the basis of race, color, national origin, age, disability, sex, sexual orientation, or gender identity.            Thank you!     Thank you for choosing [facility]  for your care. Our goal is always to provide you with excellent care. Hearing back from our patients is one way we can continue to improve our services.  Please take a few minutes to complete the written survey that you may receive in the mail after your visit with us. Thank you!             Your Updated Medication List - Protect others around you: Learn how to safely use, store and throw away your medicines at www.disposemymeds.org.          This list is accurate as of 6/8/18 10:21 AM.  Always use your most recent med list.                   Brand Name Dispense Instructions for use Diagnosis    ACCU-CHEK ANGELI PLUS test strip   Generic drug:  blood glucose monitoring     100 strip    USE ONE STRIP ONCE DAILY    Diabetes mellitus, type 2 (H)       ADVIL PO      Take by mouth every 4 hours as needed for moderate pain        ASPIR-81 PO      Take 1 tablet by mouth daily        atenolol-chlorthalidone 50-25 MG per tablet    TENORETIC    90 tablet    TAKE 1 TABLET DAILY    Benign essential hypertension       blood glucose calibration solution     1 each    USE CONTROL SOLUTION AS DIRECTED ONCE MONTHLY TO CHECK ACCURACY OF METER    Diabetes mellitus, type 2 (H)       blood glucose monitoring lancets     102 each    USE TO TEST BLOOD SUGAR ONCE DAILY    Diabetes mellitus, type 2 (H)       ezetimibe 10 MG tablet    ZETIA    90 tablet    TAKE 1 TABLET DAILY (DUE   FOR LIPID PROFILE DECEMBER 2017)    Hyperlipidemia LDL goal <100       glimepiride 1 MG tablet    AMARYL    45 tablet    Take 0.5 tablets (0.5 mg) by mouth every morning (before breakfast)    Type 2 diabetes mellitus with diabetic nephropathy, without long-term current use of insulin (H)       KLOR-CON 20 MEQ CR tablet   Generic drug:  potassium chloride SA     180 tablet    TAKE ONE TABLET BY MOUTH TWICE DAILY WITH MEALS    HTN (hypertension)       magnesium oxide 400 MG Caps     180 capsule    Take 400 mg by mouth 2 times daily    Hypomagnesemia       MELOXICAM PO      Take 15 mg by mouth One daily for 14 days        metFORMIN 500 MG 24 hr tablet    GLUCOPHAGE-XR    180 tablet    TAKE ONE TABLET BY MOUTH TWICE  DAILY WITH MEALS    Type 2 diabetes mellitus without complication, without long-term current use of insulin (H)       MULTIVITAMINS PO      Take 1 tablet by mouth daily        OMEGA-3 FISH OIL PO      Take 1 g by mouth        oxyCODONE 5 mg    ROXICODONE    6 tablet    Take 1 tablet (5 mg) by mouth every 4 hours as needed for moderate to severe pain        oxyCODONE-acetaminophen 5-325 MG per tablet    PERCOCET    20 tablet    Take 1-2 tablets by mouth every 4 hours as needed for pain        pantoprazole 40 MG EC tablet    PROTONIX    90 tablet    TAKE 1 TABLET DAILY    Baca esophagus       STATIN NOT PRESCRIBED (INTENTIONAL)      Please choose reason not prescribed, below    Hyperlipidemia with target LDL less than 100       STOOL SOFTENER 100 MG capsule   Generic drug:  docusate sodium      Take 1 capsule by mouth 2 times daily        tamsulosin 0.4 MG capsule    FLOMAX    30 capsule    Take 1 capsule (0.4 mg) by mouth daily    BPH with obstruction/lower urinary tract symptoms       traZODone 50 MG tablet    DESYREL    90 tablet    TAKE ONE TABLET BY MOUTH AT BEDTIME    Diagnosis unknown       VITAMIN D PO      Take 1 capsule by mouth daily

## 2018-06-08 NOTE — NURSING NOTE
"Chief Complaint   Patient presents with     Knee Pain       Initial /72  Pulse 67  Temp 97.9  F (36.6  C) (Tympanic)  Wt 233 lb (105.7 kg)  SpO2 97%  BMI 33.43 kg/m2 Estimated body mass index is 33.43 kg/(m^2) as calculated from the following:    Height as of 5/31/18: 5' 10\" (1.778 m).    Weight as of this encounter: 233 lb (105.7 kg).  Medication Reconciliation: complete    Amanda Pereira MA    "

## 2018-06-08 NOTE — PROGRESS NOTES
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 Had to kick the knee out and ever since then its been causing pain.  Aggravating factors include: standing, walking and climbing stairs, and bending it    Therapies tried and outcome: advil  Squatted down low and felt a pop in right hamstring  Pt iced it and now swelling        Problem list and histories reviewed & adjusted, as indicated.  Additional history: as documented    Labs reviewed in EPIC    ROS:  CONSTITUTIONAL: NEGATIVE for fever, chills, change in weight  RESP: NEGATIVE for significant cough or SOB  CV: NEGATIVE for chest pain, palpitations or peripheral edema    OBJECTIVE:                                                    /72  Pulse 67  Temp 97.9  F (36.6  C) (Tympanic)  Wt 233 lb (105.7 kg)  SpO2 97%  BMI 33.43 kg/m2  Body mass index is 33.43 kg/(m^2).   GENERAL: healthy, alert, well nourished, well hydrated, no distress  MS: extremities- right hamstring muscle - mild tenderness.  no gross deformities noted, no edema  BACK: no CVA tenderness, no paralumbar tenderness         ASSESSMENT/PLAN:                                                    (S76.311A) Right hamstring muscle strain, initial encounter  (primary encounter diagnosis)  Comment: discussed - slow improvement   Plan: Discussed in length conservative measures of OTC medications for pain,  Ice/Heat, elevation and the concept of R.I.C.E.. Continue behavioral changes and proper body mechanics to allow for healing. Follow up as directed.   Symptomatic treatment was discussed along when patient should call and/or come back into the clinic or go to ER/Urgent care. All questions answered.             Mata Richardson MD  [facility]

## 2018-06-15 ENCOUNTER — TRANSFERRED RECORDS (OUTPATIENT)
Dept: HEALTH INFORMATION MANAGEMENT | Facility: CLINIC | Age: 78
End: 2018-06-15

## 2018-06-21 ENCOUNTER — OFFICE VISIT (OUTPATIENT)
Dept: ORTHOPEDICS | Facility: OTHER | Age: 78
End: 2018-06-21
Attending: ORTHOPAEDIC SURGERY
Payer: MEDICARE

## 2018-06-21 ENCOUNTER — RADIANT APPOINTMENT (OUTPATIENT)
Dept: GENERAL RADIOLOGY | Facility: OTHER | Age: 78
End: 2018-06-21
Attending: ORTHOPAEDIC SURGERY
Payer: MEDICARE

## 2018-06-21 VITALS
WEIGHT: 233 LBS | DIASTOLIC BLOOD PRESSURE: 78 MMHG | OXYGEN SATURATION: 96 % | SYSTOLIC BLOOD PRESSURE: 138 MMHG | HEIGHT: 70 IN | HEART RATE: 80 BPM | BODY MASS INDEX: 33.36 KG/M2

## 2018-06-21 DIAGNOSIS — S62.522D OPEN DISPLACED FRACTURE OF DISTAL PHALANX OF LEFT THUMB WITH ROUTINE HEALING, SUBSEQUENT ENCOUNTER: Primary | ICD-10-CM

## 2018-06-21 DIAGNOSIS — S69.92XA INJURY OF LEFT THUMB: ICD-10-CM

## 2018-06-21 PROCEDURE — 73140 X-RAY EXAM OF FINGER(S): CPT | Mod: TC,LT

## 2018-06-21 PROCEDURE — G0463 HOSPITAL OUTPT CLINIC VISIT: HCPCS

## 2018-06-21 PROCEDURE — 99207 ZZC FRACTURE CARE IN GLOBAL PERIOD: CPT | Performed by: ORTHOPAEDIC SURGERY

## 2018-06-21 ASSESSMENT — ANXIETY QUESTIONNAIRES
3. WORRYING TOO MUCH ABOUT DIFFERENT THINGS: NOT AT ALL
1. FEELING NERVOUS, ANXIOUS, OR ON EDGE: NOT AT ALL
6. BECOMING EASILY ANNOYED OR IRRITABLE: NOT AT ALL
5. BEING SO RESTLESS THAT IT IS HARD TO SIT STILL: NOT AT ALL
GAD7 TOTAL SCORE: 0
2. NOT BEING ABLE TO STOP OR CONTROL WORRYING: NOT AT ALL
4. TROUBLE RELAXING: NOT AT ALL
7. FEELING AFRAID AS IF SOMETHING AWFUL MIGHT HAPPEN: NOT AT ALL

## 2018-06-21 NOTE — NURSING NOTE
"Chief Complaint   Patient presents with     Left Thumb - RECHECK     RECHECK     left thumb       Initial /78 (BP Location: Left arm, Patient Position: Sitting, Cuff Size: Adult Large)  Pulse 80  Ht 5' 10\" (1.778 m)  Wt 233 lb (105.7 kg)  SpO2 96%  BMI 33.43 kg/m2 Estimated body mass index is 33.43 kg/(m^2) as calculated from the following:    Height as of this encounter: 5' 10\" (1.778 m).    Weight as of this encounter: 233 lb (105.7 kg).  Medication Reconciliation: complete    Heather Marshall LPN    "

## 2018-06-21 NOTE — MR AVS SNAPSHOT
After Visit Summary   6/21/2018    Carrillo Carranza    MRN: [id number]           Patient Information     Date Of Birth          1940        Visit Information        Provider Department      6/21/2018 2:40 PM Ever Faust DO  ORTHOPEDICS        Today's Diagnoses     Open displaced fracture of distal phalanx of left thumb with routine healing, subsequent encounter    -  1       Follow-ups after your visit        Your next 10 appointments already scheduled     Jul 06, 2018  8:30 AM CDT   (Arrive by 8:15 AM)   SHORT with Mata Richardson MD   [facility] ([facility] [place] )    [address] [facility] [address]   [phone]            Jul 17, 2018 10:15 AM CDT   (Arrive by 10:00 AM)   New Visit with JUAN Batres MD   [facility] ([facility] [place] )    [address] [facility] [id number] [phone]            Apr 04, 2019  1:00 PM CDT   (Arrive by 12:45 PM)   Return Visit with Baylee Calixto RN   [facility] ([facility] )    [address] [facility] [id number] [phone]              Who to contact     If you have questions or need follow up information about today's clinic visit or your schedule please contact  ORTHOPEDICS directly at [phone].  Normal or non-critical lab and imaging results will be communicated to you by MyChart, letter or phone within 4 business days after the clinic has received the results. If you do not hear from us within 7 days, please contact the clinic through [email or web address] or phone. If you have a critical or abnormal lab result, we will notify you by phone as soon as possible.  Submit refill requests through [employer] or call your pharmacy and they will forward the refill request to us. Please allow 3 business days for your refill to be completed.          Additional Information About Your Visit        [email or web address] Information     [employer] gives you secure access to your  "electronic health record. If you see a primary care provider, you can also send messages to your care team and make appointments. If you have questions, please call your primary care clinic.  If you do not have a primary care provider, please call [phone] and they will assist you.        Care EveryWhere ID     This is your Care EveryWhere ID. This could be used by other organizations to access your [place] medical records  [id number]        Your Vitals Were     Pulse Height Pulse Oximetry BMI (Body Mass Index)          80 5' 10\" (1.778 m) 96% 33.43 kg/m2         Blood Pressure from Last 3 Encounters:   06/21/18 138/78   06/08/18 132/72   05/31/18 136/70    Weight from Last 3 Encounters:   06/21/18 233 lb (105.7 kg)   06/08/18 233 lb (105.7 kg)   05/31/18 225 lb (102.1 kg)              Today, you had the following     No orders found for display       Primary Care Provider Office Phone # Fax #    Mata Richardson [phone]       [facility] [address]        Equal Access to Services     [facility] Center[facility] AH: Hadii kennedy khan Sojade, waaxda lumarcelo, qaybta kaalmasteve cross, david dawkins . So [facility] [phone].    ATENCIÓN: Si habla español, tiene a palacios disposición servicios gratuitos de asistencia lingüística. [facility] [phone].    We comply with applicable federal civil rights laws and [place] laws. We do not discriminate on the basis of race, color, national origin, age, disability, sex, sexual orientation, or gender identity.            Thank you!     Thank you for choosing  ORTHOPEDICS  for your care. Our goal is always to provide you with excellent care. Hearing back from our patients is one way we can continue to improve our services. Please take a few minutes to complete the written survey that you may receive in the mail after your visit with us. Thank you!             Your Updated Medication List - Protect others around you: Learn " how to safely use, store and throw away your medicines at www.disposemymeds.org.          This list is accurate as of 6/21/18  3:08 PM.  Always use your most recent med list.                   Brand Name Dispense Instructions for use Diagnosis    ACCU-CHEK ANGELI PLUS test strip   Generic drug:  blood glucose monitoring     100 strip    USE ONE STRIP ONCE DAILY    Diabetes mellitus, type 2 (H)       ADVIL PO      Take by mouth every 4 hours as needed for moderate pain        ASPIR-81 PO      Take 1 tablet by mouth daily        atenolol-chlorthalidone 50-25 MG per tablet    TENORETIC    90 tablet    TAKE 1 TABLET DAILY    Benign essential hypertension       blood glucose calibration solution     1 each    USE CONTROL SOLUTION AS DIRECTED ONCE MONTHLY TO CHECK ACCURACY OF METER    Diabetes mellitus, type 2 (H)       blood glucose monitoring lancets     102 each    USE TO TEST BLOOD SUGAR ONCE DAILY    Diabetes mellitus, type 2 (H)       ezetimibe 10 MG tablet    ZETIA    90 tablet    TAKE 1 TABLET DAILY (DUE   FOR LIPID PROFILE DECEMBER 2017)    Hyperlipidemia LDL goal <100       glimepiride 1 MG tablet    AMARYL    45 tablet    Take 0.5 tablets (0.5 mg) by mouth every morning (before breakfast)    Type 2 diabetes mellitus with diabetic nephropathy, without long-term current use of insulin (H)       KLOR-CON 20 MEQ CR tablet   Generic drug:  potassium chloride SA     180 tablet    TAKE ONE TABLET BY MOUTH TWICE DAILY WITH MEALS    HTN (hypertension)       magnesium oxide 400 MG Caps     180 capsule    Take 400 mg by mouth 2 times daily    Hypomagnesemia       MELOXICAM PO      Take 15 mg by mouth One daily for 14 days        metFORMIN 500 MG 24 hr tablet    GLUCOPHAGE-XR    180 tablet    TAKE ONE TABLET BY MOUTH TWICE DAILY WITH MEALS    Type 2 diabetes mellitus without complication, without long-term current use of insulin (H)       MULTIVITAMINS PO      Take 1 tablet by mouth daily        OMEGA-3 FISH OIL PO       Take 1 g by mouth        oxyCODONE 5 mg    ROXICODONE    6 tablet    Take 1 tablet (5 mg) by mouth every 4 hours as needed for moderate to severe pain        oxyCODONE-acetaminophen 5-325 MG per tablet    PERCOCET    20 tablet    Take 1-2 tablets by mouth every 4 hours as needed for pain        pantoprazole 40 MG EC tablet    PROTONIX    90 tablet    TAKE 1 TABLET DAILY    Baca esophagus       STATIN NOT PRESCRIBED (INTENTIONAL)      Please choose reason not prescribed, below    Hyperlipidemia with target LDL less than 100       STOOL SOFTENER 100 MG capsule   Generic drug:  docusate sodium      Take 1 capsule by mouth 2 times daily        tamsulosin 0.4 MG capsule    FLOMAX    30 capsule    Take 1 capsule (0.4 mg) by mouth daily    BPH with obstruction/lower urinary tract symptoms       traZODone 50 MG tablet    DESYREL    90 tablet    TAKE ONE TABLET BY MOUTH AT BEDTIME    Diagnosis unknown       VITAMIN D PO      Take 1 capsule by mouth daily

## 2018-06-21 NOTE — PROGRESS NOTES
"Patient presents today for follow-up after his crush injury to his left distal phalanx of his thumb that required nail bed repair.  Swelling is almost completely resolved he has mild tenderness and still some numbness in the distal phalanx.  The nail was being pushed off by the new nail.  X-rays demonstrate fracture to be healing in well aligned.  Plan is for him to continue to protect it continue to monitor for any complications he should expect the nail will fall off in the next week or 2.  He should follow up if he has any difficulties completing his recovery./78 (BP Location: Left arm, Patient Position: Sitting, Cuff Size: Adult Large)  Pulse 80  Ht 5' 10\" (1.778 m)  Wt 233 lb (105.7 kg)  SpO2 96%  BMI 33.43 kg/m2  "

## 2018-06-22 ASSESSMENT — PATIENT HEALTH QUESTIONNAIRE - PHQ9: SUM OF ALL RESPONSES TO PHQ QUESTIONS 1-9: 0

## 2018-06-22 ASSESSMENT — ANXIETY QUESTIONNAIRES: GAD7 TOTAL SCORE: 0

## 2018-07-03 ENCOUNTER — OFFICE VISIT (OUTPATIENT)
Dept: ORTHOPEDICS | Facility: OTHER | Age: 78
End: 2018-07-03
Attending: ORTHOPAEDIC SURGERY
Payer: MEDICARE

## 2018-07-03 VITALS
HEIGHT: 70 IN | BODY MASS INDEX: 33.36 KG/M2 | TEMPERATURE: 97.4 F | WEIGHT: 233 LBS | OXYGEN SATURATION: 97 % | DIASTOLIC BLOOD PRESSURE: 60 MMHG | HEART RATE: 65 BPM | SYSTOLIC BLOOD PRESSURE: 100 MMHG

## 2018-07-03 DIAGNOSIS — S62.522D OPEN DISPLACED FRACTURE OF DISTAL PHALANX OF LEFT THUMB WITH ROUTINE HEALING, SUBSEQUENT ENCOUNTER: Primary | ICD-10-CM

## 2018-07-03 PROCEDURE — G0463 HOSPITAL OUTPT CLINIC VISIT: HCPCS

## 2018-07-03 PROCEDURE — 99207 ZZC FRACTURE CARE IN GLOBAL PERIOD: CPT | Performed by: ORTHOPAEDIC SURGERY

## 2018-07-03 ASSESSMENT — PAIN SCALES - GENERAL: PAINLEVEL: MILD PAIN (2)

## 2018-07-03 NOTE — PROGRESS NOTES
"Patient presents today for evaluation of his left thumb.  He had a complex open fracture to his left thumb that was treated with nail bed repair, and sewing the nail back on as a stent for guiding the new nail.  Some of the sutures and nailbed and nail worked our way over to the side of the nail fold and her catching and snagging on clothing, dressings, gloves.  They were supposed to \"dissolve\" but have not and the patient would like them removed as possible.  The thumb was cleansed with alcohol, and approximately 3 sutures of what appear to be a Vicryl material were removed, it appears as if they had not deteriorated all, there is still quite strong and intact.  He may now continue to soak the nail bed and thumb in warm water or water and half strength peroxide to help loosen the nail now that the new nail is growing underneath it and expedite the process of recovery.  If he has any questions concerns or problems he should follow up probably for repeat evaluation.  /60  Pulse 65  Temp 97.4  F (36.3  C) (Tympanic)  Ht 5' 10\" (1.778 m)  Wt 233 lb (105.7 kg)  SpO2 97%  BMI 33.43 kg/m2  "

## 2018-07-03 NOTE — NURSING NOTE
"Chief Complaint   Patient presents with     RECHECK     left thumb/ sutures       Initial /60  Pulse 65  Temp 97.4  F (36.3  C) (Tympanic)  Ht 5' 10\" (1.778 m)  Wt 233 lb (105.7 kg)  SpO2 97%  BMI 33.43 kg/m2 Estimated body mass index is 33.43 kg/(m^2) as calculated from the following:    Height as of this encounter: 5' 10\" (1.778 m).    Weight as of this encounter: 233 lb (105.7 kg).  Medication Reconciliation: complete    Alexandra Swan LPN    "

## 2018-07-03 NOTE — MR AVS SNAPSHOT
After Visit Summary   7/3/2018    Carrillo Carranza    MRN: [id number]           Patient Information     Date Of Birth          1940        Visit Information        Provider Department      7/3/2018 4:20 PM Ever Faust DO  ORTHOPEDICS        Today's Diagnoses     Open displaced fracture of distal phalanx of left thumb with routine healing, subsequent encounter    -  1       Follow-ups after your visit        Your next 10 appointments already scheduled     Jul 06, 2018  8:30 AM CDT   (Arrive by 8:15 AM)   SHORT with Mata Richardson MD   [facility] ([facility] [place] [facility] )    [address] [address]   [phone]            Jul 17, 2018 10:15 AM CDT   (Arrive by 10:00 AM)   New Visit with JUAN Batres MD   [facility] ([facility] [place] [facility] )    [address] [id number] [phone]            Apr 04, 2019  1:00 PM CDT   (Arrive by 12:45 PM)   Return Visit with Baylee Calixto RN   [facility] ([facility] )    [address] [facility] [id number] [phone]              Who to contact     If you have questions or need follow up information about today's clinic visit or your schedule please contact  ORTHOPEDICS directly at [phone].  Normal or non-critical lab and imaging results will be communicated to you by MyChart, letter or phone within 4 business days after the clinic has received the results. If you do not hear from us within 7 days, please contact the clinic through [email or web address] or phone. If you have a critical or abnormal lab result, we will notify you by phone as soon as possible.  Submit refill requests through [employer] or call your pharmacy and they will forward the refill request to us. Please allow 3 business days for your refill to be completed.          Additional Information About Your Visit        [email or web address] Information     [employer] gives you secure access to your  "electronic health record. If you see a primary care provider, you can also send messages to your care team and make appointments. If you have questions, please call your primary care clinic.  If you do not have a primary care provider, please call [phone] and they will assist you.        Care EveryWhere ID     This is your Care EveryWhere ID. This could be used by other organizations to access your [place] medical records  [id number]        Your Vitals Were     Pulse Temperature Height Pulse Oximetry BMI (Body Mass Index)       65 97.4  F (36.3  C) (Tympanic) 5' 10\" (1.778 m) 97% 33.43 kg/m2        Blood Pressure from Last 3 Encounters:   07/03/18 100/60   06/21/18 138/78   06/08/18 132/72    Weight from Last 3 Encounters:   07/03/18 233 lb (105.7 kg)   06/21/18 233 lb (105.7 kg)   06/08/18 233 lb (105.7 kg)              Today, you had the following     No orders found for display       Primary Care Provider Office Phone # Fax #    Mata Richardson [phone]       [address] [place] [address]        Equal Access to Services     YAIR GARCIA AH: Hadii kennedy khan Sojade, waaxda lumarshaadaha, qaybta kaalmada alecda, david norton. So [facility] [phone].    ATENCIÓN: Si habla español, tiene a palacios disposición servicios gratuitos de asistencia lingüística. [facility] [phone].    We comply with applicable federal civil rights laws and [place] laws. We do not discriminate on the basis of race, color, national origin, age, disability, sex, sexual orientation, or gender identity.            Thank you!     Thank you for choosing  ORTHOPEDICS  for your care. Our goal is always to provide you with excellent care. Hearing back from our patients is one way we can continue to improve our services. Please take a few minutes to complete the written survey that you may receive in the mail after your visit with us. Thank you!             Your Updated Medication List " - Protect others around you: Learn how to safely use, store and throw away your medicines at www.disposemymeds.org.          This list is accurate as of 7/3/18  4:48 PM.  Always use your most recent med list.                   Brand Name Dispense Instructions for use Diagnosis    ACCU-CHEK ANGELI PLUS test strip   Generic drug:  blood glucose monitoring     100 strip    USE ONE STRIP ONCE DAILY    Diabetes mellitus, type 2 (H)       ADVIL PO      Take by mouth every 4 hours as needed for moderate pain        ASPIR-81 PO      Take 1 tablet by mouth daily        atenolol-chlorthalidone 50-25 MG per tablet    TENORETIC    90 tablet    TAKE 1 TABLET DAILY    Benign essential hypertension       blood glucose calibration solution     1 each    USE CONTROL SOLUTION AS DIRECTED ONCE MONTHLY TO CHECK ACCURACY OF METER    Diabetes mellitus, type 2 (H)       blood glucose monitoring lancets     102 each    USE TO TEST BLOOD SUGAR ONCE DAILY    Diabetes mellitus, type 2 (H)       ezetimibe 10 MG tablet    ZETIA    90 tablet    TAKE 1 TABLET DAILY (DUE   FOR LIPID PROFILE DECEMBER 2017)    Hyperlipidemia LDL goal <100       glimepiride 1 MG tablet    AMARYL    45 tablet    Take 0.5 tablets (0.5 mg) by mouth every morning (before breakfast)    Type 2 diabetes mellitus with diabetic nephropathy, without long-term current use of insulin (H)       KLOR-CON 20 MEQ CR tablet   Generic drug:  potassium chloride SA     180 tablet    TAKE ONE TABLET BY MOUTH TWICE DAILY WITH MEALS    HTN (hypertension)       magnesium oxide 400 MG Caps     180 capsule    Take 400 mg by mouth 2 times daily    Hypomagnesemia       MELOXICAM PO      Take 15 mg by mouth One daily for 14 days        metFORMIN 500 MG 24 hr tablet    GLUCOPHAGE-XR    180 tablet    TAKE ONE TABLET BY MOUTH TWICE DAILY WITH MEALS    Type 2 diabetes mellitus without complication, without long-term current use of insulin (H)       MULTIVITAMINS PO      Take 1 tablet by mouth daily         OMEGA-3 FISH OIL PO      Take 1 g by mouth        oxyCODONE 5 mg    ROXICODONE    6 tablet    Take 1 tablet (5 mg) by mouth every 4 hours as needed for moderate to severe pain        oxyCODONE-acetaminophen 5-325 MG per tablet    PERCOCET    20 tablet    Take 1-2 tablets by mouth every 4 hours as needed for pain        pantoprazole 40 MG EC tablet    PROTONIX    90 tablet    TAKE 1 TABLET DAILY    Baca esophagus       STATIN NOT PRESCRIBED (INTENTIONAL)      Please choose reason not prescribed, below    Hyperlipidemia with target LDL less than 100       STOOL SOFTENER 100 MG capsule   Generic drug:  docusate sodium      Take 1 capsule by mouth 2 times daily        tamsulosin 0.4 MG capsule    FLOMAX    30 capsule    Take 1 capsule (0.4 mg) by mouth daily    BPH with obstruction/lower urinary tract symptoms       traZODone 50 MG tablet    DESYREL    90 tablet    TAKE ONE TABLET BY MOUTH AT BEDTIME    Diagnosis unknown       VITAMIN D PO      Take 1 capsule by mouth daily

## 2018-07-06 ENCOUNTER — OFFICE VISIT (OUTPATIENT)
Dept: FAMILY MEDICINE | Facility: OTHER | Age: 78
End: 2018-07-06
Attending: FAMILY MEDICINE
Payer: MEDICARE

## 2018-07-06 VITALS
OXYGEN SATURATION: 96 % | DIASTOLIC BLOOD PRESSURE: 64 MMHG | TEMPERATURE: 97 F | SYSTOLIC BLOOD PRESSURE: 126 MMHG | BODY MASS INDEX: 32.86 KG/M2 | HEART RATE: 69 BPM | WEIGHT: 229 LBS

## 2018-07-06 DIAGNOSIS — I10 ESSENTIAL HYPERTENSION WITH GOAL BLOOD PRESSURE LESS THAN 140/90: ICD-10-CM

## 2018-07-06 DIAGNOSIS — E78.5 HYPERLIPIDEMIA WITH TARGET LDL LESS THAN 100: ICD-10-CM

## 2018-07-06 DIAGNOSIS — E11.9 TYPE 2 DIABETES MELLITUS WITHOUT COMPLICATION, WITHOUT LONG-TERM CURRENT USE OF INSULIN (H): Primary | ICD-10-CM

## 2018-07-06 LAB
ALBUMIN SERPL-MCNC: 4.1 G/DL (ref 3.4–5)
ALP SERPL-CCNC: 63 U/L (ref 40–150)
ALT SERPL W P-5'-P-CCNC: 75 U/L (ref 0–70)
ANION GAP SERPL CALCULATED.3IONS-SCNC: 6 MMOL/L (ref 3–14)
AST SERPL W P-5'-P-CCNC: 46 U/L (ref 0–45)
BILIRUB SERPL-MCNC: 0.7 MG/DL (ref 0.2–1.3)
BUN SERPL-MCNC: 15 MG/DL (ref 7–30)
CALCIUM SERPL-MCNC: 9.3 MG/DL (ref 8.5–10.1)
CHLORIDE SERPL-SCNC: 98 MMOL/L (ref 94–109)
CHOLEST SERPL-MCNC: 166 MG/DL
CO2 SERPL-SCNC: 32 MMOL/L (ref 20–32)
CREAT SERPL-MCNC: 0.72 MG/DL (ref 0.66–1.25)
CREAT UR-MCNC: 56 MG/DL
EST. AVERAGE GLUCOSE BLD GHB EST-MCNC: 128 MG/DL
GFR SERPL CREATININE-BSD FRML MDRD: >90 ML/MIN/1.7M2
GLUCOSE SERPL-MCNC: 127 MG/DL (ref 70–99)
HBA1C MFR BLD: 6.1 % (ref 0–5.6)
HDLC SERPL-MCNC: 44 MG/DL
LDLC SERPL CALC-MCNC: 92 MG/DL
MICROALBUMIN UR-MCNC: <5 MG/L
MICROALBUMIN/CREAT UR: NORMAL MG/G CR (ref 0–17)
NONHDLC SERPL-MCNC: 122 MG/DL
POTASSIUM SERPL-SCNC: 3.5 MMOL/L (ref 3.4–5.3)
PROT SERPL-MCNC: 7.7 G/DL (ref 6.8–8.8)
SODIUM SERPL-SCNC: 136 MMOL/L (ref 133–144)
TRIGL SERPL-MCNC: 151 MG/DL

## 2018-07-06 PROCEDURE — 82043 UR ALBUMIN QUANTITATIVE: CPT | Mod: ZL | Performed by: FAMILY MEDICINE

## 2018-07-06 PROCEDURE — 83036 HEMOGLOBIN GLYCOSYLATED A1C: CPT | Mod: ZL | Performed by: FAMILY MEDICINE

## 2018-07-06 PROCEDURE — 40000788 ZZHCL STATISTIC ESTIMATED AVERAGE GLUCOSE: Mod: ZL | Performed by: FAMILY MEDICINE

## 2018-07-06 PROCEDURE — 80053 COMPREHEN METABOLIC PANEL: CPT | Mod: ZL | Performed by: FAMILY MEDICINE

## 2018-07-06 PROCEDURE — 36415 COLL VENOUS BLD VENIPUNCTURE: CPT | Mod: ZL | Performed by: FAMILY MEDICINE

## 2018-07-06 PROCEDURE — 80061 LIPID PANEL: CPT | Mod: ZL | Performed by: FAMILY MEDICINE

## 2018-07-06 PROCEDURE — G0463 HOSPITAL OUTPT CLINIC VISIT: HCPCS

## 2018-07-06 PROCEDURE — 99214 OFFICE O/P EST MOD 30 MIN: CPT | Performed by: FAMILY MEDICINE

## 2018-07-06 ASSESSMENT — PAIN SCALES - GENERAL: PAINLEVEL: NO PAIN (0)

## 2018-07-06 NOTE — MR AVS SNAPSHOT
After Visit Summary   7/6/2018    Carrillo Carranza    MRN: [id number]           Patient Information     Date Of Birth          1940        Visit Information        Provider Department      7/6/2018 8:30 AM Mata Richardson MD [facility]        Today's Diagnoses     Type 2 diabetes mellitus without complication, without long-term current use of insulin (H)    -  1    Essential hypertension with goal blood pressure less than 140/90        Hyperlipidemia with target LDL less than 100           Follow-ups after your visit        Your next 10 appointments already scheduled     Jul 17, 2018 10:15 AM CDT   (Arrive by 10:00 AM)   New Visit with JUAN Batres MD   [facility] ([facility] )    [address] [facility] [id number] [phone]            Dec 07, 2018 10:45 AM CST   (Arrive by 10:30 AM)   Office Visit with Mata Richardson MD   [facility] ([facility] )    [address] [place] [address]   [phone]           Bring a current list of meds and any records pertaining to this visit. For Physicals, please bring immunization records and any forms needing to be filled out. Please arrive 10 minutes early to complete paperwork.            Apr 04, 2019  1:00 PM CDT   (Arrive by 12:45 PM)   Return Visit with Baylee Calixto RN   [facility] ([facility] )    [address] [place] [id number] [phone]              Who to contact     If you have questions or need follow up information about today's clinic visit or your schedule please contact [facility] directly at [phone].  Normal or non-critical lab and imaging results will be communicated to you by MyChart, letter or phone within 4 business days after the clinic has received the results. If you do not hear from us within 7 days, please contact the clinic through MyChart or phone. If you have a critical  or abnormal lab result, we will notify you by phone as soon as possible.  Submit refill requests through [employer] or call your pharmacy and they will forward the refill request to us. Please allow 3 business days for your refill to be completed.          Additional Information About Your Visit        [employer]por[employer] Information     [employer] gives you secure access to your electronic health record. If you see a primary care provider, you can also send messages to your care team and make appointments. If you have questions, please call your primary care clinic.  If you do not have a primary care provider, please call [phone] and they will assist you.        Care EveryWhere ID     This is your Care EveryWhere ID. This could be used by other organizations to access your [place] medical records  [id number]        Your Vitals Were     Pulse Temperature Pulse Oximetry BMI (Body Mass Index)          69 97  F (36.1  C) (Tympanic) 96% 32.86 kg/m2         Blood Pressure from Last 3 Encounters:   07/06/18 126/64   07/03/18 100/60   06/21/18 138/78    Weight from Last 3 Encounters:   07/06/18 229 lb (103.9 kg)   07/03/18 233 lb (105.7 kg)   06/21/18 233 lb (105.7 kg)              We Performed the Following     Albumin Random Urine Quantitative with Creat Ratio     C FOOT EXAM  NO CHARGE     Comprehensive metabolic panel     Estimated Average Glucose     Hemoglobin A1c     Lipid Profile        Primary Care Provider Office Phone # Fax #    Mata Richardson [phone] [address] [id number]        Equal Access to Services     RONAK GARCIA : Hadii aad ku hadasho Sojade, waaxda luqadaha, qaybta kaalmada tay, david norton. So [facility] [phone].    ATENCIÓN: Si habla español, tiene a palacios disposición servicios gratuitos de asistencia lingüística. Llame al [phone].    We comply with applicable federal civil rights laws and [place] laws. We do not discriminate  on the basis of race, color, national origin, age, disability, sex, sexual orientation, or gender identity.            Thank you!     [facility]  [facility]. Our goal is always to provide you with excellent care. Hearing back from our patients is one way we can continue to improve our services. Please take a few minutes to complete the written survey that you may receive in the mail after your visit with us. Thank you!             Your Updated Medication List - Protect others around you: Learn how to safely use, store and throw away your medicines at www.disposemymeds.org.          This list is accurate as of 7/6/18  9:01 AM.  Always use your most recent med list.                   Brand Name Dispense Instructions for use Diagnosis    ACCU-CHEK ANGELI PLUS test strip   Generic drug:  blood glucose monitoring     100 strip    USE ONE STRIP ONCE DAILY    Diabetes mellitus, type 2 (H)       ADVIL PO      Take by mouth every 4 hours as needed for moderate pain        ASPIR-81 PO      Take 1 tablet by mouth daily        atenolol-chlorthalidone 50-25 MG per tablet    TENORETIC    90 tablet    TAKE 1 TABLET DAILY    Benign essential hypertension       blood glucose calibration solution     1 each    USE CONTROL SOLUTION AS DIRECTED ONCE MONTHLY TO CHECK ACCURACY OF METER    Diabetes mellitus, type 2 (H)       blood glucose monitoring lancets     102 each    USE TO TEST BLOOD SUGAR ONCE DAILY    Diabetes mellitus, type 2 (H)       ezetimibe 10 MG tablet    ZETIA    90 tablet    TAKE 1 TABLET DAILY (DUE   FOR LIPID PROFILE DECEMBER 2017)    Hyperlipidemia LDL goal <100       glimepiride 1 MG tablet    AMARYL    45 tablet    Take 0.5 tablets (0.5 mg) by mouth every morning (before breakfast)    Type 2 diabetes mellitus with diabetic nephropathy, without long-term current use of insulin (H)       KLOR-CON 20 MEQ CR tablet   Generic drug:  potassium chloride SA     180 tablet    TAKE ONE  TABLET BY MOUTH TWICE DAILY WITH MEALS    HTN (hypertension)       magnesium oxide 400 MG Caps     180 capsule    Take 400 mg by mouth 2 times daily    Hypomagnesemia       MELOXICAM PO      Take 15 mg by mouth One daily for 14 days        metFORMIN 500 MG 24 hr tablet    GLUCOPHAGE-XR    180 tablet    TAKE ONE TABLET BY MOUTH TWICE DAILY WITH MEALS    Type 2 diabetes mellitus without complication, without long-term current use of insulin (H)       MULTIVITAMINS PO      Take 1 tablet by mouth daily        OMEGA-3 FISH OIL PO      Take 1 g by mouth        oxyCODONE 5 mg    ROXICODONE    6 tablet    Take 1 tablet (5 mg) by mouth every 4 hours as needed for moderate to severe pain        oxyCODONE-acetaminophen 5-325 MG per tablet    PERCOCET    20 tablet    Take 1-2 tablets by mouth every 4 hours as needed for pain        pantoprazole 40 MG EC tablet    PROTONIX    90 tablet    TAKE 1 TABLET DAILY    Baca esophagus       STATIN NOT PRESCRIBED (INTENTIONAL)      Please choose reason not prescribed, below    Hyperlipidemia with target LDL less than 100       STOOL SOFTENER 100 MG capsule   Generic drug:  docusate sodium      Take 1 capsule by mouth 2 times daily        tamsulosin 0.4 MG capsule    FLOMAX    30 capsule    Take 1 capsule (0.4 mg) by mouth daily    BPH with obstruction/lower urinary tract symptoms       traZODone 50 MG tablet    DESYREL    90 tablet    TAKE ONE TABLET BY MOUTH AT BEDTIME    Diagnosis unknown       VITAMIN D PO      Take 1 capsule by mouth daily

## 2018-07-06 NOTE — NURSING NOTE
"Chief Complaint   Patient presents with     Diabetes       Initial /64  Pulse 69  Temp 97  F (36.1  C) (Tympanic)  Wt 229 lb (103.9 kg)  SpO2 96%  BMI 32.86 kg/m2 Estimated body mass index is 32.86 kg/(m^2) as calculated from the following:    Height as of 7/3/18: 5' 10\" (1.778 m).    Weight as of this encounter: 229 lb (103.9 kg).  Medication Reconciliation: complete    Amanda Pereira MA    "

## 2018-07-06 NOTE — PROGRESS NOTES
SUBJECTIVE:                                                    Carrillo Carranza is a 77 year old male who presents to clinic today for the following health issues:      Diabetes Follow-up      Patient is checking blood sugars: 3 times a week    Diabetic concerns: None     Symptoms of hypoglycemia (low blood sugar): none     Paresthesias (numbness or burning in feet) or sores: No     Date of last diabetic eye exam: 1-9-18    Diabetes Management Resources    Hyperlipidemia Follow-Up      Rate your low fat/cholesterol diet?: good    Taking statin?  No    Other lipid medications/supplements?:  none    Hypertension Follow-up      Outpatient blood pressures are not being checked.    Low Salt Diet: no added salt    BP Readings from Last 2 Encounters:   07/03/18 100/60   06/21/18 138/78     Hemoglobin A1C (%)   Date Value   02/23/2018 6.6 (H)   10/25/2017 6.0     LDL Cholesterol Calculated (mg/dL)   Date Value   10/25/2017 94   12/12/2016 93       Amount of exercise or physical activity: 4-5 days/week for an average of 45-60 minutes    Problems taking medications regularly: No    Medication side effects: none    Diet: diabetic and carbohydrate counting          Problem list and histories reviewed & adjusted, as indicated.  Additional history: as documented    Labs reviewed in EPIC    ROS:  CONSTITUTIONAL: NEGATIVE for fever, chills, change in weight  RESP: NEGATIVE for significant cough or SOB  CV: NEGATIVE for chest pain, palpitations or peripheral edema    OBJECTIVE:                                                    /64  Pulse 69  Temp 97  F (36.1  C) (Tympanic)  Wt 229 lb (103.9 kg)  SpO2 96%  BMI 32.86 kg/m2  Body mass index is 32.86 kg/(m^2).   GENERAL: healthy, alert, well nourished, well hydrated, no distress  NECK: no tenderness, no adenopathy, no asymmetry, no masses, no stiffness; thyroid- normal to palpation  RESP: lungs clear to auscultation - no rales, no rhonchi, no wheezes  CV: regular rates  and rhythm, normal S1 S2, no S3 or S4 and no murmur, no click or rub -  Diabetic foot exam: normal DP and PT pulses, no trophic changes or ulcerative lesions, normal sensory exam and normal monofilament exam    Results for orders placed or performed in visit on 07/06/18 (from the past 24 hour(s))   Comprehensive metabolic panel   Result Value Ref Range    Sodium 136 133 - 144 mmol/L    Potassium 3.5 3.4 - 5.3 mmol/L    Chloride 98 94 - 109 mmol/L    Carbon Dioxide 32 20 - 32 mmol/L    Anion Gap 6 3 - 14 mmol/L    Glucose 127 (H) 70 - 99 mg/dL    Urea Nitrogen 15 7 - 30 mg/dL    Creatinine 0.72 0.66 - 1.25 mg/dL    GFR Estimate >90 >60 mL/min/1.7m2    GFR Estimate If Black >90 >60 mL/min/1.7m2    Calcium 9.3 8.5 - 10.1 mg/dL    Bilirubin Total 0.7 0.2 - 1.3 mg/dL    Albumin 4.1 3.4 - 5.0 g/dL    Protein Total 7.7 6.8 - 8.8 g/dL    Alkaline Phosphatase 63 40 - 150 U/L    ALT 75 (H) 0 - 70 U/L    AST 46 (H) 0 - 45 U/L   Lipid Profile   Result Value Ref Range    Cholesterol 166 <200 mg/dL    Triglycerides 151 (H) <150 mg/dL    HDL Cholesterol 44 >39 mg/dL    LDL Cholesterol Calculated 92 <100 mg/dL    Non HDL Cholesterol 122 <130 mg/dL   Hemoglobin A1c   Result Value Ref Range    Hemoglobin A1C 6.1 (H) 0 - 5.6 %          ASSESSMENT/PLAN:                                                    1. Type 2 diabetes mellitus without complication, without long-term current use of insulin (H)  Great control f/u in 5-6 months . Continue current medications and behavioral changes.   - Comprehensive metabolic panel; Future  - Lipid Profile; Future  - Hemoglobin A1c; Future  - Albumin Random Urine Quantitative with Creat Ratio; Future  - Comprehensive metabolic panel  - Lipid Profile  - Hemoglobin A1c  - Albumin Random Urine Quantitative with Creat Ratio  - Estimated Average Glucose    2. Essential hypertension with goal blood pressure less than 140/90  Stable on meds. Continue current medications and behavioral changes.   -  Comprehensive metabolic panel; Future  - Lipid Profile; Future  - Hemoglobin A1c; Future  - Albumin Random Urine Quantitative with Creat Ratio; Future  - Comprehensive metabolic panel  - Lipid Profile  - Hemoglobin A1c  - Albumin Random Urine Quantitative with Creat Ratio    3. Hyperlipidemia with target LDL less than 100  Stable off  Statin- not tolerate statins. . Continue current medications and behavioral changes.   - Comprehensive metabolic panel; Future  - Lipid Profile; Future  - Hemoglobin A1c; Future  - Albumin Random Urine Quantitative with Creat Ratio; Future  - Comprehensive metabolic panel  - Lipid Profile  - Hemoglobin A1c  - Albumin Random Urine Quantitative with Creat Ratio      See Patient Instructions    Mata Richardson MD  [facility]

## 2018-07-12 DIAGNOSIS — I10 HTN (HYPERTENSION): ICD-10-CM

## 2018-07-13 RX ORDER — POTASSIUM CHLORIDE 1500 MG/1
TABLET, EXTENDED RELEASE ORAL
Qty: 180 TABLET | Refills: 0 | Status: SHIPPED | OUTPATIENT
Start: 2018-07-13 | End: 2018-10-10

## 2018-07-17 ENCOUNTER — OFFICE VISIT (OUTPATIENT)
Dept: DERMATOLOGY | Facility: OTHER | Age: 78
End: 2018-07-17
Attending: NURSE PRACTITIONER
Payer: MEDICARE

## 2018-07-17 VITALS
OXYGEN SATURATION: 98 % | TEMPERATURE: 97.8 F | WEIGHT: 233 LBS | SYSTOLIC BLOOD PRESSURE: 124 MMHG | HEIGHT: 70 IN | HEART RATE: 67 BPM | DIASTOLIC BLOOD PRESSURE: 79 MMHG | BODY MASS INDEX: 33.36 KG/M2

## 2018-07-17 DIAGNOSIS — C44.310 BCC (BASAL CELL CARCINOMA), FACE: ICD-10-CM

## 2018-07-17 PROCEDURE — G0463 HOSPITAL OUTPT CLINIC VISIT: HCPCS

## 2018-07-17 PROCEDURE — 99202 OFFICE O/P NEW SF 15 MIN: CPT | Performed by: DERMATOLOGY

## 2018-07-17 ASSESSMENT — PAIN SCALES - GENERAL: PAINLEVEL: NO PAIN (0)

## 2018-07-17 NOTE — CONSULTS
Consult Date:  2018      DATE OF CONSULTATION: 2018.      SUBJECTIVE:  Elizabeth comes in for a skin check.  He is a gentleman who had at one time a basal cell on his face which was removed and treated by Dr. Fitzgerald, the result has been excellent.  He comes in for a general skin exam.      OBJECTIVE:  We checked his face, neck, chest, back, arms and legs briefly. We found no lesions of any concern in these sites.  The skin cancer site is well-healed and there is no obvious scar there either.      ASSESSMENT:  No worrisome lesions in a gentleman who had a basal cell on his face.      PLAN:  Reassured.  Return again in 1-2 years since he is so free of other lesions.  Note, he did spend 6 months in [facility] which is an interesting experience.      MEDICATIONS AND ALLERGIES: Reviewed.         JUAN FRANZ MD             D: 2018   T: 2018   MT: GARRETT      Name:     ELIZABETH BRIZUELA   MRN:      [id number]        Account:       [id number]   :      1940           Consult Date:  2018      Document: [id number]       cc: MIRIAM DUNCAN, CNP

## 2018-07-17 NOTE — LETTER
7/17/2018       RE: Carrillo Carranza  [address]  [facility] [address]     Dear Colleague,    Thank you for referring your patient, Carrillo Carranza, to the [facility] [place] [facility]. Please see a copy of my visit note below.    dictated    Again, thank you for allowing me to participate in the care of your patient.      Sincerely,    JUAN Batres MD

## 2018-07-17 NOTE — MR AVS SNAPSHOT
After Visit Summary   7/17/2018    Carrillo Carranza    MRN: [id number]           Patient Information     Date Of Birth          1940        Visit Information        Provider Department      7/17/2018 10:15 AM JUAN Batres MD [facility]        Today's Diagnoses     BCC (basal cell carcinoma), face           Follow-ups after your visit        Your next 10 appointments already scheduled     Dec 07, 2018 10:45 AM CST   (Arrive by 10:30 AM)   Office Visit with Mata Richardson MD   [facility] ([facility] )    [address] [place] [address] [phone]           Bring a current list of meds and any records pertaining to this visit. For Physicals, please bring immunization records and any forms needing to be filled out. Please arrive 10 minutes early to complete paperwork.            Apr 04, 2019  1:00 PM CDT   (Arrive by 12:45 PM)   Return Visit with Baylee Calixto RN   [facility] ([facility] )    [address] [place] [id number] [phone]              Who to contact     If you have questions or need follow up information about today's clinic visit or your schedule please contact [facility] directly at [phone].  Normal or non-critical lab and imaging results will be communicated to you by MyChart, letter or phone within 4 business days after the clinic has received the results. If you do not hear from us within 7 days, please contact the clinic through MyChart or phone. If you have a critical or abnormal lab result, we will notify you by phone as soon as possible.  Submit refill requests through "[employer] or call your pharmacy and they will forward the refill request to us. Please allow 3 business days for your refill to be completed.          Additional Information About Your Visit        [employer] Information     "[employer] gives you secure access to your electronic health record. If you see  "a primary care provider, you can also send messages to your care team and make appointments. If you have questions, please call your primary care clinic.  If you do not have a primary care provider, please call [phone] and they will assist you.        Care EveryWhere ID     This is your Care EveryWhere ID. This could be used by other organizations to access your [place] medical records  [id number]        Your Vitals Were     Pulse Temperature Height Pulse Oximetry BMI (Body Mass Index)       67 97.8  F (36.6  C) (Tympanic) 1.778 m (5' 10\") 98% 33.43 kg/m2        Blood Pressure from Last 3 Encounters:   07/17/18 124/79   07/06/18 126/64   07/03/18 100/60    Weight from Last 3 Encounters:   07/17/18 105.7 kg (233 lb)   07/06/18 103.9 kg (229 lb)   07/03/18 105.7 kg (233 lb)              Today, you had the following     No orders found for display       Primary Care Provider Office Phone # Fax #    Mata Richardson [phone] [address] [id number]        Equal Access to Services     [facility]: Hadii kennedy saenz hadasho Sodixonali, waaxda luqadaha, qaybta kaalmada adeegyada, david norton. So [facility] [phone].    ATENCIÓN: Si habla español, tiene a palacios disposición servicios gratuitos de asistencia lingüística. Quan al [phone].    We comply with applicable federal civil rights laws and [place] laws. We do not discriminate on the basis of race, color, national origin, age, disability, sex, sexual orientation, or gender identity.            Thank you!     Thank you for choosing [facility]  for your care. Our goal is always to provide you with excellent care. Hearing back from our patients is one way we can continue to improve our services. Please take a few minutes to complete the written survey that you may receive in the mail after your visit with us. Thank you!             Your Updated Medication List - Protect others around " you: Learn how to safely use, store and throw away your medicines at www.disposemymeds.org.          This list is accurate as of 7/17/18 11:59 PM.  Always use your most recent med list.                   Brand Name Dispense Instructions for use Diagnosis    ACCU-CHEK ANGELI PLUS test strip   Generic drug:  blood glucose monitoring     100 strip    USE ONE STRIP ONCE DAILY    Diabetes mellitus, type 2 (H)       ADVIL PO      Take by mouth every 4 hours as needed for moderate pain        ASPIR-81 PO      Take 1 tablet by mouth daily        atenolol-chlorthalidone 50-25 MG per tablet    TENORETIC    90 tablet    TAKE 1 TABLET DAILY    Benign essential hypertension       blood glucose calibration solution     1 each    USE CONTROL SOLUTION AS DIRECTED ONCE MONTHLY TO CHECK ACCURACY OF METER    Diabetes mellitus, type 2 (H)       blood glucose monitoring lancets     102 each    USE TO TEST BLOOD SUGAR ONCE DAILY    Diabetes mellitus, type 2 (H)       ezetimibe 10 MG tablet    ZETIA    90 tablet    TAKE 1 TABLET DAILY (DUE   FOR LIPID PROFILE DECEMBER 2017)    Hyperlipidemia LDL goal <100       glimepiride 1 MG tablet    AMARYL    45 tablet    Take 0.5 tablets (0.5 mg) by mouth every morning (before breakfast)    Type 2 diabetes mellitus with diabetic nephropathy, without long-term current use of insulin (H)       KLOR-CON 20 MEQ CR tablet   Generic drug:  potassium chloride SA     180 tablet    TAKE ONE TABLET BY MOUTH TWICE DAILY WITH MEALS    HTN (hypertension)       magnesium oxide 400 MG Caps     180 capsule    Take 400 mg by mouth 2 times daily    Hypomagnesemia       MELOXICAM PO      Take 15 mg by mouth One daily for 14 days        metFORMIN 500 MG 24 hr tablet    GLUCOPHAGE-XR    180 tablet    TAKE ONE TABLET BY MOUTH TWICE DAILY WITH MEALS    Type 2 diabetes mellitus without complication, without long-term current use of insulin (H)       MULTIVITAMINS PO      Take 1 tablet by mouth daily        OMEGA-3 FISH OIL  PO      Take 1 g by mouth        oxyCODONE 5 mg    ROXICODONE    6 tablet    Take 1 tablet (5 mg) by mouth every 4 hours as needed for moderate to severe pain        oxyCODONE-acetaminophen 5-325 MG per tablet    PERCOCET    20 tablet    Take 1-2 tablets by mouth every 4 hours as needed for pain        pantoprazole 40 MG EC tablet    PROTONIX    90 tablet    TAKE 1 TABLET DAILY    Baca esophagus       STATIN NOT PRESCRIBED (INTENTIONAL)      Please choose reason not prescribed, below    Hyperlipidemia with target LDL less than 100       STOOL SOFTENER 100 MG capsule   Generic drug:  docusate sodium      Take 1 capsule by mouth 2 times daily        tamsulosin 0.4 MG capsule    FLOMAX    30 capsule    Take 1 capsule (0.4 mg) by mouth daily    BPH with obstruction/lower urinary tract symptoms       traZODone 50 MG tablet    DESYREL    90 tablet    TAKE ONE TABLET BY MOUTH AT BEDTIME    Diagnosis unknown       VITAMIN D PO      Take 1 capsule by mouth daily

## 2018-07-17 NOTE — NURSING NOTE
"No chief complaint on file.      Initial /79 (BP Location: Right arm, Patient Position: Chair, Cuff Size: Adult Large)  Pulse 67  Temp 97.8  F (36.6  C) (Tympanic)  Ht 1.778 m (5' 10\")  Wt 105.7 kg (233 lb)  SpO2 98%  BMI 33.43 kg/m2 Estimated body mass index is 33.43 kg/(m^2) as calculated from the following:    Height as of this encounter: 1.778 m (5' 10\").    Weight as of this encounter: 105.7 kg (233 lb).  Medication Reconciliation: complete    Jerri Pierce LPN    "

## 2018-07-18 ENCOUNTER — TRANSFERRED RECORDS (OUTPATIENT)
Dept: HEALTH INFORMATION MANAGEMENT | Facility: CLINIC | Age: 78
End: 2018-07-18

## 2018-08-22 DIAGNOSIS — E11.9 TYPE 2 DIABETES MELLITUS WITHOUT COMPLICATION, WITHOUT LONG-TERM CURRENT USE OF INSULIN (H): ICD-10-CM

## 2018-08-22 RX ORDER — METFORMIN HCL 500 MG
TABLET, EXTENDED RELEASE 24 HR ORAL
Qty: 180 TABLET | Refills: 0 | Status: SHIPPED | OUTPATIENT
Start: 2018-08-22 | End: 2019-06-05

## 2018-10-09 ENCOUNTER — ALLIED HEALTH/NURSE VISIT (OUTPATIENT)
Dept: FAMILY MEDICINE | Facility: OTHER | Age: 78
End: 2018-10-09
Attending: FAMILY MEDICINE
Payer: MEDICARE

## 2018-10-09 DIAGNOSIS — Z23 NEED FOR PROPHYLACTIC VACCINATION AND INOCULATION AGAINST INFLUENZA: Primary | ICD-10-CM

## 2018-10-09 PROCEDURE — 90471 IMMUNIZATION ADMIN: CPT

## 2018-10-09 PROCEDURE — 90662 IIV NO PRSV INCREASED AG IM: CPT

## 2018-10-09 NOTE — PROGRESS NOTES
Injectable Influenza Immunization Documentation    1.  Is the person to be vaccinated sick today?   No    2. Does the person to be vaccinated have an allergy to a component   of the vaccine?   No  Egg Allergy Algorithm Link    3. Has the person to be vaccinated ever had a serious reaction   to influenza vaccine in the past?   No    4. Has the person to be vaccinated ever had Guillain-Barré syndrome?   No    Form completed by Andree Kay LPN    Prior to injection verified patient identity using patient's name and date of birth.    Andree Kay LPN

## 2018-10-09 NOTE — MR AVS SNAPSHOT
After Visit Summary   10/9/2018    Carrillo Carranza    MRN: [id number]           Patient Information     Date Of Birth          1940        Visit Information        Provider Department      10/9/2018 1:50 PM HC FLU SHOT CLINIC [facility]        Today's Diagnoses     Need for prophylactic vaccination and inoculation against influenza    -  1       Follow-ups after your visit        Your next 10 appointments already scheduled     Oct 09, 2018  1:50 PM CDT   (Arrive by 1:35 PM)   Flu Shot with HC FLU SHOT CLINIC   [facility] ([facility] )    [address] [place] [address]   [phone]            Dec 07, 2018 10:45 AM CST   (Arrive by 10:30 AM)   Office Visit with Mata Richardson MD   [facility] ([facility] )    [address] [place] [id number] [phone]           Bring a current list of meds and any records pertaining to this visit. For Physicals, please bring immunization records and any forms needing to be filled out. Please arrive 10 minutes early to complete paperwork.            Apr 04, 2019  1:00 PM CDT   (Arrive by 12:45 PM)   Return Visit with Baylee Calixto RN   [facility] ([facility] )    [address] [place] [id number] [phone]              Who to contact     If you have questions or need follow up information about today's clinic visit or your schedule please contact [facility] directly at [phone].  Normal or non-critical lab and imaging results will be communicated to you by MyChart, letter or phone within 4 business days after the clinic has received the results. If you do not hear from us within 7 days, please contact the clinic through MyChart or phone. If you have a critical or abnormal lab result, we will notify you by phone as soon as possible.  Submit refill requests through [employer] or call  your pharmacy and they will forward the refill request to us. Please allow 3 business days for your refill to be completed.          Additional Information About Your Visit        [employer] Information     [employer] gives you secure access to your electronic health record. If you see a primary care provider, you can also send messages to your care team and make appointments. If you have questions, please call your primary care clinic.  If you do not have a primary care provider, please call [phone] and they will assist you.        Care EveryWhere ID     This is your Care EveryWhere ID. This could be used by other organizations to access your [place] medical records  [id number]         Blood Pressure from Last 3 Encounters:   07/17/18 124/79   07/06/18 126/64   07/03/18 100/60    Weight from Last 3 Encounters:   07/17/18 233 lb (105.7 kg)   07/06/18 229 lb (103.9 kg)   07/03/18 233 lb (105.7 kg)              We Performed the Following     HC FLU VACCINE, INCREASED ANTIGEN, PRESV FREE     Vaccine Administration, Initial [95651]        Primary Care Provider Office Phone # Fax #    Mata Richardson [phone] [address] [id number]        Equal Access to Services     YAIR GARCIA : Hadii kennedy luo Sojade, waaxda luqadaha, qaybta kaalmada ademichealyada, david norton. So [facility] [phone].    ATENCIÓN: Si habla español, tiene a palacios disposición servicios gratuitos de asistencia lingüística. Llame al [phone].    We comply with applicable federal civil rights laws and [place] laws. We do not discriminate on the basis of race, color, national origin, age, disability, sex, sexual orientation, or gender identity.            Thank you!     Thank you for choosing [facility]  for your care. Our goal is always to provide you with excellent care. Hearing back from our patients is one way we can continue to improve our services.  Please take a few minutes to complete the written survey that you may receive in the mail after your visit with us. Thank you!             Your Updated Medication List - Protect others around you: Learn how to safely use, store and throw away your medicines at www.disposemymeds.org.          This list is accurate as of 10/9/18  1:34 PM.  Always use your most recent med list.                   Brand Name Dispense Instructions for use Diagnosis    ACCU-CHEK ANGELI PLUS test strip   Generic drug:  blood glucose monitoring     100 strip    USE ONE STRIP ONCE DAILY    Diabetes mellitus, type 2 (H)       ADVIL PO      Take by mouth every 4 hours as needed for moderate pain        ASPIR-81 PO      Take 1 tablet by mouth daily        atenolol-chlorthalidone 50-25 MG per tablet    TENORETIC    90 tablet    TAKE 1 TABLET DAILY    Benign essential hypertension       blood glucose calibration solution     1 each    USE CONTROL SOLUTION AS DIRECTED ONCE MONTHLY TO CHECK ACCURACY OF METER    Diabetes mellitus, type 2 (H)       blood glucose monitoring lancets     102 each    USE TO TEST BLOOD SUGAR ONCE DAILY    Diabetes mellitus, type 2 (H)       ezetimibe 10 MG tablet    ZETIA    90 tablet    TAKE 1 TABLET DAILY (DUE   FOR LIPID PROFILE DECEMBER 2017)    Hyperlipidemia LDL goal <100       glimepiride 1 MG tablet    AMARYL    45 tablet    Take 0.5 tablets (0.5 mg) by mouth every morning (before breakfast)    Type 2 diabetes mellitus with diabetic nephropathy, without long-term current use of insulin (H)       KLOR-CON 20 MEQ CR tablet   Generic drug:  potassium chloride SA     180 tablet    TAKE ONE TABLET BY MOUTH TWICE DAILY WITH MEALS    HTN (hypertension)       magnesium oxide 400 MG Caps     180 capsule    Take 400 mg by mouth 2 times daily    Hypomagnesemia       MELOXICAM PO      Take 15 mg by mouth One daily for 14 days        metFORMIN 500 MG 24 hr tablet    GLUCOPHAGE-XR    180 tablet    TAKE 1 TABLET BY MOUTH TWICE  DAILY WITH MEALS **DUE FOR FOLLOW-UP VISIT AND LABS**    Type 2 diabetes mellitus without complication, without long-term current use of insulin (H)       MULTIVITAMINS PO      Take 1 tablet by mouth daily        OMEGA-3 FISH OIL PO      Take 1 g by mouth        oxyCODONE 5 mg    ROXICODONE    6 tablet    Take 1 tablet (5 mg) by mouth every 4 hours as needed for moderate to severe pain        oxyCODONE-acetaminophen 5-325 MG per tablet    PERCOCET    20 tablet    Take 1-2 tablets by mouth every 4 hours as needed for pain        pantoprazole 40 MG EC tablet    PROTONIX    90 tablet    TAKE 1 TABLET DAILY    Baca esophagus       STATIN NOT PRESCRIBED (INTENTIONAL)      Please choose reason not prescribed, below    Hyperlipidemia with target LDL less than 100       STOOL SOFTENER 100 MG capsule   Generic drug:  docusate sodium      Take 1 capsule by mouth 2 times daily        tamsulosin 0.4 MG capsule    FLOMAX    30 capsule    TAKE ONE CAPSULE BY MOUTH ONCE DAILY    BPH with obstruction/lower urinary tract symptoms       traZODone 50 MG tablet    DESYREL    90 tablet    TAKE ONE TABLET BY MOUTH AT BEDTIME    Diagnosis unknown       VITAMIN D PO      Take 1 capsule by mouth daily

## 2018-10-22 DIAGNOSIS — K22.70 BARRETT ESOPHAGUS: ICD-10-CM

## 2018-10-22 DIAGNOSIS — E78.5 HYPERLIPIDEMIA LDL GOAL <100: ICD-10-CM

## 2018-10-22 RX ORDER — PANTOPRAZOLE SODIUM 40 MG/1
TABLET, DELAYED RELEASE ORAL
Qty: 90 TABLET | Refills: 2 | Status: SHIPPED | OUTPATIENT
Start: 2018-10-22 | End: 2019-07-28

## 2018-10-22 RX ORDER — EZETIMIBE 10 MG/1
TABLET ORAL
Qty: 90 TABLET | Refills: 1 | Status: SHIPPED | OUTPATIENT
Start: 2018-10-22 | End: 2019-04-14

## 2018-11-20 DIAGNOSIS — E11.9 TYPE 2 DIABETES MELLITUS WITHOUT COMPLICATION, WITHOUT LONG-TERM CURRENT USE OF INSULIN (H): ICD-10-CM

## 2018-11-21 RX ORDER — METFORMIN HCL 500 MG
TABLET, EXTENDED RELEASE 24 HR ORAL
Qty: 180 TABLET | Refills: 0 | Status: SHIPPED | OUTPATIENT
Start: 2018-11-21 | End: 2019-01-29

## 2018-11-21 NOTE — TELEPHONE ENCOUNTER
metFORMIN (GLUCOPHAGE-XR) 500 MG 24 hr tablet    Last Written Prescription Date:  8/22/18  Last Fill Quantity: 180,   # refills: 0  Last Office Visit: 7/6/18  Future Office visit:    Next 5 appointments (look out 90 days)     Dec 07, 2018 10:45 AM CST   (Arrive by 10:30 AM)   Office Visit with Mata Richardson MD   [facility] ([facility] )    [address] [place] [address]   [phone]

## 2018-12-07 ENCOUNTER — OFFICE VISIT (OUTPATIENT)
Dept: FAMILY MEDICINE | Facility: OTHER | Age: 78
End: 2018-12-07
Attending: FAMILY MEDICINE
Payer: MEDICARE

## 2018-12-07 VITALS
HEIGHT: 70 IN | BODY MASS INDEX: 32.64 KG/M2 | TEMPERATURE: 97.1 F | WEIGHT: 228 LBS | DIASTOLIC BLOOD PRESSURE: 78 MMHG | HEART RATE: 63 BPM | SYSTOLIC BLOOD PRESSURE: 138 MMHG | OXYGEN SATURATION: 97 %

## 2018-12-07 DIAGNOSIS — E11.9 TYPE 2 DIABETES MELLITUS WITHOUT COMPLICATION, WITHOUT LONG-TERM CURRENT USE OF INSULIN (H): Primary | ICD-10-CM

## 2018-12-07 DIAGNOSIS — I10 ESSENTIAL HYPERTENSION WITH GOAL BLOOD PRESSURE LESS THAN 140/90: ICD-10-CM

## 2018-12-07 LAB
EST. AVERAGE GLUCOSE BLD GHB EST-MCNC: 143 MG/DL
HBA1C MFR BLD: 6.6 % (ref 0–5.6)

## 2018-12-07 PROCEDURE — 83036 HEMOGLOBIN GLYCOSYLATED A1C: CPT | Mod: ZL | Performed by: FAMILY MEDICINE

## 2018-12-07 PROCEDURE — 40000788 ZZHCL STATISTIC ESTIMATED AVERAGE GLUCOSE: Mod: ZL | Performed by: FAMILY MEDICINE

## 2018-12-07 PROCEDURE — 99213 OFFICE O/P EST LOW 20 MIN: CPT | Performed by: FAMILY MEDICINE

## 2018-12-07 PROCEDURE — G0463 HOSPITAL OUTPT CLINIC VISIT: HCPCS

## 2018-12-07 PROCEDURE — 36415 COLL VENOUS BLD VENIPUNCTURE: CPT | Mod: ZL | Performed by: FAMILY MEDICINE

## 2018-12-07 ASSESSMENT — ANXIETY QUESTIONNAIRES
5. BEING SO RESTLESS THAT IT IS HARD TO SIT STILL: NOT AT ALL
2. NOT BEING ABLE TO STOP OR CONTROL WORRYING: NOT AT ALL
7. FEELING AFRAID AS IF SOMETHING AWFUL MIGHT HAPPEN: NOT AT ALL
1. FEELING NERVOUS, ANXIOUS, OR ON EDGE: NOT AT ALL
GAD7 TOTAL SCORE: 0
3. WORRYING TOO MUCH ABOUT DIFFERENT THINGS: NOT AT ALL
6. BECOMING EASILY ANNOYED OR IRRITABLE: NOT AT ALL
4. TROUBLE RELAXING: NOT AT ALL

## 2018-12-07 ASSESSMENT — PAIN SCALES - GENERAL: PAINLEVEL: NO PAIN (0)

## 2018-12-07 ASSESSMENT — PATIENT HEALTH QUESTIONNAIRE - PHQ9: SUM OF ALL RESPONSES TO PHQ QUESTIONS 1-9: 0

## 2018-12-07 NOTE — PROGRESS NOTES
"  SUBJECTIVE:   Carrillo Carranza is a 77 year old male who presents to clinic today for the following health issues:      Diabetes Follow-up    Patient is checking blood sugars: once daily.  Results are as follows:         am - 119    Diabetic concerns: None     Symptoms of hypoglycemia (low blood sugar): none     Paresthesias (numbness or burning in feet) or sores: No     Date of last diabetic eye exam: july    BP Readings from Last 2 Encounters:   12/07/18 138/78   07/17/18 124/79     Hemoglobin A1C (%)   Date Value   07/06/2018 6.1 (H)   02/23/2018 6.6 (H)     LDL Cholesterol Calculated (mg/dL)   Date Value   07/06/2018 92   10/25/2017 94       Diabetes Management Resources    Amount of exercise or physical activity: None    Problems taking medications regularly: No    Medication side effects: none    Diet: regular (no restrictions)            Problem list and histories reviewed & adjusted, as indicated.  Additional history: as documented    Labs reviewed in EPIC    Reviewed and updated as needed this visit by clinical staff  Tobacco  Allergies  Meds  Med Hx  Surg Hx  Fam Hx  Soc Hx      Reviewed and updated as needed this visit by Provider         ROS:  CONSTITUTIONAL: NEGATIVE for fever, chills, change in weight  RESP: NEGATIVE for significant cough or SOB  CV: NEGATIVE for chest pain, palpitations or peripheral edema    OBJECTIVE:                                                    /78  Pulse 63  Temp 97.1  F (36.2  C)  Ht 5' 10\" (1.778 m)  Wt 228 lb (103.4 kg)  SpO2 97%  BMI 32.71 kg/m2  Body mass index is 32.71 kg/(m^2).   GENERAL: healthy, alert, well nourished, well hydrated, no distress  NECK: no tenderness, no adenopathy, no asymmetry, no masses, no stiffness; thyroid- normal to palpation  RESP: lungs clear to auscultation - no rales, no rhonchi, no wheezes  CV: regular rates and rhythm, normal S1 S2, no S3 or S4 and no murmur, no click or rub -  ABDOMEN: soft, no tenderness, no  " hepatosplenomegaly, no masses, normal bowel sounds      Results for orders placed or performed in visit on 12/07/18 (from the past 24 hour(s))   Hemoglobin A1c   Result Value Ref Range    Hemoglobin A1C 6.6 (H) 0 - 5.6 %   Estimated Average Glucose   Result Value Ref Range    Estimated Average Glucose 143 mg/dL          ASSESSMENT/PLAN:                                                    (E11.9) Type 2 diabetes mellitus without complication, without long-term current use of insulin (H)  (primary encounter diagnosis)  Comment: great control - no concerns   Plan: Hemoglobin A1c        Continue current medications and behavioral changes.   F/u in -5 -6 months for minipx and fasting labs.     (I10) Essential hypertension with goal blood pressure less than 140/90  Comment: stable   Plan: Continue current medications and behavioral changes.         See Patient Instructions    Mata Richardson MD  [facility]

## 2018-12-07 NOTE — MR AVS SNAPSHOT
After Visit Summary   12/7/2018    Carrillo Carranza    MRN: [id number]           Patient Information     Date Of Birth          1940        Visit Information        Provider Department      12/7/2018 10:45 AM Mata Richardson MD [facility]        Today's Diagnoses     Type 2 diabetes mellitus without complication, without long-term current use of insulin (H)    -  1    Essential hypertension with goal blood pressure less than 140/90          Care Instructions       Ref Range & Units 10:47 AM   5mo ago   9mo ago         Hemoglobin A1C 0 - 5.6 % 6.6 (H) 6.1 (H)CM 6.6 (H)R     Comments: Normal <5.7% Prediabetes 5.7-6.4%  Diabetes 6.5% or higher - adopted from ADA   consensus guidelines.        Resulting [facility]          Specimen Collected: 12/07/18 10:47 AM Last Resulted: 12/07/18 11:28 AM                CM=Additional comments  R=Reference range differs from displayed range                      Estimated Average Glucose   Status:  Final result   Visible to patient:  No (Not Released) Dx:  Type 2 diabetes mellitus without comp... Order: [id number]             Ref Range & Units 10:47 AM   5mo ago   9mo ago        Estimated Average Glucose mg/dL 143 128 143     Resulting [facility]          Specimen Collected: 12/07/18 10:47 AM Last Resulted: 12/07/18 11:28 AM                                 Status of Other Orders        Order    Lab Status Result Date Provider Status       Estimated Average Glucose In process 12/7/2018 Ordered                     Follow-ups after your visit        Your next 10 appointments already scheduled     Apr 04, 2019  1:00 PM CDT   (Arrive by 12:45 PM)   Return Visit with Baylee Calixto RN   [facility] ([facility] )    [address] [place] [id number]   [phone]            May 07, 2019  9:00 AM CDT   (Arrive by 8:45 AM)   Office Visit with Mata Richardson MD   [facility] ([place]  "[facility] )    [address] [place] [address]   [phone]           Bring a current list of meds and any records pertaining to this visit. For Physicals, please bring immunization records and any forms needing to be filled out. Please arrive 10 minutes early to complete paperwork.              Who to contact     If you have questions or need follow up information about today's clinic visit or your schedule please contact [facility] directly at [phone].  Normal or non-critical lab and imaging results will be communicated to you by Sowes[employer]art, letter or phone within 4 business days after the clinic has received the results. If you do not hear from us within 7 days, please contact the clinic through [employer] or phone. If you have a critical or abnormal lab result, we will notify you by phone as soon as possible.  Submit refill requests through [employer] or call your pharmacy and they will forward the refill request to us. Please allow 3 business days for your refill to be completed.          Additional Information About Your Visit        [employer] Information     [employer] gives you secure access to your electronic health record. If you see a primary care provider, you can also send messages to your care team and make appointments. If you have questions, please call your primary care clinic.  If you do not have a primary care provider, please call [phone] and they will assist you.        Care EveryWhere ID     This is your Care EveryWhere ID. This could be used by other organizations to access your [place] medical records  [id number]        Your Vitals Were     Pulse Temperature Height Pulse Oximetry BMI (Body Mass Index)       63 97.1  F (36.2  C) 5' 10\" (1.778 m) 97% 32.71 kg/m2        Blood Pressure from Last 3 Encounters:   12/07/18 138/78   07/17/18 124/79   07/06/18 126/64    Weight from Last 3 Encounters:   12/07/18 228 lb (103.4 kg)   07/17/18 233 lb (105.7 kg) "   07/06/18 229 lb (103.9 kg)              We Performed the Following     Estimated Average Glucose     Estimated Average Glucose     Hemoglobin A1c        Primary Care Provider Office Phone # Fax #    Mata Richardson [phone] [address] [facility] [address]        Equal Access to Services     [facility] RADHA : Hadii aad ku hadasho Soomaali, waaxda luqadaha, qaybta kaalmada adeegyada, waxay idiin haymaxn ademicheal [place] latrishtessa . So [facility] [phone].    ATENCIÓN: Si habla español, tiene a palacios disposición servicios gratuitos de asistencia lingüística. [facility] [phone].    We comply with applicable federal civil rights laws and [place] laws. We do not discriminate on the basis of race, color, national origin, age, disability, sex, sexual orientation, or gender identity.            Thank you!     Thank you for choosing [facility]  for your care. Our goal is always to provide you with excellent care. Hearing back from our patients is one way we can continue to improve our services. Please take a few minutes to complete the written survey that you may receive in the mail after your visit with us. Thank you!             Your Updated Medication List - Protect others around you: Learn how to safely use, store and throw away your medicines at www.disposemymeds.org.          This list is accurate as of 12/7/18 11:44 AM.  Always use your most recent med list.                   Brand Name Dispense Instructions for use Diagnosis    ACCU-CHEK ANGELI PLUS test strip   Generic drug:  blood glucose monitoring     100 strip    USE ONE STRIP TO CHECK GLUCOSE ONCE DAILY    Diabetes mellitus, type 2 (H)       ADVIL PO      Take by mouth every 4 hours as needed for moderate pain        ASPIR-81 PO      Take 1 tablet by mouth daily        atenolol-chlorthalidone 50-25 MG tablet    TENORETIC    90 tablet    TAKE 1 TABLET DAILY    Benign essential hypertension       blood glucose  calibration solution     1 each    USE CONTROL SOLUTION AS DIRECTED ONCE MONTHLY TO CHECK ACCURACY OF METER    Diabetes mellitus, type 2 (H)       blood glucose monitoring lancets     102 each    USE TO TEST BLOOD SUGAR ONCE DAILY    Diabetes mellitus, type 2 (H)       ezetimibe 10 MG tablet    ZETIA    90 tablet    TAKE 1 TABLET DAILY (DUE   FOR LIPID PROFILE DECEMBER 2017)    Hyperlipidemia LDL goal <100       glimepiride 1 MG tablet    AMARYL    45 tablet    TAKE ONE-HALF TABLET BY MOUTH ONCE DAILY IN THE MORNING BEFORE BREAKFAST    Type 2 diabetes mellitus with diabetic nephropathy, without long-term current use of insulin (H)       KLOR-CON 20 MEQ CR tablet   Generic drug:  potassium chloride ER     180 tablet    TAKE 1 TABLET BY MOUTH TWICE DAILY WITH MEALS    HTN (hypertension)       magnesium oxide 400 MG Caps     180 capsule    Take 400 mg by mouth 2 times daily    Hypomagnesemia       MELOXICAM PO      Take 15 mg by mouth One daily for 14 days        * metFORMIN 500 MG 24 hr tablet    GLUCOPHAGE-XR    180 tablet    TAKE 1 TABLET BY MOUTH TWICE DAILY WITH MEALS **DUE FOR FOLLOW-UP VISIT AND LABS**    Type 2 diabetes mellitus without complication, without long-term current use of insulin (H)       * metFORMIN 500 MG 24 hr tablet    GLUCOPHAGE-XR    180 tablet    TAKE 1 TABLET BY MOUTH TWICE DAILY WITH MEALS **DUE FOR FOLLOW-UP VISIT AND LABS**    Type 2 diabetes mellitus without complication, without long-term current use of insulin (H)       MULTIVITAMINS PO      Take 1 tablet by mouth daily        OMEGA-3 FISH OIL PO      Take 1 g by mouth        oxyCODONE 5 mg    ROXICODONE    6 tablet    Take 1 tablet (5 mg) by mouth every 4 hours as needed for moderate to severe pain        oxyCODONE-acetaminophen 5-325 MG tablet    PERCOCET    20 tablet    Take 1-2 tablets by mouth every 4 hours as needed for pain        pantoprazole 40 MG EC tablet    PROTONIX    90 tablet    TAKE 1 TABLET DAILY    Baca esophagus        STATIN NOT PRESCRIBED    INTENTIONAL     Please choose reason not prescribed, below    Hyperlipidemia with target LDL less than 100       STOOL SOFTENER 100 MG capsule   Generic drug:  docusate sodium      Take 1 capsule by mouth 2 times daily        tamsulosin 0.4 MG capsule    FLOMAX    30 capsule    TAKE ONE CAPSULE BY MOUTH ONCE DAILY    BPH with obstruction/lower urinary tract symptoms       traZODone 50 MG tablet    DESYREL    90 tablet    TAKE ONE TABLET BY MOUTH AT BEDTIME    Diagnosis unknown       VITAMIN D PO      Take 1 capsule by mouth daily        * Notice:  This list has 2 medication(s) that are the same as other medications prescribed for you. Read the directions carefully, and ask your doctor or other care provider to review them with you.

## 2018-12-07 NOTE — NURSING NOTE
"Chief Complaint   Patient presents with     Diabetes       Initial /78  Pulse 63  Temp 97.1  F (36.2  C)  Ht 5' 10\" (1.778 m)  Wt 228 lb (103.4 kg)  SpO2 97%  BMI 32.71 kg/m2 Estimated body mass index is 32.71 kg/(m^2) as calculated from the following:    Height as of this encounter: 5' 10\" (1.778 m).    Weight as of this encounter: 228 lb (103.4 kg).  Medication Reconciliation: complete    Jerome Fitzgerald LPN  "

## 2018-12-07 NOTE — PATIENT INSTRUCTIONS
Ref Range & Units 10:47 AM   5mo ago   9mo ago         Hemoglobin A1C 0 - 5.6 % 6.6 (H) 6.1 (H)CM 6.6 (H)R     Comments: Normal <5.7% Prediabetes 5.7-6.4%  Diabetes 6.5% or higher - adopted from ADA   consensus guidelines.        Resulting [facility]          Specimen Collected: 12/07/18 10:47 AM Last Resulted: 12/07/18 11:28 AM                CM=Additional comments  R=Reference range differs from displayed range                      Estimated Average Glucose   Status:  Final result   Visible to patient:  No (Not Released) Dx:  Type 2 diabetes mellitus without comp... Order: [id number]             Ref Range & Units 10:47 AM   5mo ago   9mo ago        Estimated Average Glucose mg/dL 143 128 143     Resulting [facility]          Specimen Collected: 12/07/18 10:47 AM Last Resulted: 12/07/18 11:28 AM                                 Status of Other Orders        Order    Lab Status Result Date Provider Status       Estimated Average Glucose In process 12/7/2018 Ordered

## 2018-12-08 ASSESSMENT — ANXIETY QUESTIONNAIRES: GAD7 TOTAL SCORE: 0

## 2018-12-27 DIAGNOSIS — E11.21 TYPE 2 DIABETES MELLITUS WITH DIABETIC NEPHROPATHY, WITHOUT LONG-TERM CURRENT USE OF INSULIN (H): ICD-10-CM

## 2018-12-27 NOTE — TELEPHONE ENCOUNTER
amaryl      Last Written Prescription Date:  10/12/18  Last Fill Quantity: 45,   # refills: 0  Last Office Visit: 12/7/18

## 2018-12-28 RX ORDER — GLIMEPIRIDE 1 MG/1
TABLET ORAL
Qty: 45 TABLET | Refills: 1 | Status: SHIPPED | OUTPATIENT
Start: 2018-12-28 | End: 2019-05-07

## 2019-01-23 NOTE — PROGRESS NOTES
"  SUBJECTIVE:   Carrillo Carranza is a 78 year old male who presents to clinic today for the following health issues:      Spot of face      Duration: 3-4 weeks    Description (location/character/radiation): left side of face    Intensity:  mild    Accompanying signs and symptoms: painful if touched- comb or glasses touching it     History (similar episodes/previous evaluation): history of BCC    Precipitating or alleviating factors: None    Therapies tried and outcome: None               Problem list and histories reviewed & adjusted, as indicated.  Additional history: as documented    Labs reviewed in EPIC    Reviewed and updated as needed this visit by clinical staff       Reviewed and updated as needed this visit by Provider         ROS:  CONSTITUTIONAL: NEGATIVE for fever, chills, change in weight    OBJECTIVE:                                                    /62   Pulse 81   Temp 96.6  F (35.9  C)   Ht 1.778 m (5' 10\")   Wt 102.5 kg (226 lb)   SpO2 97%   BMI 32.43 kg/m    Body mass index is 32.43 kg/m .   GENERAL: healthy, alert, well nourished, well hydrated, no distress  SKIN: irritated seborrheic keratosis of left temple - 1 cm  no suspicious lesions, no rashes         ASSESSMENT/PLAN:                                                    (L82.0) Inflamed seborrheic keratosis  (primary encounter diagnosis)  Comment: discussed. Will do cryo .   Plan: DESTRUCT BENIGN LESION, UP TO 14        Risk and benefits of cryo discussed and verbal consent given.  Cryo done - freeze and  thaw times three done.  F/u as directed and wound care instructions given.               Mata Richardson MD  [facility] [place]  "

## 2019-01-29 ENCOUNTER — OFFICE VISIT (OUTPATIENT)
Dept: FAMILY MEDICINE | Facility: OTHER | Age: 79
End: 2019-01-29
Attending: FAMILY MEDICINE
Payer: MEDICARE

## 2019-01-29 VITALS
DIASTOLIC BLOOD PRESSURE: 62 MMHG | OXYGEN SATURATION: 97 % | HEART RATE: 81 BPM | BODY MASS INDEX: 32.35 KG/M2 | TEMPERATURE: 96.6 F | HEIGHT: 70 IN | SYSTOLIC BLOOD PRESSURE: 122 MMHG | WEIGHT: 226 LBS

## 2019-01-29 DIAGNOSIS — L82.0 INFLAMED SEBORRHEIC KERATOSIS: Primary | ICD-10-CM

## 2019-01-29 PROCEDURE — 17110 DESTRUCTION B9 LES UP TO 14: CPT | Performed by: FAMILY MEDICINE

## 2019-01-29 ASSESSMENT — PAIN SCALES - GENERAL: PAINLEVEL: NO PAIN (0)

## 2019-01-29 ASSESSMENT — MIFFLIN-ST. JEOR: SCORE: 1761.38

## 2019-01-29 NOTE — NURSING NOTE
"Chief Complaint   Patient presents with     Derm Problem       Initial /62   Pulse 81   Temp 96.6  F (35.9  C)   Ht 1.778 m (5' 10\")   Wt 102.5 kg (226 lb)   SpO2 97%   BMI 32.43 kg/m   Estimated body mass index is 32.43 kg/m  as calculated from the following:    Height as of this encounter: 1.778 m (5' 10\").    Weight as of this encounter: 102.5 kg (226 lb).  Medication Reconciliation: complete    Jerome Fitzgerald LPN  "

## 2019-02-17 DIAGNOSIS — E11.9 TYPE 2 DIABETES MELLITUS WITHOUT COMPLICATION, WITHOUT LONG-TERM CURRENT USE OF INSULIN (H): ICD-10-CM

## 2019-02-18 RX ORDER — METFORMIN HCL 500 MG
TABLET, EXTENDED RELEASE 24 HR ORAL
Qty: 180 TABLET | Refills: 0 | Status: SHIPPED | OUTPATIENT
Start: 2019-02-18 | End: 2019-05-07

## 2019-02-18 NOTE — TELEPHONE ENCOUNTER
Metformin  Last Written Prescription Date: 8/22/18  Last Fill Quantity: 180 # of Refills: 0  Last Office Visit: 1/29/19

## 2019-04-09 DIAGNOSIS — N13.8 BPH WITH OBSTRUCTION/LOWER URINARY TRACT SYMPTOMS: ICD-10-CM

## 2019-04-09 DIAGNOSIS — N40.1 BPH WITH OBSTRUCTION/LOWER URINARY TRACT SYMPTOMS: ICD-10-CM

## 2019-04-10 DIAGNOSIS — N13.8 BPH WITH OBSTRUCTION/LOWER URINARY TRACT SYMPTOMS: ICD-10-CM

## 2019-04-10 DIAGNOSIS — N40.1 BPH WITH OBSTRUCTION/LOWER URINARY TRACT SYMPTOMS: ICD-10-CM

## 2019-04-10 RX ORDER — TAMSULOSIN HYDROCHLORIDE 0.4 MG/1
CAPSULE ORAL
Qty: 30 CAPSULE | Refills: 2 | Status: SHIPPED | OUTPATIENT
Start: 2019-04-10 | End: 2020-01-02

## 2019-04-11 RX ORDER — TAMSULOSIN HYDROCHLORIDE 0.4 MG/1
CAPSULE ORAL
Qty: 90 CAPSULE | Refills: 1 | Status: SHIPPED | OUTPATIENT
Start: 2019-04-11 | End: 2019-05-07

## 2019-04-12 DIAGNOSIS — I10 HTN (HYPERTENSION): ICD-10-CM

## 2019-04-12 RX ORDER — POTASSIUM CHLORIDE 1500 MG/1
TABLET, EXTENDED RELEASE ORAL
Qty: 180 TABLET | Refills: 0 | Status: SHIPPED | OUTPATIENT
Start: 2019-04-12 | End: 2019-07-01

## 2019-04-25 ENCOUNTER — TELEPHONE (OUTPATIENT)
Dept: EDUCATION SERVICES | Facility: OTHER | Age: 79
End: 2019-04-25

## 2019-04-25 DIAGNOSIS — E11.9 TYPE 2 DIABETES MELLITUS WITHOUT COMPLICATION, WITHOUT LONG-TERM CURRENT USE OF INSULIN (H): Primary | ICD-10-CM

## 2019-04-25 NOTE — MR AVS SNAPSHOT
After Visit Summary   3/12/2018    Carrillo Carranza    MRN: [id number]           Patient Information     Date Of Birth          1940        Visit Information        Provider Department      3/12/2018 7:45 AM Mata Richardson MD [facility]        Today's Diagnoses     Pre-operative cardiovascular examination    -  1    BCC (basal cell carcinoma), face        Heel spur, right        Essential hypertension with goal blood pressure less than 140/90        Type 2 diabetes mellitus without complication, without long-term current use of insulin (H)          Care Instructions      Before Your Surgery      Call your surgeon if there is any change in your health. This includes signs of a cold or flu (such as a sore throat, runny nose, cough, rash or fever).    Do not smoke, drink alcohol or take over the counter medicine (unless your surgeon or primary care doctor tells you to) for the 24 hours before and after surgery.    If you take prescribed drugs: Follow your doctor s orders about which medicines to take and which to stop until after surgery.    Eating and drinking prior to surgery: follow the instructions from your surgeon    Take a shower or bath the night before surgery. Use the soap your surgeon gave you to gently clean your skin. If you do not have soap from your surgeon, use your regular soap. Do not shave or scrub the surgery site.  Wear clean pajamas and have clean sheets on your bed.     Results for orders placed or performed in visit on 03/12/18 (from the past 24 hour(s))   CBC with platelets differential   Result Value Ref Range    WBC 6.8 4.0 - 11.0 10e9/L    RBC Count 5.05 4.4 - 5.9 10e12/L    Hemoglobin 15.6 13.3 - 17.7 g/dL    Hematocrit 43.6 40.0 - 53.0 %    MCV 86 78 - 100 fl    MCH 30.9 26.5 - 33.0 pg    MCHC 35.8 31.5 - 36.5 g/dL    RDW 12.2 10.0 - 15.0 %    Platelet Count 187 150 - 450 10e9/L    Diff Method Automated Method     % Neutrophils 58.6 %    % Lymphocytes  27.8 %    % Monocytes 9.6 %    % Eosinophils 3.1 %    % Basophils 0.6 %    % Immature Granulocytes 0.3 %    Nucleated RBCs 0 0 /100    Absolute Neutrophil 4.0 1.6 - 8.3 10e9/L    Absolute Lymphocytes 1.9 0.8 - 5.3 10e9/L    Absolute Monocytes 0.7 0.0 - 1.3 10e9/L    Absolute Eosinophils 0.2 0.0 - 0.7 10e9/L    Absolute Basophils 0.0 0.0 - 0.2 10e9/L    Abs Immature Granulocytes 0.0 0 - 0.4 10e9/L    Absolute Nucleated RBC 0.0                Follow-ups after your visit        Your next 10 appointments already scheduled     Mar 13, 2018   Procedure with Kyara Rojas MD   [facility] ([facility] )    [address] [place] [id number]   [phone]            Mar 20, 2018 10:45 AM CDT   (Arrive by 10:30 AM)   Post Op with DAKOTA Mendoza [facility] ([facility] )    [address] [address]   [phone]            Jul 06, 2018  8:30 AM CDT   (Arrive by 8:15 AM)   SHORT with Mata Richardson MD   [facility] ([facility] )    [address] [address]   [phone]              Who to contact     If you have questions or need follow up information about today's clinic visit or your schedule please contact [facility] directly at [phone].  Normal or non-critical lab and imaging results will be communicated to you by [employer], letter or phone within 4 business days after the clinic has received the results. If you do not hear from us within 7 days, please contact the clinic through [employer] or phone. If you have a critical or abnormal lab result, we will notify you by phone as soon as possible.  Submit refill requests through [employer] or call your pharmacy and they will forward the refill request to us. Please allow 3 business days for your refill to be completed.          Additional Information About Your Visit        [facility] [employer] Information     [employer] gives you secure  access to your electronic health record. If you see a primary care provider, you can also send messages to your care team and make appointments. If you have questions, please call your primary care clinic.  If you do not have a primary care provider, please call [phone] and they will assist you.        Care EveryWhere ID     This is your Care EveryWhere ID. This could be used by other organizations to access your [facility] medical records  [id number]        Your Vitals Were     Pulse Temperature Pulse Oximetry BMI (Body Mass Index)          73 97  F (36.1  C) (Tympanic) 96% 33.49 kg/m2         Blood Pressure from Last 3 Encounters:   03/12/18 132/72   03/01/18 138/62   02/23/18 132/70    Weight from Last 3 Encounters:   03/12/18 233 lb 6 oz (105.9 kg)   03/01/18 237 lb (107.5 kg)   02/23/18 237 lb 6 oz (107.7 kg)              We Performed the Following     CBC with platelets differential     EKG 12-lead complete w/read - Clinics        Primary Care Provider Office Phone # Fax #    Mata Richardson [phone]       [facility] [id number]        Equal Access to Services     RONAK GARCIA AH: Hadii kennedy luo Sojade, waaxda luqadaha, qaybta kaalmada adeegyada, david norton. So [facility] [phone].    ATENCIÓN: Si habla español, tiene a palacios disposición servicios gratuitos de asistencia lingüística. [facility] [phone].    We comply with applicable federal civil rights laws and [place] laws. We do not discriminate on the basis of race, color, national origin, age, disability, sex, sexual orientation, or gender identity.            Thank you!     Thank you for choosing [facility]  for your care. Our goal is always to provide you with excellent care. Hearing back from our patients is one way we can continue to improve our services. Please take a few minutes to complete the written survey that you may receive in the mail after your visit  with us. Thank you!             Your Updated Medication List - Protect others around you: Learn how to safely use, store and throw away your medicines at www.disposemymeds.org.          This list is accurate as of 3/12/18  8:47 AM.  Always use your most recent med list.                   Brand Name Dispense Instructions for use Diagnosis    ACCU-CHEK ANGELI PLUS test strip   Generic drug:  blood glucose monitoring     100 strip    USE ONE STRIP ONCE DAILY    Diabetes mellitus, type 2 (H)       ADVIL PO      Take by mouth every 4 hours as needed for moderate pain        ASPIR-81 PO      Take 1 tablet by mouth daily        atenolol-chlorthalidone 50-25 MG per tablet    TENORETIC    90 tablet    TAKE 1 TABLET DAILY    Benign essential hypertension       blood glucose calibration solution     1 each    USE CONTROL SOLUTION AS DIRECTED ONCE MONTHLY TO CHECK ACCURACY OF METER    Diabetes mellitus, type 2 (H)       blood glucose monitoring lancets     102 each    USE TO TEST BLOOD SUGAR ONCE DAILY    Diabetes mellitus, type 2 (H)       ezetimibe 10 MG tablet    ZETIA    90 tablet    TAKE 1 TABLET DAILY (DUE   FOR LIPID PROFILE DECEMBER 2017)    Hyperlipidemia LDL goal <100       glimepiride 1 MG tablet    AMARYL    45 tablet    Take 0.5 tablets (0.5 mg) by mouth every morning (before breakfast)    Type 2 diabetes mellitus with diabetic nephropathy, without long-term current use of insulin (H)       KLOR-CON 20 MEQ CR tablet   Generic drug:  potassium chloride SA     180 tablet    TAKE ONE TABLET BY MOUTH TWICE DAILY WITH MEALS    HTN (hypertension)       magnesium oxide 400 MG Caps     180 capsule    Take 400 mg by mouth 2 times daily    Hypomagnesemia       metFORMIN 500 MG 24 hr tablet    GLUCOPHAGE-XR    180 tablet    TAKE ONE TABLET BY MOUTH TWICE DAILY WITH MEALS    Type 2 diabetes mellitus without complication, without long-term current use of insulin (H)       MULTIVITAMINS PO      Take 1 tablet by mouth daily         OMEGA-3 FISH OIL PO      Take 1 g by mouth        pantoprazole 40 MG EC tablet    PROTONIX    90 tablet    TAKE 1 TABLET DAILY    Baca esophagus       STATIN NOT PRESCRIBED (INTENTIONAL)      Please choose reason not prescribed, below    Hyperlipidemia with target LDL less than 100       STOOL SOFTENER 100 MG capsule   Generic drug:  docusate sodium      Take 1 capsule by mouth 2 times daily        tamsulosin 0.4 MG capsule    FLOMAX    30 capsule    Take 1 capsule (0.4 mg) by mouth daily    BPH with obstruction/lower urinary tract symptoms       traZODone 50 MG tablet    DESYREL    90 tablet    TAKE ONE TABLET BY MOUTH AT BEDTIME    Diagnosis unknown       VITAMIN D PO      Take 1 capsule by mouth daily           No

## 2019-04-29 NOTE — PATIENT INSTRUCTIONS
Patient Education   Personalized Prevention Plan  You are due for the preventive services outlined below.  Your care team is available to assist you in scheduling these services.  If you have already completed any of these items, please share that information with your care team to update in your medical record.  Health Maintenance Due   Topic Date Due     Annual Wellness Visit  12/11/2007     Zoster (Shingles) Vaccine (2 of 3) 12/29/2011     Colonoscopy - every 5 years  02/23/2015        
[FreeTextEntry8] : Patient is a 21-year-old  female, new to this office, here for an acute visit.\par Patient states yesterday around 3:00 in the afternoon while she was working she fainted. Patient had eaten a bagel and had juice around 11:00. Just prior to fainting she felt like a headache coming on. She did not hit the floor as she was caught while falling. Patient does not know how long she was unconscious. She was told that her eyes rolled up. She did not go to the hospital.\par denies chest pain, palpitations, headaches or dizziness, no history of seizure disorder.\par No significant past medical or surgical history.\par Last menstrual period was the beginning of this month.\par Patient is on no current medication.\par Denies any alcohol or drug intake.

## 2019-04-29 NOTE — PROGRESS NOTES
"SUBJECTIVE:   Carrillo Carranza is a 78 year old male who presents for Preventive Visit.      Are you in the first 12 months of your Medicare Part B coverage?  No    Physical Health:    In general, how would you rate your overall physical health? excellent    Outside of work, how many days during the week do you exercise? 2-3 days/week    Outside of work, approximately how many minutes a day do you exercise?greater than 60 minutes    If you drink alcohol do you typically have >3 drinks per day or >7 drinks per week? No    Do you usually eat at least 4 servings of fruit and vegetables a day, include whole grains & fiber and avoid regularly eating high fat or \"junk\" foods? Yes    Do you have any problems taking medications regularly?  No    Do you have any side effects from medications? not applicable    Needs assistance for the following daily activities: no assistance needed    Which of the following safety concerns are present in your home?  none identified     Hearing impairment: No    In the past 6 months, have you been bothered by leaking of urine? no    Mental Health:    In general, how would you rate your overall mental or emotional health? excellent  PHQ-2 Score:    PHQ-9 SCORE 5/15/2018 6/21/2018 12/7/2018   PHQ-9 Total Score - - -   PHQ-9 Total Score 0 0 0       Do you feel safe in your environment? Yes    Do you have a Health Care Directive? No: Advance care planning was reviewed with patient; patient declined at this time.    Additional concerns to address?  No    Fall risk:low       Cognitive Screening: normal     Do you have sleep apnea, excessive snoring or daytime drowsiness?: no        Diabetes Follow-up      Patient is checking blood sugars: not at all    Diabetic concerns: None     Symptoms of hypoglycemia (low blood sugar): none     Paresthesias (numbness or burning in feet) or sores: No     Date of last diabetic eye exam: 1 month ago     Diabetes Management Resources    Hyperlipidemia " Follow-Up      Rate your low fat/cholesterol diet?: good    Taking statin?  No    Other lipid medications/supplements?:  none    Hypertension Follow-up      Outpatient blood pressures are not being checked.    Low Salt Diet: no added salt    BP Readings from Last 2 Encounters:   01/29/19 122/62   12/07/18 138/78     Hemoglobin A1C (%)   Date Value   12/07/2018 6.6 (H)   07/06/2018 6.1 (H)     LDL Cholesterol Calculated (mg/dL)   Date Value   07/06/2018 92   10/25/2017 94       Reviewed and updated as needed this visit by clinical staff         Reviewed and updated as needed this visit by Provider        Social History     Tobacco Use     Smoking status: Former Smoker     Types: Cigarettes     Smokeless tobacco: Never Used     Tobacco comment: quit in the 80s   Substance Use Topics     Alcohol use: No                           Current providers sharing in care for this patient include:   Patient Care Team:  Mata Richardson MD as PCP - General  Mata Richardson MD as Assigned PCP  Janet Montilla LPN as Baylee Bo RN as Diabetes Educator (Diabetes Education)    The following health maintenance items are reviewed in Epic and correct as of today:  Health Maintenance   Topic Date Due     MEDICARE ANNUAL WELLNESS VISIT  12/11/2007     ZOSTER IMMUNIZATION (2 of 3) 12/29/2011     COLONOSCOPY Q5 YR  02/23/2015     A1C Q6 MO  06/07/2019     PHQ-9 Q6 MONTHS  06/07/2019     FOOT EXAM Q1 YEAR  07/06/2019     CREATININE Q1 YEAR  07/06/2019     LIPID MONITORING Q1 YEAR  07/06/2019     MICROALBUMIN Q1 YEAR  07/06/2019     EYE EXAM Q1 YEAR  07/18/2019     TSH W/ FREE T4 REFLEX Q2 YEAR  10/25/2019     FALL RISK ASSESSMENT  01/29/2020     ADVANCE DIRECTIVE PLANNING Q5 YRS  01/29/2024     DTAP/TDAP/TD IMMUNIZATION (3 - Td) 05/21/2028     INFLUENZA VACCINE  Completed     IPV IMMUNIZATION  Aged Out     MENINGITIS IMMUNIZATION  Aged Out     Labs reviewed in EPIC      ROS:  Constitutional, HEENT, cardiovascular, pulmonary, GI, ,  "musculoskeletal, neuro, skin, endocrine and psych systems are negative, except as otherwise noted.    OBJECTIVE:   /72 (BP Location: Left arm, Patient Position: Chair, Cuff Size: Adult Large)   Pulse 68   Temp 97.4  F (36.3  C) (Tympanic)   Ht 1.763 m (5' 9.4\")   Wt 106.5 kg (234 lb 12.8 oz)   SpO2 98%   BMI 34.28 kg/m   Estimated body mass index is 32.43 kg/m  as calculated from the following:    Height as of 1/29/19: 1.778 m (5' 10\").    Weight as of 1/29/19: 102.5 kg (226 lb).  EXAM:   GENERAL: healthy, alert and no distress  EYES: Eyes grossly normal to inspection, PERRL and conjunctivae and sclerae normal  HENT: ear canals and TM's normal, nose and mouth without ulcers or lesions  NECK: no adenopathy, no asymmetry, masses, or scars and thyroid normal to palpation  RESP: lungs clear to auscultation - no rales, rhonchi or wheezes  CV: regular rate and rhythm, normal S1 S2, no S3 or S4, no murmur, click or rub, no peripheral edema and peripheral pulses strong  ABDOMEN: soft, nontender, no hepatosplenomegaly, no masses and bowel sounds normal   (male): normal male genitalia without lesions or urethral discharge, no hernia  MS: no gross musculoskeletal defects noted, no edema  SKIN: no suspicious lesions or rashes  NEURO: Normal strength and tone, mentation intact and speech normal  PSYCH: mentation appears normal, affect normal/bright    Results for orders placed or performed in visit on 05/07/19   PSA tumor marker   Result Value Ref Range    PSA 1.04 0 - 4 ug/L   Lipid Profile   Result Value Ref Range    Cholesterol 172 <200 mg/dL    Triglycerides 145 <150 mg/dL    HDL Cholesterol 43 >39 mg/dL    LDL Cholesterol Calculated 100 (H) <100 mg/dL    Non HDL Cholesterol 129 <130 mg/dL   TSH   Result Value Ref Range    TSH 0.90 0.40 - 4.00 mU/L   Comprehensive metabolic panel   Result Value Ref Range    Sodium 135 133 - 144 mmol/L    Potassium 4.3 3.4 - 5.3 mmol/L    Chloride 100 94 - 109 mmol/L    Carbon " Dioxide 32 20 - 32 mmol/L    Anion Gap 3 3 - 14 mmol/L    Glucose 150 (H) 70 - 99 mg/dL    Urea Nitrogen 15 7 - 30 mg/dL    Creatinine 0.75 0.66 - 1.25 mg/dL    GFR Estimate 89 >60 mL/min/[1.73_m2]    GFR Estimate If Black >90 >60 mL/min/[1.73_m2]    Calcium 9.5 8.5 - 10.1 mg/dL    Bilirubin Total 0.6 0.2 - 1.3 mg/dL    Albumin 4.0 3.4 - 5.0 g/dL    Protein Total 7.6 6.8 - 8.8 g/dL    Alkaline Phosphatase 71 40 - 150 U/L    ALT 95 (H) 0 - 70 U/L    AST 53 (H) 0 - 45 U/L   CBC with platelets differential   Result Value Ref Range    WBC 5.8 4.0 - 11.0 10e9/L    RBC Count 5.11 4.4 - 5.9 10e12/L    Hemoglobin 15.7 13.3 - 17.7 g/dL    Hematocrit 44.8 40.0 - 53.0 %    MCV 88 78 - 100 fl    MCH 30.7 26.5 - 33.0 pg    MCHC 35.0 31.5 - 36.5 g/dL    RDW 12.0 10.0 - 15.0 %    Platelet Count 214 150 - 450 10e9/L    Diff Method Automated Method     % Neutrophils 58.6 %    % Lymphocytes 29.1 %    % Monocytes 8.8 %    % Eosinophils 2.6 %    % Basophils 0.7 %    % Immature Granulocytes 0.2 %    Nucleated RBCs 0 0 /100    Absolute Neutrophil 3.4 1.6 - 8.3 10e9/L    Absolute Lymphocytes 1.7 0.8 - 5.3 10e9/L    Absolute Monocytes 0.5 0.0 - 1.3 10e9/L    Absolute Eosinophils 0.2 0.0 - 0.7 10e9/L    Absolute Basophils 0.0 0.0 - 0.2 10e9/L    Abs Immature Granulocytes 0.0 0 - 0.4 10e9/L    Absolute Nucleated RBC 0.0    Hemoglobin A1c   Result Value Ref Range    Hemoglobin A1C 6.7 (H) 0 - 5.6 %         ASSESSMENT / PLAN:   1. Type 2 diabetes mellitus without complication, without long-term current use of insulin (H)  Good control. Continue current medications and behavioral changes. F/u in 4-5 months.   - Hemoglobin A1c; Future  - CBC with platelets differential; Future  - Comprehensive metabolic panel; Future  - TSH; Future  - Lipid Profile; Future  - Lipid Profile  - TSH  - Comprehensive metabolic panel  - CBC with platelets differential  - Hemoglobin A1c    2. Essential hypertension  Doing well. Continue current medications and  "behavioral changes.   - CBC with platelets differential; Future  - Comprehensive metabolic panel; Future  - TSH; Future  - Lipid Profile; Future  - Lipid Profile  - TSH  - Comprehensive metabolic panel  - CBC with platelets differential    3. Hyperlipidemia LDL goal <100  Stable - does not tolerate statins  - CBC with platelets differential; Future  - Comprehensive metabolic panel; Future  - TSH; Future  - Lipid Profile; Future  - Lipid Profile  - TSH  - Comprehensive metabolic panel  - CBC with platelets differential    4. Nocturia  Stable - doing well with flomax  - PSA tumor marker; Future  - PSA tumor marker    5. 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 Fatty liver disease, nonalcoholic  Stable     7. H/O adenomatous polyp of colon  Needs colonoscopy next year     8. Type 2 diabetes mellitus with diabetic nephropathy, without long-term current use of insulin (H)  See above. RF done   - glimepiride (AMARYL) 1 MG tablet; TAKE 1/2 (ONE-HALF) TABLET BY MOUTH IN THE MORNING BEFORE  BREAKFAST  Dispense: 45 tablet; Refill: 3    End of Life Planning:  Patient currently has an advanced directive: No.  I have verified the patient's ablity to prepare an advanced directive/make health care decisions.  Literature was provided to assist patient in preparing an advanced directive.    COUNSELING:  Reviewed preventive health counseling, as reflected in patient instructions       Regular exercise       Healthy diet/nutrition    BP Readings from Last 1 Encounters:   01/29/19 122/62     Estimated body mass index is 32.43 kg/m  as calculated from the following:    Height as of 1/29/19: 1.778 m (5' 10\").    Weight as of 1/29/19: 102.5 kg (226 lb).      Weight management plan: Discussed healthy diet and exercise guidelines     reports that he has quit smoking. His smoking use included cigarettes. He has never used smokeless tobacco.      Appropriate preventive services were discussed with this patient, " including applicable screening as appropriate for cardiovascular disease, diabetes, osteopenia/osteoporosis, and glaucoma.  As appropriate for age/gender, discussed screening for colorectal cancer, prostate cancer, breast cancer, and cervical cancer. Checklist reviewing preventive services available has been given to the patient.    Reviewed patients plan of care and provided an AVS. The Basic Care Plan (routine screening as documented in Health Maintenance) for Carrillo meets the Care Plan requirement. This Care Plan has been established and reviewed with the Patient.    Counseling Resources:  ATP IV Guidelines  Pooled Cohorts Equation Calculator  Breast Cancer Risk Calculator  FRAX Risk Assessment  ICSI Preventive Guidelines  Dietary Guidelines for Americans, 2010  USDA's MyPlate  ASA Prophylaxis  Lung CA Screening    Mata Richardson MD  [facility]

## 2019-05-07 ENCOUNTER — OFFICE VISIT (OUTPATIENT)
Dept: FAMILY MEDICINE | Facility: OTHER | Age: 79
End: 2019-05-07
Attending: FAMILY MEDICINE
Payer: MEDICARE

## 2019-05-07 VITALS
WEIGHT: 234.8 LBS | BODY MASS INDEX: 34.78 KG/M2 | DIASTOLIC BLOOD PRESSURE: 72 MMHG | HEART RATE: 68 BPM | TEMPERATURE: 97.4 F | HEIGHT: 69 IN | SYSTOLIC BLOOD PRESSURE: 124 MMHG | OXYGEN SATURATION: 98 %

## 2019-05-07 DIAGNOSIS — K22.70 BARRETT'S ESOPHAGUS WITHOUT DYSPLASIA: ICD-10-CM

## 2019-05-07 DIAGNOSIS — K76.0 FATTY LIVER DISEASE, NONALCOHOLIC: ICD-10-CM

## 2019-05-07 DIAGNOSIS — E78.5 HYPERLIPIDEMIA LDL GOAL <100: ICD-10-CM

## 2019-05-07 DIAGNOSIS — Z86.0101 H/O ADENOMATOUS POLYP OF COLON: ICD-10-CM

## 2019-05-07 DIAGNOSIS — E11.9 TYPE 2 DIABETES MELLITUS WITHOUT COMPLICATION, WITHOUT LONG-TERM CURRENT USE OF INSULIN (H): Primary | ICD-10-CM

## 2019-05-07 DIAGNOSIS — E11.21 TYPE 2 DIABETES MELLITUS WITH DIABETIC NEPHROPATHY, WITHOUT LONG-TERM CURRENT USE OF INSULIN (H): ICD-10-CM

## 2019-05-07 DIAGNOSIS — I10 ESSENTIAL HYPERTENSION: ICD-10-CM

## 2019-05-07 DIAGNOSIS — R35.1 NOCTURIA: ICD-10-CM

## 2019-05-07 LAB
ALBUMIN SERPL-MCNC: 4 G/DL (ref 3.4–5)
ALP SERPL-CCNC: 71 U/L (ref 40–150)
ALT SERPL W P-5'-P-CCNC: 95 U/L (ref 0–70)
ANION GAP SERPL CALCULATED.3IONS-SCNC: 3 MMOL/L (ref 3–14)
AST SERPL W P-5'-P-CCNC: 53 U/L (ref 0–45)
BASOPHILS # BLD AUTO: 0 10E9/L (ref 0–0.2)
BASOPHILS NFR BLD AUTO: 0.7 %
BILIRUB SERPL-MCNC: 0.6 MG/DL (ref 0.2–1.3)
BUN SERPL-MCNC: 15 MG/DL (ref 7–30)
CALCIUM SERPL-MCNC: 9.5 MG/DL (ref 8.5–10.1)
CHLORIDE SERPL-SCNC: 100 MMOL/L (ref 94–109)
CHOLEST SERPL-MCNC: 172 MG/DL
CO2 SERPL-SCNC: 32 MMOL/L (ref 20–32)
CREAT SERPL-MCNC: 0.75 MG/DL (ref 0.66–1.25)
DIFFERENTIAL METHOD BLD: NORMAL
EOSINOPHIL # BLD AUTO: 0.2 10E9/L (ref 0–0.7)
EOSINOPHIL NFR BLD AUTO: 2.6 %
ERYTHROCYTE [DISTWIDTH] IN BLOOD BY AUTOMATED COUNT: 12 % (ref 10–15)
GFR SERPL CREATININE-BSD FRML MDRD: 89 ML/MIN/{1.73_M2}
GLUCOSE SERPL-MCNC: 150 MG/DL (ref 70–99)
HBA1C MFR BLD: 6.7 % (ref 0–5.6)
HCT VFR BLD AUTO: 44.8 % (ref 40–53)
HDLC SERPL-MCNC: 43 MG/DL
HGB BLD-MCNC: 15.7 G/DL (ref 13.3–17.7)
IMM GRANULOCYTES # BLD: 0 10E9/L (ref 0–0.4)
IMM GRANULOCYTES NFR BLD: 0.2 %
LDLC SERPL CALC-MCNC: 100 MG/DL
LYMPHOCYTES # BLD AUTO: 1.7 10E9/L (ref 0.8–5.3)
LYMPHOCYTES NFR BLD AUTO: 29.1 %
MCH RBC QN AUTO: 30.7 PG (ref 26.5–33)
MCHC RBC AUTO-ENTMCNC: 35 G/DL (ref 31.5–36.5)
MCV RBC AUTO: 88 FL (ref 78–100)
MONOCYTES # BLD AUTO: 0.5 10E9/L (ref 0–1.3)
MONOCYTES NFR BLD AUTO: 8.8 %
NEUTROPHILS # BLD AUTO: 3.4 10E9/L (ref 1.6–8.3)
NEUTROPHILS NFR BLD AUTO: 58.6 %
NONHDLC SERPL-MCNC: 129 MG/DL
NRBC # BLD AUTO: 0 10*3/UL
NRBC BLD AUTO-RTO: 0 /100
PLATELET # BLD AUTO: 214 10E9/L (ref 150–450)
POTASSIUM SERPL-SCNC: 4.3 MMOL/L (ref 3.4–5.3)
PROT SERPL-MCNC: 7.6 G/DL (ref 6.8–8.8)
PSA SERPL-MCNC: 1.04 UG/L (ref 0–4)
RBC # BLD AUTO: 5.11 10E12/L (ref 4.4–5.9)
SODIUM SERPL-SCNC: 135 MMOL/L (ref 133–144)
TRIGL SERPL-MCNC: 145 MG/DL
TSH SERPL DL<=0.005 MIU/L-ACNC: 0.9 MU/L (ref 0.4–4)
WBC # BLD AUTO: 5.8 10E9/L (ref 4–11)

## 2019-05-07 PROCEDURE — 80053 COMPREHEN METABOLIC PANEL: CPT | Mod: ZL | Performed by: FAMILY MEDICINE

## 2019-05-07 PROCEDURE — 80061 LIPID PANEL: CPT | Mod: ZL | Performed by: FAMILY MEDICINE

## 2019-05-07 PROCEDURE — G0463 HOSPITAL OUTPT CLINIC VISIT: HCPCS

## 2019-05-07 PROCEDURE — 36415 COLL VENOUS BLD VENIPUNCTURE: CPT | Mod: ZL | Performed by: FAMILY MEDICINE

## 2019-05-07 PROCEDURE — 85025 COMPLETE CBC W/AUTO DIFF WBC: CPT | Mod: ZL | Performed by: FAMILY MEDICINE

## 2019-05-07 PROCEDURE — 84443 ASSAY THYROID STIM HORMONE: CPT | Mod: ZL | Performed by: FAMILY MEDICINE

## 2019-05-07 PROCEDURE — 84153 ASSAY OF PSA TOTAL: CPT | Mod: ZL | Performed by: FAMILY MEDICINE

## 2019-05-07 PROCEDURE — 99214 OFFICE O/P EST MOD 30 MIN: CPT | Performed by: FAMILY MEDICINE

## 2019-05-07 PROCEDURE — 83036 HEMOGLOBIN GLYCOSYLATED A1C: CPT | Mod: ZL | Performed by: FAMILY MEDICINE

## 2019-05-07 RX ORDER — AMOXICILLIN 500 MG/1
2000 CAPSULE ORAL
Status: ON HOLD | COMMUNITY
End: 2019-06-25

## 2019-05-07 RX ORDER — ZOSTER VACCINE RECOMBINANT, ADJUVANTED 50 MCG/0.5
KIT INTRAMUSCULAR
Refills: 0 | COMMUNITY
Start: 2019-03-16 | End: 2019-06-05

## 2019-05-07 RX ORDER — GLIMEPIRIDE 1 MG/1
TABLET ORAL
Qty: 45 TABLET | Refills: 3 | Status: SHIPPED | OUTPATIENT
Start: 2019-05-07 | End: 2020-03-04

## 2019-05-07 ASSESSMENT — MIFFLIN-ST. JEOR: SCORE: 1791.78

## 2019-05-07 ASSESSMENT — PAIN SCALES - GENERAL: PAINLEVEL: NO PAIN (0)

## 2019-05-07 NOTE — NURSING NOTE
"Chief Complaint   Patient presents with     Physical       Initial /72 (BP Location: Left arm, Patient Position: Chair, Cuff Size: Adult Large)   Pulse 68   Temp 97.4  F (36.3  C) (Tympanic)   Ht 1.763 m (5' 9.4\")   Wt 106.5 kg (234 lb 12.8 oz)   SpO2 98%   BMI 34.28 kg/m   Estimated body mass index is 34.28 kg/m  as calculated from the following:    Height as of this encounter: 1.763 m (5' 9.4\").    Weight as of this encounter: 106.5 kg (234 lb 12.8 oz).  Medication Reconciliation: complete    April Tavares LPN  "

## 2019-05-14 ENCOUNTER — HOSPITAL ENCOUNTER (OUTPATIENT)
Dept: EDUCATION SERVICES | Facility: HOSPITAL | Age: 79
Discharge: HOME | End: 2019-05-14
Attending: FAMILY MEDICINE | Admitting: FAMILY MEDICINE
Payer: MEDICARE

## 2019-05-14 VITALS
SYSTOLIC BLOOD PRESSURE: 137 MMHG | OXYGEN SATURATION: 96 % | HEART RATE: 68 BPM | BODY MASS INDEX: 34.44 KG/M2 | WEIGHT: 235.9 LBS | RESPIRATION RATE: 16 BRPM | DIASTOLIC BLOOD PRESSURE: 75 MMHG

## 2019-05-14 DIAGNOSIS — E11.9 TYPE 2 DIABETES MELLITUS WITHOUT COMPLICATION, WITHOUT LONG-TERM CURRENT USE OF INSULIN (H): Primary | ICD-10-CM

## 2019-05-14 DIAGNOSIS — E11.9 DIABETES MELLITUS, TYPE 2 (H): ICD-10-CM

## 2019-05-14 PROCEDURE — G0109 DIAB MANAGE TRN IND/GROUP: HCPCS

## 2019-05-14 RX ORDER — BLOOD-GLUCOSE METER
EACH MISCELLANEOUS
Qty: 1 KIT | Refills: 0 | Status: SHIPPED | OUTPATIENT
Start: 2019-05-14 | End: 2019-05-15

## 2019-05-14 ASSESSMENT — PAIN SCALES - GENERAL: PAINLEVEL: NO PAIN (0)

## 2019-05-15 ENCOUNTER — TELEPHONE (OUTPATIENT)
Dept: FAMILY MEDICINE | Facility: OTHER | Age: 79
End: 2019-05-15

## 2019-05-15 DIAGNOSIS — E11.9 TYPE 2 DIABETES MELLITUS WITHOUT COMPLICATION, WITHOUT LONG-TERM CURRENT USE OF INSULIN (H): ICD-10-CM

## 2019-05-15 RX ORDER — BLOOD-GLUCOSE METER
EACH MISCELLANEOUS
Qty: 1 KIT | Refills: 0 | Status: SHIPPED | OUTPATIENT
Start: 2019-05-15

## 2019-05-15 NOTE — PROGRESS NOTES
"Diabetes Self-Management Education & Support    Diabetes Education Self Management & Training - Annual    SUBJECTIVE/OBJECTIVE:  Presents for: Individual review  Accompanied by: Self, Spouse  Diabetes education in the past 24mo: Yes  Focus of Visit: Monitoring, Taking Medication, Healthy Eating  Diabetes type: Type 2  Disease course: Stable  Diabetes management related comments/concerns: None  Transportation concerns: No  Other concerns:: None  Cultural Influences/Ethnic Background:  American      Diabetes Symptoms & Complications  Blurred vision: No  Fatigue: No  Neuropathy: No  Foot ulcerations: No  Polydipsia: No  Polyphagia: No  Polyuria: No  Visual change: No  Weakness: No  Weight loss: No  Slow healing wounds: No  Recent Infection(s): No  Symptom course: Stable  Weight trend: Increasing steadily  Autonomic neuropathy: No  CVA: No  Heart disease: No  Nephropathy: No  Peripheral neuropathy: No  Peripheral Vascular Disease: No  Retinopathy: No    Patient Problem List and Family Medical History reviewed for relevant medical history, current medical status, and diabetes risk factors.    Vitals:  /75   Pulse 68   Resp 16   Wt 107 kg (235 lb 14.4 oz)   SpO2 96%   BMI 34.44 kg/m    Estimated body mass index is 34.44 kg/m  as calculated from the following:    Height as of 5/7/19: 1.763 m (5' 9.4\").    Weight as of this encounter: 107 kg (235 lb 14.4 oz).   Last 3 BP:   BP Readings from Last 3 Encounters:   05/14/19 137/75   05/07/19 124/72   01/29/19 122/62       History   Smoking Status     Former Smoker     Types: Cigarettes   Smokeless Tobacco     Never Used     Comment: quit in the 80s       Labs:  Lab Results   Component Value Date    A1C 6.7 05/07/2019     Lab Results   Component Value Date     05/07/2019     Lab Results   Component Value Date     05/07/2019     HDL Cholesterol   Date Value Ref Range Status   05/07/2019 43 >39 mg/dL Final   ]  GFR Estimate   Date Value Ref Range Status "   2019 89 >60 mL/min/[1.73_m2] Final     Comment:     Non  GFR Calc  Starting 2018, serum creatinine based estimated GFR (eGFR) will be   calculated using the Chronic Kidney Disease Epidemiology Collaboration   (CKD-EPI) equation.       GFR Estimate If Black   Date Value Ref Range Status   2019 >90 >60 mL/min/[1.73_m2] Final     Comment:      GFR Calc  Starting 2018, serum creatinine based estimated GFR (eGFR) will be   calculated using the Chronic Kidney Disease Epidemiology Collaboration   (CKD-EPI) equation.       Lab Results   Component Value Date    CR 0.75 2019     No results found for: MICROALBUMIN    Healthy Eating  Healthy Eating Assessed Today: Yes  Cultural/Oriental orthodox diet restrictions?: No  Patient on a regular basis: Eats 3 meals a day  Meal planning: Carbohydrate counting, Smaller portions  Meals include: Breakfast, Lunch, Dinner, Snacks  Breakfast: cheerios, banana, milk  Lunch: sandwich with chips or veggies  Dinner: hamburger with bun, fries, coleslaw or ribs with potato salad, sausage with mac and cheese  Snacks: hard boiled eggs, strawberry shortcake  Beverages: Water, Milk, Juice, Soda  Has patient met with a dietitian in the past?: Yes    Being Active  Being Active Assessed Today: Yes  Exercise:: [place] [employer] [place]  Days per week of moderate to strenuous exercise (like a brisk walk): 3  On average, minutes per day of exercise at this level: 20  How intense was your typical exercise? : Moderate (like brisk walking)  Exercise Minutes per Week: 60    Monitoring  Monitoring Assessed Today: Yes  Did patient bring glucose meter to appointment? : Yes  Blood Glucose Meter: Accu-check  Home Glucose (Sugar) Monitorin-2 times per day        Taking Medications  Diabetes Medication(s)     Biguanides       metFORMIN (GLUCOPHAGE-XR) 500 MG 24 hr tablet    TAKE 1 TABLET BY MOUTH TWICE DAILY WITH MEALS **DUE FOR FOLLOW-UP VISIT AND  LABS**    Sulfonylureas       glimepiride (AMARYL) 1 MG tablet    TAKE 1/2 (ONE-HALF) TABLET BY MOUTH IN THE MORNING BEFORE  BREAKFAST          Taking Medication Assessed Today: Yes  Current Treatments: Oral Agent (dual therapy)  Problems taking diabetes medications regularly?: Yes  Diabetes medication side effects?: No  Treatment Compliance: All of the time    Problem Solving  Problem Solving Assessed Today: Yes  Hypoglycemia Frequency: Rarely  Hypoglycemia Treatment: Glucose (tablets or gel), Other food    Hypoglycemia symptoms  Tremors: Yes         Reducing Risks  Reducing Risks Assessed Today: No  Has dilated eye exam at least once a year?: Yes    Healthy Coping  Healthy Coping Assessed Today: Yes  Emotional response to diabetes: Confidence diabetes can be controlled, Concern for health and well-being  Informal Support system:: Family, Spouse  Stage of change: ACTION (Actively working towards change)  Difficulty affording diabetes management supplies?: No  Support resources: None  Patient Activation Measure Survey Score:  JAVIER Score (Last Two) 6/21/2018   JAVIER Raw Score 40   Activation Score 100   JAVIER Level 4       ASSESSMENT:  Readings range as follows: 111-139 and post-supper:113-149.    No concerns today. Plans to be more active with the warm weather.        Patient's most recent   Lab Results   Component Value Date    A1C 6.7 05/07/2019    is meeting goal of <7.0    INTERVENTION:   Diabetes knowledge and skills assessment:     Patient is knowledgeable in diabetes management concepts related to: Healthy Eating, Being Active, Monitoring and Taking Medication    Patient needs further education on the following diabetes management concepts: Healthy Eating, Monitoring and Taking Medication    Based on learning assessment above, most appropriate setting for further diabetes education would be: Group class setting.    Education provided today on:  AADE Self-Care Behaviors:  Diabetes Pathophysiology  Healthy Eating:  consistency in amount, composition, and timing of food intake, portion control and label reading  Being Active: relationship to blood glucose and precautions to take  Monitoring: log and interpret results, individual blood glucose targets, frequency of monitoring and use of glucose control solution  Taking Medication: action of prescribed medication, side effects of prescribed medications and when to take medications  Problem Solving: low blood glucose - causes, signs/symptoms, treatment and prevention, carrying a carbohydrate source at all times and when to call health care provider  Reducing Risks: prevention, early diagnostic measures and treatment of complications, annual eye exam and A1C - goals, relating to blood glucose levels, how often to check    Opportunities for ongoing education and support in diabetes-self management were discussed.    Pt verbalized understanding of concepts discussed and recommendations provided today.       Education Materials Provided:  No new materials provided today    PLAN:  See Patient Instructions for co-developed, patient-stated behavior change goals.  Continue current plan  Return annually and prn  AVS printed and provided to patient today. See Follow-Up section for recommended follow-up.      Time Spent: 60 minutes  Encounter Type: Individual    Any diabetes medication dose changes were made via the CDE Protocol and Collaborative Practice Agreement with the patient's primary care provider. A copy of this encounter was shared with the provider.

## 2019-05-15 NOTE — TELEPHONE ENCOUNTER
Received a fax from pharmacy and it stated that blood glucose monitoring (ACCU-CHEK ANGELI PLUS) meter device kit SIG can only state 1 time daily. Revised RX pended.  Luz Sanches, CMA

## 2019-05-23 ENCOUNTER — TRANSFERRED RECORDS (OUTPATIENT)
Dept: HEALTH INFORMATION MANAGEMENT | Facility: CLINIC | Age: 79
End: 2019-05-23

## 2019-05-23 ENCOUNTER — TELEPHONE (OUTPATIENT)
Dept: FAMILY MEDICINE | Facility: OTHER | Age: 79
End: 2019-05-23

## 2019-05-23 NOTE — H&P (VIEW-ONLY)
[facility] [place]  [address]  [place] [address]  [phone]  Dept: [phone]    PRE-OP EVALUATION:  Today's date: 2019    Carrillo Carranza (: 1940) presents for pre-operative evaluation assessment as requested by Dr. Patrick.  He requires evaluation and anesthesia risk assessment prior to undergoing surgery/procedure for treatment of cataracts .    Proposed Surgery/ Procedure: bilateral cataract removal with lens replacement  Date of Surgery/ Procedure: right eye 19 and left eye 19  Time of Surgery/ Procedure: unknown  Hospital/Surgical Facility: [facility] [place]    Primary Physician: Mata Richardson  Type of Anesthesia Anticipated: to be determined    Patient has a Health Care Directive or Living Will:  NO    1. NO - Do you have a history of heart attack, stroke, stent, bypass or surgery on an artery in the head, neck, heart or legs?  2. NO - Do you ever have any pain or discomfort in your chest?  3. NO - Do you have a history of  Heart Failure?  4. NO - Are you troubled by shortness of breath when: walking on the level, up a slight hill or at night?  5. NO - Do you currently have a cold, bronchitis or other respiratory infection?  6. NO - Do you have a cough, shortness of breath or wheezing?  7. NO - Do you sometimes get pains in the calves of your legs when you walk?  8. NO - Do you or anyone in your family have previous history of blood clots?  9. NO - Do you or does anyone in your family have a serious bleeding problem such as prolonged bleeding following surgeries or cuts?  10. NO - Have you ever had problems with anemia or been told to take iron pills?  11. NO - Have you had any abnormal blood loss such as black, tarry or bloody stools, or abnormal vaginal bleeding?  12. NO - Have you ever had a blood transfusion?  13. NO - Have you or any of your relatives ever had problems with anesthesia?  14. NO - Do you have sleep apnea, excessive snoring or daytime drowsiness?  15.  NO - Do you have any prosthetic heart valves?  16. YES - DO YOU HAVE PROSTHETIC JOINTS? Bilateral knees  17. NO - Is there any chance that you may be pregnant?      HPI:     HPI related to upcoming procedure:   77 yO male  presents for cardiopulmonary/general clearance to undergo the above procedure.  His surgeon listed above has asked for this clearance to undergo anesthesia. Pt has had this condition for approximately over a year .   Overall pt is of good health and has not  had any perioperative complications with previous surgeries.          HYPERTENSION - Patient has longstanding history of HTN , currently denies any symptoms referable to elevated blood pressure. Specifically denies chest pain, palpitations, dyspnea, orthopnea, PND or peripheral edema. Blood pressure readings have been in normal range. Current medication regimen is as listed below. Patient denies any side effects of medication.       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 05/07/2019     Priority: Medium     H/O adenomatous polyp of colon 05/07/2019     Priority: Medium     Low libido 09/14/2017     Priority: Medium     BPH with obstruction/lower urinary tract symptoms 05/16/2017     Priority: Medium     Type 2 diabetes mellitus without complication, without long-term current use of insulin (H) 12/12/2016     Priority: Medium     Essential hypertension with goal blood pressure less than 140/90 07/15/2016     Priority: Medium     Nocturia 10/09/2014     Priority: Medium     Hypomagnesemia 06/23/2014     Priority: Medium     BPPV (benign paroxysmal positional vertigo) 06/13/2014     Priority: Medium     Hypokalemia 06/02/2014     Priority: Medium     Hyperlipidemia with target LDL less than 100      Priority: Medium     Diagnosis updated by automated process. Provider to review and confirm.       Fatty liver disease, nonalcoholic      Priority: Medium     Diffuse  connective tissue disease (H) 01/29/2013     Priority: Medium     Problem list name updated by automated process. Provider to review       Advanced care planning/counseling discussion 06/12/2012     Priority: Medium      Past Medical History:   Diagnosis Date     Baca's esophagus 11/3/2011     Contact dermatitis and other eczema, due to unspecified cause 3/1/2011     Dermatophytosis of groin and perianal area 2/1/2006     Esophageal reflux 11/3/2011    GERD     Family history of colonic polyps 11/3/2011     Fatty liver disease, nonalcoholic      Generalized osteoarthrosis, involving multiple sites 3/1/2011     HTN (hypertension)      Hyperlipidaemia LDL goal < 100      Other and unspecified hyperlipidemia 11/3/2011     Other chronic nonalcoholic liver disease 11/3/2011    SANCHES (nonalcoholic steatohepatitis)     Other premature beats 11/3/2011    PVC (premature ventricular contraction)     Other specified cardiac dysrhythmias(427.89) 11/3/2011    ventricular bigeminy under anesthesia     Personal history of tobacco use, presenting hazards to health 11/3/2011    in remission     Type II or unspecified type diabetes mellitus without mention of complication, not stated as uncontrolled 2/7/2012     Umbilical hernia without mention of obstruction or gangrene 11/3/2011     Unspecified diffuse connective tissue disease 1/29/2013     Unspecified essential hypertension 11/3/2011     Past Surgical History:   Procedure Laterality Date     ADENOIDECTOMY       APPENDECTOMY       ARTHROSCOPY KNEE       arthroscopy right great toe      bunion     COLONOSCOPY  02-     COLONOSCOPY  2010,2006    repeat in 2015     COLONOSCOPY  8-     COLONOSCOPY  2015    Repeat 2020     ESOPHAGOGASTRODUODENOSCOPY      Repeat in 2015     ESOPHAGOGASTRODUODENOSCOPY  2015    Dr Funes/Vivek- Repeat 3yrs     EXCISE LESION EAR EXTERNAL Right 3/13/2018    Procedure: EXCISE LESION EAR EXTERNAL;  EXCISION OF PREAURICULAR BASAL CELL  CARCINOMA WITH FROZENS, FLAP REPAIR ( 2.6 x 2cm Defect), ( 3.5 x 3.5cmTissue Rearrangement);  Surgeon: Kyara Rojas MD;  Location: [facility] OR     HERNIA REPAIR, UMBILICAL  2012    reduction and repair of umbilical hernia     JOINT REPLACEMENT       RELEASE CARPAL TUNNEL      carpal tunnel syndrome     shoulder bone spur removal       TKA       TONSILLECTOMY       UPPER GI ENDOSCOPY  2012    repeat in 2014     UPPER GI ENDOSCOPY       UPPER GI ENDOSCOPY       UPPER GI ENDOSCOPY  2010    repeat 2012     Current Outpatient Medications   Medication Sig Dispense Refill     amoxicillin (AMOXIL) 500 MG capsule Take 2,000 mg by mouth       Aspirin (ASPIR-81 PO) Take 1 tablet by mouth daily        atenolol-chlorthalidone (TENORETIC) 50-25 MG tablet TAKE 1 TABLET DAILY 90 tablet 1     blood glucose (ACCU-CHEK ANGELI PLUS) test strip USE ONE STRIP TO CHECK GLUCOSE ONCE DAILY 100 strip 5     blood glucose calibration (ACCU-CHEK ANGELI) solution USE CONTROL SOLUTION AS DIRECTED ONCE MONTHLY TO CHECK ACCURACY OF METER 1 each 0     blood glucose monitoring (ACCU-CHEK ANGELI PLUS) meter device kit Use to test blood sugar 1 times daily 1 kit 0     blood glucose monitoring (ACCU-CHEK MULTICLIX) lancets USE TO TEST BLOOD SUGAR ONCE DAILY 102 each 5     Cholecalciferol (VITAMIN D PO) Take 1 capsule by mouth daily       docusate sodium (STOOL SOFTENER) 100 MG capsule Take 1 capsule by mouth daily        ezetimibe (ZETIA) 10 MG tablet TAKE 1 TABLET DAILY 90 tablet 1     glimepiride (AMARYL) 1 MG tablet TAKE 1/2 (ONE-HALF) TABLET BY MOUTH IN THE MORNING BEFORE  BREAKFAST 45 tablet 3     Ibuprofen (ADVIL PO) Take by mouth every 4 hours as needed for moderate pain       KLOR-CON 20 MEQ CR tablet TAKE 1 TABLET BY MOUTH TWICE DAILY WITH MEALS 180 tablet 0     Magnesium Oxide -Mg Supplement 400 MG CAPS Take 1 tablet by mouth       magnesium oxide 400 MG CAPS Take 400 mg by mouth 2 times daily 180 capsule 2     metFORMIN (GLUCOPHAGE-XR) 500 MG  "24 hr tablet TAKE 1 TABLET BY MOUTH TWICE DAILY WITH MEALS **DUE FOR FOLLOW-UP VISIT AND LABS** 180 tablet 0     Multiple Vitamin (MULTIVITAMINS PO) Take 1 tablet by mouth daily       pantoprazole (PROTONIX) 40 MG EC tablet TAKE 1 TABLET DAILY 90 tablet 2     SHINGRIX injection ADM 0.5ML IM UTD  0     STATIN NOT PRESCRIBED, INTENTIONAL, Please choose reason not prescribed, below       tamsulosin (FLOMAX) 0.4 MG capsule TAKE ONE CAPSULE BY MOUTH ONCE DAILY 30 capsule 2     traZODone (DESYREL) 50 MG tablet TAKE ONE TABLET BY MOUTH AT BEDTIME (Patient taking differently: TAKE  HALF TABLET BY MOUTH AT BEDTIME) 90 tablet 1     OTC products: None, except as noted above    Allergies   Allergen Reactions     Atorvastatin Calcium Other (See Comments)     Lipitor - myalgia     Avodart [Dutasteride] Other (See Comments)     Sexual dsfxn     Simvastatin Other (See Comments)     Zocor - elevated liver function test      Latex Allergy: NO    Social History     Tobacco Use     Smoking status: Former Smoker     Types: Cigarettes     Smokeless tobacco: Never Used     Tobacco comment: quit in the 80s   Substance Use Topics     Alcohol use: No     History   Drug Use No       REVIEW OF SYSTEMS:   Constitutional, neuro, ENT, endocrine, pulmonary, cardiac, gastrointestinal, genitourinary, musculoskeletal, integument and psychiatric systems are negative, except as otherwise noted.    EXAM:   /64   Pulse 68   Temp 97.9  F (36.6  C)   Ht 1.772 m (5' 9.75\")   Wt 106.1 kg (234 lb)   SpO2 98%   BMI 33.82 kg/m      GENERAL APPEARANCE: healthy, alert and no distress     EYES: EOMI,  PERRL     HENT: ear canals and TM's normal and nose and mouth without ulcers or lesions     NECK: no adenopathy, no asymmetry, masses, or scars and thyroid normal to palpation     RESP: lungs clear to auscultation - no rales, rhonchi or wheezes     CV: regular rates and rhythm, normal S1 S2, no S3 or S4 and no murmur, click or rub     ABDOMEN:  soft, " nontender, no HSM or masses and bowel sounds normal     MS: extremities normal- no gross deformities noted, no evidence of inflammation in joints, FROM in all extremities.     SKIN: no suspicious lesions or rashes     NEURO: Normal strength and tone, sensory exam grossly normal, mentation intact and speech normal     PSYCH: mentation appears normal. and affect normal/bright     LYMPHATICS: No cervical adenopathy    DIAGNOSTICS:   EKG: appears normal, NSR, first decree block , normal axis, normal intervals, no acute ST/T changes c/w ischemia, no LVH by voltage criteria, unchanged from previous tracings    Recent Labs   Lab Test 05/07/19  0853 12/07/18  1047 07/06/18  0807 03/12/18  0747   HGB 15.7  --   --  15.6     --   --  187     --  136  --    POTASSIUM 4.3  --  3.5  --    CR 0.75  --  0.72  --    A1C 6.7* 6.6* 6.1*  --       Results for orders placed or performed in visit on 06/05/19   CBC with platelets differential   Result Value Ref Range    WBC 6.0 4.0 - 11.0 10e9/L    RBC Count 4.93 4.4 - 5.9 10e12/L    Hemoglobin 15.3 13.3 - 17.7 g/dL    Hematocrit 43.1 40.0 - 53.0 %    MCV 87 78 - 100 fl    MCH 31.0 26.5 - 33.0 pg    MCHC 35.5 31.5 - 36.5 g/dL    RDW 12.1 10.0 - 15.0 %    Platelet Count 195 150 - 450 10e9/L    Diff Method Automated Method     % Neutrophils 51.1 %    % Lymphocytes 31.3 %    % Monocytes 12.6 %    % Eosinophils 4.0 %    % Basophils 0.7 %    % Immature Granulocytes 0.3 %    Nucleated RBCs 0 0 /100    Absolute Neutrophil 3.1 1.6 - 8.3 10e9/L    Absolute Lymphocytes 1.9 0.8 - 5.3 10e9/L    Absolute Monocytes 0.8 0.0 - 1.3 10e9/L    Absolute Eosinophils 0.2 0.0 - 0.7 10e9/L    Absolute Basophils 0.0 0.0 - 0.2 10e9/L    Abs Immature Granulocytes 0.0 0 - 0.4 10e9/L    Absolute Nucleated RBC 0.0    Basic metabolic panel   Result Value Ref Range    Sodium 138 133 - 144 mmol/L    Potassium 3.6 3.4 - 5.3 mmol/L    Chloride 101 94 - 109 mmol/L    Carbon Dioxide 31 20 - 32 mmol/L    Anion  Gap 6 3 - 14 mmol/L    Glucose 110 (H) 70 - 99 mg/dL    Urea Nitrogen 14 7 - 30 mg/dL    Creatinine 0.77 0.66 - 1.25 mg/dL    GFR Estimate 88 >60 mL/min/[1.73_m2]    GFR Estimate If Black >90 >60 mL/min/[1.73_m2]    Calcium 9.5 8.5 - 10.1 mg/dL       RECENT PAST LABS BELOW     Results for orders placed or performed in visit on 05/07/19   PSA tumor marker   Result Value Ref Range    PSA 1.04 0 - 4 ug/L   Lipid Profile   Result Value Ref Range    Cholesterol 172 <200 mg/dL    Triglycerides 145 <150 mg/dL    HDL Cholesterol 43 >39 mg/dL    LDL Cholesterol Calculated 100 (H) <100 mg/dL    Non HDL Cholesterol 129 <130 mg/dL   TSH   Result Value Ref Range    TSH 0.90 0.40 - 4.00 mU/L   Comprehensive metabolic panel   Result Value Ref Range    Sodium 135 133 - 144 mmol/L    Potassium 4.3 3.4 - 5.3 mmol/L    Chloride 100 94 - 109 mmol/L    Carbon Dioxide 32 20 - 32 mmol/L    Anion Gap 3 3 - 14 mmol/L    Glucose 150 (H) 70 - 99 mg/dL    Urea Nitrogen 15 7 - 30 mg/dL    Creatinine 0.75 0.66 - 1.25 mg/dL    GFR Estimate 89 >60 mL/min/[1.73_m2]    GFR Estimate If Black >90 >60 mL/min/[1.73_m2]    Calcium 9.5 8.5 - 10.1 mg/dL    Bilirubin Total 0.6 0.2 - 1.3 mg/dL    Albumin 4.0 3.4 - 5.0 g/dL    Protein Total 7.6 6.8 - 8.8 g/dL    Alkaline Phosphatase 71 40 - 150 U/L    ALT 95 (H) 0 - 70 U/L    AST 53 (H) 0 - 45 U/L   CBC with platelets differential   Result Value Ref Range    WBC 5.8 4.0 - 11.0 10e9/L    RBC Count 5.11 4.4 - 5.9 10e12/L    Hemoglobin 15.7 13.3 - 17.7 g/dL    Hematocrit 44.8 40.0 - 53.0 %    MCV 88 78 - 100 fl    MCH 30.7 26.5 - 33.0 pg    MCHC 35.0 31.5 - 36.5 g/dL    RDW 12.0 10.0 - 15.0 %    Platelet Count 214 150 - 450 10e9/L    Diff Method Automated Method     % Neutrophils 58.6 %    % Lymphocytes 29.1 %    % Monocytes 8.8 %    % Eosinophils 2.6 %    % Basophils 0.7 %    % Immature Granulocytes 0.2 %    Nucleated RBCs 0 0 /100    Absolute Neutrophil 3.4 1.6 - 8.3 10e9/L    Absolute Lymphocytes 1.7 0.8 -  5.3 10e9/L    Absolute Monocytes 0.5 0.0 - 1.3 10e9/L    Absolute Eosinophils 0.2 0.0 - 0.7 10e9/L    Absolute Basophils 0.0 0.0 - 0.2 10e9/L    Abs Immature Granulocytes 0.0 0 - 0.4 10e9/L    Absolute Nucleated RBC 0.0    Hemoglobin A1c   Result Value Ref Range    Hemoglobin A1C 6.7 (H) 0 - 5.6 %         IMPRESSION:   Reason for surgery/procedure: Bilateral cataracts  Diagnosis/reason for consult: Cardiopulmonary clearance     The proposed surgical procedure is considered LOW risk.    REVISED CARDIAC RISK INDEX  The patient has the following serious cardiovascular risks for perioperative complications such as (MI, PE, VFib and 3  AV Block):  No serious cardiac risks  INTERPRETATION: 0 risks: Class I (very low risk - 0.4% complication rate)    The patient has the following additional risks for perioperative complications:  No identified additional risks      ICD-10-CM    1. Preop general physical exam Z01.818 Basic metabolic panel     CBC with platelets differential     EKG 12-lead complete w/read - (Clinic Performed)     EKG 12-lead complete w/read - (Clinic Performed)     CBC with platelets differential     Basic metabolic panel   2. Age-related cataract of both eyes, unspecified age-related cataract type H25.9    3. Type 2 diabetes mellitus without complication, without long-term current use of insulin (H) E11.9    4. Essential hypertension with goal blood pressure less than 140/90 I10        RECOMMENDATIONS:       APPROVAL GIVEN to proceed with proposed procedure, without further diagnostic evaluation       Signed Electronically by: Mata Richardson MD    Copy of this evaluation report is provided to requesting physician.    [facility] Preop Guidelines    Revised Cardiac Risk Index

## 2019-05-23 NOTE — PROGRESS NOTES
[facility]  [address]  [place] [address]  [phone]  Dept: [phone]    PRE-OP EVALUATION:  Today's date: 2019    Carrillo Carranza (: 1940) presents for pre-operative evaluation assessment as requested by Dr. Patrick.  He requires evaluation and anesthesia risk assessment prior to undergoing surgery/procedure for treatment of cataracts .    Proposed Surgery/ Procedure: bilateral cataract removal with lens replacement  Date of Surgery/ Procedure: right eye 19 and left eye 19  Time of Surgery/ Procedure: unknown  Hospital/Surgical Facility: [facility] [place]    Primary Physician: Mata Richardson  Type of Anesthesia Anticipated: to be determined    Patient has a Health Care Directive or Living Will:  NO    1. NO - Do you have a history of heart attack, stroke, stent, bypass or surgery on an artery in the head, neck, heart or legs?  2. NO - Do you ever have any pain or discomfort in your chest?  3. NO - Do you have a history of  Heart Failure?  4. NO - Are you troubled by shortness of breath when: walking on the level, up a slight hill or at night?  5. NO - Do you currently have a cold, bronchitis or other respiratory infection?  6. NO - Do you have a cough, shortness of breath or wheezing?  7. NO - Do you sometimes get pains in the calves of your legs when you walk?  8. NO - Do you or anyone in your family have previous history of blood clots?  9. NO - Do you or does anyone in your family have a serious bleeding problem such as prolonged bleeding following surgeries or cuts?  10. NO - Have you ever had problems with anemia or been told to take iron pills?  11. NO - Have you had any abnormal blood loss such as black, tarry or bloody stools, or abnormal vaginal bleeding?  12. NO - Have you ever had a blood transfusion?  13. NO - Have you or any of your relatives ever had problems with anesthesia?  14. NO - Do you have sleep apnea, excessive snoring or daytime drowsiness?  15.  NO - Do you have any prosthetic heart valves?  16. YES - DO YOU HAVE PROSTHETIC JOINTS? Bilateral knees  17. NO - Is there any chance that you may be pregnant?      HPI:     HPI related to upcoming procedure:   79 yO male  presents for cardiopulmonary/general clearance to undergo the above procedure.  His surgeon listed above has asked for this clearance to undergo anesthesia. Pt has had this condition for approximately over a year .   Overall pt is of good health and has not  had any perioperative complications with previous surgeries.          HYPERTENSION - Patient has longstanding history of HTN , currently denies any symptoms referable to elevated blood pressure. Specifically denies chest pain, palpitations, dyspnea, orthopnea, PND or peripheral edema. Blood pressure readings have been in normal range. Current medication regimen is as listed below. Patient denies any side effects of medication.       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 05/07/2019     Priority: Medium     H/O adenomatous polyp of colon 05/07/2019     Priority: Medium     Low libido 09/14/2017     Priority: Medium     BPH with obstruction/lower urinary tract symptoms 05/16/2017     Priority: Medium     Type 2 diabetes mellitus without complication, without long-term current use of insulin (H) 12/12/2016     Priority: Medium     Essential hypertension with goal blood pressure less than 140/90 07/15/2016     Priority: Medium     Nocturia 10/09/2014     Priority: Medium     Hypomagnesemia 06/23/2014     Priority: Medium     BPPV (benign paroxysmal positional vertigo) 06/13/2014     Priority: Medium     Hypokalemia 06/02/2014     Priority: Medium     Hyperlipidemia with target LDL less than 100      Priority: Medium     Diagnosis updated by automated process. Provider to review and confirm.       Fatty liver disease, nonalcoholic      Priority: Medium     Diffuse  connective tissue disease (H) 01/29/2013     Priority: Medium     Problem list name updated by automated process. Provider to review       Advanced care planning/counseling discussion 06/12/2012     Priority: Medium      Past Medical History:   Diagnosis Date     Baca's esophagus 11/3/2011     Contact dermatitis and other eczema, due to unspecified cause 3/1/2011     Dermatophytosis of groin and perianal area 2/1/2006     Esophageal reflux 11/3/2011    GERD     Family history of colonic polyps 11/3/2011     Fatty liver disease, nonalcoholic      Generalized osteoarthrosis, involving multiple sites 3/1/2011     HTN (hypertension)      Hyperlipidaemia LDL goal < 100      Other and unspecified hyperlipidemia 11/3/2011     Other chronic nonalcoholic liver disease 11/3/2011    SANCHES (nonalcoholic steatohepatitis)     Other premature beats 11/3/2011    PVC (premature ventricular contraction)     Other specified cardiac dysrhythmias(427.89) 11/3/2011    ventricular bigeminy under anesthesia     Personal history of tobacco use, presenting hazards to health 11/3/2011    in remission     Type II or unspecified type diabetes mellitus without mention of complication, not stated as uncontrolled 2/7/2012     Umbilical hernia without mention of obstruction or gangrene 11/3/2011     Unspecified diffuse connective tissue disease 1/29/2013     Unspecified essential hypertension 11/3/2011     Past Surgical History:   Procedure Laterality Date     ADENOIDECTOMY       APPENDECTOMY       ARTHROSCOPY KNEE       arthroscopy right great toe      bunion     COLONOSCOPY  02-     COLONOSCOPY  2010,2006    repeat in 2015     COLONOSCOPY  8-     COLONOSCOPY  2015    Repeat 2020     ESOPHAGOGASTRODUODENOSCOPY      Repeat in 2015     ESOPHAGOGASTRODUODENOSCOPY  2015    Dr Funes/Vivek- Repeat 3yrs     EXCISE LESION EAR EXTERNAL Right 3/13/2018    Procedure: EXCISE LESION EAR EXTERNAL;  EXCISION OF PREAURICULAR BASAL CELL  CARCINOMA WITH FROZENS, FLAP REPAIR ( 2.6 x 2cm Defect), ( 3.5 x 3.5cmTissue Rearrangement);  Surgeon: Kyara Rojas MD;  Location: [facility] OR     HERNIA REPAIR, UMBILICAL  2012    reduction and repair of umbilical hernia     JOINT REPLACEMENT       RELEASE CARPAL TUNNEL      carpal tunnel syndrome     shoulder bone spur removal       TKA       TONSILLECTOMY       UPPER GI ENDOSCOPY  2012    repeat in 2014     UPPER GI ENDOSCOPY       UPPER GI ENDOSCOPY       UPPER GI ENDOSCOPY  2010    repeat 2012     Current Outpatient Medications   Medication Sig Dispense Refill     amoxicillin (AMOXIL) 500 MG capsule Take 2,000 mg by mouth       Aspirin (ASPIR-81 PO) Take 1 tablet by mouth daily        atenolol-chlorthalidone (TENORETIC) 50-25 MG tablet TAKE 1 TABLET DAILY 90 tablet 1     blood glucose (ACCU-CHEK ANGELI PLUS) test strip USE ONE STRIP TO CHECK GLUCOSE ONCE DAILY 100 strip 5     blood glucose calibration (ACCU-CHEK ANGELI) solution USE CONTROL SOLUTION AS DIRECTED ONCE MONTHLY TO CHECK ACCURACY OF METER 1 each 0     blood glucose monitoring (ACCU-CHEK ANGELI PLUS) meter device kit Use to test blood sugar 1 times daily 1 kit 0     blood glucose monitoring (ACCU-CHEK MULTICLIX) lancets USE TO TEST BLOOD SUGAR ONCE DAILY 102 each 5     Cholecalciferol (VITAMIN D PO) Take 1 capsule by mouth daily       docusate sodium (STOOL SOFTENER) 100 MG capsule Take 1 capsule by mouth daily        ezetimibe (ZETIA) 10 MG tablet TAKE 1 TABLET DAILY 90 tablet 1     glimepiride (AMARYL) 1 MG tablet TAKE 1/2 (ONE-HALF) TABLET BY MOUTH IN THE MORNING BEFORE  BREAKFAST 45 tablet 3     Ibuprofen (ADVIL PO) Take by mouth every 4 hours as needed for moderate pain       KLOR-CON 20 MEQ CR tablet TAKE 1 TABLET BY MOUTH TWICE DAILY WITH MEALS 180 tablet 0     Magnesium Oxide -Mg Supplement 400 MG CAPS Take 1 tablet by mouth       magnesium oxide 400 MG CAPS Take 400 mg by mouth 2 times daily 180 capsule 2     metFORMIN (GLUCOPHAGE-XR) 500 MG  "24 hr tablet TAKE 1 TABLET BY MOUTH TWICE DAILY WITH MEALS **DUE FOR FOLLOW-UP VISIT AND LABS** 180 tablet 0     Multiple Vitamin (MULTIVITAMINS PO) Take 1 tablet by mouth daily       pantoprazole (PROTONIX) 40 MG EC tablet TAKE 1 TABLET DAILY 90 tablet 2     SHINGRIX injection ADM 0.5ML IM UTD  0     STATIN NOT PRESCRIBED, INTENTIONAL, Please choose reason not prescribed, below       tamsulosin (FLOMAX) 0.4 MG capsule TAKE ONE CAPSULE BY MOUTH ONCE DAILY 30 capsule 2     traZODone (DESYREL) 50 MG tablet TAKE ONE TABLET BY MOUTH AT BEDTIME (Patient taking differently: TAKE  HALF TABLET BY MOUTH AT BEDTIME) 90 tablet 1     OTC products: None, except as noted above    Allergies   Allergen Reactions     Atorvastatin Calcium Other (See Comments)     Lipitor - myalgia     Avodart [Dutasteride] Other (See Comments)     Sexual dsfxn     Simvastatin Other (See Comments)     Zocor - elevated liver function test      Latex Allergy: NO    Social History     Tobacco Use     Smoking status: Former Smoker     Types: Cigarettes     Smokeless tobacco: Never Used     Tobacco comment: quit in the 80s   Substance Use Topics     Alcohol use: No     History   Drug Use No       REVIEW OF SYSTEMS:   Constitutional, neuro, ENT, endocrine, pulmonary, cardiac, gastrointestinal, genitourinary, musculoskeletal, integument and psychiatric systems are negative, except as otherwise noted.    EXAM:   /64   Pulse 68   Temp 97.9  F (36.6  C)   Ht 1.772 m (5' 9.75\")   Wt 106.1 kg (234 lb)   SpO2 98%   BMI 33.82 kg/m      GENERAL APPEARANCE: healthy, alert and no distress     EYES: EOMI,  PERRL     HENT: ear canals and TM's normal and nose and mouth without ulcers or lesions     NECK: no adenopathy, no asymmetry, masses, or scars and thyroid normal to palpation     RESP: lungs clear to auscultation - no rales, rhonchi or wheezes     CV: regular rates and rhythm, normal S1 S2, no S3 or S4 and no murmur, click or rub     ABDOMEN:  soft, " nontender, no HSM or masses and bowel sounds normal     MS: extremities normal- no gross deformities noted, no evidence of inflammation in joints, FROM in all extremities.     SKIN: no suspicious lesions or rashes     NEURO: Normal strength and tone, sensory exam grossly normal, mentation intact and speech normal     PSYCH: mentation appears normal. and affect normal/bright     LYMPHATICS: No cervical adenopathy    DIAGNOSTICS:   EKG: appears normal, NSR, first decree block , normal axis, normal intervals, no acute ST/T changes c/w ischemia, no LVH by voltage criteria, unchanged from previous tracings    Recent Labs   Lab Test 05/07/19  0853 12/07/18  1047 07/06/18  0807 03/12/18  0747   HGB 15.7  --   --  15.6     --   --  187     --  136  --    POTASSIUM 4.3  --  3.5  --    CR 0.75  --  0.72  --    A1C 6.7* 6.6* 6.1*  --       Results for orders placed or performed in visit on 06/05/19   CBC with platelets differential   Result Value Ref Range    WBC 6.0 4.0 - 11.0 10e9/L    RBC Count 4.93 4.4 - 5.9 10e12/L    Hemoglobin 15.3 13.3 - 17.7 g/dL    Hematocrit 43.1 40.0 - 53.0 %    MCV 87 78 - 100 fl    MCH 31.0 26.5 - 33.0 pg    MCHC 35.5 31.5 - 36.5 g/dL    RDW 12.1 10.0 - 15.0 %    Platelet Count 195 150 - 450 10e9/L    Diff Method Automated Method     % Neutrophils 51.1 %    % Lymphocytes 31.3 %    % Monocytes 12.6 %    % Eosinophils 4.0 %    % Basophils 0.7 %    % Immature Granulocytes 0.3 %    Nucleated RBCs 0 0 /100    Absolute Neutrophil 3.1 1.6 - 8.3 10e9/L    Absolute Lymphocytes 1.9 0.8 - 5.3 10e9/L    Absolute Monocytes 0.8 0.0 - 1.3 10e9/L    Absolute Eosinophils 0.2 0.0 - 0.7 10e9/L    Absolute Basophils 0.0 0.0 - 0.2 10e9/L    Abs Immature Granulocytes 0.0 0 - 0.4 10e9/L    Absolute Nucleated RBC 0.0    Basic metabolic panel   Result Value Ref Range    Sodium 138 133 - 144 mmol/L    Potassium 3.6 3.4 - 5.3 mmol/L    Chloride 101 94 - 109 mmol/L    Carbon Dioxide 31 20 - 32 mmol/L    Anion  Gap 6 3 - 14 mmol/L    Glucose 110 (H) 70 - 99 mg/dL    Urea Nitrogen 14 7 - 30 mg/dL    Creatinine 0.77 0.66 - 1.25 mg/dL    GFR Estimate 88 >60 mL/min/[1.73_m2]    GFR Estimate If Black >90 >60 mL/min/[1.73_m2]    Calcium 9.5 8.5 - 10.1 mg/dL       RECENT PAST LABS BELOW     Results for orders placed or performed in visit on 05/07/19   PSA tumor marker   Result Value Ref Range    PSA 1.04 0 - 4 ug/L   Lipid Profile   Result Value Ref Range    Cholesterol 172 <200 mg/dL    Triglycerides 145 <150 mg/dL    HDL Cholesterol 43 >39 mg/dL    LDL Cholesterol Calculated 100 (H) <100 mg/dL    Non HDL Cholesterol 129 <130 mg/dL   TSH   Result Value Ref Range    TSH 0.90 0.40 - 4.00 mU/L   Comprehensive metabolic panel   Result Value Ref Range    Sodium 135 133 - 144 mmol/L    Potassium 4.3 3.4 - 5.3 mmol/L    Chloride 100 94 - 109 mmol/L    Carbon Dioxide 32 20 - 32 mmol/L    Anion Gap 3 3 - 14 mmol/L    Glucose 150 (H) 70 - 99 mg/dL    Urea Nitrogen 15 7 - 30 mg/dL    Creatinine 0.75 0.66 - 1.25 mg/dL    GFR Estimate 89 >60 mL/min/[1.73_m2]    GFR Estimate If Black >90 >60 mL/min/[1.73_m2]    Calcium 9.5 8.5 - 10.1 mg/dL    Bilirubin Total 0.6 0.2 - 1.3 mg/dL    Albumin 4.0 3.4 - 5.0 g/dL    Protein Total 7.6 6.8 - 8.8 g/dL    Alkaline Phosphatase 71 40 - 150 U/L    ALT 95 (H) 0 - 70 U/L    AST 53 (H) 0 - 45 U/L   CBC with platelets differential   Result Value Ref Range    WBC 5.8 4.0 - 11.0 10e9/L    RBC Count 5.11 4.4 - 5.9 10e12/L    Hemoglobin 15.7 13.3 - 17.7 g/dL    Hematocrit 44.8 40.0 - 53.0 %    MCV 88 78 - 100 fl    MCH 30.7 26.5 - 33.0 pg    MCHC 35.0 31.5 - 36.5 g/dL    RDW 12.0 10.0 - 15.0 %    Platelet Count 214 150 - 450 10e9/L    Diff Method Automated Method     % Neutrophils 58.6 %    % Lymphocytes 29.1 %    % Monocytes 8.8 %    % Eosinophils 2.6 %    % Basophils 0.7 %    % Immature Granulocytes 0.2 %    Nucleated RBCs 0 0 /100    Absolute Neutrophil 3.4 1.6 - 8.3 10e9/L    Absolute Lymphocytes 1.7 0.8 -  5.3 10e9/L    Absolute Monocytes 0.5 0.0 - 1.3 10e9/L    Absolute Eosinophils 0.2 0.0 - 0.7 10e9/L    Absolute Basophils 0.0 0.0 - 0.2 10e9/L    Abs Immature Granulocytes 0.0 0 - 0.4 10e9/L    Absolute Nucleated RBC 0.0    Hemoglobin A1c   Result Value Ref Range    Hemoglobin A1C 6.7 (H) 0 - 5.6 %         IMPRESSION:   Reason for surgery/procedure: Bilateral cataracts  Diagnosis/reason for consult: Cardiopulmonary clearance     The proposed surgical procedure is considered LOW risk.    REVISED CARDIAC RISK INDEX  The patient has the following serious cardiovascular risks for perioperative complications such as (MI, PE, VFib and 3  AV Block):  No serious cardiac risks  INTERPRETATION: 0 risks: Class I (very low risk - 0.4% complication rate)    The patient has the following additional risks for perioperative complications:  No identified additional risks      ICD-10-CM    1. Preop general physical exam Z01.818 Basic metabolic panel     CBC with platelets differential     EKG 12-lead complete w/read - (Clinic Performed)     EKG 12-lead complete w/read - (Clinic Performed)     CBC with platelets differential     Basic metabolic panel   2. Age-related cataract of both eyes, unspecified age-related cataract type H25.9    3. Type 2 diabetes mellitus without complication, without long-term current use of insulin (H) E11.9    4. Essential hypertension with goal blood pressure less than 140/90 I10        RECOMMENDATIONS:       APPROVAL GIVEN to proceed with proposed procedure, without further diagnostic evaluation       Signed Electronically by: Mata Richardson MD    Copy of this evaluation report is provided to requesting physician.    [facility] Preop Guidelines    Revised Cardiac Risk Index

## 2019-05-23 NOTE — H&P (VIEW-ONLY)
[facility] [place]  [address]  [place] [address]  [phone]  Dept: [phone]    PRE-OP EVALUATION:  Today's date: 2019    Carrillo Carranza (: 1940) presents for pre-operative evaluation assessment as requested by Dr. Patrick.  He requires evaluation and anesthesia risk assessment prior to undergoing surgery/procedure for treatment of cataracts .    Proposed Surgery/ Procedure: bilateral cataract removal with lens replacement  Date of Surgery/ Procedure: right eye 19 and left eye 19  Time of Surgery/ Procedure: unknown  Hospital/Surgical Facility: [facility] [place]    Primary Physician: Mata Richardson  Type of Anesthesia Anticipated: to be determined    Patient has a Health Care Directive or Living Will:  NO    1. NO - Do you have a history of heart attack, stroke, stent, bypass or surgery on an artery in the head, neck, heart or legs?  2. NO - Do you ever have any pain or discomfort in your chest?  3. NO - Do you have a history of  Heart Failure?  4. NO - Are you troubled by shortness of breath when: walking on the level, up a slight hill or at night?  5. NO - Do you currently have a cold, bronchitis or other respiratory infection?  6. NO - Do you have a cough, shortness of breath or wheezing?  7. NO - Do you sometimes get pains in the calves of your legs when you walk?  8. NO - Do you or anyone in your family have previous history of blood clots?  9. NO - Do you or does anyone in your family have a serious bleeding problem such as prolonged bleeding following surgeries or cuts?  10. NO - Have you ever had problems with anemia or been told to take iron pills?  11. NO - Have you had any abnormal blood loss such as black, tarry or bloody stools, or abnormal vaginal bleeding?  12. NO - Have you ever had a blood transfusion?  13. NO - Have you or any of your relatives ever had problems with anesthesia?  14. NO - Do you have sleep apnea, excessive snoring or daytime drowsiness?  15.  NO - Do you have any prosthetic heart valves?  16. YES - DO YOU HAVE PROSTHETIC JOINTS? Bilateral knees  17. NO - Is there any chance that you may be pregnant?      HPI:     HPI related to upcoming procedure:   77 yO male  presents for cardiopulmonary/general clearance to undergo the above procedure.  His surgeon listed above has asked for this clearance to undergo anesthesia. Pt has had this condition for approximately over a year .   Overall pt is of good health and has not  had any perioperative complications with previous surgeries.          HYPERTENSION - Patient has longstanding history of HTN , currently denies any symptoms referable to elevated blood pressure. Specifically denies chest pain, palpitations, dyspnea, orthopnea, PND or peripheral edema. Blood pressure readings have been in normal range. Current medication regimen is as listed below. Patient denies any side effects of medication.       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 05/07/2019     Priority: Medium     H/O adenomatous polyp of colon 05/07/2019     Priority: Medium     Low libido 09/14/2017     Priority: Medium     BPH with obstruction/lower urinary tract symptoms 05/16/2017     Priority: Medium     Type 2 diabetes mellitus without complication, without long-term current use of insulin (H) 12/12/2016     Priority: Medium     Essential hypertension with goal blood pressure less than 140/90 07/15/2016     Priority: Medium     Nocturia 10/09/2014     Priority: Medium     Hypomagnesemia 06/23/2014     Priority: Medium     BPPV (benign paroxysmal positional vertigo) 06/13/2014     Priority: Medium     Hypokalemia 06/02/2014     Priority: Medium     Hyperlipidemia with target LDL less than 100      Priority: Medium     Diagnosis updated by automated process. Provider to review and confirm.       Fatty liver disease, nonalcoholic      Priority: Medium     Diffuse  connective tissue disease (H) 01/29/2013     Priority: Medium     Problem list name updated by automated process. Provider to review       Advanced care planning/counseling discussion 06/12/2012     Priority: Medium      Past Medical History:   Diagnosis Date     Baca's esophagus 11/3/2011     Contact dermatitis and other eczema, due to unspecified cause 3/1/2011     Dermatophytosis of groin and perianal area 2/1/2006     Esophageal reflux 11/3/2011    GERD     Family history of colonic polyps 11/3/2011     Fatty liver disease, nonalcoholic      Generalized osteoarthrosis, involving multiple sites 3/1/2011     HTN (hypertension)      Hyperlipidaemia LDL goal < 100      Other and unspecified hyperlipidemia 11/3/2011     Other chronic nonalcoholic liver disease 11/3/2011    SANCHES (nonalcoholic steatohepatitis)     Other premature beats 11/3/2011    PVC (premature ventricular contraction)     Other specified cardiac dysrhythmias(427.89) 11/3/2011    ventricular bigeminy under anesthesia     Personal history of tobacco use, presenting hazards to health 11/3/2011    in remission     Type II or unspecified type diabetes mellitus without mention of complication, not stated as uncontrolled 2/7/2012     Umbilical hernia without mention of obstruction or gangrene 11/3/2011     Unspecified diffuse connective tissue disease 1/29/2013     Unspecified essential hypertension 11/3/2011     Past Surgical History:   Procedure Laterality Date     ADENOIDECTOMY       APPENDECTOMY       ARTHROSCOPY KNEE       arthroscopy right great toe      bunion     COLONOSCOPY  02-     COLONOSCOPY  2010,2006    repeat in 2015     COLONOSCOPY  8-     COLONOSCOPY  2015    Repeat 2020     ESOPHAGOGASTRODUODENOSCOPY      Repeat in 2015     ESOPHAGOGASTRODUODENOSCOPY  2015    Dr Funes/Vivek- Repeat 3yrs     EXCISE LESION EAR EXTERNAL Right 3/13/2018    Procedure: EXCISE LESION EAR EXTERNAL;  EXCISION OF PREAURICULAR BASAL CELL  CARCINOMA WITH FROZENS, FLAP REPAIR ( 2.6 x 2cm Defect), ( 3.5 x 3.5cmTissue Rearrangement);  Surgeon: Kyara Rojas MD;  Location: [facility]     HERNIA REPAIR, UMBILICAL  2012    reduction and repair of umbilical hernia     JOINT REPLACEMENT       RELEASE CARPAL TUNNEL      carpal tunnel syndrome     shoulder bone spur removal       TKA       TONSILLECTOMY       UPPER GI ENDOSCOPY  2012    repeat in 2014     UPPER GI ENDOSCOPY       UPPER GI ENDOSCOPY       UPPER GI ENDOSCOPY  2010    repeat 2012     Current Outpatient Medications   Medication Sig Dispense Refill     amoxicillin (AMOXIL) 500 MG capsule Take 2,000 mg by mouth       Aspirin (ASPIR-81 PO) Take 1 tablet by mouth daily        atenolol-chlorthalidone (TENORETIC) 50-25 MG tablet TAKE 1 TABLET DAILY 90 tablet 1     blood glucose (ACCU-CHEK ANGELI PLUS) test strip USE ONE STRIP TO CHECK GLUCOSE ONCE DAILY 100 strip 5     blood glucose calibration (ACCU-CHEK ANGELI) solution USE CONTROL SOLUTION AS DIRECTED ONCE MONTHLY TO CHECK ACCURACY OF METER 1 each 0     blood glucose monitoring (ACCU-CHEK ANGELI PLUS) meter device kit Use to test blood sugar 1 times daily 1 kit 0     blood glucose monitoring (ACCU-CHEK MULTICLIX) lancets USE TO TEST BLOOD SUGAR ONCE DAILY 102 each 5     Cholecalciferol (VITAMIN D PO) Take 1 capsule by mouth daily       docusate sodium (STOOL SOFTENER) 100 MG capsule Take 1 capsule by mouth daily        ezetimibe (ZETIA) 10 MG tablet TAKE 1 TABLET DAILY 90 tablet 1     glimepiride (AMARYL) 1 MG tablet TAKE 1/2 (ONE-HALF) TABLET BY MOUTH IN THE MORNING BEFORE  BREAKFAST 45 tablet 3     Ibuprofen (ADVIL PO) Take by mouth every 4 hours as needed for moderate pain       KLOR-CON 20 MEQ CR tablet TAKE 1 TABLET BY MOUTH TWICE DAILY WITH MEALS 180 tablet 0     Magnesium Oxide -Mg Supplement 400 MG CAPS Take 1 tablet by mouth       magnesium oxide 400 MG CAPS Take 400 mg by mouth 2 times daily 180 capsule 2     metFORMIN (GLUCOPHAGE-XR) 500 MG  "24 hr tablet TAKE 1 TABLET BY MOUTH TWICE DAILY WITH MEALS **DUE FOR FOLLOW-UP VISIT AND LABS** 180 tablet 0     Multiple Vitamin (MULTIVITAMINS PO) Take 1 tablet by mouth daily       pantoprazole (PROTONIX) 40 MG EC tablet TAKE 1 TABLET DAILY 90 tablet 2     SHINGRIX injection ADM 0.5ML IM UTD  0     STATIN NOT PRESCRIBED, INTENTIONAL, Please choose reason not prescribed, below       tamsulosin (FLOMAX) 0.4 MG capsule TAKE ONE CAPSULE BY MOUTH ONCE DAILY 30 capsule 2     traZODone (DESYREL) 50 MG tablet TAKE ONE TABLET BY MOUTH AT BEDTIME (Patient taking differently: TAKE  HALF TABLET BY MOUTH AT BEDTIME) 90 tablet 1     OTC products: None, except as noted above    Allergies   Allergen Reactions     Atorvastatin Calcium Other (See Comments)     Lipitor - myalgia     Avodart [Dutasteride] Other (See Comments)     Sexual dsfxn     Simvastatin Other (See Comments)     Zocor - elevated liver function test      Latex Allergy: NO    Social History     Tobacco Use     Smoking status: Former Smoker     Types: Cigarettes     Smokeless tobacco: Never Used     Tobacco comment: quit in the 80s   Substance Use Topics     Alcohol use: No     History   Drug Use No       REVIEW OF SYSTEMS:   Constitutional, neuro, ENT, endocrine, pulmonary, cardiac, gastrointestinal, genitourinary, musculoskeletal, integument and psychiatric systems are negative, except as otherwise noted.    EXAM:   /64   Pulse 68   Temp 97.9  F (36.6  C)   Ht 1.772 m (5' 9.75\")   Wt 106.1 kg (234 lb)   SpO2 98%   BMI 33.82 kg/m      GENERAL APPEARANCE: healthy, alert and no distress     EYES: EOMI,  PERRL     HENT: ear canals and TM's normal and nose and mouth without ulcers or lesions     NECK: no adenopathy, no asymmetry, masses, or scars and thyroid normal to palpation     RESP: lungs clear to auscultation - no rales, rhonchi or wheezes     CV: regular rates and rhythm, normal S1 S2, no S3 or S4 and no murmur, click or rub     ABDOMEN:  soft, " nontender, no HSM or masses and bowel sounds normal     MS: extremities normal- no gross deformities noted, no evidence of inflammation in joints, FROM in all extremities.     SKIN: no suspicious lesions or rashes     NEURO: Normal strength and tone, sensory exam grossly normal, mentation intact and speech normal     PSYCH: mentation appears normal. and affect normal/bright     LYMPHATICS: No cervical adenopathy    DIAGNOSTICS:   EKG: appears normal, NSR, first decree block , normal axis, normal intervals, no acute ST/T changes c/w ischemia, no LVH by voltage criteria, unchanged from previous tracings    Recent Labs   Lab Test 05/07/19  0853 12/07/18  1047 07/06/18  0807 03/12/18  0747   HGB 15.7  --   --  15.6     --   --  187     --  136  --    POTASSIUM 4.3  --  3.5  --    CR 0.75  --  0.72  --    A1C 6.7* 6.6* 6.1*  --       Results for orders placed or performed in visit on 06/05/19   CBC with platelets differential   Result Value Ref Range    WBC 6.0 4.0 - 11.0 10e9/L    RBC Count 4.93 4.4 - 5.9 10e12/L    Hemoglobin 15.3 13.3 - 17.7 g/dL    Hematocrit 43.1 40.0 - 53.0 %    MCV 87 78 - 100 fl    MCH 31.0 26.5 - 33.0 pg    MCHC 35.5 31.5 - 36.5 g/dL    RDW 12.1 10.0 - 15.0 %    Platelet Count 195 150 - 450 10e9/L    Diff Method Automated Method     % Neutrophils 51.1 %    % Lymphocytes 31.3 %    % Monocytes 12.6 %    % Eosinophils 4.0 %    % Basophils 0.7 %    % Immature Granulocytes 0.3 %    Nucleated RBCs 0 0 /100    Absolute Neutrophil 3.1 1.6 - 8.3 10e9/L    Absolute Lymphocytes 1.9 0.8 - 5.3 10e9/L    Absolute Monocytes 0.8 0.0 - 1.3 10e9/L    Absolute Eosinophils 0.2 0.0 - 0.7 10e9/L    Absolute Basophils 0.0 0.0 - 0.2 10e9/L    Abs Immature Granulocytes 0.0 0 - 0.4 10e9/L    Absolute Nucleated RBC 0.0    Basic metabolic panel   Result Value Ref Range    Sodium 138 133 - 144 mmol/L    Potassium 3.6 3.4 - 5.3 mmol/L    Chloride 101 94 - 109 mmol/L    Carbon Dioxide 31 20 - 32 mmol/L    Anion  Gap 6 3 - 14 mmol/L    Glucose 110 (H) 70 - 99 mg/dL    Urea Nitrogen 14 7 - 30 mg/dL    Creatinine 0.77 0.66 - 1.25 mg/dL    GFR Estimate 88 >60 mL/min/[1.73_m2]    GFR Estimate If Black >90 >60 mL/min/[1.73_m2]    Calcium 9.5 8.5 - 10.1 mg/dL       RECENT PAST LABS BELOW     Results for orders placed or performed in visit on 05/07/19   PSA tumor marker   Result Value Ref Range    PSA 1.04 0 - 4 ug/L   Lipid Profile   Result Value Ref Range    Cholesterol 172 <200 mg/dL    Triglycerides 145 <150 mg/dL    HDL Cholesterol 43 >39 mg/dL    LDL Cholesterol Calculated 100 (H) <100 mg/dL    Non HDL Cholesterol 129 <130 mg/dL   TSH   Result Value Ref Range    TSH 0.90 0.40 - 4.00 mU/L   Comprehensive metabolic panel   Result Value Ref Range    Sodium 135 133 - 144 mmol/L    Potassium 4.3 3.4 - 5.3 mmol/L    Chloride 100 94 - 109 mmol/L    Carbon Dioxide 32 20 - 32 mmol/L    Anion Gap 3 3 - 14 mmol/L    Glucose 150 (H) 70 - 99 mg/dL    Urea Nitrogen 15 7 - 30 mg/dL    Creatinine 0.75 0.66 - 1.25 mg/dL    GFR Estimate 89 >60 mL/min/[1.73_m2]    GFR Estimate If Black >90 >60 mL/min/[1.73_m2]    Calcium 9.5 8.5 - 10.1 mg/dL    Bilirubin Total 0.6 0.2 - 1.3 mg/dL    Albumin 4.0 3.4 - 5.0 g/dL    Protein Total 7.6 6.8 - 8.8 g/dL    Alkaline Phosphatase 71 40 - 150 U/L    ALT 95 (H) 0 - 70 U/L    AST 53 (H) 0 - 45 U/L   CBC with platelets differential   Result Value Ref Range    WBC 5.8 4.0 - 11.0 10e9/L    RBC Count 5.11 4.4 - 5.9 10e12/L    Hemoglobin 15.7 13.3 - 17.7 g/dL    Hematocrit 44.8 40.0 - 53.0 %    MCV 88 78 - 100 fl    MCH 30.7 26.5 - 33.0 pg    MCHC 35.0 31.5 - 36.5 g/dL    RDW 12.0 10.0 - 15.0 %    Platelet Count 214 150 - 450 10e9/L    Diff Method Automated Method     % Neutrophils 58.6 %    % Lymphocytes 29.1 %    % Monocytes 8.8 %    % Eosinophils 2.6 %    % Basophils 0.7 %    % Immature Granulocytes 0.2 %    Nucleated RBCs 0 0 /100    Absolute Neutrophil 3.4 1.6 - 8.3 10e9/L    Absolute Lymphocytes 1.7 0.8 -  5.3 10e9/L    Absolute Monocytes 0.5 0.0 - 1.3 10e9/L    Absolute Eosinophils 0.2 0.0 - 0.7 10e9/L    Absolute Basophils 0.0 0.0 - 0.2 10e9/L    Abs Immature Granulocytes 0.0 0 - 0.4 10e9/L    Absolute Nucleated RBC 0.0    Hemoglobin A1c   Result Value Ref Range    Hemoglobin A1C 6.7 (H) 0 - 5.6 %         IMPRESSION:   Reason for surgery/procedure: Bilateral cataracts  Diagnosis/reason for consult: Cardiopulmonary clearance     The proposed surgical procedure is considered LOW risk.    REVISED CARDIAC RISK INDEX  The patient has the following serious cardiovascular risks for perioperative complications such as (MI, PE, VFib and 3  AV Block):  No serious cardiac risks  INTERPRETATION: 0 risks: Class I (very low risk - 0.4% complication rate)    The patient has the following additional risks for perioperative complications:  No identified additional risks      ICD-10-CM    1. Preop general physical exam Z01.818 Basic metabolic panel     CBC with platelets differential     EKG 12-lead complete w/read - (Clinic Performed)     EKG 12-lead complete w/read - (Clinic Performed)     CBC with platelets differential     Basic metabolic panel   2. Age-related cataract of both eyes, unspecified age-related cataract type H25.9    3. Type 2 diabetes mellitus without complication, without long-term current use of insulin (H) E11.9    4. Essential hypertension with goal blood pressure less than 140/90 I10        RECOMMENDATIONS:       APPROVAL GIVEN to proceed with proposed procedure, without further diagnostic evaluation       Signed Electronically by: Mata Richardson MD    Copy of this evaluation report is provided to requesting physician.    [facility] Preop Guidelines    Revised Cardiac Risk Index

## 2019-05-24 DIAGNOSIS — E11.9 TYPE 2 DIABETES MELLITUS WITHOUT COMPLICATION, WITHOUT LONG-TERM CURRENT USE OF INSULIN (H): ICD-10-CM

## 2019-05-24 RX ORDER — METFORMIN HCL 500 MG
TABLET, EXTENDED RELEASE 24 HR ORAL
Qty: 180 TABLET | Refills: 1 | Status: SHIPPED | OUTPATIENT
Start: 2019-05-24 | End: 2019-11-26

## 2019-06-03 DIAGNOSIS — I10 BENIGN ESSENTIAL HYPERTENSION: ICD-10-CM

## 2019-06-04 RX ORDER — ATENOLOL AND CHLORTHALIDONE TABLET 50; 25 MG/1; MG/1
TABLET ORAL
Qty: 90 TABLET | Refills: 1 | Status: SHIPPED | OUTPATIENT
Start: 2019-06-04 | End: 2019-12-09

## 2019-06-05 ENCOUNTER — OFFICE VISIT (OUTPATIENT)
Dept: FAMILY MEDICINE | Facility: OTHER | Age: 79
End: 2019-06-05
Attending: FAMILY MEDICINE
Payer: MEDICARE

## 2019-06-05 VITALS
OXYGEN SATURATION: 98 % | WEIGHT: 234 LBS | BODY MASS INDEX: 33.5 KG/M2 | DIASTOLIC BLOOD PRESSURE: 64 MMHG | TEMPERATURE: 97.9 F | SYSTOLIC BLOOD PRESSURE: 128 MMHG | HEART RATE: 68 BPM | HEIGHT: 70 IN

## 2019-06-05 DIAGNOSIS — Z01.818 PREOP GENERAL PHYSICAL EXAM: Primary | ICD-10-CM

## 2019-06-05 DIAGNOSIS — I10 ESSENTIAL HYPERTENSION WITH GOAL BLOOD PRESSURE LESS THAN 140/90: ICD-10-CM

## 2019-06-05 DIAGNOSIS — E11.9 TYPE 2 DIABETES MELLITUS WITHOUT COMPLICATION, WITHOUT LONG-TERM CURRENT USE OF INSULIN (H): ICD-10-CM

## 2019-06-05 DIAGNOSIS — H25.9 AGE-RELATED CATARACT OF BOTH EYES, UNSPECIFIED AGE-RELATED CATARACT TYPE: ICD-10-CM

## 2019-06-05 LAB
ANION GAP SERPL CALCULATED.3IONS-SCNC: 6 MMOL/L (ref 3–14)
BASOPHILS # BLD AUTO: 0 10E9/L (ref 0–0.2)
BASOPHILS NFR BLD AUTO: 0.7 %
BUN SERPL-MCNC: 14 MG/DL (ref 7–30)
CALCIUM SERPL-MCNC: 9.5 MG/DL (ref 8.5–10.1)
CHLORIDE SERPL-SCNC: 101 MMOL/L (ref 94–109)
CO2 SERPL-SCNC: 31 MMOL/L (ref 20–32)
CREAT SERPL-MCNC: 0.77 MG/DL (ref 0.66–1.25)
DIFFERENTIAL METHOD BLD: NORMAL
EOSINOPHIL # BLD AUTO: 0.2 10E9/L (ref 0–0.7)
EOSINOPHIL NFR BLD AUTO: 4 %
ERYTHROCYTE [DISTWIDTH] IN BLOOD BY AUTOMATED COUNT: 12.1 % (ref 10–15)
GFR SERPL CREATININE-BSD FRML MDRD: 88 ML/MIN/{1.73_M2}
GLUCOSE SERPL-MCNC: 110 MG/DL (ref 70–99)
HCT VFR BLD AUTO: 43.1 % (ref 40–53)
HGB BLD-MCNC: 15.3 G/DL (ref 13.3–17.7)
IMM GRANULOCYTES # BLD: 0 10E9/L (ref 0–0.4)
IMM GRANULOCYTES NFR BLD: 0.3 %
LYMPHOCYTES # BLD AUTO: 1.9 10E9/L (ref 0.8–5.3)
LYMPHOCYTES NFR BLD AUTO: 31.3 %
MCH RBC QN AUTO: 31 PG (ref 26.5–33)
MCHC RBC AUTO-ENTMCNC: 35.5 G/DL (ref 31.5–36.5)
MCV RBC AUTO: 87 FL (ref 78–100)
MONOCYTES # BLD AUTO: 0.8 10E9/L (ref 0–1.3)
MONOCYTES NFR BLD AUTO: 12.6 %
NEUTROPHILS # BLD AUTO: 3.1 10E9/L (ref 1.6–8.3)
NEUTROPHILS NFR BLD AUTO: 51.1 %
NRBC # BLD AUTO: 0 10*3/UL
NRBC BLD AUTO-RTO: 0 /100
PLATELET # BLD AUTO: 195 10E9/L (ref 150–450)
POTASSIUM SERPL-SCNC: 3.6 MMOL/L (ref 3.4–5.3)
RBC # BLD AUTO: 4.93 10E12/L (ref 4.4–5.9)
SODIUM SERPL-SCNC: 138 MMOL/L (ref 133–144)
WBC # BLD AUTO: 6 10E9/L (ref 4–11)

## 2019-06-05 PROCEDURE — 93010 ELECTROCARDIOGRAM REPORT: CPT | Performed by: INTERNAL MEDICINE

## 2019-06-05 PROCEDURE — 80048 BASIC METABOLIC PNL TOTAL CA: CPT | Mod: ZL | Performed by: FAMILY MEDICINE

## 2019-06-05 PROCEDURE — G0463 HOSPITAL OUTPT CLINIC VISIT: HCPCS | Mod: 25

## 2019-06-05 PROCEDURE — 85025 COMPLETE CBC W/AUTO DIFF WBC: CPT | Mod: ZL | Performed by: FAMILY MEDICINE

## 2019-06-05 PROCEDURE — 99214 OFFICE O/P EST MOD 30 MIN: CPT | Mod: 25 | Performed by: FAMILY MEDICINE

## 2019-06-05 PROCEDURE — 93005 ELECTROCARDIOGRAM TRACING: CPT

## 2019-06-05 PROCEDURE — 36415 COLL VENOUS BLD VENIPUNCTURE: CPT | Mod: ZL | Performed by: FAMILY MEDICINE

## 2019-06-05 ASSESSMENT — PAIN SCALES - GENERAL: PAINLEVEL: NO PAIN (0)

## 2019-06-05 ASSESSMENT — PATIENT HEALTH QUESTIONNAIRE - PHQ9: SUM OF ALL RESPONSES TO PHQ QUESTIONS 1-9: 0

## 2019-06-05 ASSESSMENT — MIFFLIN-ST. JEOR: SCORE: 1793.7

## 2019-06-05 NOTE — NURSING NOTE
"Chief Complaint   Patient presents with     Pre-Op Exam       Initial /64   Pulse 68   Temp 97.9  F (36.6  C)   Ht 1.772 m (5' 9.75\")   Wt 106.1 kg (234 lb)   SpO2 98%   BMI 33.82 kg/m   Estimated body mass index is 33.82 kg/m  as calculated from the following:    Height as of this encounter: 1.772 m (5' 9.75\").    Weight as of this encounter: 106.1 kg (234 lb).  Medication Reconciliation: complete    Jerome Fitzgerald LPN  "

## 2019-06-06 ENCOUNTER — ANESTHESIA EVENT (OUTPATIENT)
Dept: SURGERY | Facility: HOSPITAL | Age: 79
End: 2019-06-06
Payer: MEDICARE

## 2019-06-06 RX ORDER — NALOXONE HYDROCHLORIDE 0.4 MG/ML
.1-.4 INJECTION, SOLUTION INTRAMUSCULAR; INTRAVENOUS; SUBCUTANEOUS
Status: CANCELLED | OUTPATIENT
Start: 2019-06-06 | End: 2019-06-07

## 2019-06-06 RX ORDER — ALBUTEROL SULFATE 0.83 MG/ML
2.5 SOLUTION RESPIRATORY (INHALATION) EVERY 4 HOURS PRN
Status: CANCELLED | OUTPATIENT
Start: 2019-06-06

## 2019-06-06 RX ORDER — FENTANYL CITRATE 50 UG/ML
25-50 INJECTION, SOLUTION INTRAMUSCULAR; INTRAVENOUS
Status: CANCELLED | OUTPATIENT
Start: 2019-06-06

## 2019-06-06 RX ORDER — MEPERIDINE HYDROCHLORIDE 50 MG/ML
12.5 INJECTION INTRAMUSCULAR; INTRAVENOUS; SUBCUTANEOUS
Status: CANCELLED | OUTPATIENT
Start: 2019-06-06

## 2019-06-06 RX ORDER — ONDANSETRON 4 MG/1
4 TABLET, ORALLY DISINTEGRATING ORAL EVERY 30 MIN PRN
Status: CANCELLED | OUTPATIENT
Start: 2019-06-06

## 2019-06-06 RX ORDER — HYDROMORPHONE HYDROCHLORIDE 1 MG/ML
.3-.5 INJECTION, SOLUTION INTRAMUSCULAR; INTRAVENOUS; SUBCUTANEOUS EVERY 10 MIN PRN
Status: CANCELLED | OUTPATIENT
Start: 2019-06-06

## 2019-06-06 RX ORDER — SODIUM CHLORIDE, SODIUM LACTATE, POTASSIUM CHLORIDE, CALCIUM CHLORIDE 600; 310; 30; 20 MG/100ML; MG/100ML; MG/100ML; MG/100ML
INJECTION, SOLUTION INTRAVENOUS CONTINUOUS
Status: CANCELLED | OUTPATIENT
Start: 2019-06-06

## 2019-06-06 RX ORDER — ONDANSETRON 2 MG/ML
4 INJECTION INTRAMUSCULAR; INTRAVENOUS EVERY 30 MIN PRN
Status: CANCELLED | OUTPATIENT
Start: 2019-06-06

## 2019-06-06 ASSESSMENT — LIFESTYLE VARIABLES: TOBACCO_USE: 1

## 2019-06-06 NOTE — ANESTHESIA PREPROCEDURE EVALUATION
Anesthesia Evaluation     . Pt has had prior anesthetic. Type: General and MAC    No history of anesthetic complications          ROS/MED HX    ENT/Pulmonary:     (+)ECDRICK risk factors snores loudly, hypertension, tobacco use, Past use , . .    Neurologic:     (+)other neuro BPPV    Cardiovascular: Comment: Hx dysrhythmias    (+) Dyslipidemia, hypertension----. : . . . :. . Previous cardiac testing date:results:date: results:ECG reviewed date:6/4/19 results:Cannot view results date: results:          METS/Exercise Tolerance:  4 - Raking leaves, gardening   Hematologic:  - neg hematologic  ROS       Musculoskeletal:  - neg musculoskeletal ROS (+)  other musculoskeletal- Connective tissue disease      GI/Hepatic:     (+) GERD Asymptomatic on medication, liver disease, Other GI/Hepatic Barretts esophagus      Renal/Genitourinary:  - ROS Renal section negative   (+) Other Renal/ Genitourinary, BPH      Endo:     (+) type II DM Not using insulin - not using insulin pump Normal glucose range: 100-140 not previously admitted for DM/DKA Obesity, .      Psychiatric:     (+) psychiatric history other (comment) (claustrophobic)      Infectious Disease:  - neg infectious disease ROS       Malignancy:   (+) Malignancy History of Skin  Skin CA status post Surgery,         Other:    - neg other ROS                 Physical Exam      Airway   Mallampati: II  TM distance: >3 FB  Neck ROM: full    Dental   (+) upper dentures    Cardiovascular   Rhythm and rate: regular and normal      Pulmonary    breath sounds clear to auscultation                        Anesthesia Plan      History & Physical Review  History and physical reviewed and following examination; no interval change.    ASA Status:  3 .    NPO Status:  > 8 hours    Plan for MAC with Intravenous induction. Maintenance will be TIVA.  Reason for MAC:  Chronic cardiopulmonary disease (G9), Deep or markedly invasive procedure (G8) and Procedure to face, neck, head or  breast  PONV prophylaxis:  Ondansetron (or other 5HT-3)  Patient has significant claustrophobia and requests full IV sedation in order to tolerate procedure.       Postoperative Care  Postoperative pain management:  IV analgesics.      Consents  Anesthetic plan, risks, benefits and alternatives discussed with:  Patient..          H&P on 6/5/19                .

## 2019-06-11 ENCOUNTER — ANESTHESIA (OUTPATIENT)
Dept: SURGERY | Facility: HOSPITAL | Age: 79
End: 2019-06-11
Payer: MEDICARE

## 2019-06-11 ENCOUNTER — HOSPITAL ENCOUNTER (OUTPATIENT)
Facility: HOSPITAL | Age: 79
Discharge: HOME | End: 2019-06-11
Attending: OPHTHALMOLOGY | Admitting: OPHTHALMOLOGY
Payer: MEDICARE

## 2019-06-11 VITALS
SYSTOLIC BLOOD PRESSURE: 132 MMHG | RESPIRATION RATE: 20 BRPM | TEMPERATURE: 97 F | OXYGEN SATURATION: 96 % | DIASTOLIC BLOOD PRESSURE: 70 MMHG

## 2019-06-11 PROCEDURE — 25000132 ZZH RX MED GY IP 250 OP 250 PS 637: Mod: GY | Performed by: OPHTHALMOLOGY

## 2019-06-11 PROCEDURE — 66984 XCAPSL CTRC RMVL W/O ECP: CPT | Performed by: NURSE ANESTHETIST, CERTIFIED REGISTERED

## 2019-06-11 PROCEDURE — 36000056 ZZH SURGERY LEVEL 3 1ST 30 MIN: Performed by: OPHTHALMOLOGY

## 2019-06-11 PROCEDURE — 99100 ANES PT EXTEME AGE<1 YR&>70: CPT | Performed by: NURSE ANESTHETIST, CERTIFIED REGISTERED

## 2019-06-11 PROCEDURE — 37000008 ZZH ANESTHESIA TECHNICAL FEE, 1ST 30 MIN: Performed by: OPHTHALMOLOGY

## 2019-06-11 PROCEDURE — 71000027 ZZH RECOVERY PHASE 2 EACH 15 MINS: Performed by: OPHTHALMOLOGY

## 2019-06-11 PROCEDURE — A9270 NON-COVERED ITEM OR SERVICE: HCPCS | Mod: GY | Performed by: OPHTHALMOLOGY

## 2019-06-11 PROCEDURE — 25000125 ZZHC RX 250: Performed by: NURSE ANESTHETIST, CERTIFIED REGISTERED

## 2019-06-11 PROCEDURE — 27210794 ZZH OR GENERAL SUPPLY STERILE: Performed by: OPHTHALMOLOGY

## 2019-06-11 PROCEDURE — V2632 POST CHMBR INTRAOCULAR LENS: HCPCS | Performed by: OPHTHALMOLOGY

## 2019-06-11 PROCEDURE — 25000125 ZZHC RX 250: Performed by: OPHTHALMOLOGY

## 2019-06-11 PROCEDURE — 66984 XCAPSL CTRC RMVL W/O ECP: CPT | Performed by: ANESTHESIOLOGY

## 2019-06-11 PROCEDURE — C9447 INJ, PHENYLEPHRINE KETOROLAC: HCPCS | Performed by: OPHTHALMOLOGY

## 2019-06-11 PROCEDURE — 25000128 H RX IP 250 OP 636: Performed by: NURSE ANESTHETIST, CERTIFIED REGISTERED

## 2019-06-11 PROCEDURE — 25800030 ZZH RX IP 258 OP 636: Performed by: NURSE ANESTHETIST, CERTIFIED REGISTERED

## 2019-06-11 PROCEDURE — 37000009 ZZH ANESTHESIA TECHNICAL FEE, EACH ADDTL 15 MIN: Performed by: OPHTHALMOLOGY

## 2019-06-11 PROCEDURE — 36000058 ZZH SURGERY LEVEL 3 EA 15 ADDTL MIN: Performed by: OPHTHALMOLOGY

## 2019-06-11 PROCEDURE — 25000128 H RX IP 250 OP 636: Performed by: OPHTHALMOLOGY

## 2019-06-11 PROCEDURE — 40000305 ZZH STATISTIC PRE PROC ASSESS I: Performed by: OPHTHALMOLOGY

## 2019-06-11 DEVICE — LENS-SOFPORT 21.0: Type: IMPLANTABLE DEVICE | Site: EYE | Status: FUNCTIONAL

## 2019-06-11 RX ORDER — PROPOFOL 10 MG/ML
INJECTION, EMULSION INTRAVENOUS CONTINUOUS PRN
Status: DISCONTINUED | OUTPATIENT
Start: 2019-06-11 | End: 2019-06-11

## 2019-06-11 RX ORDER — SODIUM CHLORIDE, SODIUM LACTATE, POTASSIUM CHLORIDE, CALCIUM CHLORIDE 600; 310; 30; 20 MG/100ML; MG/100ML; MG/100ML; MG/100ML
INJECTION, SOLUTION INTRAVENOUS CONTINUOUS PRN
Status: DISCONTINUED | OUTPATIENT
Start: 2019-06-11 | End: 2019-06-11

## 2019-06-11 RX ORDER — FENTANYL CITRATE 50 UG/ML
INJECTION, SOLUTION INTRAMUSCULAR; INTRAVENOUS PRN
Status: DISCONTINUED | OUTPATIENT
Start: 2019-06-11 | End: 2019-06-11

## 2019-06-11 RX ORDER — PROPARACAINE HYDROCHLORIDE 5 MG/ML
1 SOLUTION/ DROPS OPHTHALMIC ONCE
Status: COMPLETED | OUTPATIENT
Start: 2019-06-11 | End: 2019-06-11

## 2019-06-11 RX ORDER — LIDOCAINE HYDROCHLORIDE 10 MG/ML
INJECTION, SOLUTION EPIDURAL; INFILTRATION; INTRACAUDAL; PERINEURAL PRN
Status: DISCONTINUED | OUTPATIENT
Start: 2019-06-11 | End: 2019-06-11 | Stop reason: HOSPADM

## 2019-06-11 RX ORDER — LIDOCAINE 40 MG/G
CREAM TOPICAL
Status: DISCONTINUED | OUTPATIENT
Start: 2019-06-11 | End: 2019-06-11 | Stop reason: HOSPADM

## 2019-06-11 RX ORDER — PHENYLEPHRINE HYDROCHLORIDE 100 MG/ML
1 SOLUTION/ DROPS OPHTHALMIC
Status: DISCONTINUED | OUTPATIENT
Start: 2019-06-11 | End: 2019-06-11 | Stop reason: HOSPADM

## 2019-06-11 RX ORDER — CYCLOPENTOLATE HYDROCHLORIDE 20 MG/ML
1 SOLUTION/ DROPS OPHTHALMIC
Status: COMPLETED | OUTPATIENT
Start: 2019-06-11 | End: 2019-06-11

## 2019-06-11 RX ORDER — ACETAMINOPHEN 325 MG/1
650 TABLET ORAL ONCE
Status: COMPLETED | OUTPATIENT
Start: 2019-06-11 | End: 2019-06-11

## 2019-06-11 RX ORDER — LEVOBUNOLOL HYDROCHLORIDE 5 MG/ML
SOLUTION/ DROPS OPHTHALMIC PRN
Status: DISCONTINUED | OUTPATIENT
Start: 2019-06-11 | End: 2019-06-11 | Stop reason: HOSPADM

## 2019-06-11 RX ORDER — MOXIFLOXACIN 5 MG/ML
SOLUTION/ DROPS OPHTHALMIC PRN
Status: DISCONTINUED | OUTPATIENT
Start: 2019-06-11 | End: 2019-06-11 | Stop reason: HOSPADM

## 2019-06-11 RX ORDER — PILOCARPINE HYDROCHLORIDE 10 MG/ML
SOLUTION/ DROPS OPHTHALMIC PRN
Status: DISCONTINUED | OUTPATIENT
Start: 2019-06-11 | End: 2019-06-11 | Stop reason: HOSPADM

## 2019-06-11 RX ORDER — PHENYLEPHRINE HYDROCHLORIDE 25 MG/ML
1 SOLUTION/ DROPS OPHTHALMIC ONCE
Status: COMPLETED | OUTPATIENT
Start: 2019-06-11 | End: 2019-06-11

## 2019-06-11 RX ORDER — TETRACAINE HYDROCHLORIDE 5 MG/ML
SOLUTION OPHTHALMIC PRN
Status: DISCONTINUED | OUTPATIENT
Start: 2019-06-11 | End: 2019-06-11 | Stop reason: HOSPADM

## 2019-06-11 RX ORDER — PHENYLEPHRINE HYDROCHLORIDE 100 MG/ML
1 SOLUTION/ DROPS OPHTHALMIC ONCE
Status: COMPLETED | OUTPATIENT
Start: 2019-06-11 | End: 2019-06-11

## 2019-06-11 RX ORDER — PROPOFOL 10 MG/ML
INJECTION, EMULSION INTRAVENOUS PRN
Status: DISCONTINUED | OUTPATIENT
Start: 2019-06-11 | End: 2019-06-11

## 2019-06-11 RX ADMIN — ACETAMINOPHEN 650 MG: 325 TABLET, FILM COATED ORAL at 13:44

## 2019-06-11 RX ADMIN — FENTANYL CITRATE 50 MCG: 50 INJECTION, SOLUTION INTRAMUSCULAR; INTRAVENOUS at 14:40

## 2019-06-11 RX ADMIN — PROPARACAINE HYDROCHLORIDE 1 DROP: 5 SOLUTION/ DROPS OPHTHALMIC at 13:50

## 2019-06-11 RX ADMIN — PROPOFOL 20 MG: 10 INJECTION, EMULSION INTRAVENOUS at 14:43

## 2019-06-11 RX ADMIN — FLURBIPROFEN SODIUM 0.5 ML: 0.3 SOLUTION/ DROPS OPHTHALMIC at 14:12

## 2019-06-11 RX ADMIN — PHENYLEPHRINE HYDROCHLORIDE 1 DROP: 100 SOLUTION/ DROPS OPHTHALMIC at 13:53

## 2019-06-11 RX ADMIN — PHENYLEPHRINE HYDROCHLORIDE 1 DROP: 25 SOLUTION/ DROPS OPHTHALMIC at 14:01

## 2019-06-11 RX ADMIN — CYCLOPENTOLATE HYDROCHLORIDE 1 DROP: 20 SOLUTION/ DROPS OPHTHALMIC at 13:53

## 2019-06-11 RX ADMIN — MIDAZOLAM 2 MG: 1 INJECTION INTRAMUSCULAR; INTRAVENOUS at 14:36

## 2019-06-11 RX ADMIN — SODIUM CHLORIDE, POTASSIUM CHLORIDE, SODIUM LACTATE AND CALCIUM CHLORIDE: 600; 310; 30; 20 INJECTION, SOLUTION INTRAVENOUS at 13:17

## 2019-06-11 RX ADMIN — CYCLOPENTOLATE HYDROCHLORIDE 1 DROP: 20 SOLUTION/ DROPS OPHTHALMIC at 14:01

## 2019-06-11 RX ADMIN — PROPOFOL 50 MCG/KG/MIN: 10 INJECTION, EMULSION INTRAVENOUS at 14:45

## 2019-06-11 NOTE — ANESTHESIA POSTPROCEDURE EVALUATION
Patient: Carrillo Carranza    Procedure(s):  COMPLEX PHACOEMULSIFICATION CATARACT EXTRACTION POSTERIOR CHAMBER LENS RIGHT 10% LINDA IFIS SOLUTION/OMIDRIA  LI61AO  21    Diagnosis:AGE RELATED NUCLEAR SCLEROTIC CATARACT RIGHT EYE  Diagnosis Additional Information: No value filed.    Anesthesia Type:  MAC    Note:  Anesthesia Post Evaluation    Patient location during evaluation: Phase 2 and Bedside  Patient participation: Able to fully participate in evaluation  Level of consciousness: awake and alert  Pain management: adequate  Airway patency: patent  Cardiovascular status: acceptable  Respiratory status: acceptable  Hydration status: stable  PONV: none     Anesthetic complications: None          Last vitals:  Vitals:    06/11/19 1330 06/11/19 1335   BP: 156/84    Resp: 18    Temp: 98  F (36.7  C)    SpO2:  99%         Electronically Signed By: Jasbir Torres MD  June 11, 2019  3:32 PM

## 2019-06-11 NOTE — OP NOTE
[facility]  Ophthalmology Full Operative Note    Pre-operative diagnosis: AGE RELATED NUCLEAR SCLEROTIC CATARACT, PUPIL MIOSIS RIGHT EYE   Post-operative diagnosis Same   Procedure: Procedure(s):  COMPLEX PHACOEMULSIFICATION CATARACT EXTRACTION POSTERIOR CHAMBER LENS RIGHT 10% LINDA IFIS SOLUTION/OMIDRIA  LI61AO  21   Surgeon: Julio Patrick MD   Assistants(s):    Anesthesia: Combined MAC with Topical    Estimated blood loss: None    Total IV fluids: (See anesthesia record)   Specimens: None   Implants: 21 LI61AO   Findings:    Complications: None   Condition: Stable   Comments:       PROCEDURAL ANESTHESIA:     Topical/MAC with IV sedation (claustrophobic).  Lidocaine 2% jelly topically and Lidocaine 1% preservative-free intracamerally.    PROCEDURE:  The patient was brought to the Operating Room and prepped and draped in a sterile manner.  A wire lid speculum was placed.  A paracentesis was created and 1% Lidocaine was instilled in the anterior chamber.  Poor to moderate dilation noted.  Later floppy iris. The anterior chamber was then filled with Amvisc viscoelastic.  A clear cornea temporal wound was created using a 2.8 mm keratome.  The pupil was then stretched open with a 7 mm Malyugin ring.A cystotome was used to initiate a flap in the anterior capsule and a Utrata forceps was used to create a continuous tear capsulorhexis.  Hydrodissection was performed.  The phacoemulsification tip was inserted into the eye and the nucleus and epinucleus were removed using a divide and conquer technique.  The residual cortex was removed using the I/A handpiece.  The anterior chamber was then refilled with viscoelastic and the wound was enlarged with the keratome.  The intraocular lens, 21 diopter, Model LI61AO, was placed into the injector and injected into the capsular bag. It was checked to make sure that it was central and stable.  Residual viscoelastic was removed using the I/A.  The anterior chamber was  refilled with BSS.  The wounds were hydrated with BSS and were noted to be watertight with no suture necessary.  Topical pilocarpine 1%, Betagan, and Vigamox was applied.  A hard shield was placed.     The patient tolerated the procedure well and was sent to the Recovery Room in satisfactory condition.

## 2019-06-11 NOTE — OR NURSING
Patient and responsible adult given discharge instructions with no questions regarding instructions. Qi score 20. Pain level 0/10.  Discharged from unit via ambulatory Patient discharged to home.

## 2019-06-11 NOTE — INTERVAL H&P NOTE
History and physical reviewed. Patient examined. No interval change in condition.  Julio Patrick

## 2019-06-12 ENCOUNTER — TRANSFERRED RECORDS (OUTPATIENT)
Dept: HEALTH INFORMATION MANAGEMENT | Facility: HOSPITAL | Age: 79
End: 2019-06-12

## 2019-06-19 ENCOUNTER — ANESTHESIA EVENT (OUTPATIENT)
Dept: SURGERY | Facility: HOSPITAL | Age: 79
End: 2019-06-19
Payer: MEDICARE

## 2019-06-19 ASSESSMENT — LIFESTYLE VARIABLES: TOBACCO_USE: 1

## 2019-06-19 ASSESSMENT — ENCOUNTER SYMPTOMS: DYSRHYTHMIAS: 1

## 2019-06-19 NOTE — ANESTHESIA PREPROCEDURE EVALUATION
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    Procedure(s):  PHACOEMULSIFICATION CATARACT EXTRACTION POSTERIOR CHAMBER LENS LEFT 10% LINDA, POSSIBLE IFIS OR OMIDRIA    Past Medical History:   Diagnosis Date     Baca's esophagus 11/3/2011     Contact dermatitis and other eczema, due to unspecified cause 3/1/2011     Dermatophytosis of groin and perianal area 2006     Esophageal reflux 11/3/2011    GERD     Family history of colonic polyps 11/3/2011     Fatty liver disease, nonalcoholic      Generalized osteoarthrosis, involving multiple sites 3/1/2011     HTN (hypertension)      Hyperlipidaemia LDL goal < 100      Other and unspecified hyperlipidemia 11/3/2011     Other chronic nonalcoholic liver disease 11/3/2011    SANCHES (nonalcoholic steatohepatitis)     Other premature beats 11/3/2011    PVC (premature ventricular contraction)     Other specified cardiac dysrhythmias(427.89) 11/3/2011    ventricular bigeminy under anesthesia     Personal history of tobacco use, presenting hazards to health 11/3/2011    in remission     Type II or unspecified type diabetes mellitus without mention of complication, not stated as uncontrolled 2012     Umbilical hernia without mention of obstruction or gangrene 11/3/2011     Unspecified diffuse connective tissue disease 2013     Unspecified essential hypertension 11/3/2011     Past Surgical History:   Procedure Laterality Date     ADENOIDECTOMY       APPENDECTOMY       ARTHROSCOPY KNEE       arthroscopy right great toe      bunion     COLONOSCOPY  2010     COLONOSCOPY  ,    repeat in      COLONOSCOPY  8-     COLONOSCOPY      Repeat      ESOPHAGOGASTRODUODENOSCOPY      Repeat in      ESOPHAGOGASTRODUODENOSCOPY      Dr Funes/Vivek- Repeat 3yrs     EXCISE LESION EAR EXTERNAL Right 3/13/2018     Procedure: EXCISE LESION EAR EXTERNAL;  EXCISION OF PREAURICULAR BASAL CELL CARCINOMA WITH FROZENS, FLAP REPAIR ( 2.6 x 2cm Defect), ( 3.5 x 3.5cmTissue Rearrangement);  Surgeon: Kyara Rojas MD;  Location: [facility]     HERNIA REPAIR, UMBILICAL  2012    reduction and repair of umbilical hernia     JOINT REPLACEMENT       PHACOEMULSIFICATION WITH STANDARD INTRAOCULAR LENS IMPLANT Right 6/11/2019    Procedure: COMPLEX PHACOEMULSIFICATION CATARACT EXTRACTION POSTERIOR CHAMBER LENS RIGHT 10% LINDA IFIS SOLUTION/OMIDRIA  LI61AO  21;  Surgeon: Julio Patrick MD;  Location: [facility]     RELEASE CARPAL TUNNEL      carpal tunnel syndrome     shoulder bone spur removal       TKA       TONSILLECTOMY       UPPER GI ENDOSCOPY  2012    repeat in 2014     UPPER GI ENDOSCOPY       UPPER GI ENDOSCOPY       UPPER GI ENDOSCOPY  2010    repeat 2012       Anesthesia Evaluation     . Pt has had prior anesthetic. Type: General and MAC    No history of anesthetic complications          ROS/MED HX    ENT/Pulmonary:     (+)tobacco use, Past use , . .    Neurologic:     (+)other neuro BPPV    Cardiovascular:     (+) Dyslipidemia, hypertension----. : . . . :. dysrhythmias PVCs, .       METS/Exercise Tolerance:     Hematologic:  - neg hematologic  ROS       Musculoskeletal:   (+) arthritis,  other musculoskeletal- diffuse connective tissue disease      GI/Hepatic:     (+) GERD hepatitis (SANCHES) type Other, liver disease, Other GI/Hepatic Barretts       Renal/Genitourinary:     (+) BPH,       Endo:     (+) type II DM Last HgA1c: 6.7 date: 5/7/19 Not using insulin Obesity, .      Psychiatric:     (+) psychiatric history other (comment) (claustrophobia)      Infectious Disease:  - neg infectious disease ROS       Malignancy:      - no malignancy   Other:    - neg other ROS                      Physical Exam  Normal systems: cardiovascular    Airway   Mallampati: II  TM distance: >3 FB  Neck ROM: full    Dental   (+) upper dentures and  "missing    Cardiovascular   Rhythm and rate: regular and normal      Pulmonary    breath sounds clear to auscultation            Lab Results   Component Value Date    WBC 6.0 06/05/2019    HGB 15.3 06/05/2019    HCT 43.1 06/05/2019     06/05/2019    CRP 5.6 (H) 05/30/2014    SED 14 05/30/2014     06/05/2019    POTASSIUM 3.6 06/05/2019    CHLORIDE 101 06/05/2019    CO2 31 06/05/2019    BUN 14 06/05/2019    CR 0.77 06/05/2019     (H) 06/05/2019    MACARIO 9.5 06/05/2019    MAG 1.6 (L) 06/23/2014    ALBUMIN 4.0 05/07/2019    PROTTOTAL 7.6 05/07/2019    ALT 95 (H) 05/07/2019    AST 53 (H) 05/07/2019    ALKPHOS 71 05/07/2019    BILITOTAL 0.6 05/07/2019    TSH 0.90 05/07/2019    TROPN  05/30/2014     <0.04  **Risk Stratification**   0.10 - 0.39   Elevated-may indicate increased                 risk of short term adverse                 cardiac events.      >=0.4      Possible cardiac injury.         Preop Vitals  BP Readings from Last 3 Encounters:   06/11/19 132/70   06/05/19 128/64   05/14/19 137/75    Pulse Readings from Last 3 Encounters:   06/05/19 68   05/14/19 68   05/07/19 68      Resp Readings from Last 3 Encounters:   06/11/19 20   05/14/19 16   05/23/18 16    SpO2 Readings from Last 3 Encounters:   06/11/19 96%   06/05/19 98%   05/14/19 96%      Temp Readings from Last 1 Encounters:   06/11/19 97  F (36.1  C) (Oral)    Ht Readings from Last 1 Encounters:   06/05/19 1.772 m (5' 9.75\")      Wt Readings from Last 1 Encounters:   06/05/19 106.1 kg (234 lb)    Estimated body mass index is 33.82 kg/m  as calculated from the following:    Height as of 6/5/19: 1.772 m (5' 9.75\").    Weight as of 6/5/19: 106.1 kg (234 lb).       Anesthesia Plan      History & Physical Review  History and physical reviewed and following examination; no interval change.    ASA Status:  3 .    NPO Status:  > 8 hours    Plan for MAC with Intravenous and Propofol induction. Maintenance will be TIVA.  Reason for MAC:  Chronic " cardiopulmonary disease (G9), Extreme anxiety (QS), Deep or markedly invasive procedure (G8) and Procedure to face, neck, head or breast  PONV prophylaxis:  Ondansetron (or other 5HT-3)  Hp 6/5/19  Patient has significant claustrophobia and requests full IV sedation in order to tolerate procedure.         Postoperative Care  Postoperative pain management:  IV analgesics.      Consents  Anesthetic plan, risks, benefits and alternatives discussed with:  Patient..                 DAKOTA Bloom CRNA

## 2019-06-25 ENCOUNTER — HOSPITAL ENCOUNTER (OUTPATIENT)
Facility: HOSPITAL | Age: 79
Discharge: HOME | End: 2019-06-25
Attending: OPHTHALMOLOGY | Admitting: OPHTHALMOLOGY
Payer: MEDICARE

## 2019-06-25 ENCOUNTER — ANESTHESIA (OUTPATIENT)
Dept: SURGERY | Facility: HOSPITAL | Age: 79
End: 2019-06-25
Payer: MEDICARE

## 2019-06-25 VITALS
TEMPERATURE: 98.1 F | DIASTOLIC BLOOD PRESSURE: 78 MMHG | OXYGEN SATURATION: 93 % | RESPIRATION RATE: 18 BRPM | SYSTOLIC BLOOD PRESSURE: 146 MMHG

## 2019-06-25 PROCEDURE — 25000132 ZZH RX MED GY IP 250 OP 250 PS 637: Mod: GY | Performed by: OPHTHALMOLOGY

## 2019-06-25 PROCEDURE — 25000128 H RX IP 250 OP 636: Performed by: NURSE ANESTHETIST, CERTIFIED REGISTERED

## 2019-06-25 PROCEDURE — 25000125 ZZHC RX 250: Performed by: OPHTHALMOLOGY

## 2019-06-25 PROCEDURE — 27210794 ZZH OR GENERAL SUPPLY STERILE: Performed by: OPHTHALMOLOGY

## 2019-06-25 PROCEDURE — 25000128 H RX IP 250 OP 636: Performed by: OPHTHALMOLOGY

## 2019-06-25 PROCEDURE — 40000306 ZZH STATISTIC PRE PROC ASSESS II: Performed by: OPHTHALMOLOGY

## 2019-06-25 PROCEDURE — 36000058 ZZH SURGERY LEVEL 3 EA 15 ADDTL MIN: Performed by: OPHTHALMOLOGY

## 2019-06-25 PROCEDURE — 37000009 ZZH ANESTHESIA TECHNICAL FEE, EACH ADDTL 15 MIN: Performed by: OPHTHALMOLOGY

## 2019-06-25 PROCEDURE — V2632 POST CHMBR INTRAOCULAR LENS: HCPCS | Performed by: OPHTHALMOLOGY

## 2019-06-25 PROCEDURE — 71000027 ZZH RECOVERY PHASE 2 EACH 15 MINS: Performed by: OPHTHALMOLOGY

## 2019-06-25 PROCEDURE — 37000008 ZZH ANESTHESIA TECHNICAL FEE, 1ST 30 MIN: Performed by: OPHTHALMOLOGY

## 2019-06-25 PROCEDURE — C9447 INJ, PHENYLEPHRINE KETOROLAC: HCPCS | Performed by: OPHTHALMOLOGY

## 2019-06-25 PROCEDURE — 99100 ANES PT EXTEME AGE<1 YR&>70: CPT | Performed by: NURSE ANESTHETIST, CERTIFIED REGISTERED

## 2019-06-25 PROCEDURE — 25000125 ZZHC RX 250: Performed by: NURSE ANESTHETIST, CERTIFIED REGISTERED

## 2019-06-25 PROCEDURE — 25800030 ZZH RX IP 258 OP 636: Performed by: NURSE ANESTHETIST, CERTIFIED REGISTERED

## 2019-06-25 PROCEDURE — 36000056 ZZH SURGERY LEVEL 3 1ST 30 MIN: Performed by: OPHTHALMOLOGY

## 2019-06-25 PROCEDURE — 66984 XCAPSL CTRC RMVL W/O ECP: CPT | Mod: QK | Performed by: ANESTHESIOLOGY

## 2019-06-25 PROCEDURE — 66984 XCAPSL CTRC RMVL W/O ECP: CPT | Performed by: NURSE ANESTHETIST, CERTIFIED REGISTERED

## 2019-06-25 DEVICE — LENS-SOFPORT 21.0: Type: IMPLANTABLE DEVICE | Site: EYE | Status: FUNCTIONAL

## 2019-06-25 RX ORDER — TETRACAINE HYDROCHLORIDE 5 MG/ML
SOLUTION OPHTHALMIC PRN
Status: DISCONTINUED | OUTPATIENT
Start: 2019-06-25 | End: 2019-06-25 | Stop reason: HOSPADM

## 2019-06-25 RX ORDER — LIDOCAINE HYDROCHLORIDE 20 MG/ML
INJECTION, SOLUTION INFILTRATION; PERINEURAL PRN
Status: DISCONTINUED | OUTPATIENT
Start: 2019-06-25 | End: 2019-06-25

## 2019-06-25 RX ORDER — ONDANSETRON 4 MG/1
4 TABLET, ORALLY DISINTEGRATING ORAL EVERY 30 MIN PRN
Status: DISCONTINUED | OUTPATIENT
Start: 2019-06-25 | End: 2019-06-25 | Stop reason: HOSPADM

## 2019-06-25 RX ORDER — NALOXONE HYDROCHLORIDE 0.4 MG/ML
.1-.4 INJECTION, SOLUTION INTRAMUSCULAR; INTRAVENOUS; SUBCUTANEOUS
Status: DISCONTINUED | OUTPATIENT
Start: 2019-06-25 | End: 2019-06-25 | Stop reason: HOSPADM

## 2019-06-25 RX ORDER — SODIUM CHLORIDE, SODIUM LACTATE, POTASSIUM CHLORIDE, CALCIUM CHLORIDE 600; 310; 30; 20 MG/100ML; MG/100ML; MG/100ML; MG/100ML
INJECTION, SOLUTION INTRAVENOUS CONTINUOUS PRN
Status: DISCONTINUED | OUTPATIENT
Start: 2019-06-25 | End: 2019-06-25

## 2019-06-25 RX ORDER — LEVOBUNOLOL HYDROCHLORIDE 5 MG/ML
SOLUTION/ DROPS OPHTHALMIC PRN
Status: DISCONTINUED | OUTPATIENT
Start: 2019-06-25 | End: 2019-06-25 | Stop reason: HOSPADM

## 2019-06-25 RX ORDER — ONDANSETRON 2 MG/ML
4 INJECTION INTRAMUSCULAR; INTRAVENOUS EVERY 30 MIN PRN
Status: DISCONTINUED | OUTPATIENT
Start: 2019-06-25 | End: 2019-06-25 | Stop reason: HOSPADM

## 2019-06-25 RX ORDER — PROPARACAINE HYDROCHLORIDE 5 MG/ML
1 SOLUTION/ DROPS OPHTHALMIC ONCE
Status: COMPLETED | OUTPATIENT
Start: 2019-06-25 | End: 2019-06-25

## 2019-06-25 RX ORDER — LIDOCAINE HYDROCHLORIDE 10 MG/ML
INJECTION, SOLUTION EPIDURAL; INFILTRATION; INTRACAUDAL; PERINEURAL PRN
Status: DISCONTINUED | OUTPATIENT
Start: 2019-06-25 | End: 2019-06-25 | Stop reason: HOSPADM

## 2019-06-25 RX ORDER — PROPOFOL 10 MG/ML
INJECTION, EMULSION INTRAVENOUS PRN
Status: DISCONTINUED | OUTPATIENT
Start: 2019-06-25 | End: 2019-06-25

## 2019-06-25 RX ORDER — CYCLOPENTOLATE HYDROCHLORIDE 20 MG/ML
1 SOLUTION/ DROPS OPHTHALMIC
Status: COMPLETED | OUTPATIENT
Start: 2019-06-25 | End: 2019-06-25

## 2019-06-25 RX ORDER — MEPERIDINE HYDROCHLORIDE 50 MG/ML
12.5 INJECTION INTRAMUSCULAR; INTRAVENOUS; SUBCUTANEOUS
Status: DISCONTINUED | OUTPATIENT
Start: 2019-06-25 | End: 2019-06-25 | Stop reason: HOSPADM

## 2019-06-25 RX ORDER — PILOCARPINE HYDROCHLORIDE 10 MG/ML
SOLUTION/ DROPS OPHTHALMIC PRN
Status: DISCONTINUED | OUTPATIENT
Start: 2019-06-25 | End: 2019-06-25 | Stop reason: HOSPADM

## 2019-06-25 RX ORDER — FENTANYL CITRATE 50 UG/ML
INJECTION, SOLUTION INTRAMUSCULAR; INTRAVENOUS PRN
Status: DISCONTINUED | OUTPATIENT
Start: 2019-06-25 | End: 2019-06-25

## 2019-06-25 RX ORDER — ACETAMINOPHEN 325 MG/1
650 TABLET ORAL ONCE
Status: COMPLETED | OUTPATIENT
Start: 2019-06-25 | End: 2019-06-25

## 2019-06-25 RX ORDER — LIDOCAINE 40 MG/G
CREAM TOPICAL
Status: DISCONTINUED | OUTPATIENT
Start: 2019-06-25 | End: 2019-06-25 | Stop reason: HOSPADM

## 2019-06-25 RX ORDER — SODIUM CHLORIDE, SODIUM LACTATE, POTASSIUM CHLORIDE, CALCIUM CHLORIDE 600; 310; 30; 20 MG/100ML; MG/100ML; MG/100ML; MG/100ML
INJECTION, SOLUTION INTRAVENOUS CONTINUOUS
Status: DISCONTINUED | OUTPATIENT
Start: 2019-06-25 | End: 2019-06-25 | Stop reason: HOSPADM

## 2019-06-25 RX ORDER — PHENYLEPHRINE HYDROCHLORIDE 100 MG/ML
1 SOLUTION/ DROPS OPHTHALMIC ONCE
Status: COMPLETED | OUTPATIENT
Start: 2019-06-25 | End: 2019-06-25

## 2019-06-25 RX ORDER — PHENYLEPHRINE HYDROCHLORIDE 100 MG/ML
1 SOLUTION/ DROPS OPHTHALMIC
Status: COMPLETED | OUTPATIENT
Start: 2019-06-25 | End: 2019-06-25

## 2019-06-25 RX ORDER — PHENYLEPHRINE HYDROCHLORIDE 25 MG/ML
1 SOLUTION/ DROPS OPHTHALMIC ONCE
Status: COMPLETED | OUTPATIENT
Start: 2019-06-25 | End: 2019-06-25

## 2019-06-25 RX ADMIN — PHENYLEPHRINE HYDROCHLORIDE 1 DROP: 100 SOLUTION/ DROPS OPHTHALMIC at 08:26

## 2019-06-25 RX ADMIN — LIDOCAINE HYDROCHLORIDE 40 MG: 20 INJECTION, SOLUTION INFILTRATION; PERINEURAL at 09:17

## 2019-06-25 RX ADMIN — FENTANYL CITRATE 50 MCG: 50 INJECTION, SOLUTION INTRAMUSCULAR; INTRAVENOUS at 09:15

## 2019-06-25 RX ADMIN — PHENYLEPHRINE HYDROCHLORIDE 1 DROP: 25 SOLUTION/ DROPS OPHTHALMIC at 07:56

## 2019-06-25 RX ADMIN — PROPOFOL 10 MG: 10 INJECTION, EMULSION INTRAVENOUS at 09:30

## 2019-06-25 RX ADMIN — CYCLOPENTOLATE HYDROCHLORIDE 1 DROP: 20 SOLUTION/ DROPS OPHTHALMIC at 07:51

## 2019-06-25 RX ADMIN — PROPOFOL 10 MG: 10 INJECTION, EMULSION INTRAVENOUS at 09:25

## 2019-06-25 RX ADMIN — SODIUM CHLORIDE, POTASSIUM CHLORIDE, SODIUM LACTATE AND CALCIUM CHLORIDE: 600; 310; 30; 20 INJECTION, SOLUTION INTRAVENOUS at 08:46

## 2019-06-25 RX ADMIN — PROPOFOL 10 MG: 10 INJECTION, EMULSION INTRAVENOUS at 09:35

## 2019-06-25 RX ADMIN — CYCLOPENTOLATE HYDROCHLORIDE 1 DROP: 20 SOLUTION/ DROPS OPHTHALMIC at 07:56

## 2019-06-25 RX ADMIN — PROPOFOL 10 MG: 10 INJECTION, EMULSION INTRAVENOUS at 09:39

## 2019-06-25 RX ADMIN — PHENYLEPHRINE HYDROCHLORIDE 1 DROP: 100 SOLUTION/ DROPS OPHTHALMIC at 07:51

## 2019-06-25 RX ADMIN — FLURBIPROFEN SODIUM 0.5 ML: 0.3 SOLUTION/ DROPS OPHTHALMIC at 07:56

## 2019-06-25 RX ADMIN — PROPOFOL 30 MG: 10 INJECTION, EMULSION INTRAVENOUS at 09:17

## 2019-06-25 RX ADMIN — PROPOFOL 10 MG: 10 INJECTION, EMULSION INTRAVENOUS at 09:44

## 2019-06-25 RX ADMIN — PROPARACAINE HYDROCHLORIDE 1 DROP: 5 SOLUTION/ DROPS OPHTHALMIC at 07:50

## 2019-06-25 RX ADMIN — PROPOFOL 10 MG: 10 INJECTION, EMULSION INTRAVENOUS at 09:21

## 2019-06-25 RX ADMIN — ACETAMINOPHEN 650 MG: 325 TABLET, FILM COATED ORAL at 07:49

## 2019-06-25 RX ADMIN — MIDAZOLAM 2 MG: 1 INJECTION INTRAMUSCULAR; INTRAVENOUS at 09:13

## 2019-06-25 NOTE — ANESTHESIA CARE TRANSFER NOTE
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    Anesthesia Type:   MAC     Note:  Airway :Nasal Cannula  Patient transferred to:Phase II  Handoff Report: Identifed the Patient, Identified the Reponsible Provider, Reviewed the pertinent medical history, Discussed the surgical course, Reviewed Intra-OP anesthesia mangement and issues during anesthesia, Set expectations for post-procedure period and Allowed opportunity for questions and acknowledgement of understanding      Vitals: (Last set prior to Anesthesia Care Transfer)    CRNA VITALS  6/25/2019 0917 - 6/25/2019 0948      6/25/2019             Resp Rate (set):  8                Electronically Signed By: DAKOTA Wayne CRNA  June 25, 2019  9:48 AM

## 2019-06-25 NOTE — OP NOTE
[facility]  Ophthalmology Full Operative Note    Pre-operative diagnosis: AGE RELATED NUCLEAR SCLEROTIC CATARACT, PUPIL MIOSIS LEFT EYE   Post-operative diagnosis Same   Procedure: Procedure(s):  PHACOEMULSIFICATION CATARACT EXTRACTION POSTERIOR CHAMBER LENS LEFT 10% LINDA, POSSIBLE IFIS OR OMIDRIA LI61AO 21m COMPLEX CATARACT   Surgeon: Julio Patrick MD   Assistants(s):    Anesthesia: Combined MAC with Topical    Estimated blood loss: None    Total IV fluids: (See anesthesia record)   Specimens: None   Implants: 21 LI61AO   Findings:    Complications: None   Condition: Stable   Comments:       PROCEDURAL ANESTHESIA:    Topical/MAC with IV sedation.  Lidocaine 2% jelly topically and Lidocaine 1% preservative-free intracamerally.     PROCEDURE:  The patient was brought to the Operating Room and prepped and draped in a sterile manner.  A wire lid speculum was placed.  A paracentesis was created and 1% Lidocaine was instilled in the anterior chamber.  Poor dilation, and later floppy iris noted.  History of Flomax use.  The anterior chamber was then filled with Amvisc viscoelastic.  A clear cornea temporal wound was created using a 2.8 mm keratome.  The pupil was then stretched and held open with a 7 mm Malyugin ring. A cystotome was used to initiate a flap in the anterior capsule and a Utrata forceps was used to create a continuous tear capsulorhexis.  Hydrodissection was performed.  The phacoemulsification tip was inserted into the eye and the nucleus and epinucleus were removed using a divide and conquer technique.  The residual cortex was removed using the I/A handpiece.  The anterior chamber was then refilled with viscoelastic and the wound was enlarged with the keratome.  The intraocular lens, 21 diopter, Model LI61AO, was placed into the injector and injected into the capsular bag. It was checked to make sure that it was central and stable.  Residual viscoelastic was removed using the I/A.  The  anterior chamber was refilled with BSS.  The wounds were hydrated with BSS and were noted to be watertight with no suture necessary.  Topical pilocarpine 1%, Betagan, and Vigamox was applied.  A hard shield was placed.     The patient tolerated the procedure well and was sent to the Recovery Room in satisfactory condition.

## 2019-06-25 NOTE — OR NURSING
Patient and responsible adult given discharge instructions with no questions regarding instructions. Qi score 19. Pain level 0/10.  Discharged from unit via wheelchair. Patient discharged to home.

## 2019-06-25 NOTE — ANESTHESIA POSTPROCEDURE EVALUATION
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    Anesthesia Type:  MAC    Note:  Anesthesia Post Evaluation    Patient location during evaluation: Phase 2 and Bedside  Patient participation: Able to fully participate in evaluation  Level of consciousness: awake and alert  Pain management: adequate  Airway patency: patent  Cardiovascular status: acceptable  Respiratory status: acceptable  Hydration status: stable  PONV: none     Anesthetic complications: None          Last vitals:  Vitals:    06/25/19 1005 06/25/19 1010 06/25/19 1015   BP: 138/69 144/58 146/78   Resp: 18 18 18   Temp:      SpO2: 95% 93% 93%         Electronically Signed By: Jasbir Torres MD  June 25, 2019  10:29 AM

## 2019-07-01 DIAGNOSIS — I10 HTN (HYPERTENSION): ICD-10-CM

## 2019-07-02 RX ORDER — POTASSIUM CHLORIDE 1500 MG/1
TABLET, EXTENDED RELEASE ORAL
Qty: 180 TABLET | Refills: 0 | Status: SHIPPED | OUTPATIENT
Start: 2019-07-02 | End: 2020-04-02

## 2019-07-02 NOTE — TELEPHONE ENCOUNTER
potassium chloride ER (K-DUR/KLOR-CON M) 20 MEQ CR tablet    Last Written Prescription Date:  Not on med list   Last Fill Quantity: 0,   # refills: 0  Last Office Visit: 6/5/19  Future Office visit:

## 2019-07-28 DIAGNOSIS — K22.70 BARRETT ESOPHAGUS: ICD-10-CM

## 2019-07-29 RX ORDER — PANTOPRAZOLE SODIUM 40 MG/1
TABLET, DELAYED RELEASE ORAL
Qty: 90 TABLET | Refills: 2 | Status: SHIPPED | OUTPATIENT
Start: 2019-07-29 | End: 2020-04-23

## 2019-09-16 DIAGNOSIS — R69 DIAGNOSIS UNKNOWN: ICD-10-CM

## 2019-09-17 RX ORDER — TRAZODONE HYDROCHLORIDE 50 MG/1
TABLET, FILM COATED ORAL
Qty: 90 TABLET | Refills: 0 | Status: SHIPPED | OUTPATIENT
Start: 2019-09-17 | End: 2020-03-02

## 2019-10-04 DIAGNOSIS — I10 HTN (HYPERTENSION): ICD-10-CM

## 2019-10-07 RX ORDER — POTASSIUM CHLORIDE 1500 MG/1
TABLET, EXTENDED RELEASE ORAL
Qty: 180 TABLET | Refills: 1 | Status: SHIPPED | OUTPATIENT
Start: 2019-10-07 | End: 2019-11-05

## 2019-10-12 ENCOUNTER — OFFICE VISIT (OUTPATIENT)
Dept: FAMILY MEDICINE | Facility: OTHER | Age: 79
End: 2019-10-12
Attending: FAMILY MEDICINE
Payer: MEDICARE

## 2019-10-12 DIAGNOSIS — Z23 NEED FOR PROPHYLACTIC VACCINATION AND INOCULATION AGAINST INFLUENZA: Primary | ICD-10-CM

## 2019-10-12 PROCEDURE — G0008 ADMIN INFLUENZA VIRUS VAC: HCPCS

## 2019-10-12 PROCEDURE — 90471 IMMUNIZATION ADMIN: CPT

## 2019-10-12 PROCEDURE — 90662 IIV NO PRSV INCREASED AG IM: CPT

## 2019-10-12 NOTE — PROGRESS NOTES
Clinic Administered Medication Documentation    MEDICATION LIST:   Injectable Medication Documentation    Patient was given FLU SHOT. Prior to medication administration, verified patients identity using patient s name and date of birth. Please see MAR and medication order for additional information. Patient instructed to remain in clinic for 15 minutes.      Was entire vial of medication used? Yes  Vial/Syringe: Single dose vial  Expiration Date:  04/14/2020  Was this medication supplied by the patient? No     Cynthia Grissom LPN

## 2019-10-15 ENCOUNTER — TRANSFERRED RECORDS (OUTPATIENT)
Dept: HEALTH INFORMATION MANAGEMENT | Facility: CLINIC | Age: 79
End: 2019-10-15

## 2019-10-17 ENCOUNTER — TELEPHONE (OUTPATIENT)
Dept: FAMILY MEDICINE | Facility: OTHER | Age: 79
End: 2019-10-17

## 2019-10-28 NOTE — PROGRESS NOTES
Subjective     Carrillo Carranza is a 78 year old male who presents to clinic today for the following health issues:    HPI   Diabetes Follow-up    How often are you checking your blood sugar? A few times a week  What time of day are you checking your blood sugars (select all that apply)?  Before meals  Have you had any blood sugars above 200?  No  Have you had any blood sugars below 70?  No    What symptoms do you notice when your blood sugar is low?  None    What time of day are you checking your blood sugars (select all that apply)?  Before meals     Have you had a diabetic eye exam in the last 12 months? Yes- Date of last eye exam: october    BP Readings from Last 2 Encounters:   06/25/19 146/78   06/11/19 132/70     Hemoglobin A1C (%)   Date Value   05/07/2019 6.7 (H)   12/07/2018 6.6 (H)     LDL Cholesterol Calculated (mg/dL)   Date Value   05/07/2019 100 (H)   07/06/2018 92       Diabetes Management Resources      How many servings of fruits and vegetables do you eat daily?  2-3    On average, how many sweetened beverages do you drink each day (soda, juice, sweet tea, etc)?   0    How many days per week do you miss taking your medication? 0        Musculoskeletal problem/pain      Duration: 3 months    Description  Location: low back right side    Intensity:  moderate    Accompanying signs and symptoms: none    History  Previous similar problem: no   Previous evaluation:  none    Precipitating or alleviating factors:  Trauma or overuse: no   Aggravating factors include: sitting    Therapies tried and outcome: rest/inactivity    Across low back and now kinked on right side after lifting wood splitter    No pain in legs    Has done stretches     Advil not help    Pt has a catch in his back with bending and coming up     Has not seen chiropractor      Current Outpatient Medications   Medication Sig Dispense Refill     Aspirin (ASPIR-81 PO) Take 1 tablet by mouth daily        atenolol-chlorthalidone (TENORETIC)  50-25 MG tablet TAKE 1 TABLET DAILY 90 tablet 1     blood glucose (ACCU-CHEK ANGELI PLUS) test strip USE ONE STRIP TO CHECK GLUCOSE ONCE DAILY 100 strip 5     blood glucose calibration (ACCU-CHEK ANGELI) solution USE CONTROL SOLUTION AS DIRECTED ONCE MONTHLY TO CHECK ACCURACY OF METER 1 each 0     blood glucose monitoring (ACCU-CHEK ANGELI PLUS) meter device kit Use to test blood sugar 1 times daily 1 kit 0     blood glucose monitoring (ACCU-CHEK MULTICLIX) lancets USE TO TEST BLOOD SUGAR ONCE DAILY 102 each 5     Cholecalciferol (VITAMIN D PO) Take 1 capsule by mouth daily       docusate sodium (STOOL SOFTENER) 100 MG capsule Take 1 capsule by mouth daily        ezetimibe (ZETIA) 10 MG tablet Take 1 tablet (10 mg) by mouth daily 90 tablet 0     glimepiride (AMARYL) 1 MG tablet TAKE 1/2 (ONE-HALF) TABLET BY MOUTH IN THE MORNING BEFORE  BREAKFAST 45 tablet 3     Ibuprofen (ADVIL PO) Take by mouth every 4 hours as needed for moderate pain       Krill Oil 500 MG CAPS Take 1 capsule by mouth       magnesium oxide 400 MG CAPS Take 400 mg by mouth 2 times daily 180 capsule 2     metFORMIN (GLUCOPHAGE-XR) 500 MG 24 hr tablet TAKE 1 TABLET BY MOUTH TWICE DAILY WITH MEALS **DUE FOR FOLLOW UP AND LABS** 180 tablet 1     Multiple Vitamin (MULTIVITAMINS PO) Take 1 tablet by mouth daily       pantoprazole (PROTONIX) 40 MG EC tablet TAKE 1 TABLET DAILY 90 tablet 2     potassium chloride ER (K-DUR/KLOR-CON M) 20 MEQ CR tablet TAKE 1 TABLET BY MOUTH TWICE DAILY WITH MEALS 180 tablet 0     STATIN NOT PRESCRIBED, INTENTIONAL, Please choose reason not prescribed, below       tamsulosin (FLOMAX) 0.4 MG capsule TAKE ONE CAPSULE BY MOUTH ONCE DAILY 30 capsule 2     traZODone (DESYREL) 50 MG tablet TAKE 1 TABLET BY MOUTH AT BEDTIME 90 tablet 0     Allergies   Allergen Reactions     Atorvastatin Calcium Other (See Comments)     Lipitor - myalgia     Avodart [Dutasteride] Other (See Comments)     Sexual dsfxn     Simvastatin Other (See  "Comments)     Zocor - elevated liver function test         Reviewed and updated as needed this visit by Provider         Review of Systems   ROS COMP: Constitutional, HEENT, cardiovascular, pulmonary, gi and gu systems are negative, except as otherwise noted.      Objective    /68   Pulse 65   Temp 96.3  F (35.7  C)   Ht 1.772 m (5' 9.75\")   Wt 102.5 kg (226 lb)   SpO2 96%   BMI 32.66 kg/m    Body mass index is 32.66 kg/m .  Physical Exam   GENERAL: healthy, alert and no distress  NECK: no adenopathy, no asymmetry, masses, or scars and thyroid normal to palpation  RESP: lungs clear to auscultation - no rales, rhonchi or wheezes  CV: regular rate and rhythm, normal S1 S2, no S3 or S4, no murmur, click or rub, no peripheral edema and peripheral pulses strong  ABDOMEN: soft, nontender, no hepatosplenomegaly, no masses and bowel sounds normal  MS: no gross musculoskeletal defects noted, no edema  Diabetic foot exam unremarkable - normal   BACK: no CVA tenderness, right sided  paralumbar tenderness    Results for orders placed or performed in visit on 11/05/19   Hemoglobin A1c     Status: Abnormal   Result Value Ref Range    Hemoglobin A1C 6.7 (H) 0 - 5.6 %             Assessment & Plan     1. Type 2 diabetes mellitus without complication, without long-term current use of insulin (H)  Great control - Continue current medications and behavioral changes. F/u in 4-5 months   - Hemoglobin A1c; Future  - Albumin Random Urine Quantitative with Creat Ratio; Future    2. Strain of lumbar region, initial encounter  Discussed. Discussed behavioral changes and proper body mechanics needed to help control patient's back pain.   Recommend seeing his chiropractor to get the \"catch out\" Discussed in length conservative measures of OTC medications for pain, Ice/Heat, elevation and the concept of R.I.C.E.. Continue behavioral changes and proper body mechanics to allow for healing. Follow up as directed.          BMI: " "  Estimated body mass index is 32.66 kg/m  as calculated from the following:    Height as of this encounter: 1.772 m (5' 9.75\").    Weight as of this encounter: 102.5 kg (226 lb).               No follow-ups on file.    Mata Richardson MD  [facility] [place]      "

## 2019-10-29 DIAGNOSIS — E78.5 HYPERLIPIDEMIA LDL GOAL <100: ICD-10-CM

## 2019-10-29 NOTE — TELEPHONE ENCOUNTER
ANNATIA      Last Written Prescription Date:  4-15-19  Last Fill Quantity: 90,   # refills: 1  Last Office Visit: 10-  Future Office visit:    Next 5 appointments (look out 90 days)    Nov 05, 2019  9:00 AM CST  (Arrive by 8:45 AM)  [facility]ata Richardson [facility]  [facility] [address]  [place] [address]  [phone]           Routing refill request to provider for review/approval because:

## 2019-10-30 RX ORDER — EZETIMIBE 10 MG/1
10 TABLET ORAL DAILY
Qty: 90 TABLET | Refills: 0 | Status: SHIPPED | OUTPATIENT
Start: 2019-10-30 | End: 2020-01-23

## 2019-11-05 ENCOUNTER — OFFICE VISIT (OUTPATIENT)
Dept: FAMILY MEDICINE | Facility: OTHER | Age: 79
End: 2019-11-05
Attending: FAMILY MEDICINE
Payer: MEDICARE

## 2019-11-05 VITALS
DIASTOLIC BLOOD PRESSURE: 68 MMHG | HEART RATE: 65 BPM | OXYGEN SATURATION: 96 % | HEIGHT: 70 IN | WEIGHT: 226 LBS | BODY MASS INDEX: 32.35 KG/M2 | TEMPERATURE: 96.3 F | SYSTOLIC BLOOD PRESSURE: 134 MMHG

## 2019-11-05 DIAGNOSIS — E11.9 TYPE 2 DIABETES MELLITUS WITHOUT COMPLICATION, WITHOUT LONG-TERM CURRENT USE OF INSULIN (H): Primary | ICD-10-CM

## 2019-11-05 DIAGNOSIS — S39.012A STRAIN OF LUMBAR REGION, INITIAL ENCOUNTER: ICD-10-CM

## 2019-11-05 DIAGNOSIS — E11.9 TYPE 2 DIABETES MELLITUS WITHOUT COMPLICATION, WITHOUT LONG-TERM CURRENT USE OF INSULIN (H): ICD-10-CM

## 2019-11-05 LAB
CREAT UR-MCNC: 22 MG/DL
EST. AVERAGE GLUCOSE BLD GHB EST-MCNC: 146 MG/DL
HBA1C MFR BLD: 6.7 % (ref 0–5.6)
MICROALBUMIN UR-MCNC: <5 MG/L
MICROALBUMIN/CREAT UR: NORMAL MG/G CR (ref 0–17)

## 2019-11-05 PROCEDURE — G0463 HOSPITAL OUTPT CLINIC VISIT: HCPCS

## 2019-11-05 PROCEDURE — 82043 UR ALBUMIN QUANTITATIVE: CPT | Mod: ZL | Performed by: FAMILY MEDICINE

## 2019-11-05 PROCEDURE — 83036 HEMOGLOBIN GLYCOSYLATED A1C: CPT | Mod: ZL | Performed by: FAMILY MEDICINE

## 2019-11-05 PROCEDURE — 40000788 ZZHCL STATISTIC ESTIMATED AVERAGE GLUCOSE: Mod: ZL | Performed by: FAMILY MEDICINE

## 2019-11-05 PROCEDURE — 99213 OFFICE O/P EST LOW 20 MIN: CPT | Performed by: FAMILY MEDICINE

## 2019-11-05 PROCEDURE — 36415 COLL VENOUS BLD VENIPUNCTURE: CPT | Mod: ZL | Performed by: FAMILY MEDICINE

## 2019-11-05 RX ORDER — KRILL/OM-3/DHA/EPA/PHOSPHO/AST 500MG-86MG
1 CAPSULE ORAL
COMMUNITY

## 2019-11-05 ASSESSMENT — PAIN SCALES - GENERAL: PAINLEVEL: SEVERE PAIN (7)

## 2019-11-05 ASSESSMENT — MIFFLIN-ST. JEOR: SCORE: 1757.41

## 2019-11-05 NOTE — NURSING NOTE
"Chief Complaint   Patient presents with     Diabetes       Initial /68   Pulse 65   Temp 96.3  F (35.7  C)   Ht 1.772 m (5' 9.75\")   Wt 102.5 kg (226 lb)   SpO2 96%   BMI 32.66 kg/m   Estimated body mass index is 32.66 kg/m  as calculated from the following:    Height as of this encounter: 1.772 m (5' 9.75\").    Weight as of this encounter: 102.5 kg (226 lb).  Medication Reconciliation: complete  Jerome Fitzgerald LPN  "

## 2019-11-26 DIAGNOSIS — E11.9 TYPE 2 DIABETES MELLITUS WITHOUT COMPLICATION, WITHOUT LONG-TERM CURRENT USE OF INSULIN (H): ICD-10-CM

## 2019-11-29 RX ORDER — METFORMIN HCL 500 MG
500 TABLET, EXTENDED RELEASE 24 HR ORAL 2 TIMES DAILY WITH MEALS
Qty: 180 TABLET | Refills: 1 | Status: SHIPPED | OUTPATIENT
Start: 2019-11-29 | End: 2020-04-28

## 2019-12-09 DIAGNOSIS — I10 BENIGN ESSENTIAL HYPERTENSION: ICD-10-CM

## 2019-12-10 NOTE — TELEPHONE ENCOUNTER
atenolol-chlorthalidone (TENORETIC) 50-25 MG tablet     Last Written Prescription Date:  06/04/2019  Last Fill Quantity: 90,   # refills: 1  Last Office Visit: 11/05/2019  Future Office visit:       Routing refill request to provider for review/approval because:

## 2019-12-11 RX ORDER — ATENOLOL AND CHLORTHALIDONE TABLET 50; 25 MG/1; MG/1
TABLET ORAL
Qty: 90 TABLET | Refills: 1 | Status: SHIPPED | OUTPATIENT
Start: 2019-12-11 | End: 2020-04-23

## 2019-12-30 DIAGNOSIS — N13.8 BPH WITH OBSTRUCTION/LOWER URINARY TRACT SYMPTOMS: ICD-10-CM

## 2019-12-30 DIAGNOSIS — N40.1 BPH WITH OBSTRUCTION/LOWER URINARY TRACT SYMPTOMS: ICD-10-CM

## 2020-01-02 RX ORDER — TAMSULOSIN HYDROCHLORIDE 0.4 MG/1
CAPSULE ORAL
Qty: 90 CAPSULE | Refills: 0 | Status: SHIPPED | OUTPATIENT
Start: 2020-01-02 | End: 2020-03-25

## 2020-01-02 NOTE — TELEPHONE ENCOUNTER
Please advise.    tamsulosin (FLOMAX) 0.4 MG capsule      Last Written Prescription Date:  4/10/19  Last Fill Quantity: 30,   # refills: 2  Last Office Visit: 11/5/19  Future Office visit:    Next 5 appointments (look out 90 days)    Mar 17, 2020  9:00 AM CDT  (Arrive by 8:45 AM)  SHORT with Mata Richardson MD  [facility] [address]  [place] [address]  [phone]           Routing refill request to provider for review/approval because:  Drug not on the [facility], [facility] or  Health refill protocol or controlled substance

## 2020-01-23 DIAGNOSIS — E78.5 HYPERLIPIDEMIA LDL GOAL <100: ICD-10-CM

## 2020-01-23 RX ORDER — EZETIMIBE 10 MG/1
10 TABLET ORAL DAILY
Qty: 90 TABLET | Refills: 0 | Status: SHIPPED | OUTPATIENT
Start: 2020-01-23 | End: 2020-04-23

## 2020-03-02 ENCOUNTER — HEALTH MAINTENANCE LETTER (OUTPATIENT)
Age: 80
End: 2020-03-02

## 2020-03-02 DIAGNOSIS — R69 DIAGNOSIS UNKNOWN: ICD-10-CM

## 2020-03-02 RX ORDER — TRAZODONE HYDROCHLORIDE 50 MG/1
TABLET, FILM COATED ORAL
Qty: 90 TABLET | Refills: 3 | Status: SHIPPED | OUTPATIENT
Start: 2020-03-02 | End: 2021-08-23

## 2020-03-04 DIAGNOSIS — E11.21 TYPE 2 DIABETES MELLITUS WITH DIABETIC NEPHROPATHY, WITHOUT LONG-TERM CURRENT USE OF INSULIN (H): ICD-10-CM

## 2020-03-04 RX ORDER — GLIMEPIRIDE 1 MG/1
TABLET ORAL
Qty: 45 TABLET | Refills: 0 | Status: SHIPPED | OUTPATIENT
Start: 2020-03-04 | End: 2020-05-21

## 2020-03-06 ENCOUNTER — TELEPHONE (OUTPATIENT)
Dept: FAMILY MEDICINE | Facility: OTHER | Age: 80
End: 2020-03-06

## 2020-03-06 NOTE — TELEPHONE ENCOUNTER
9:19 AM    Reason for Call: Phone Call    Description: patient called and would like for a nurse to call him regarding a fall he had last week, he states he has left side pain, he fell on ice, is having a painful time sleeping. He would like to know what to do for pain.     Was an appointment offered for this call? Yes  If yes : Appointment type              Date 03/17/20    Preferred method for responding to this message: Telephone Call  What is your phone number ?  [phone]    If we cannot reach you directly, may we leave a detailed response at the number you provided? Yes    Can this message wait until your PCP/provider returns, if available today? YES, No    Rodney Talbot

## 2020-03-06 NOTE — TELEPHONE ENCOUNTER
Was having sciatic pain that started Wednesday night had fallen other day on ice, has been using heat and aleve, now today states pain has subsided, told patient that he can still use aleve as needed and if pain worsens would need to come in and be seen.

## 2020-03-24 ENCOUNTER — TELEPHONE (OUTPATIENT)
Dept: FAMILY MEDICINE | Facility: OTHER | Age: 80
End: 2020-03-24

## 2020-03-24 NOTE — TELEPHONE ENCOUNTER
Sugars have been running higher than normal 140-150's last couple weeks. Is wondering if need sto go on full tablet of amaryl?

## 2020-03-25 DIAGNOSIS — N13.8 BPH WITH OBSTRUCTION/LOWER URINARY TRACT SYMPTOMS: ICD-10-CM

## 2020-03-25 DIAGNOSIS — N40.1 BPH WITH OBSTRUCTION/LOWER URINARY TRACT SYMPTOMS: ICD-10-CM

## 2020-03-25 RX ORDER — TAMSULOSIN HYDROCHLORIDE 0.4 MG/1
CAPSULE ORAL
Qty: 90 CAPSULE | Refills: 0 | Status: SHIPPED | OUTPATIENT
Start: 2020-03-25 | End: 2020-06-30

## 2020-03-25 NOTE — TELEPHONE ENCOUNTER
Flomax      Last Written Prescription Date:  01/02/2020  Last Fill Quantity: 90,   # refills: 0  Last Office Visit: 03/25/2020  Future Office visit:    Next 5 appointments (look out 90 days)    May 20, 2020  8:00 AM CDT  (Arrive by 7:45 AM)  SHORT with Mata Richardson MD  [facility] [address]  [place] [address]  [phone]           Routing refill request to provider for review/approval because:  Drug not on the [facility], [facility] or [facility] refill protocol or controlled substance

## 2020-04-02 DIAGNOSIS — I10 HTN (HYPERTENSION): ICD-10-CM

## 2020-04-02 RX ORDER — POTASSIUM CHLORIDE 1500 MG/1
TABLET, EXTENDED RELEASE ORAL
Qty: 180 TABLET | Refills: 0 | Status: SHIPPED | OUTPATIENT
Start: 2020-04-02 | End: 2020-07-06

## 2020-04-02 NOTE — TELEPHONE ENCOUNTER
Potassium      Last Written Prescription Date:  7-2-19  Last Fill Quantity: 180,   # refills: 0  Last Office Visit: 11-5-19  Future Office visit:    Next 5 appointments (look out 90 days)    May 20, 2020  8:00 AM CDT  (Arrive by 7:45 AM)  SHORT with Mata Richardson MD  [facility] [place] ([facility] [place] ) [address]  [place] [address]  [phone]

## 2020-04-20 ENCOUNTER — TELEPHONE (OUTPATIENT)
Dept: FAMILY MEDICINE | Facility: OTHER | Age: 80
End: 2020-04-20

## 2020-04-20 NOTE — TELEPHONE ENCOUNTER
Pt calls to report his blood sugar was 175 this morning. States it usually runs 130-140 and he ate a healthy dinner. Wants to talk with Dr Richardson's nurse or him about this spike.

## 2020-04-20 NOTE — TELEPHONE ENCOUNTER
Spoke with Dr. Richardson. We decided that with patient's current stressor it would be reasonable to hold of changing medications at this point. Advised patient to continue currently prescribed dosages (do not increase amaryl as may cause hypoglycemia) and begin checking/recording BG levels three times a day before breakfast, lunch, and dinner. Will follow-up with patient in 7-10 days. If BG levels continue to be high throughout the day, consider increasing Metformin in the afternoon. (Patient has not had any adverse effects from metformin in the past).

## 2020-04-20 NOTE — TELEPHONE ENCOUNTER
Spoke with patient via telephone.    Patient states that blood sugars have been creeping up for the last month or so. Had called here one month ago that his sugars were above normal in the 140s-150s - was advised to watch diet and exercise more with the better weather. Patient has been active outside since, but sugars remained elevated and this morning his BG was measured in the 170's. Dinner last night was meat loaf, cauliflower, sugarless jello, and some popcorn a little before bedtime. Patient took full pill of amaryl (prescibed only half a pill), but did not check his BG since. No symptoms of hypoglycemia noted.     Of note, patient has had increasing stress with his part-time job. Stressful enough that he quit and just turned in his keys this morning.

## 2020-04-22 DIAGNOSIS — I10 BENIGN ESSENTIAL HYPERTENSION: ICD-10-CM

## 2020-04-22 DIAGNOSIS — E78.5 HYPERLIPIDEMIA LDL GOAL <100: ICD-10-CM

## 2020-04-22 DIAGNOSIS — K22.70 BARRETT ESOPHAGUS: ICD-10-CM

## 2020-04-23 RX ORDER — PANTOPRAZOLE SODIUM 40 MG/1
TABLET, DELAYED RELEASE ORAL
Qty: 90 TABLET | Refills: 2 | Status: SHIPPED | OUTPATIENT
Start: 2020-04-23 | End: 2021-01-18

## 2020-04-23 RX ORDER — EZETIMIBE 10 MG/1
TABLET ORAL
Qty: 90 TABLET | Refills: 3 | Status: SHIPPED | OUTPATIENT
Start: 2020-04-23 | End: 2021-04-19

## 2020-04-23 RX ORDER — ATENOLOL AND CHLORTHALIDONE TABLET 50; 25 MG/1; MG/1
TABLET ORAL
Qty: 90 TABLET | Refills: 1 | Status: SHIPPED | OUTPATIENT
Start: 2020-04-23 | End: 2020-11-30

## 2020-04-23 NOTE — TELEPHONE ENCOUNTER
ezetimibe      Last Written Prescription Date:  1/23/20  Last Fill Quantity: 90,   # refills: 0  Last Office Visit: 11/5/19  Future Office visit:    Next 5 appointments (look out 90 days)    May 20, 2020  8:00 AM CDT  (Arrive by 7:45 AM)  SHORT with Mata Richardson MD  [facility] ([facility] ) [address]  [place] [address]  [phone]           Routing refill request to provider for review/approval because:  Drug not on the [facility], [facility] or [facility] refill protocol or controlled substance

## 2020-05-18 DIAGNOSIS — E11.9 TYPE 2 DIABETES MELLITUS WITHOUT COMPLICATION, WITHOUT LONG-TERM CURRENT USE OF INSULIN (H): Primary | ICD-10-CM

## 2020-05-18 DIAGNOSIS — I10 BENIGN ESSENTIAL HYPERTENSION: ICD-10-CM

## 2020-05-19 DIAGNOSIS — E11.21 TYPE 2 DIABETES MELLITUS WITH DIABETIC NEPHROPATHY, WITHOUT LONG-TERM CURRENT USE OF INSULIN (H): ICD-10-CM

## 2020-05-20 NOTE — TELEPHONE ENCOUNTER
glimepiride      Last Written Prescription Date:  03/04/2020  Last Fill Quantity: 45,   # refills: 0  Last Office Visit: 11/05/2019  Future Office visit:

## 2020-05-21 DIAGNOSIS — I10 BENIGN ESSENTIAL HYPERTENSION: ICD-10-CM

## 2020-05-21 DIAGNOSIS — E11.9 TYPE 2 DIABETES MELLITUS WITHOUT COMPLICATION, WITHOUT LONG-TERM CURRENT USE OF INSULIN (H): ICD-10-CM

## 2020-05-21 LAB
ANION GAP SERPL CALCULATED.3IONS-SCNC: 3 MMOL/L (ref 3–14)
BASOPHILS # BLD AUTO: 0 10E9/L (ref 0–0.2)
BASOPHILS NFR BLD AUTO: 0.5 %
BUN SERPL-MCNC: 14 MG/DL (ref 7–30)
CALCIUM SERPL-MCNC: 9.4 MG/DL (ref 8.5–10.1)
CHLORIDE SERPL-SCNC: 99 MMOL/L (ref 94–109)
CO2 SERPL-SCNC: 31 MMOL/L (ref 20–32)
CREAT SERPL-MCNC: 0.74 MG/DL (ref 0.66–1.25)
DIFFERENTIAL METHOD BLD: NORMAL
EOSINOPHIL # BLD AUTO: 0.2 10E9/L (ref 0–0.7)
EOSINOPHIL NFR BLD AUTO: 3.5 %
ERYTHROCYTE [DISTWIDTH] IN BLOOD BY AUTOMATED COUNT: 12.1 % (ref 10–15)
EST. AVERAGE GLUCOSE BLD GHB EST-MCNC: 134 MG/DL
GFR SERPL CREATININE-BSD FRML MDRD: 89 ML/MIN/{1.73_M2}
GLUCOSE SERPL-MCNC: 210 MG/DL (ref 70–99)
HBA1C MFR BLD: 6.3 % (ref 0–5.6)
HCT VFR BLD AUTO: 44.4 % (ref 40–53)
HGB BLD-MCNC: 15.5 G/DL (ref 13.3–17.7)
IMM GRANULOCYTES # BLD: 0 10E9/L (ref 0–0.4)
IMM GRANULOCYTES NFR BLD: 0.2 %
LYMPHOCYTES # BLD AUTO: 1.9 10E9/L (ref 0.8–5.3)
LYMPHOCYTES NFR BLD AUTO: 31.2 %
MCH RBC QN AUTO: 31.4 PG (ref 26.5–33)
MCHC RBC AUTO-ENTMCNC: 34.9 G/DL (ref 31.5–36.5)
MCV RBC AUTO: 90 FL (ref 78–100)
MONOCYTES # BLD AUTO: 0.5 10E9/L (ref 0–1.3)
MONOCYTES NFR BLD AUTO: 8.5 %
NEUTROPHILS # BLD AUTO: 3.4 10E9/L (ref 1.6–8.3)
NEUTROPHILS NFR BLD AUTO: 56.1 %
NRBC # BLD AUTO: 0 10*3/UL
NRBC BLD AUTO-RTO: 0 /100
PLATELET # BLD AUTO: 195 10E9/L (ref 150–450)
POTASSIUM SERPL-SCNC: 3.6 MMOL/L (ref 3.4–5.3)
RBC # BLD AUTO: 4.94 10E12/L (ref 4.4–5.9)
SODIUM SERPL-SCNC: 133 MMOL/L (ref 133–144)
WBC # BLD AUTO: 6 10E9/L (ref 4–11)

## 2020-05-21 PROCEDURE — 83036 HEMOGLOBIN GLYCOSYLATED A1C: CPT | Mod: ZL | Performed by: FAMILY MEDICINE

## 2020-05-21 PROCEDURE — 80048 BASIC METABOLIC PNL TOTAL CA: CPT | Mod: ZL | Performed by: FAMILY MEDICINE

## 2020-05-21 PROCEDURE — 85025 COMPLETE CBC W/AUTO DIFF WBC: CPT | Mod: ZL | Performed by: FAMILY MEDICINE

## 2020-05-21 PROCEDURE — 36415 COLL VENOUS BLD VENIPUNCTURE: CPT | Mod: ZL | Performed by: FAMILY MEDICINE

## 2020-05-21 PROCEDURE — 40000788 ZZHCL STATISTIC ESTIMATED AVERAGE GLUCOSE: Mod: ZL | Performed by: FAMILY MEDICINE

## 2020-05-21 RX ORDER — GLIMEPIRIDE 1 MG/1
TABLET ORAL
Qty: 45 TABLET | Refills: 3 | Status: SHIPPED | OUTPATIENT
Start: 2020-05-21 | End: 2021-03-18

## 2020-05-21 NOTE — TELEPHONE ENCOUNTER
Lab Results   Component Value Date    A1C 6.3 05/21/2020    A1C 6.7 11/05/2019    A1C 6.7 05/07/2019    A1C 6.6 12/07/2018    A1C 6.1 07/06/2018

## 2020-05-28 DIAGNOSIS — E11.9 DIABETES MELLITUS, TYPE 2 (H): ICD-10-CM

## 2020-05-28 DIAGNOSIS — E11.21 TYPE 2 DIABETES MELLITUS WITH DIABETIC NEPHROPATHY, WITHOUT LONG-TERM CURRENT USE OF INSULIN (H): Primary | ICD-10-CM

## 2020-05-29 NOTE — TELEPHONE ENCOUNTER
Blood glucose test strips      Last Written Prescription Date:  5/14/2019  Last Fill Quantity: 100,   # refills: 5  Last Office Visit: 11/5/2019

## 2020-06-15 ENCOUNTER — NURSE TRIAGE (OUTPATIENT)
Dept: FAMILY MEDICINE | Facility: OTHER | Age: 80
End: 2020-06-15

## 2020-06-15 ENCOUNTER — TELEPHONE (OUTPATIENT)
Dept: FAMILY MEDICINE | Facility: OTHER | Age: 80
End: 2020-06-15

## 2020-06-15 NOTE — TELEPHONE ENCOUNTER
"Loud ringing in left ear on 6/12/20 when patient woke up. Lasting 15-20 minutes. No other symptoms. Could not hear out of left ear was like \"echo chamber\".     No hearing problems during conversation on 6/15/20.    Care Advice provided per protocol. Patient instructed to call back if symptoms occur again or worsen.       Additional Information    Negative: Followed an ear injury    Negative: Hearing loss is main symptom    Negative: Patient sounds very sick or weak to the triager    Negative: Taking aspirin and dosage sounds high (i.e., > 1500 mg/day)    Negative: Hearing loss in one or both ears and sudden onset and present now    Negative: Ear is painful    Negative: Decreased hearing followed sudden, extremely loud noise (e.g., explosion, not just loud concert)    Negative: Taking medication that can damage hearing (i.e., gentamycin, tobramycin, furosemide, ethacrynic acid, cisplatin, quinidine)    Negative: Symptoms only or mainly in 1 ear (unilateral tinnitus)    Negative: Recurrent episodes of hearing loss, dizziness, and ringing in the ear    Negative: Taking medication that can cause tinnitus (i.e., indomethacin, propranolol, levodopa, carbamazepine, aminophylline).    Negative: Patient wants to be seen    MODERATE-SEVERE tinnitus (i.e., interferes with work, school, or sleep)    Answer Assessment - Initial Assessment Questions  1. DESCRIPTION: \"Describe the sound you are hearing.\" (e.g., hissing, humming, pounding, ringing)      Ringing in left ear and echoing    2. LOCATION: \"One or both ears?\" If one, ask: \"Which ear?\"      Left ear    3. SEVERITY: \"How bad is it?\"     - MILD - doesn't interfere with normal activities, only can hear in a quiet room     - MODERATE-SEVERE (Bothersome): interferes with work, school, sleep, or other activities       Moderate to severe    4. ONSET: \"When did this begin?\" \"Did it start suddenly or come on gradually?\"      6/13/20 came on suddenly. \"muffling, ringing " "severe\"    5. PATTERN: \"Does this come and go, or has it been constant since it started?\"      Constant then subsided after 20 minutes.     6. HEARING LOSS: \"Is your hearing decreased?\" (e.g., normal, decreased)        No     7. OTHER SYMPTOMS: \"Do you have any other symptoms?\" (e.g., dizziness, earache)      No dizziness, no pain in left ear. No symptoms during conversation on 6/15/20    8. PREGNANCY: \"Is there any chance you are pregnant?\" \"When was your last menstrual period?\"      NA    Protocols used: TINNITUS-A-OH      "

## 2020-06-18 ENCOUNTER — TELEPHONE (OUTPATIENT)
Dept: FAMILY MEDICINE | Facility: OTHER | Age: 80
End: 2020-06-18

## 2020-06-18 DIAGNOSIS — E11.9 TYPE 2 DIABETES MELLITUS WITHOUT COMPLICATION, WITHOUT LONG-TERM CURRENT USE OF INSULIN (H): ICD-10-CM

## 2020-06-18 RX ORDER — METFORMIN HCL 500 MG
TABLET, EXTENDED RELEASE 24 HR ORAL
Qty: 180 TABLET | Refills: 0 | COMMUNITY
Start: 2020-06-18 | End: 2020-10-02

## 2020-06-18 NOTE — TELEPHONE ENCOUNTER
Yeah we can't start seeing patients in clinic till 830 arrival time, could set him up to do virtual.

## 2020-06-18 NOTE — TELEPHONE ENCOUNTER
10:54 AM  Called pt to advise appointment would need to be changed. Pt would prefer to come in vs virtual per wife.   Next 5 appointments (look out 90 days)    Jul 22, 2020 10:30 AM CDT  (Arrive by 10:15 AM)  SHORT with Mata Richardson MD  [facility] [place] ([facility] [place] ) [address]  [place] [address]  [phone]

## 2020-06-18 NOTE — TELEPHONE ENCOUNTER
"Pt's wife called reports \"he has enough metformin, but the dose has been decreased 500 mg one in the AM an done in the PM\". Script updated signed as historical.   Med review scheduled as requested by wife.     Next 5 appointments (look out 90 days)    Jun 23, 2020  7:45 AM CDT  (Arrive by 7:30 AM)  SHORT with Mata Richardson MD  [facility] [place] ([facility] [place] ) [address]  [place] [address]  [phone]          "

## 2020-06-25 ENCOUNTER — TELEPHONE (OUTPATIENT)
Dept: FAMILY MEDICINE | Facility: OTHER | Age: 80
End: 2020-06-25

## 2020-06-25 DIAGNOSIS — E11.21 TYPE 2 DIABETES MELLITUS WITH DIABETIC NEPHROPATHY, WITHOUT LONG-TERM CURRENT USE OF INSULIN (H): Primary | ICD-10-CM

## 2020-06-29 DIAGNOSIS — N13.8 BPH WITH OBSTRUCTION/LOWER URINARY TRACT SYMPTOMS: ICD-10-CM

## 2020-06-29 DIAGNOSIS — N40.1 BPH WITH OBSTRUCTION/LOWER URINARY TRACT SYMPTOMS: ICD-10-CM

## 2020-06-30 RX ORDER — TAMSULOSIN HYDROCHLORIDE 0.4 MG/1
CAPSULE ORAL
Qty: 90 CAPSULE | Refills: 3 | Status: SHIPPED | OUTPATIENT
Start: 2020-06-30 | End: 2021-06-29

## 2020-07-02 ENCOUNTER — HOSPITAL ENCOUNTER (OUTPATIENT)
Dept: EDUCATION SERVICES | Facility: HOSPITAL | Age: 80
Discharge: HOME | End: 2020-07-02
Attending: FAMILY MEDICINE | Admitting: FAMILY MEDICINE
Payer: MEDICARE

## 2020-07-02 VITALS
SYSTOLIC BLOOD PRESSURE: 121 MMHG | DIASTOLIC BLOOD PRESSURE: 73 MMHG | HEIGHT: 70 IN | HEART RATE: 66 BPM | BODY MASS INDEX: 32.97 KG/M2 | WEIGHT: 230.3 LBS | OXYGEN SATURATION: 98 % | TEMPERATURE: 97.5 F

## 2020-07-02 DIAGNOSIS — E11.21 TYPE 2 DIABETES MELLITUS WITH DIABETIC NEPHROPATHY, WITHOUT LONG-TERM CURRENT USE OF INSULIN (H): ICD-10-CM

## 2020-07-02 PROCEDURE — G0109 DIAB MANAGE TRN IND/GROUP: HCPCS

## 2020-07-02 ASSESSMENT — ANXIETY QUESTIONNAIRES
4. TROUBLE RELAXING: NOT AT ALL
GAD7 TOTAL SCORE: 0
7. FEELING AFRAID AS IF SOMETHING AWFUL MIGHT HAPPEN: NOT AT ALL
1. FEELING NERVOUS, ANXIOUS, OR ON EDGE: NOT AT ALL
2. NOT BEING ABLE TO STOP OR CONTROL WORRYING: NOT AT ALL
6. BECOMING EASILY ANNOYED OR IRRITABLE: NOT AT ALL
5. BEING SO RESTLESS THAT IT IS HARD TO SIT STILL: NOT AT ALL
3. WORRYING TOO MUCH ABOUT DIFFERENT THINGS: NOT AT ALL

## 2020-07-02 ASSESSMENT — PATIENT HEALTH QUESTIONNAIRE - PHQ9: SUM OF ALL RESPONSES TO PHQ QUESTIONS 1-9: 0

## 2020-07-02 ASSESSMENT — PAIN SCALES - GENERAL: PAINLEVEL: NO PAIN (0)

## 2020-07-02 ASSESSMENT — MIFFLIN-ST. JEOR: SCORE: 1771.91

## 2020-07-02 NOTE — PROGRESS NOTES
"Chief Complaint   Patient presents with     Diabetes Education     Annual       Initial /73 (BP Location: Left arm, Patient Position: Sitting, Cuff Size: Adult Large)   Pulse 66   Temp 97.5  F (36.4  C) (Tympanic)   Ht 1.772 m (5' 9.75\")   Wt 104.5 kg (230 lb 4.8 oz)   SpO2 98%   BMI 33.28 kg/m   Estimated body mass index is 33.28 kg/m  as calculated from the following:    Height as of this encounter: 1.772 m (5' 9.75\").    Weight as of this encounter: 104.5 kg (230 lb 4.8 oz).  Medication Reconciliation: complete  Tara Garcia MA    "

## 2020-07-03 ASSESSMENT — ANXIETY QUESTIONNAIRES: GAD7 TOTAL SCORE: 0

## 2020-07-03 NOTE — PROGRESS NOTES
"Diabetes Self-Management Education & Support    Presents for: Individual review    SUBJECTIVE/OBJECTIVE:  Presents for: Individual review  Accompanied by: Self, Spouse  Diabetes education in the past 24mo: Yes  Focus of Visit: Taking Medication, Monitoring  Diabetes type: Type 2  Disease course: Stable  Diabetes management related comments/concerns: Recent higher BG readings  Transportation concerns: No  Difficulty affording diabetes medication?: No  Difficulty affording diabetes testing supplies?: No  Other concerns:: None  Cultural Influences/Ethnic Background:  American      Diabetes Symptoms & Complications:  Fatigue: No  Neuropathy: No  Polydipsia: No  Polyphagia: No  Polyuria: No  Visual change: No  Slow healing wounds: No  Symptom course: Stable  Weight trend: Stable  Complications assessed today?: Yes    Patient Problem List and Family Medical History reviewed for relevant medical history, current medical status, and diabetes risk factors.    Vitals:  /73 (BP Location: Left arm, Patient Position: Sitting, Cuff Size: Adult Large)   Pulse 66   Temp 97.5  F (36.4  C) (Tympanic)   Ht 1.772 m (5' 9.75\")   Wt 104.5 kg (230 lb 4.8 oz)   SpO2 98%   BMI 33.28 kg/m    Estimated body mass index is 33.28 kg/m  as calculated from the following:    Height as of this encounter: 1.772 m (5' 9.75\").    Weight as of this encounter: 104.5 kg (230 lb 4.8 oz).   Last 3 BP:   BP Readings from Last 3 Encounters:   07/02/20 121/73   11/05/19 134/68   06/25/19 146/78       History   Smoking Status     Former Smoker     Types: Cigarettes   Smokeless Tobacco     Never Used     Comment: quit in the 80s       Labs:  Lab Results   Component Value Date    A1C 6.3 05/21/2020     Lab Results   Component Value Date     05/21/2020     Lab Results   Component Value Date     05/07/2019     HDL Cholesterol   Date Value Ref Range Status   05/07/2019 43 >39 mg/dL Final   ]  GFR Estimate   Date Value Ref Range Status "   05/21/2020 89 >60 mL/min/[1.73_m2] Final     Comment:     Non  GFR Calc  Starting 12/18/2018, serum creatinine based estimated GFR (eGFR) will be   calculated using the Chronic Kidney Disease Epidemiology Collaboration   (CKD-EPI) equation.       GFR Estimate If Black   Date Value Ref Range Status   05/21/2020 >90 >60 mL/min/[1.73_m2] Final     Comment:      GFR Calc  Starting 12/18/2018, serum creatinine based estimated GFR (eGFR) will be   calculated using the Chronic Kidney Disease Epidemiology Collaboration   (CKD-EPI) equation.       Lab Results   Component Value Date    CR 0.74 05/21/2020     No results found for: MICROALBUMIN    Healthy Eating:  Healthy Eating Assessed Today: Yes  Cultural/Voodoo diet restrictions?: No  Meal planning/habits: Heart healthy, Smaller portions  How many times a week on average do you eat food made away from home (restaurant/take-out)?: 0  Meals include: Breakfast, Lunch, Dinner  Breakfast: cheerios, banana, milk  Lunch: sandwich, chips  Dinner: hamburgers, spaghetti  Beverages: Water, Soda, Coffee    Being Active:  Being Active Assessed Today: Yes  Exercise:: [place]    Monitoring:  Monitoring Assessed Today: Yes  Did patient bring glucose meter to appointment? : Yes  Times checking blood sugar at home (number): 3  Times checking blood sugar at home (per): Week  Blood glucose trend: Decreasing        Taking Medications:  Diabetes Medication(s)     Biguanides       metFORMIN (GLUCOPHAGE-XR) 500 MG 24 hr tablet    1 tablet oral  daily in am and 1 tablet in the PM    Sulfonylureas       glimepiride (AMARYL) 1 MG tablet    TAKE 1/2 (ONE-HALF) TABLET BY MOUTH IN THE MORNING BEFORE BREAKFAST          Taking Medication Assessed Today: Yes  Current Treatments: Oral Medication (taken by mouth)  Problems taking diabetes medications regularly?: No  Diabetes medication side effects?: Yes(when dose increased)    Problem Solving:  Problem  Solving Assessed Today: No  Is the patient at risk for hypoglycemia?: No              Reducing Risks:  Has dilated eye exam at least once a year?: Yes  Feet checked by healthcare provider in the last year?: Yes    Healthy Coping:  Healthy Coping Assessed Today: Yes  Emotional response to diabetes: Acceptance, Confidence diabetes can be controlled  Informal Support system:: Spouse, Family  Stage of change: ACTION (Actively working towards change)  Support resources: Websites  Patient Activation Measure Survey Score:  JAVIER Score (Last Two) 6/21/2018   JAVIER Raw Score 40   Activation Score 100   JAVIER Level 4       Diabetes knowledge and skills assessment:   Patient is knowledgeable in diabetes management concepts related to: Healthy Eating, Being Active, Monitoring and Taking Medication    Patient needs further education on the following diabetes management concepts: Healthy Eating, Monitoring, Taking Medication and Reducing Risks    Based on learning assessment above, most appropriate setting for further diabetes education would be: Group class setting.      INTERVENTIONS:    Education provided today on:  AADE Self-Care Behaviors:  Diabetes Pathophysiology  Healthy Eating: consistency in amount, composition, and timing of food intake and portion control  Being Active: relationship to blood glucose and precautions to take  Monitoring: log and interpret results, individual blood glucose targets and frequency of monitoring  Taking Medication: action of prescribed medication, side effects of prescribed medications and when to take medications  Reducing Risks: major complications of diabetes, prevention, early diagnostic measures and treatment of complications, foot care, annual eye exam, A1C - goals, relating to blood glucose levels, how often to check, lipids levels and goals and blood pressure and goals    Opportunities for ongoing education and support in diabetes-self management were discussed.    Pt verbalized  understanding of concepts discussed and recommendations provided today.       Education Materials Provided:  No new materials provided today      ASSESSMENT:  Readings range as follows: fastin-129. Carrillo had been concerned about FBG being higher a few months ago: 150s.  His Metformin had been increased, but he had diarrhea and went back to his usual dose. Readings have trended down and now in target        Patient's most recent   Lab Results   Component Value Date    A1C 6.3 2020    is meeting goal of <7.0    PLAN  See Patient Instructions for co-developed, patient-stated behavior change goals.  Continue current plan.    Follow up annually and prn.    Call or send [email or web address] message when you have questions/concerns    AVS printed and provided to patient today. See Follow-Up section for recommended follow-up.      Time Spent: 45 minutes  Encounter Type: Individual    Any diabetes medication dose changes were made via the CDE Protocol and Collaborative Practice Agreement with the patient's primary care provider. A copy of this encounter was shared with the provider.

## 2020-07-15 NOTE — PROGRESS NOTES
Subjective     Carrillo Carranza is a 79 year old male who presents to clinic today for the following health issues:    HPI       Diabetes Follow-up    How often are you checking your blood sugar? One time daily  What time of day are you checking your blood sugars (select all that apply)?  Before meals  Have you had any blood sugars above 200?  No  Have you had any blood sugars below 70?  No    What symptoms do you notice when your blood sugar is low?  None    What concerns do you have today about your diabetes? None     Do you have any of these symptoms? (Select all that apply)  No numbness or tingling in feet.  No redness, sores or blisters on feet.  No complaints of excessive thirst.  No reports of blurry vision.  No significant changes to weight.     Too early for labs.  BS ok         Hyperlipidemia Follow-Up      Are you regularly taking any medication or supplement to lower your cholesterol?   Yes- zetia    Are you having muscle aches or other side effects that you think could be caused by your cholesterol lowering medication?  No  NOt fasting . Can not take statin.    Hypertension Follow-up      Do you check your blood pressure regularly outside of the clinic? No     Are you following a low salt diet? Yes    Are your blood pressures ever more than 140 on the top number (systolic) OR more   than 90 on the bottom number (diastolic), for example 140/90? No    BP Readings from Last 2 Encounters:   07/02/20 121/73   11/05/19 134/68     Hemoglobin A1C (%)   Date Value   05/21/2020 6.3 (H)   11/05/2019 6.7 (H)     LDL Cholesterol Calculated (mg/dL)   Date Value   05/07/2019 100 (H)   07/06/2018 92       Has questions on if should have colonoscopy this year with Covid concerns  --  Had 2 normal colons in last 10yrs.     Musculoskeletal problem/pain      Duration: over 1 month    Description  Location: left foot great toe    Intensity:  moderate    Accompanying signs and symptoms: none    History  Previous similar  problem: no   Previous evaluation:  none    Precipitating or alleviating factors:  Trauma or overuse: no   Aggravating factors include: standing and walking    Therapies tried and outcome: nothing    No trauma       Current Outpatient Medications   Medication Sig Dispense Refill     Aspirin (ASPIR-81 PO) Take 1 tablet by mouth daily        atenolol-chlorthalidone (TENORETIC) 50-25 MG tablet TAKE 1 TABLET DAILY 90 tablet 1     blood glucose (NO BRAND SPECIFIED) test strip USE 1 STRIP TO CHECK GLUCOSE ONCE DAILY 100 strip 3     blood glucose calibration (ACCU-CHEK ANGELI) solution USE CONTROL SOLUTION AS DIRECTED ONCE MONTHLY TO CHECK ACCURACY OF METER 1 each 0     blood glucose monitoring (ACCU-CHEK ANGELI PLUS) meter device kit Use to test blood sugar 1 times daily 1 kit 0     blood glucose monitoring (ACCU-CHEK MULTICLIX) lancets USE TO TEST BLOOD SUGAR ONCE DAILY 102 each 5     Cholecalciferol (VITAMIN D PO) Take 1 capsule by mouth daily       docusate sodium (STOOL SOFTENER) 100 MG capsule Take 1 capsule by mouth daily        ezetimibe (ZETIA) 10 MG tablet TAKE 1 TABLET DAILY 90 tablet 3     garlic 150 MG TABS tablet Take 100 mg by mouth daily       glimepiride (AMARYL) 1 MG tablet TAKE 1/2 (ONE-HALF) TABLET BY MOUTH IN THE MORNING BEFORE BREAKFAST 45 tablet 3     Ibuprofen (ADVIL PO) Take by mouth every 4 hours as needed for moderate pain       Krill Oil 500 MG CAPS Take 1 capsule by mouth       magnesium oxide 400 MG CAPS Take 400 mg by mouth 2 times daily 180 capsule 2     metFORMIN (GLUCOPHAGE-XR) 500 MG 24 hr tablet 1 tablet oral  daily in am and 1 tablet in the  tablet 0     Multiple Vitamin (MULTIVITAMINS PO) Take 1 tablet by mouth daily       pantoprazole (PROTONIX) 40 MG EC tablet TAKE 1 TABLET DAILY 90 tablet 2     potassium chloride ER (KLOR-CON M) 20 MEQ CR tablet TAKE 1  BY MOUTH TWICE DAILY WITH MEALS 180 tablet 0     STATIN NOT PRESCRIBED, INTENTIONAL, Please choose reason not prescribed, below  "      tamsulosin (FLOMAX) 0.4 MG capsule Take 1 capsule by mouth once daily 90 capsule 3     traZODone (DESYREL) 50 MG tablet TAKE 1 TABLET BY MOUTH AT BEDTIME 90 tablet 3     Allergies   Allergen Reactions     Atorvastatin Calcium Other (See Comments)     Lipitor - myalgia     Avodart [Dutasteride] Other (See Comments)     Sexual dsfxn     Simvastatin Other (See Comments)     Zocor - elevated liver function test       Reviewed and updated as needed this visit by Provider         Review of Systems   Constitutional, HEENT, cardiovascular, pulmonary, gi and gu systems are negative, except as otherwise noted.      Objective    /64   Pulse 76   Temp 97.8  F (36.6  C)   Ht 1.772 m (5' 9.75\")   Wt 103 kg (227 lb)   SpO2 98%   BMI 32.81 kg/m    Body mass index is 32.81 kg/m .  Physical Exam   GENERAL: healthy, alert and no distress  NECK: no adenopathy, no asymmetry, masses, or scars and thyroid normal to palpation  RESP: lungs clear to auscultation - no rales, rhonchi or wheezes  CV: regular rate and rhythm, normal S1 S2, no S3 or S4, no murmur, click or rub, no peripheral edema and peripheral pulses strong  ABDOMEN: soft, nontender, no hepatosplenomegaly, no masses and bowel sounds normal  MS: no gross musculoskeletal defects noted, no edema-- some tenderness and djd changes of first MTP and pip of left toe.             Assessment & Plan     1. Hyperlipidemia LDL goal <100  Future labs ordered for fall  - CBC with platelets differential; Future  - Comprehensive metabolic panel; Future  - Lipid Profile; Future    2. BPH with obstruction/lower urinary tract symptoms  Stable - psa will be due / will get in Sept  - PSA tumor marker; Future    3. Nocturia  As above   - PSA tumor marker; Future    4. Essential hypertension with goal blood pressure less than 140/90  Stable - Continue current medications and behavioral changes.   - CBC with platelets differential; Future  - Comprehensive metabolic panel; Future  - " "Lipid Profile; Future    5. Type 2 diabetes mellitus without complication, without long-term current use of insulin (H)  Stable and good control in past. Will have labs in sept. And see in early spring in clinic  - Comprehensive metabolic panel; Future    6. H/O adenomatous polyp of colon  Would recommend delaying colonoscopy for a year or so. Last 2 colonoscopy negative in last 10 yrs   - CBC with platelets differential; Future    7. Arthritis of first metatarsophalangeal (MTP) joint of left foot  Discussed. Wide shoes / decrease foot stress. Discussed in length conservative measures of OTC medications for pain, Ice/Heat, elevation and the concept of R.I.C.E.. Continue behavioral changes and proper body mechanics to allow for healing. Follow up as directed.          BMI:   Estimated body mass index is 32.81 kg/m  as calculated from the following:    Height as of this encounter: 1.772 m (5' 9.75\").    Weight as of this encounter: 103 kg (227 lb).               No follow-ups on file.    Mata Richardson MD  [facility] [place]  "

## 2020-07-22 ENCOUNTER — OFFICE VISIT (OUTPATIENT)
Dept: FAMILY MEDICINE | Facility: OTHER | Age: 80
End: 2020-07-22
Attending: FAMILY MEDICINE
Payer: MEDICARE

## 2020-07-22 VITALS
OXYGEN SATURATION: 98 % | TEMPERATURE: 97.8 F | DIASTOLIC BLOOD PRESSURE: 64 MMHG | BODY MASS INDEX: 32.5 KG/M2 | SYSTOLIC BLOOD PRESSURE: 134 MMHG | WEIGHT: 227 LBS | HEIGHT: 70 IN | HEART RATE: 76 BPM

## 2020-07-22 DIAGNOSIS — I10 ESSENTIAL HYPERTENSION WITH GOAL BLOOD PRESSURE LESS THAN 140/90: ICD-10-CM

## 2020-07-22 DIAGNOSIS — R35.1 NOCTURIA: ICD-10-CM

## 2020-07-22 DIAGNOSIS — E78.5 HYPERLIPIDEMIA LDL GOAL <100: Primary | ICD-10-CM

## 2020-07-22 DIAGNOSIS — E11.9 TYPE 2 DIABETES MELLITUS WITHOUT COMPLICATION, WITHOUT LONG-TERM CURRENT USE OF INSULIN (H): ICD-10-CM

## 2020-07-22 DIAGNOSIS — N40.1 BPH WITH OBSTRUCTION/LOWER URINARY TRACT SYMPTOMS: ICD-10-CM

## 2020-07-22 DIAGNOSIS — M19.072 ARTHRITIS OF FIRST METATARSOPHALANGEAL (MTP) JOINT OF LEFT FOOT: ICD-10-CM

## 2020-07-22 DIAGNOSIS — Z86.0101 H/O ADENOMATOUS POLYP OF COLON: ICD-10-CM

## 2020-07-22 DIAGNOSIS — N13.8 BPH WITH OBSTRUCTION/LOWER URINARY TRACT SYMPTOMS: ICD-10-CM

## 2020-07-22 PROCEDURE — 99214 OFFICE O/P EST MOD 30 MIN: CPT | Performed by: FAMILY MEDICINE

## 2020-07-22 PROCEDURE — G0463 HOSPITAL OUTPT CLINIC VISIT: HCPCS

## 2020-07-22 ASSESSMENT — PAIN SCALES - GENERAL: PAINLEVEL: NO PAIN (0)

## 2020-07-22 ASSESSMENT — MIFFLIN-ST. JEOR: SCORE: 1756.95

## 2020-07-22 NOTE — PATIENT INSTRUCTIONS
Continue current plan.    Follow up annually and prn.    Call or send [place] message when you have questions/concerns

## 2020-07-22 NOTE — NURSING NOTE
"Chief Complaint   Patient presents with     Lipids     PSA Recheck       Initial BP (!) 152/72   Pulse 76   Temp 97.8  F (36.6  C)   Ht 1.772 m (5' 9.75\")   Wt 103 kg (227 lb)   SpO2 98%   BMI 32.81 kg/m   Estimated body mass index is 32.81 kg/m  as calculated from the following:    Height as of this encounter: 1.772 m (5' 9.75\").    Weight as of this encounter: 103 kg (227 lb).  Medication Reconciliation: complete  Jerome Fitzgerald LPN  "

## 2020-08-31 ENCOUNTER — TELEPHONE (OUTPATIENT)
Dept: FAMILY MEDICINE | Facility: OTHER | Age: 80
End: 2020-08-31

## 2020-08-31 ENCOUNTER — TRANSFERRED RECORDS (OUTPATIENT)
Dept: HEALTH INFORMATION MANAGEMENT | Facility: CLINIC | Age: 80
End: 2020-08-31

## 2020-08-31 LAB — RETINOPATHY: NEGATIVE

## 2020-09-15 DIAGNOSIS — E11.9 TYPE 2 DIABETES MELLITUS WITHOUT COMPLICATION, WITHOUT LONG-TERM CURRENT USE OF INSULIN (H): ICD-10-CM

## 2020-09-15 DIAGNOSIS — N13.8 BPH WITH OBSTRUCTION/LOWER URINARY TRACT SYMPTOMS: ICD-10-CM

## 2020-09-15 DIAGNOSIS — Z86.0101 H/O ADENOMATOUS POLYP OF COLON: ICD-10-CM

## 2020-09-15 DIAGNOSIS — E78.5 HYPERLIPIDEMIA LDL GOAL <100: ICD-10-CM

## 2020-09-15 DIAGNOSIS — R35.1 NOCTURIA: ICD-10-CM

## 2020-09-15 DIAGNOSIS — N40.1 BPH WITH OBSTRUCTION/LOWER URINARY TRACT SYMPTOMS: ICD-10-CM

## 2020-09-15 DIAGNOSIS — I10 ESSENTIAL HYPERTENSION WITH GOAL BLOOD PRESSURE LESS THAN 140/90: ICD-10-CM

## 2020-09-15 LAB
ALBUMIN SERPL-MCNC: 4.1 G/DL (ref 3.4–5)
ALP SERPL-CCNC: 69 U/L (ref 40–150)
ALT SERPL W P-5'-P-CCNC: 76 U/L (ref 0–70)
ANION GAP SERPL CALCULATED.3IONS-SCNC: 3 MMOL/L (ref 3–14)
AST SERPL W P-5'-P-CCNC: 46 U/L (ref 0–45)
BASOPHILS # BLD AUTO: 0.1 10E9/L (ref 0–0.2)
BASOPHILS NFR BLD AUTO: 0.8 %
BILIRUB SERPL-MCNC: 0.5 MG/DL (ref 0.2–1.3)
BUN SERPL-MCNC: 15 MG/DL (ref 7–30)
CALCIUM SERPL-MCNC: 9.3 MG/DL (ref 8.5–10.1)
CHLORIDE SERPL-SCNC: 100 MMOL/L (ref 94–109)
CHOLEST SERPL-MCNC: 185 MG/DL
CO2 SERPL-SCNC: 33 MMOL/L (ref 20–32)
CREAT SERPL-MCNC: 0.69 MG/DL (ref 0.66–1.25)
DIFFERENTIAL METHOD BLD: NORMAL
EOSINOPHIL # BLD AUTO: 0.3 10E9/L (ref 0–0.7)
EOSINOPHIL NFR BLD AUTO: 4.9 %
ERYTHROCYTE [DISTWIDTH] IN BLOOD BY AUTOMATED COUNT: 12.1 % (ref 10–15)
EST. AVERAGE GLUCOSE BLD GHB EST-MCNC: 143 MG/DL
GFR SERPL CREATININE-BSD FRML MDRD: >90 ML/MIN/{1.73_M2}
GLUCOSE SERPL-MCNC: 141 MG/DL (ref 70–99)
HBA1C MFR BLD: 6.6 % (ref 0–5.6)
HCT VFR BLD AUTO: 44 % (ref 40–53)
HDLC SERPL-MCNC: 44 MG/DL
HGB BLD-MCNC: 15.6 G/DL (ref 13.3–17.7)
IMM GRANULOCYTES # BLD: 0 10E9/L (ref 0–0.4)
IMM GRANULOCYTES NFR BLD: 0.3 %
LDLC SERPL CALC-MCNC: 103 MG/DL
LYMPHOCYTES # BLD AUTO: 2.1 10E9/L (ref 0.8–5.3)
LYMPHOCYTES NFR BLD AUTO: 32.2 %
MCH RBC QN AUTO: 31.2 PG (ref 26.5–33)
MCHC RBC AUTO-ENTMCNC: 35.5 G/DL (ref 31.5–36.5)
MCV RBC AUTO: 88 FL (ref 78–100)
MONOCYTES # BLD AUTO: 0.6 10E9/L (ref 0–1.3)
MONOCYTES NFR BLD AUTO: 9.4 %
NEUTROPHILS # BLD AUTO: 3.3 10E9/L (ref 1.6–8.3)
NEUTROPHILS NFR BLD AUTO: 52.4 %
NONHDLC SERPL-MCNC: 141 MG/DL
NRBC # BLD AUTO: 0 10*3/UL
NRBC BLD AUTO-RTO: 0 /100
PLATELET # BLD AUTO: 193 10E9/L (ref 150–450)
POTASSIUM SERPL-SCNC: 3.5 MMOL/L (ref 3.4–5.3)
PROT SERPL-MCNC: 7.9 G/DL (ref 6.8–8.8)
PSA SERPL-MCNC: 1.08 UG/L (ref 0–4)
RBC # BLD AUTO: 5 10E12/L (ref 4.4–5.9)
SODIUM SERPL-SCNC: 136 MMOL/L (ref 133–144)
TRIGL SERPL-MCNC: 188 MG/DL
WBC # BLD AUTO: 6.4 10E9/L (ref 4–11)

## 2020-09-15 PROCEDURE — 84153 ASSAY OF PSA TOTAL: CPT | Mod: ZL | Performed by: FAMILY MEDICINE

## 2020-09-15 PROCEDURE — 85025 COMPLETE CBC W/AUTO DIFF WBC: CPT | Mod: ZL | Performed by: FAMILY MEDICINE

## 2020-09-15 PROCEDURE — 36415 COLL VENOUS BLD VENIPUNCTURE: CPT | Mod: ZL | Performed by: FAMILY MEDICINE

## 2020-09-15 PROCEDURE — 83036 HEMOGLOBIN GLYCOSYLATED A1C: CPT | Mod: ZL | Performed by: FAMILY MEDICINE

## 2020-09-15 PROCEDURE — 80061 LIPID PANEL: CPT | Mod: ZL | Performed by: FAMILY MEDICINE

## 2020-09-15 PROCEDURE — 80053 COMPREHEN METABOLIC PANEL: CPT | Mod: ZL | Performed by: FAMILY MEDICINE

## 2020-09-15 PROCEDURE — 40000788 ZZHCL STATISTIC ESTIMATED AVERAGE GLUCOSE: Mod: ZL | Performed by: FAMILY MEDICINE

## 2020-09-23 NOTE — PROGRESS NOTES
"  SUBJECTIVE:   Carrillo Carranza is a 79 year old male who presents for Preventive Visit.    Pt is comprehensive follow up      Patient has been advised of split billing requirements and indicates understanding: Yes  Are you in the first 12 months of your Medicare Part B coverage?  No    Physical Health:    In general, how would you rate your overall physical health? good    Outside of work, how many days during the week do you exercise? none    Outside of work, approximately how many minutes a day do you exercise?not applicable    If you drink alcohol do you typically have >3 drinks per day or >7 drinks per week? No    Do you usually eat at least 4 servings of fruit and vegetables a day, include whole grains & fiber and avoid regularly eating high fat or \"junk\" foods? Yes    Do you have any problems taking medications regularly?  No    Do you have any side effects from medications? none    Needs assistance for the following daily activities: no assistance needed    Which of the following safety concerns are present in your home?  none identified     Hearing impairment: No    In the past 6 months, have you been bothered by leaking of urine? no    Mental Health:    In general, how would you rate your overall mental or emotional health? good  PHQ-2 Score:      Do you feel safe in your environment? Yes    Have you ever done Advance Care Planning? (For example, a Health Directive, POLST, or a discussion with a medical provider or your loved ones about your wishes): No, advance care planning information given to patient to review.  Patient declined advance care planning discussion at this time.    Additional concerns to address?  No    Fall risk:  Fallen 2 or more times in the past year?: No  Any fall with injury in the past year?: No    Cognitive Screenin) Repeat 3 items (Leader, Season, Table)    2) Clock draw: NORMAL  3) 3 item recall: Recalls 1 objects   Results: NORMAL clock, 1-2 items recalled: COGNITIVE " IMPAIRMENT LESS LIKELY    Mini-CogTM Copyright CONRAD Fox. Licensed by the author for use in [facility]; reprinted with permission ([email or web address]). All rights reserved.      Do you have sleep apnea, excessive snoring or daytime drowsiness?: no        Diabetes Follow-up    How often are you checking your blood sugar? A few times a week  What time of day are you checking your blood sugars (select all that apply)?  Before meals  Have you had any blood sugars above 200?  No  Have you had any blood sugars below 70?  No    What symptoms do you notice when your blood sugar is low?  None    What concerns do you have today about your diabetes? None     Do you have any of these symptoms? (Select all that apply)  No numbness or tingling in feet.  No redness, sores or blisters on feet.  No complaints of excessive thirst.  No reports of blurry vision.  No significant changes to weight.              Hyperlipidemia Follow-Up      Are you regularly taking any medication or supplement to lower your cholesterol?   No    Are you having muscle aches or other side effects that you think could be caused by your cholesterol lowering medication?  No    Hypertension Follow-up      Do you check your blood pressure regularly outside of the clinic? No     Are you following a low salt diet? No    Are your blood pressures ever more than 140 on the top number (systolic) OR more   than 90 on the bottom number (diastolic), for example 140/90? No    BP Readings from Last 2 Encounters:   07/22/20 134/64   07/02/20 121/73     Hemoglobin A1C (%)   Date Value   09/15/2020 6.6 (H)   05/21/2020 6.3 (H)     LDL Cholesterol Calculated (mg/dL)   Date Value   09/15/2020 103 (H)   05/07/2019 100 (H)         Reviewed and updated as needed this visit by clinical staff         Reviewed and updated as needed this visit by Provider        Social History     Tobacco Use     Smoking status: Former Smoker     Types: Cigarettes     Smokeless tobacco: Never  "Used     Tobacco comment: quit in the 80s   Substance Use Topics     Alcohol use: No                           Current providers sharing in care for this patient include:   Patient Care Team:  Mata Richardson MD as PCP - General  Mata Richardson MD as Assigned PCP  Janet Montilla LPN as Baylee Bo, RN as Diabetes Educator (Diabetes Education)    The following health maintenance items are reviewed in Epic and correct as of today:  Health Maintenance   Topic Date Due     HEPATITIS B IMMUNIZATION (1 of 3 - Risk 3-dose series) 12[birth year]11/1961     ZOSTER IMMUNIZATION (2 of 3) 12/29/2011     COLORECTAL CANCER SCREENING  05/05/2020     MEDICARE ANNUAL WELLNESS VISIT  05/07/2020     INFLUENZA VACCINE (1) 09/01/2020     MICROALBUMIN  11/05/2020     DIABETIC FOOT EXAM  11/05/2020     PHQ-9  01/02/2021     A1C  03/15/2021     FALL RISK ASSESSMENT  07/22/2021     EYE EXAM  08/31/2021     BMP  09/15/2021     LIPID  09/15/2021     ADVANCE CARE PLANNING  01/29/2024     DTAP/TDAP/TD IMMUNIZATION (3 - Td) 05/21/2028     PHQ-2  Completed     PNEUMOCOCCAL IMMUNIZATION 65+ LOW/MEDIUM RISK  Completed     IPV IMMUNIZATION  Aged Out     MENINGITIS IMMUNIZATION  Aged Out     Labs reviewed in EPIC      ROS:  Constitutional, HEENT, cardiovascular, pulmonary, GI, , musculoskeletal, neuro, skin, endocrine and psych systems are negative, except as otherwise noted.    OBJECTIVE:   /72   Pulse 77   Temp 97.8  F (36.6  C)   Ht 1.772 m (5' 9.75\")   Wt 99.3 kg (219 lb)   SpO2 98%   BMI 31.65 kg/m   Estimated body mass index is 32.81 kg/m  as calculated from the following:    Height as of 7/22/20: 1.772 m (5' 9.75\").    Weight as of 7/22/20: 103 kg (227 lb).  EXAM:   GENERAL: healthy, alert and no distress  EYES: Eyes grossly normal to inspection, PERRL and conjunctivae and sclerae normal  HENT: ear canals and TM's normal, nose and mouth without ulcers or lesions  NECK: no adenopathy, no asymmetry, masses, or scars and thyroid " normal to palpation  RESP: lungs clear to auscultation - no rales, rhonchi or wheezes  CV: regular rate and rhythm, normal S1 S2, no S3 or S4, no murmur, click or rub, no peripheral edema and peripheral pulses strong  ABDOMEN: soft, nontender, no hepatosplenomegaly, no masses and bowel sounds normal  MS: no gross musculoskeletal defects noted, no edema  SKIN: no suspicious lesions or rashes  NEURO: Normal strength and tone, mentation intact and speech normal  PSYCH: mentation appears normal, affect normal/bright    No results found for this or any previous visit (from the past 48 hour(s)).    Orders Only on 09/15/2020   Component Date Value Ref Range Status     Cholesterol 09/15/2020 185  <200 mg/dL Final     Triglycerides 09/15/2020 188* <150 mg/dL Final    Comment: Borderline high:  150-199 mg/dl  High:             200-499 mg/dl  Very high:       >499 mg/dl  Fasting specimen       HDL Cholesterol 09/15/2020 44  >39 mg/dL Final     LDL Cholesterol Calculated 09/15/2020 103* <100 mg/dL Final    Comment: Above desirable:  100-129 mg/dl  Borderline High:  130-159 mg/dL  High:             160-189 mg/dL  Very high:       >189 mg/dl       Non HDL Cholesterol 09/15/2020 141* <130 mg/dL Final    Comment: Above Desirable:  130-159 mg/dl  Borderline high:  160-189 mg/dl  High:             190-219 mg/dl  Very high:       >219 mg/dl       PSA 09/15/2020 1.08  0 - 4 ug/L Final    Assay Method:  Chemiluminescence using Siemens Vista analyzer     Sodium 09/15/2020 136  133 - 144 mmol/L Final     Potassium 09/15/2020 3.5  3.4 - 5.3 mmol/L Final     Chloride 09/15/2020 100  94 - 109 mmol/L Final     Carbon Dioxide 09/15/2020 33* 20 - 32 mmol/L Final     Anion Gap 09/15/2020 3  3 - 14 mmol/L Final     Glucose 09/15/2020 141* 70 - 99 mg/dL Final    Fasting specimen     Urea Nitrogen 09/15/2020 15  7 - 30 mg/dL Final     Creatinine 09/15/2020 0.69  0.66 - 1.25 mg/dL Final     GFR Estimate 09/15/2020 >90  >60 mL/min/[1.73_m2] Final     Comment: Non  GFR Calc  Starting 12/18/2018, serum creatinine based estimated GFR (eGFR) will be   calculated using the Chronic Kidney Disease Epidemiology Collaboration   (CKD-EPI) equation.       GFR Estimate If Black 09/15/2020 >90  >60 mL/min/[1.73_m2] Final    Comment:  GFR Calc  Starting 12/18/2018, serum creatinine based estimated GFR (eGFR) will be   calculated using the Chronic Kidney Disease Epidemiology Collaboration   (CKD-EPI) equation.       Calcium 09/15/2020 9.3  8.5 - 10.1 mg/dL Final     Bilirubin Total 09/15/2020 0.5  0.2 - 1.3 mg/dL Final     Albumin 09/15/2020 4.1  3.4 - 5.0 g/dL Final     Protein Total 09/15/2020 7.9  6.8 - 8.8 g/dL Final     Alkaline Phosphatase 09/15/2020 69  40 - 150 U/L Final     ALT 09/15/2020 76* 0 - 70 U/L Final     AST 09/15/2020 46* 0 - 45 U/L Final     WBC 09/15/2020 6.4  4.0 - 11.0 10e9/L Final     RBC Count 09/15/2020 5.00  4.4 - 5.9 10e12/L Final     Hemoglobin 09/15/2020 15.6  13.3 - 17.7 g/dL Final     Hematocrit 09/15/2020 44.0  40.0 - 53.0 % Final     MCV 09/15/2020 88  78 - 100 fl Final     MCH 09/15/2020 31.2  26.5 - 33.0 pg Final     MCHC 09/15/2020 35.5  31.5 - 36.5 g/dL Final     RDW 09/15/2020 12.1  10.0 - 15.0 % Final     Platelet Count 09/15/2020 193  150 - 450 10e9/L Final     Diff Method 09/15/2020 Automated Method   Final     % Neutrophils 09/15/2020 52.4  % Final     % Lymphocytes 09/15/2020 32.2  % Final     % Monocytes 09/15/2020 9.4  % Final     % Eosinophils 09/15/2020 4.9  % Final     % Basophils 09/15/2020 0.8  % Final     % Immature Granulocytes 09/15/2020 0.3  % Final     Nucleated RBCs 09/15/2020 0  0 /100 Final     Absolute Neutrophil 09/15/2020 3.3  1.6 - 8.3 10e9/L Final     Absolute Lymphocytes 09/15/2020 2.1  0.8 - 5.3 10e9/L Final     Absolute Monocytes 09/15/2020 0.6  0.0 - 1.3 10e9/L Final     Absolute Eosinophils 09/15/2020 0.3  0.0 - 0.7 10e9/L Final     Absolute Basophils 09/15/2020 0.1  0.0 - 0.2  "10e9/L Final     Abs Immature Granulocytes 09/15/2020 0.0  0 - 0.4 10e9/L Final     Absolute Nucleated RBC 09/15/2020 0.0   Final     Hemoglobin A1C 09/15/2020 6.6* 0 - 5.6 % Final    Comment: Normal <5.7% Prediabetes 5.7-6.4%  Diabetes 6.5% or higher - adopted from ADA   consensus guidelines.       Estimated Average Glucose 09/15/2020 143  mg/dL Final         ASSESSMENT / PLAN:   1. Essential hypertension with goal blood pressure less than 140/90  Stable -- Continue current medications and behavioral changes.       2. BPH with obstruction/lower urinary tract symptoms  Stable     3. Nocturia  Stable - psa ok     4. H/O adenomatous polyp of colon  *Due to Covid - will delay til next summer     5. Fatty liver disease, nonalcoholic  Stable -- Continue current medications and behavioral changes.       6. 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 Type 2 diabetes mellitus without complication, without long-term current use of insulin (H)  Great control  -just had eye checked. F/u in 6 months.      Patient has been advised of split billing requirements and indicates understanding: No    COUNSELING:  Reviewed preventive health counseling, as reflected in patient instructions       Regular exercise       Healthy diet/nutrition    Estimated body mass index is 32.81 kg/m  as calculated from the following:    Height as of 7/22/20: 1.772 m (5' 9.75\").    Weight as of 7/22/20: 103 kg (227 lb).    Weight management plan: Discussed healthy diet and exercise guidelines    He reports that he has quit smoking. His smoking use included cigarettes. He has never used smokeless tobacco.    Appropriate preventive services were discussed with this patient, including applicable screening as appropriate for cardiovascular disease, diabetes, osteopenia/osteoporosis, and glaucoma.  As appropriate for age/gender, discussed screening for colorectal cancer, prostate cancer, breast cancer, and " cervical cancer. Checklist reviewing preventive services available has been given to the patient.    Reviewed patients plan of care and provided an AVS. The Basic Care Plan (routine screening as documented in Health Maintenance) for Carrillo meets the Care Plan requirement. This Care Plan has been established and reviewed with the Patient.    Counseling Resources:  ATP IV Guidelines  Pooled Cohorts Equation Calculator  Breast Cancer Risk Calculator  BRCA-Related Cancer Risk Assessment: FHS-7 Tool  FRAX Risk Assessment  ICSI Preventive Guidelines  Dietary Guidelines for Americans, 2010  USDA's MyPlate  ASA Prophylaxis  Lung CA Screening    Mata Richardson MD  [facility] [place]  SUBJECTIVE:

## 2020-09-23 NOTE — PATIENT INSTRUCTIONS
Preventive Health Recommendations:     See your health care provider every year to    Review health changes.     Discuss preventive care.      Review your medicines if your doctor has prescribed any.      Talk with your health care provider about whether you should have a test to screen for prostate cancer (PSA).    Every 3 years, have a diabetes test (fasting glucose). If you are at risk for diabetes, you should have this test more often.    Every 5 years, have a cholesterol test. Have this test more often if you are at risk for high cholesterol or heart disease.     Every 10 years, have a colonoscopy. Or, have a yearly FIT test (stool test). These exams will check for colon cancer.    Talk to with your health care provider about screening for Abdominal Aortic Aneurysm if you have a family history of AAA or have a history of smoking.    Shots:     Get a flu shot each year.     Get a tetanus shot every 10 years.     Talk to your doctor about your pneumonia vaccines. There are now two you should receive - Pneumovax (PPSV 23) and Prevnar (PCV 13).     Talk to your pharmacist about a shingles vaccine.     Talk to your doctor about the hepatitis B vaccine.  Nutrition:     Eat at least 5 servings of fruits and vegetables each day.     Eat whole-grain bread, whole-wheat pasta and brown rice instead of white grains and rice.     Get adequate Calcium and Vitamin D.   Lifestyle    Exercise for at least 150 minutes a week (30 minutes a day, 5 days a week). This will help you control your weight and prevent disease.     Limit alcohol to one drink per day.     No smoking.     Wear sunscreen to prevent skin cancer.    See your dentist every six months for an exam and cleaning.    See your eye doctor every 1 to 2 years to screen for conditions such as glaucoma, macular degeneration, cataracts, etc.    Personalized Prevention Plan  You are due for the preventive services outlined below.  Your care team is available to assist you  in scheduling these services.  If you have already completed any of these items, please share that information with your care team to update in your medical record.  Health Maintenance Due   Topic Date Due     Hepatitis B Vaccine (1 of 3 - Risk 3-dose series) 12[birth year]11[birth year]1961     Zoster (Shingles) Vaccine (2 of 3) 12/29/2011     Colorectal Cancer Screening  05/05/2020     Annual Wellness Visit  05/07/2020     Flu Vaccine (1) 09/01/2020     Preventive Health Recommendations:     See your health care provider every year to    Review health changes.     Discuss preventive care.      Review your medicines if your doctor has prescribed any.      Talk with your health care provider about whether you should have a test to screen for prostate cancer (PSA).    Every 3 years, have a diabetes test (fasting glucose). If you are at risk for diabetes, you should have this test more often.    Every 5 years, have a cholesterol test. Have this test more often if you are at risk for high cholesterol or heart disease.     Every 10 years, have a colonoscopy. Or, have a yearly FIT test (stool test). These exams will check for colon cancer.    Talk to with your health care provider about screening for Abdominal Aortic Aneurysm if you have a family history of AAA or have a history of smoking.    Shots:     Get a flu shot each year.     Get a tetanus shot every 10 years.     Talk to your doctor about your pneumonia vaccines. There are now two you should receive - Pneumovax (PPSV 23) and Prevnar (PCV 13).     Talk to your pharmacist about a shingles vaccine.     Talk to your doctor about the hepatitis B vaccine.  Nutrition:     Eat at least 5 servings of fruits and vegetables each day.     Eat whole-grain bread, whole-wheat pasta and brown rice instead of white grains and rice.     Get adequate Calcium and Vitamin D.   Lifestyle    Exercise for at least 150 minutes a week (30 minutes a day, 5 days a week). This will help you control your weight  and prevent disease.     Limit alcohol to one drink per day.     No smoking.     Wear sunscreen to prevent skin cancer.    See your dentist every six months for an exam and cleaning.    See your eye doctor every 1 to 2 years to screen for conditions such as glaucoma, macular degeneration, cataracts, etc.    Personalized Prevention Plan  You are due for the preventive services outlined below.  Your care team is available to assist you in scheduling these services.  If you have already completed any of these items, please share that information with your care team to update in your medical record.  Health Maintenance Due   Topic Date Due     Hepatitis B Vaccine (1 of 3 - Risk 3-dose series) 12/11/1959     Zoster (Shingles) Vaccine (2 of 3) 12/29/2011     Colorectal Cancer Screening  05/05/2020     Annual Wellness Visit  05/07/2020     Flu Vaccine (1) 09/01/2020       No results found for this or any previous visit (from the past 48 hour(s)).    Orders Only on 09/15/2020   Component Date Value Ref Range Status     Cholesterol 09/15/2020 185  <200 mg/dL Final     Triglycerides 09/15/2020 188* <150 mg/dL Final    Comment: Borderline high:  150-199 mg/dl  High:             200-499 mg/dl  Very high:       >499 mg/dl  Fasting specimen       HDL Cholesterol 09/15/2020 44  >39 mg/dL Final     LDL Cholesterol Calculated 09/15/2020 103* <100 mg/dL Final    Comment: Above desirable:  100-129 mg/dl  Borderline High:  130-159 mg/dL  High:             160-189 mg/dL  Very high:       >189 mg/dl       Non HDL Cholesterol 09/15/2020 141* <130 mg/dL Final    Comment: Above Desirable:  130-159 mg/dl  Borderline high:  160-189 mg/dl  High:             190-219 mg/dl  Very high:       >219 mg/dl       PSA 09/15/2020 1.08  0 - 4 ug/L Final    Assay Method:  Chemiluminescence using Siemens Vista analyzer     Sodium 09/15/2020 136  133 - 144 mmol/L Final     Potassium 09/15/2020 3.5  3.4 - 5.3 mmol/L Final     Chloride 09/15/2020 100  94 -  109 mmol/L Final     Carbon Dioxide 09/15/2020 33* 20 - 32 mmol/L Final     Anion Gap 09/15/2020 3  3 - 14 mmol/L Final     Glucose 09/15/2020 141* 70 - 99 mg/dL Final    Fasting specimen     Urea Nitrogen 09/15/2020 15  7 - 30 mg/dL Final     Creatinine 09/15/2020 0.69  0.66 - 1.25 mg/dL Final     GFR Estimate 09/15/2020 >90  >60 mL/min/[1.73_m2] Final    Comment: Non  GFR Calc  Starting 12/18/2018, serum creatinine based estimated GFR (eGFR) will be   calculated using the Chronic Kidney Disease Epidemiology Collaboration   (CKD-EPI) equation.       GFR Estimate If Black 09/15/2020 >90  >60 mL/min/[1.73_m2] Final    Comment:  GFR Calc  Starting 12/18/2018, serum creatinine based estimated GFR (eGFR) will be   calculated using the Chronic Kidney Disease Epidemiology Collaboration   (CKD-EPI) equation.       Calcium 09/15/2020 9.3  8.5 - 10.1 mg/dL Final     Bilirubin Total 09/15/2020 0.5  0.2 - 1.3 mg/dL Final     Albumin 09/15/2020 4.1  3.4 - 5.0 g/dL Final     Protein Total 09/15/2020 7.9  6.8 - 8.8 g/dL Final     Alkaline Phosphatase 09/15/2020 69  40 - 150 U/L Final     ALT 09/15/2020 76* 0 - 70 U/L Final     AST 09/15/2020 46* 0 - 45 U/L Final     WBC 09/15/2020 6.4  4.0 - 11.0 10e9/L Final     RBC Count 09/15/2020 5.00  4.4 - 5.9 10e12/L Final     Hemoglobin 09/15/2020 15.6  13.3 - 17.7 g/dL Final     Hematocrit 09/15/2020 44.0  40.0 - 53.0 % Final     MCV 09/15/2020 88  78 - 100 fl Final     MCH 09/15/2020 31.2  26.5 - 33.0 pg Final     MCHC 09/15/2020 35.5  31.5 - 36.5 g/dL Final     RDW 09/15/2020 12.1  10.0 - 15.0 % Final     Platelet Count 09/15/2020 193  150 - 450 10e9/L Final     Diff Method 09/15/2020 Automated Method   Final     % Neutrophils 09/15/2020 52.4  % Final     % Lymphocytes 09/15/2020 32.2  % Final     % Monocytes 09/15/2020 9.4  % Final     % Eosinophils 09/15/2020 4.9  % Final     % Basophils 09/15/2020 0.8  % Final     % Immature Granulocytes 09/15/2020  0.3  % Final     Nucleated RBCs 09/15/2020 0  0 /100 Final     Absolute Neutrophil 09/15/2020 3.3  1.6 - 8.3 10e9/L Final     Absolute Lymphocytes 09/15/2020 2.1  0.8 - 5.3 10e9/L Final     Absolute Monocytes 09/15/2020 0.6  0.0 - 1.3 10e9/L Final     Absolute Eosinophils 09/15/2020 0.3  0.0 - 0.7 10e9/L Final     Absolute Basophils 09/15/2020 0.1  0.0 - 0.2 10e9/L Final     Abs Immature Granulocytes 09/15/2020 0.0  0 - 0.4 10e9/L Final     Absolute Nucleated RBC 09/15/2020 0.0   Final     Hemoglobin A1C 09/15/2020 6.6* 0 - 5.6 % Final    Comment: Normal <5.7% Prediabetes 5.7-6.4%  Diabetes 6.5% or higher - adopted from ADA   consensus guidelines.       Estimated Average Glucose 09/15/2020 143  mg/dL Final

## 2020-09-29 ENCOUNTER — OFFICE VISIT (OUTPATIENT)
Dept: FAMILY MEDICINE | Facility: OTHER | Age: 80
End: 2020-09-29
Attending: FAMILY MEDICINE
Payer: MEDICARE

## 2020-09-29 VITALS
HEART RATE: 77 BPM | DIASTOLIC BLOOD PRESSURE: 72 MMHG | WEIGHT: 219 LBS | BODY MASS INDEX: 31.35 KG/M2 | SYSTOLIC BLOOD PRESSURE: 138 MMHG | HEIGHT: 70 IN | TEMPERATURE: 97.8 F | OXYGEN SATURATION: 98 %

## 2020-09-29 DIAGNOSIS — R35.1 NOCTURIA: ICD-10-CM

## 2020-09-29 DIAGNOSIS — I10 ESSENTIAL HYPERTENSION WITH GOAL BLOOD PRESSURE LESS THAN 140/90: Primary | ICD-10-CM

## 2020-09-29 DIAGNOSIS — E11.9 TYPE 2 DIABETES MELLITUS WITHOUT COMPLICATION, WITHOUT LONG-TERM CURRENT USE OF INSULIN (H): ICD-10-CM

## 2020-09-29 DIAGNOSIS — N40.1 BPH WITH OBSTRUCTION/LOWER URINARY TRACT SYMPTOMS: ICD-10-CM

## 2020-09-29 DIAGNOSIS — Z86.0101 H/O ADENOMATOUS POLYP OF COLON: ICD-10-CM

## 2020-09-29 DIAGNOSIS — N13.8 BPH WITH OBSTRUCTION/LOWER URINARY TRACT SYMPTOMS: ICD-10-CM

## 2020-09-29 DIAGNOSIS — K76.0 FATTY LIVER DISEASE, NONALCOHOLIC: ICD-10-CM

## 2020-09-29 DIAGNOSIS — K22.70 BARRETT'S ESOPHAGUS WITHOUT DYSPLASIA: ICD-10-CM

## 2020-09-29 PROCEDURE — G0008 ADMIN INFLUENZA VIRUS VAC: HCPCS | Performed by: FAMILY MEDICINE

## 2020-09-29 PROCEDURE — 99214 OFFICE O/P EST MOD 30 MIN: CPT | Performed by: FAMILY MEDICINE

## 2020-09-29 PROCEDURE — G0463 HOSPITAL OUTPT CLINIC VISIT: HCPCS | Mod: 25

## 2020-09-29 PROCEDURE — 90662 IIV NO PRSV INCREASED AG IM: CPT

## 2020-09-29 RX ORDER — PREDNISOLONE ACETATE 10 MG/ML
1 SUSPENSION/ DROPS OPHTHALMIC 2 TIMES DAILY
COMMUNITY
End: 2024-03-04

## 2020-09-29 ASSESSMENT — PAIN SCALES - GENERAL: PAINLEVEL: NO PAIN (0)

## 2020-09-29 ASSESSMENT — MIFFLIN-ST. JEOR: SCORE: 1720.66

## 2020-09-29 NOTE — NURSING NOTE
"Chief Complaint   Patient presents with     Physical       Initial /72   Pulse 77   Temp 97.8  F (36.6  C)   Ht 1.772 m (5' 9.75\")   Wt 99.3 kg (219 lb)   SpO2 98%   BMI 31.65 kg/m   Estimated body mass index is 31.65 kg/m  as calculated from the following:    Height as of this encounter: 1.772 m (5' 9.75\").    Weight as of this encounter: 99.3 kg (219 lb).  Medication Reconciliation: complete  Jerome Fitzgerald LPN  "

## 2020-10-01 DIAGNOSIS — E11.9 TYPE 2 DIABETES MELLITUS WITHOUT COMPLICATION, WITHOUT LONG-TERM CURRENT USE OF INSULIN (H): ICD-10-CM

## 2020-10-01 NOTE — TELEPHONE ENCOUNTER
Metformin       Last Written Prescription Date:  6/18/2020  Last Fill Quantity: 180,   # refills: 0  Last Office Visit: 9/29/2020  Future Office visit:

## 2020-10-02 RX ORDER — METFORMIN HCL 500 MG
TABLET, EXTENDED RELEASE 24 HR ORAL
Qty: 180 TABLET | Refills: 3 | Status: SHIPPED | OUTPATIENT
Start: 2020-10-02 | End: 2021-09-20

## 2020-10-03 ENCOUNTER — HOSPITAL ENCOUNTER (EMERGENCY)
Facility: HOSPITAL | Age: 80
Discharge: HOME | End: 2020-10-03
Attending: PHYSICIAN ASSISTANT | Admitting: PHYSICIAN ASSISTANT
Payer: MEDICARE

## 2020-10-03 VITALS
BODY MASS INDEX: 30.66 KG/M2 | DIASTOLIC BLOOD PRESSURE: 77 MMHG | HEIGHT: 71 IN | HEART RATE: 63 BPM | OXYGEN SATURATION: 95 % | RESPIRATION RATE: 16 BRPM | SYSTOLIC BLOOD PRESSURE: 146 MMHG | WEIGHT: 219 LBS | TEMPERATURE: 98 F

## 2020-10-03 DIAGNOSIS — T22.222A: ICD-10-CM

## 2020-10-03 DIAGNOSIS — T21.25XA PARTIAL THICKNESS BURN OF BUTTOCK, INITIAL ENCOUNTER: ICD-10-CM

## 2020-10-03 PROCEDURE — 99284 EMERGENCY DEPT VISIT MOD MDM: CPT | Mod: 25

## 2020-10-03 PROCEDURE — 96374 THER/PROPH/DIAG INJ IV PUSH: CPT | Mod: XU

## 2020-10-03 PROCEDURE — 16020 DRESS/DEBRID P-THICK BURN S: CPT

## 2020-10-03 PROCEDURE — 16020 DRESS/DEBRID P-THICK BURN S: CPT | Performed by: PHYSICIAN ASSISTANT

## 2020-10-03 PROCEDURE — 250N000011 HC RX IP 250 OP 636: Performed by: PHYSICIAN ASSISTANT

## 2020-10-03 RX ORDER — OXYCODONE AND ACETAMINOPHEN 5; 325 MG/1; MG/1
1 TABLET ORAL EVERY 4 HOURS PRN
Qty: 12 TABLET | Refills: 0 | Status: SHIPPED | OUTPATIENT
Start: 2020-10-03 | End: 2020-10-08

## 2020-10-03 RX ORDER — HYDROMORPHONE HYDROCHLORIDE 1 MG/ML
0.5 INJECTION, SOLUTION INTRAMUSCULAR; INTRAVENOUS; SUBCUTANEOUS
Status: DISCONTINUED | OUTPATIENT
Start: 2020-10-03 | End: 2020-10-03 | Stop reason: HOSPADM

## 2020-10-03 RX ADMIN — HYDROMORPHONE HYDROCHLORIDE 0.5 MG: 1 INJECTION, SOLUTION INTRAMUSCULAR; INTRAVENOUS; SUBCUTANEOUS at 16:40

## 2020-10-03 ASSESSMENT — MIFFLIN-ST. JEOR: SCORE: 1732.57

## 2020-10-03 ASSESSMENT — ENCOUNTER SYMPTOMS: CONSTITUTIONAL NEGATIVE: 1

## 2020-10-03 NOTE — ED AVS SNAPSHOT
[facility]  [address] [address]  Phone: [phone]                                    Carrillo Carranza   MRN: [id number]    Department: [facility]   Date of Visit: 10/3/2020           After Visit Summary Signature Page    I have received my discharge instructions, and my questions have been answered. I have discussed any challenges I see with this plan with the nurse or doctor.    ..........................................................................................................................................  Patient/Patient Representative Signature      ..........................................................................................................................................  Patient Representative Print Name and Relationship to Patient    ..................................................               ................................................  Date                                   Time    ..........................................................................................................................................  Reviewed by Signature/Title    ...................................................              ..............................................  Date                                               Time          22EPIC Rev 08/18
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
## 2020-10-08 ENCOUNTER — OFFICE VISIT (OUTPATIENT)
Dept: FAMILY MEDICINE | Facility: OTHER | Age: 80
End: 2020-10-08
Attending: NURSE PRACTITIONER
Payer: MEDICARE

## 2020-10-08 VITALS
DIASTOLIC BLOOD PRESSURE: 78 MMHG | TEMPERATURE: 97.2 F | OXYGEN SATURATION: 96 % | WEIGHT: 227 LBS | BODY MASS INDEX: 32.11 KG/M2 | SYSTOLIC BLOOD PRESSURE: 116 MMHG | HEART RATE: 79 BPM

## 2020-10-08 DIAGNOSIS — T22.222D: Primary | ICD-10-CM

## 2020-10-08 DIAGNOSIS — T21.25XD PARTIAL THICKNESS BURN OF BUTTOCK, SUBSEQUENT ENCOUNTER: ICD-10-CM

## 2020-10-08 PROCEDURE — G0463 HOSPITAL OUTPT CLINIC VISIT: HCPCS

## 2020-10-08 PROCEDURE — 99213 OFFICE O/P EST LOW 20 MIN: CPT | Performed by: NURSE PRACTITIONER

## 2020-10-08 RX ORDER — BACITRACIN ZINC 500 [USP'U]/G
OINTMENT TOPICAL 2 TIMES DAILY
Qty: 113.4 G | Refills: 1 | Status: SHIPPED | OUTPATIENT
Start: 2020-10-08 | End: 2021-06-25

## 2020-10-08 ASSESSMENT — PAIN SCALES - GENERAL: PAINLEVEL: SEVERE PAIN (6)

## 2020-10-08 NOTE — PATIENT INSTRUCTIONS
Patient Education     Wound Care  Taking proper care of your wound will help it heal. Your healthcare provider may show you how to clean and dress the wound. He or she will also explain how to tell if the wound is healing normally. If you are unsure of how to take care of the wound, be sure to clarify what dressing to use and how often you should change the bandages. Here are the basic steps.     A wound that's not healing normally may be dark in color or have white streaks.   Wash your hands  Tips for washing your hands include:    Use liquid soap and lather for 2 minutes. Scrub between your fingers and under your nails.    Rinse with warm water, keeping your fingers pointing down.    Use a paper towel to dry your hands and to turn off the faucet.  Remove the used dressing  Here are suggestions for removing the dressing:    If dressing changes cause you pain, be sure to take your pain medicine as prescribed by your healthcare provider 30 minutes before dressing changes.    Set up your supplies.    Put on disposable gloves if you re dressing a wound for someone else or your wound is infected.    Loosen the tape by pulling gently toward the wound.    Gently take off the old dressing. If the dressing is stuck to the wound, moisten it with saline (if available) or clean water.    If you have a drain or tube in the wound, be careful not to pull on it.    Remove the dressing 1 layer at a time and put it in a plastic bag. Seal the bag and put it in the trash.    Remove your gloves.  Inspect and dress the wound  Check the wound carefully:    Each time you change the dressing, check the wound carefully to be sure it s healing normally by making sure your wound appears to be pink and moist, and is free of infection.      Wash your hands again. Put on a new pair of gloves.    Clean and dress the wound as directed by your healthcare provider or nurse. Don't put anything in the wound that is not prescribed or directed by your  healthcare provider. If you have a drain or tube, be careful not to pull on it. Make sure to secure the drain or tube as well.    Put all unused supplies in a clean plastic bag. Seal the bag and store it in a clean, dry area between dressing changes.     Be sure to wash your hands again.  Call your healthcare provider  Call your healthcare provider if you see any of the following signs of a problem:    Bleeding that soaks the dressing    Pink fluid weeping from the wound    Increased drainage or drainage that is yellow, yellow-green, or foul-smelling    Increased swelling or pain, or redness or swelling in the skin around the wound    A change in the color of the wound, or if streaks develop in a direction away from the wound    The area between any stitches opens up    An increase in the size of the wound    A fever of 100.4 F (38 C) or higher, or as directed by your healthcare provider    Chills, increased fatigue, or a loss of appetite      Date Last Reviewed: 7/1/2017 2000-2019 [employer]. [address] [place] [address]. All rights reserved. This information is not intended as a substitute for professional medical care. Always follow your healthcare professional's instructions.    Patient Education     Second-Degree Burn  A burn occurs when skin is exposed to too much heat, sun, or harsh chemicals. A second-degree burn (partial-thickness burn) is deeper than a first-degree burn (superficial burn). It usually causes a blister to form. The blister may remain intact and gradually go away on its own. Or it may break open. The goal of treatment is to relieve pain and stop infection while the burn heals.  Home care  Use pain medicine as directed. If no pain medicine was prescribed, you may use over-the-counter medicine to control pain. If you have chronic liver or kidney disease, talk with your healthcare provider before using acetaminophen or ibuprofen. Also talk with your provider if  you've had a stomach ulcer or GI bleeding.  General care    On the first day, you may put a cool compress on the wound to ease pain. A cool compress is a small towel soaked in cool water.    If you were sent home with the blister intact, don't break the blister. The risk for infection is greater if the blister breaks. If a bandage was applied, change it once a day, unless told otherwise. If the bandage becomes wet or soiled, change it as soon as you can.    Sometimes an infection may occur even with proper treatment. Check the burn daily for the signs of infection listed below.    Eat more calories and protein until your wound is healed.    Wear a hat, sunscreen, and long sleeves while in the sun to protect the skin.    Don't pick or scratch at the wound. Use over-the-counter medicines like diphenhydramine for itching.    Avoid tight-fitting clothes.  To change a bandage:    Wash your hands.    Take off the old bandage. If the bandage sticks, soak it off under warm running water.    Once the bandage is off, gently wash the burn area with mild soap and warm water to remove any cream, ointment, ooze, or scab. You may do this in a sink, under a tub faucet, or in the shower. Rinse off the soap and gently pat dry with a clean towel.    Check for signs of infection listed below.    Put any prescribed antibiotic cream or ointment on the wound.    Cover the burn with nonstick gauze. Then wrap it with the bandage material.  Follow-up care  Follow up with your healthcare provider, or as advised.  When to seek medical advice  Call your healthcare provider right away if you have any of these signs of infection:    Fever of 100.4 F (38 C) or higher, or as directed by your healthcare provider    Pain that gets worse    Redness or swelling that gets worse    Pus comes from the burn    Red streaks in your skin coming from the burn    Wound doesn't appear to be healing    Nausea or vomiting   Date Last Reviewed: 1/1/2017     9078-8274 [employer]. [address] [place] [address], [place] [address]. All rights reserved. This information is not intended as a substitute for professional medical care. Always follow your healthcare professional's instructions.    Use bacitracin as directed to burn.   Watch for signs of infection.   Follow up as needed.

## 2020-10-23 ENCOUNTER — TELEPHONE (OUTPATIENT)
Dept: FAMILY MEDICINE | Facility: OTHER | Age: 80
End: 2020-10-23

## 2020-10-25 NOTE — TELEPHONE ENCOUNTER
I would leave the dressing off if its not draining and actively healing. Air can help the healing process as well.

## 2020-12-08 ENCOUNTER — NURSE TRIAGE (OUTPATIENT)
Dept: FAMILY MEDICINE | Facility: OTHER | Age: 80
End: 2020-12-08

## 2020-12-08 NOTE — TELEPHONE ENCOUNTER
Pt called reports he has eye surgery at [facility] this week. He reports congestion only no other symptoms pt questioning if he needs a covid test. Pt will call [facility] to inquire if he needs a test prior to his procedure and call us back.

## 2020-12-30 DIAGNOSIS — E11.9 TYPE 2 DIABETES MELLITUS WITHOUT COMPLICATION, WITHOUT LONG-TERM CURRENT USE OF INSULIN (H): ICD-10-CM

## 2020-12-30 NOTE — TELEPHONE ENCOUNTER
DEVANG RAMIREZ      Last Written Prescription Date:  8-  Last Fill Quantity: 102,   # refills: 5  Last Office Visit: 9-  Future Office visit:    Next 5 appointments (look out 90 days)    Feb 23, 2021  9:15 AM  (Arrive by 9:00 AM)  SHORT with Mata Richardson MD  [facility] ([facility] ) [address]  [place] [address]  [phone]           Routing refill request to provider for review/approval because:  Drug not on the [facility], [facility] or [facility] refill protocol or controlled substance

## 2021-01-06 DIAGNOSIS — E11.9 TYPE 2 DIABETES MELLITUS WITHOUT COMPLICATION, WITHOUT LONG-TERM CURRENT USE OF INSULIN (H): Primary | ICD-10-CM

## 2021-01-06 RX ORDER — LANCING DEVICE/LANCETS
KIT MISCELLANEOUS
Qty: 1 EACH | Refills: 3 | Status: SHIPPED | OUTPATIENT
Start: 2021-01-06

## 2021-02-16 NOTE — PROGRESS NOTES
Assessment & Plan     Type 2 diabetes mellitus without complication, without long-term current use of insulin (H)  Great control. Continue current medications and behavioral changes.   F/u in 6 months   - Hemoglobin A1c; Future  - Albumin Random Urine Quantitative with Creat Ratio; Future    H/O adenomatous polyp of colon  Due for colon- been seen in [facility] - will refer   - GASTROENTEROLOGY ADULT REF PROCEDURE ONLY; Future    Baca's esophagus with dysplasia  As above - will refer   - GASTROENTEROLOGY ADULT REF PROCEDURE ONLY; Future    AK (actinic keratosis)  Times 2--- Risk and benefits of cryo discussed and verbal consent given.  Cryo done - freeze and  thaw times three done.  F/u as directed and wound care instructions given.   - DESTRUCT PREMALIGNANT LESION, FIRST  - DESTRUCT PREMALIGNANT LESION, 2-14                 No follow-ups on file.    Mata Richardson MD  [facility] [place] Vizcaino is a 80 year old who presents for the following health issues     HPI       Diabetes Follow-up    How often are you checking your blood sugar? One time daily  What time of day are you checking your blood sugars (select all that apply)?  Before meals  Have you had any blood sugars above 200?  No  Have you had any blood sugars below 70?  No    What symptoms do you notice when your blood sugar is low?  None    What concerns do you have today about your diabetes? None     Do you have any of these symptoms? (Select all that apply)  No numbness or tingling in feet.  No redness, sores or blisters on feet.  No complaints of excessive thirst.  No reports of blurry vision.  No significant changes to weight.    1. foot deformity   No  2. Current or previous foot ulceration     No  3. Current or previous pre-ulcerative calluses     No  4. previous partial amputation of one or both feet or complete amputation of one foot     No  5. peripheral neuropathy with evidence of callus formation     No  6.  "poor circulation     No      BP Readings from Last 2 Encounters:   10/08/20 116/78   10/03/20 146/77     Hemoglobin A1C (%)   Date Value   09/15/2020 6.6 (H)   05/21/2020 6.3 (H)     LDL Cholesterol Calculated (mg/dL)   Date Value   09/15/2020 103 (H)   05/07/2019 100 (H)               Review of Systems   Constitutional, HEENT, cardiovascular, pulmonary, gi and gu systems are negative, except as otherwise noted.      Objective    /68   Pulse 71   Temp 96.8  F (36  C)   Ht 1.791 m (5' 10.5\")   Wt 100.7 kg (222 lb)   SpO2 98%   BMI 31.40 kg/m    Body mass index is 31.4 kg/m .  Physical Exam   GENERAL: healthy, alert and no distress  NECK: no adenopathy, no asymmetry, masses, or scars and thyroid normal to palpation  RESP: lungs clear to auscultation - no rales, rhonchi or wheezes  CV: regular rate and rhythm, normal S1 S2, no S3 or S4, no murmur, click or rub, no peripheral edema and peripheral pulses strong  ABDOMEN: soft, nontender, no hepatosplenomegaly, no masses and bowel sounds normal  MS: no gross musculoskeletal defects noted, no edema  Diabetic foot exam: normal DP and PT pulses, no trophic changes or ulcerative lesions, normal sensory exam and normal monofilament exam  Skin - 1 cm AK on left ear and right temple   Results for orders placed or performed in visit on 02/23/21   Albumin Random Urine Quantitative with Creat Ratio     Status: None   Result Value Ref Range    Creatinine Urine 23 mg/dL    Albumin Urine mg/L <5 mg/L    Albumin Urine mg/g Cr Unable to calculate due to low value 0 - 17 mg/g Cr   Hemoglobin A1c     Status: Abnormal   Result Value Ref Range    Hemoglobin A1C 6.3 (H) 0 - 5.6 %                 "

## 2021-02-23 ENCOUNTER — OFFICE VISIT (OUTPATIENT)
Dept: FAMILY MEDICINE | Facility: OTHER | Age: 81
End: 2021-02-23
Attending: FAMILY MEDICINE
Payer: MEDICARE

## 2021-02-23 VITALS
SYSTOLIC BLOOD PRESSURE: 138 MMHG | WEIGHT: 222 LBS | HEART RATE: 71 BPM | TEMPERATURE: 96.8 F | HEIGHT: 71 IN | DIASTOLIC BLOOD PRESSURE: 68 MMHG | OXYGEN SATURATION: 98 % | BODY MASS INDEX: 31.08 KG/M2

## 2021-02-23 DIAGNOSIS — E11.9 TYPE 2 DIABETES MELLITUS WITHOUT COMPLICATION, WITHOUT LONG-TERM CURRENT USE OF INSULIN (H): ICD-10-CM

## 2021-02-23 DIAGNOSIS — E11.9 TYPE 2 DIABETES MELLITUS WITHOUT COMPLICATION, WITHOUT LONG-TERM CURRENT USE OF INSULIN (H): Primary | ICD-10-CM

## 2021-02-23 DIAGNOSIS — Z86.0101 H/O ADENOMATOUS POLYP OF COLON: ICD-10-CM

## 2021-02-23 DIAGNOSIS — K22.719 BARRETT'S ESOPHAGUS WITH DYSPLASIA: ICD-10-CM

## 2021-02-23 DIAGNOSIS — L57.0 AK (ACTINIC KERATOSIS): ICD-10-CM

## 2021-02-23 LAB
CREAT UR-MCNC: 23 MG/DL
EST. AVERAGE GLUCOSE BLD GHB EST-MCNC: 134 MG/DL
HBA1C MFR BLD: 6.3 % (ref 0–5.6)
MICROALBUMIN UR-MCNC: <5 MG/L
MICROALBUMIN/CREAT UR: NORMAL MG/G CR (ref 0–17)

## 2021-02-23 PROCEDURE — 83036 HEMOGLOBIN GLYCOSYLATED A1C: CPT | Mod: ZL | Performed by: FAMILY MEDICINE

## 2021-02-23 PROCEDURE — 36416 COLLJ CAPILLARY BLOOD SPEC: CPT | Mod: ZL | Performed by: FAMILY MEDICINE

## 2021-02-23 PROCEDURE — 82043 UR ALBUMIN QUANTITATIVE: CPT | Mod: ZL | Performed by: FAMILY MEDICINE

## 2021-02-23 PROCEDURE — 99213 OFFICE O/P EST LOW 20 MIN: CPT | Mod: 25 | Performed by: FAMILY MEDICINE

## 2021-02-23 PROCEDURE — 17003 DESTRUCT PREMALG LES 2-14: CPT | Performed by: FAMILY MEDICINE

## 2021-02-23 PROCEDURE — 36415 COLL VENOUS BLD VENIPUNCTURE: CPT | Mod: ZL | Performed by: FAMILY MEDICINE

## 2021-02-23 PROCEDURE — G0463 HOSPITAL OUTPT CLINIC VISIT: HCPCS | Mod: 25

## 2021-02-23 PROCEDURE — 17000 DESTRUCT PREMALG LESION: CPT | Performed by: FAMILY MEDICINE

## 2021-02-23 PROCEDURE — 999N001182 HC STATISTIC ESTIMATED AVERAGE GLUCOSE: Mod: ZL | Performed by: FAMILY MEDICINE

## 2021-02-23 ASSESSMENT — ANXIETY QUESTIONNAIRES
5. BEING SO RESTLESS THAT IT IS HARD TO SIT STILL: NOT AT ALL
GAD7 TOTAL SCORE: 0
1. FEELING NERVOUS, ANXIOUS, OR ON EDGE: NOT AT ALL
7. FEELING AFRAID AS IF SOMETHING AWFUL MIGHT HAPPEN: NOT AT ALL
4. TROUBLE RELAXING: NOT AT ALL
2. NOT BEING ABLE TO STOP OR CONTROL WORRYING: NOT AT ALL
6. BECOMING EASILY ANNOYED OR IRRITABLE: NOT AT ALL
3. WORRYING TOO MUCH ABOUT DIFFERENT THINGS: NOT AT ALL

## 2021-02-23 ASSESSMENT — MIFFLIN-ST. JEOR: SCORE: 1741.18

## 2021-02-23 ASSESSMENT — PATIENT HEALTH QUESTIONNAIRE - PHQ9: SUM OF ALL RESPONSES TO PHQ QUESTIONS 1-9: 0

## 2021-02-23 ASSESSMENT — PAIN SCALES - GENERAL: PAINLEVEL: NO PAIN (0)

## 2021-02-23 NOTE — NURSING NOTE
"Chief Complaint   Patient presents with     Diabetes       Initial /68   Pulse 71   Temp 96.8  F (36  C)   Ht 1.791 m (5' 10.5\")   Wt 100.7 kg (222 lb)   SpO2 98%   BMI 31.40 kg/m   Estimated body mass index is 31.4 kg/m  as calculated from the following:    Height as of this encounter: 1.791 m (5' 10.5\").    Weight as of this encounter: 100.7 kg (222 lb).  Medication Reconciliation: complete  Jerome Fitzgerald LPN  "

## 2021-02-24 ASSESSMENT — ANXIETY QUESTIONNAIRES: GAD7 TOTAL SCORE: 0

## 2021-04-05 ENCOUNTER — TELEPHONE (OUTPATIENT)
Dept: FAMILY MEDICINE | Facility: OTHER | Age: 81
End: 2021-04-05

## 2021-04-05 DIAGNOSIS — Z96.659 HISTORY OF KNEE REPLACEMENT, UNSPECIFIED LATERALITY: Primary | ICD-10-CM

## 2021-04-05 RX ORDER — AMOXICILLIN 500 MG/1
CAPSULE ORAL
Qty: 4 CAPSULE | Refills: 0 | Status: SHIPPED | OUTPATIENT
Start: 2021-04-05 | End: 2021-11-07

## 2021-04-05 NOTE — TELEPHONE ENCOUNTER
Patient having colonoscopy 4/13/21 wants to know if he needs to take antibiotic before surgery please call back@ [phone] to advise.  Rachael Botello LPN

## 2021-04-13 ENCOUNTER — TRANSFERRED RECORDS (OUTPATIENT)
Dept: HEALTH INFORMATION MANAGEMENT | Facility: HOSPITAL | Age: 81
End: 2021-04-13
Payer: MEDICARE

## 2021-04-18 DIAGNOSIS — E78.5 HYPERLIPIDEMIA LDL GOAL <100: ICD-10-CM

## 2021-04-19 RX ORDER — EZETIMIBE 10 MG/1
TABLET ORAL
Qty: 90 TABLET | Refills: 3 | Status: SHIPPED | OUTPATIENT
Start: 2021-04-19 | End: 2022-04-06

## 2021-04-19 NOTE — TELEPHONE ENCOUNTER
Zetia 10mg      Last Written Prescription Date:  4/23/20  Last Fill Quantity: 90,   # refills: 3  Last Office Visit: 2/23/21  Future Office visit:       Routing refill request to provider for review/approval because:

## 2021-04-21 ENCOUNTER — TELEPHONE (OUTPATIENT)
Dept: FAMILY MEDICINE | Facility: OTHER | Age: 81
End: 2021-04-21

## 2021-04-21 ENCOUNTER — NURSE TRIAGE (OUTPATIENT)
Dept: FAMILY MEDICINE | Facility: OTHER | Age: 81
End: 2021-04-21

## 2021-04-21 NOTE — TELEPHONE ENCOUNTER
"Patient called with moderate to severe abdominal pain since waking up this morning at 6:30. Patient states he had a colonoscopy last week. Patient states last bowel movement was Monday. Patient advised to be seen in UC/ED due to symptoms and PCP's schedule being full.     Additional Information    Negative: Passed out (i.e., fainted, collapsed and was not responding)    Negative: Shock suspected (e.g., cold/pale/clammy skin, too weak to stand, low BP, rapid pulse)    Negative: Sounds like a life-threatening emergency to the triager    Negative: Chest pain    Negative: Pain is mainly in upper abdomen (if needed ask: 'is it mainly above the belly button?')    Negative: SEVERE abdominal pain (e.g., excruciating)    Negative: Vomiting red blood or black (coffee ground) material    Negative: Bloody, black, or tarry bowel movements    Negative: Unable to urinate (or only a few drops) and bladder feels very full    Negative: Pain in scrotum persists > 1 hour    Constant abdominal pain lasting > 2 hours    Answer Assessment - Initial Assessment Questions  1. LOCATION: \"Where does it hurt?\"       Lower abdomen  2. RADIATION: \"Does the pain shoot anywhere else?\" (e.g., chest, back)      no  3. ONSET: \"When did the pain begin?\" (Minutes, hours or days ago)       Pain started this morning  4. SUDDEN: \"Gradual or sudden onset?\"      gradual  5. PATTERN \"Does the pain come and go, or is it constant?\"     - If constant: \"Is it getting better, staying the same, or worsening?\"       (Note: Constant means the pain never goes away completely; most serious pain is constant and it progresses)      - If intermittent: \"How long does it last?\" \"Do you have pain now?\"      (Note: Intermittent means the pain goes away completely between bouts)      constant  6. SEVERITY: \"How bad is the pain?\"  (e.g., Scale 1-10; mild, moderate, or severe)     - MILD (1-3): doesn't interfere with normal activities, abdomen soft and not tender to touch      - " "MODERATE (4-7): interferes with normal activities or awakens from sleep, tender to touch      - SEVERE (8-10): excruciating pain, doubled over, unable to do any normal activities        Moderate to sever  7. RECURRENT SYMPTOM: \"Have you ever had this type of abdominal pain before?\" If so, ask: \"When was the last time?\" and \"What happened that time?\"       Yes history of impaction of bowel  8. CAUSE: \"What do you think is causing the abdominal pain?\"      Had a colonoscopy last week. Last bowel movement was Monday  9. RELIEVING/AGGRAVATING FACTORS: \"What makes it better or worse?\" (e.g., movement, antacids, bowel movement)      no  10. OTHER SYMPTOMS: \"Has there been any vomiting, diarrhea, constipation, or urine problems?\"        Possible constipation    Protocols used: ABDOMINAL PAIN - MALE-A-OH      "

## 2021-05-13 NOTE — PROGRESS NOTES
"    Assessment & Plan     Fatigue, unspecified type  Does not sound cardiac. ?? If low bs at these times.  Labs and exam along with EKG unremarkable.  Discussed checking Testosterone level and checking BS during these episodes.  IF continues and no low BS readings,consider stress testing. Symptomatic treatment was discussed along when patient should call and/or come back into the clinic or go to ER/Urgent care. All questions answered.   Will get with lab results next week  - Comprehensive metabolic panel  - CBC with platelets differential  - TSH  - EKG 12-lead complete w/read - Clinics  - CRP inflammation  - Erythrocyte sedimentation rate auto  - Testosterone Free and Total             BMI:   Estimated body mass index is 31.4 kg/m  as calculated from the following:    Height as of 2/23/21: 1.791 m (5' 10.5\").    Weight as of 2/23/21: 100.7 kg (222 lb).           No follow-ups on file.    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[place]ain[place] is a 80 year old who presents for the following health issues     HPI     Concern - fatigue  Onset: Month   Description: Fatigued, pt states he is only able to work 1-2 hours without being exausted  Intensity: moderate  Progression of Symptoms:  worsening  Accompanying Signs & Symptoms: pt states that his blood sugars have been increasing over the last couple of weeks  Previous history of similar problem: None  Precipitating factors:        Worsened by: None  Alleviating factors:        Improved by: None  Therapies tried and outcome: PT states he recently started supplementing his diet with CHAGA     Works for couple of hours and gets exhausted and done for the day   Some days can work much longer and other days less work due to fatigue   Has not checked sugars during fatigue and tiredness       NO chest pain /sob/arm pain with activity         Review of Systems   Constitutional, HEENT, cardiovascular, pulmonary, gi and gu systems are negative, except as " otherwise noted.      Objective    /80   Pulse 72   Temp 98.1  F (36.7  C)   Resp 20   SpO2 97%   There is no height or weight on file to calculate BMI.  Physical Exam   GENERAL: healthy, alert and no distress  EYES: Eyes grossly normal to inspection, PERRL and conjunctivae and sclerae normal  HENT: ear canals and TM's normal, nose and mouth without ulcers or lesions  NECK: no adenopathy, no asymmetry, masses, or scars and thyroid normal to palpation  RESP: lungs clear to auscultation - no rales, rhonchi or wheezes  CV: regular rate and rhythm, normal S1 S2, no S3 or S4, no murmur, click or rub, no peripheral edema and peripheral pulses strong  ABDOMEN: soft, nontender, no hepatosplenomegaly, no masses and bowel sounds normal  MS: no gross musculoskeletal defects noted, no edema  SKIN: no suspicious lesions or rashes  NEURO: Normal strength and tone, mentation intact and speech normal  PSYCH: mentation appears normal, affect normal/bright  LYMPH: no cervical, supraclavicular, axillary, or inguinal adenopathy      Results for orders placed or performed in visit on 05/14/21   Comprehensive metabolic panel     Status: Abnormal   Result Value Ref Range    Sodium 134 133 - 144 mmol/L    Potassium 3.6 3.4 - 5.3 mmol/L    Chloride 98 94 - 109 mmol/L    Carbon Dioxide 31 20 - 32 mmol/L    Anion Gap 5 3 - 14 mmol/L    Glucose 217 (H) 70 - 99 mg/dL    Urea Nitrogen 18 7 - 30 mg/dL    Creatinine 0.70 0.66 - 1.25 mg/dL    GFR Estimate >90 >60 mL/min/[1.73_m2]    GFR Estimate If Black >90 >60 mL/min/[1.73_m2]    Calcium 9.8 8.5 - 10.1 mg/dL    Bilirubin Total 0.5 0.2 - 1.3 mg/dL    Albumin 4.0 3.4 - 5.0 g/dL    Protein Total 8.0 6.8 - 8.8 g/dL    Alkaline Phosphatase 71 40 - 150 U/L    ALT 72 (H) 0 - 70 U/L    AST 41 0 - 45 U/L   CBC with platelets differential     Status: None   Result Value Ref Range    WBC 6.0 4.0 - 11.0 10e9/L    RBC Count 4.98 4.4 - 5.9 10e12/L    Hemoglobin 15.8 13.3 - 17.7 g/dL    Hematocrit  43.7 40.0 - 53.0 %    MCV 88 78 - 100 fl    MCH 31.7 26.5 - 33.0 pg    MCHC 36.2 31.5 - 36.5 g/dL    RDW 12.1 10.0 - 15.0 %    Platelet Count 190 150 - 450 10e9/L    Diff Method Automated Method     % Neutrophils 63.2 %    % Lymphocytes 24.3 %    % Monocytes 9.7 %    % Eosinophils 1.8 %    % Basophils 0.7 %    % Immature Granulocytes 0.3 %    Nucleated RBCs 0 0 /100    Absolute Neutrophil 3.8 1.6 - 8.3 10e9/L    Absolute Lymphocytes 1.5 0.8 - 5.3 10e9/L    Absolute Monocytes 0.6 0.0 - 1.3 10e9/L    Absolute Eosinophils 0.1 0.0 - 0.7 10e9/L    Absolute Basophils 0.0 0.0 - 0.2 10e9/L    Abs Immature Granulocytes 0.0 0 - 0.4 10e9/L    Absolute Nucleated RBC 0.0    TSH     Status: None   Result Value Ref Range    TSH 0.70 0.40 - 4.00 mU/L   CRP inflammation     Status: None   Result Value Ref Range    CRP Inflammation 6.4 0.0 - 8.0 mg/L   Erythrocyte sedimentation rate auto     Status: None   Result Value Ref Range    Sed Rate 8 0 - 20 mm/h     EKG unremarkable - first degree block

## 2021-05-14 ENCOUNTER — OFFICE VISIT (OUTPATIENT)
Dept: FAMILY MEDICINE | Facility: OTHER | Age: 81
End: 2021-05-14
Attending: FAMILY MEDICINE
Payer: MEDICARE

## 2021-05-14 VITALS
SYSTOLIC BLOOD PRESSURE: 138 MMHG | TEMPERATURE: 98.1 F | OXYGEN SATURATION: 97 % | RESPIRATION RATE: 20 BRPM | DIASTOLIC BLOOD PRESSURE: 80 MMHG | HEART RATE: 72 BPM

## 2021-05-14 DIAGNOSIS — R53.83 FATIGUE, UNSPECIFIED TYPE: Primary | ICD-10-CM

## 2021-05-14 LAB
ALBUMIN SERPL-MCNC: 4 G/DL (ref 3.4–5)
ALP SERPL-CCNC: 71 U/L (ref 40–150)
ALT SERPL W P-5'-P-CCNC: 72 U/L (ref 0–70)
ANION GAP SERPL CALCULATED.3IONS-SCNC: 5 MMOL/L (ref 3–14)
AST SERPL W P-5'-P-CCNC: 41 U/L (ref 0–45)
BASOPHILS # BLD AUTO: 0 10E9/L (ref 0–0.2)
BASOPHILS NFR BLD AUTO: 0.7 %
BILIRUB SERPL-MCNC: 0.5 MG/DL (ref 0.2–1.3)
BUN SERPL-MCNC: 18 MG/DL (ref 7–30)
CALCIUM SERPL-MCNC: 9.8 MG/DL (ref 8.5–10.1)
CHLORIDE SERPL-SCNC: 98 MMOL/L (ref 94–109)
CO2 SERPL-SCNC: 31 MMOL/L (ref 20–32)
CREAT SERPL-MCNC: 0.7 MG/DL (ref 0.66–1.25)
CRP SERPL-MCNC: 6.4 MG/L (ref 0–8)
DIFFERENTIAL METHOD BLD: NORMAL
EOSINOPHIL # BLD AUTO: 0.1 10E9/L (ref 0–0.7)
EOSINOPHIL NFR BLD AUTO: 1.8 %
ERYTHROCYTE [DISTWIDTH] IN BLOOD BY AUTOMATED COUNT: 12.1 % (ref 10–15)
ERYTHROCYTE [SEDIMENTATION RATE] IN BLOOD BY WESTERGREN METHOD: 8 MM/H (ref 0–20)
GFR SERPL CREATININE-BSD FRML MDRD: >90 ML/MIN/{1.73_M2}
GLUCOSE SERPL-MCNC: 217 MG/DL (ref 70–99)
HCT VFR BLD AUTO: 43.7 % (ref 40–53)
HGB BLD-MCNC: 15.8 G/DL (ref 13.3–17.7)
IMM GRANULOCYTES # BLD: 0 10E9/L (ref 0–0.4)
IMM GRANULOCYTES NFR BLD: 0.3 %
LYMPHOCYTES # BLD AUTO: 1.5 10E9/L (ref 0.8–5.3)
LYMPHOCYTES NFR BLD AUTO: 24.3 %
MCH RBC QN AUTO: 31.7 PG (ref 26.5–33)
MCHC RBC AUTO-ENTMCNC: 36.2 G/DL (ref 31.5–36.5)
MCV RBC AUTO: 88 FL (ref 78–100)
MONOCYTES # BLD AUTO: 0.6 10E9/L (ref 0–1.3)
MONOCYTES NFR BLD AUTO: 9.7 %
NEUTROPHILS # BLD AUTO: 3.8 10E9/L (ref 1.6–8.3)
NEUTROPHILS NFR BLD AUTO: 63.2 %
NRBC # BLD AUTO: 0 10*3/UL
NRBC BLD AUTO-RTO: 0 /100
PLATELET # BLD AUTO: 190 10E9/L (ref 150–450)
POTASSIUM SERPL-SCNC: 3.6 MMOL/L (ref 3.4–5.3)
PROT SERPL-MCNC: 8 G/DL (ref 6.8–8.8)
RBC # BLD AUTO: 4.98 10E12/L (ref 4.4–5.9)
SODIUM SERPL-SCNC: 134 MMOL/L (ref 133–144)
TSH SERPL DL<=0.005 MIU/L-ACNC: 0.7 MU/L (ref 0.4–4)
WBC # BLD AUTO: 6 10E9/L (ref 4–11)

## 2021-05-14 PROCEDURE — 85025 COMPLETE CBC W/AUTO DIFF WBC: CPT | Mod: ZL | Performed by: FAMILY MEDICINE

## 2021-05-14 PROCEDURE — 36415 COLL VENOUS BLD VENIPUNCTURE: CPT | Mod: ZL | Performed by: FAMILY MEDICINE

## 2021-05-14 PROCEDURE — 84443 ASSAY THYROID STIM HORMONE: CPT | Mod: ZL | Performed by: FAMILY MEDICINE

## 2021-05-14 PROCEDURE — 84270 ASSAY OF SEX HORMONE GLOBUL: CPT | Mod: ZL | Performed by: FAMILY MEDICINE

## 2021-05-14 PROCEDURE — 80053 COMPREHEN METABOLIC PANEL: CPT | Mod: ZL | Performed by: FAMILY MEDICINE

## 2021-05-14 PROCEDURE — 84403 ASSAY OF TOTAL TESTOSTERONE: CPT | Mod: ZL | Performed by: FAMILY MEDICINE

## 2021-05-14 PROCEDURE — 99214 OFFICE O/P EST MOD 30 MIN: CPT | Mod: 25 | Performed by: FAMILY MEDICINE

## 2021-05-14 PROCEDURE — 85652 RBC SED RATE AUTOMATED: CPT | Mod: ZL | Performed by: FAMILY MEDICINE

## 2021-05-14 PROCEDURE — 93010 ELECTROCARDIOGRAM REPORT: CPT | Performed by: INTERNAL MEDICINE

## 2021-05-14 PROCEDURE — 93005 ELECTROCARDIOGRAM TRACING: CPT

## 2021-05-14 PROCEDURE — 86140 C-REACTIVE PROTEIN: CPT | Mod: ZL | Performed by: FAMILY MEDICINE

## 2021-05-14 PROCEDURE — G0463 HOSPITAL OUTPT CLINIC VISIT: HCPCS | Mod: 25

## 2021-05-14 ASSESSMENT — PAIN SCALES - GENERAL: PAINLEVEL: NO PAIN (0)

## 2021-05-14 NOTE — NURSING NOTE
"Chief Complaint   Patient presents with     Recheck Medication       Initial /80   Pulse 72   Temp 98.1  F (36.7  C)   Resp 20   SpO2 97%  Estimated body mass index is 31.4 kg/m  as calculated from the following:    Height as of 2/23/21: 1.791 m (5' 10.5\").    Weight as of 2/23/21: 100.7 kg (222 lb).  Medication Reconciliation: tanya Martins  "

## 2021-05-17 ENCOUNTER — TELEPHONE (OUTPATIENT)
Dept: FAMILY MEDICINE | Facility: OTHER | Age: 81
End: 2021-05-17

## 2021-05-17 NOTE — TELEPHONE ENCOUNTER
I called labs ok . Testosterone pending. Had small episode- BS was fine. No real pattern to his sx.

## 2021-05-17 NOTE — TELEPHONE ENCOUNTER
Pt calling and would like his labs/EKG results.    Pt said he was feeling tired and no energy,lost all energy. He took his BGL and it was not low it was 190.    Please call back      Gabriella Crockett RN

## 2021-05-19 LAB
SHBG SERPL-SCNC: 51 NMOL/L (ref 11–80)
TESTOST FREE SERPL-MCNC: 5.18 NG/DL (ref 4.7–24.4)
TESTOST SERPL-MCNC: 346 NG/DL (ref 240–950)

## 2021-06-03 ENCOUNTER — TRANSFERRED RECORDS (OUTPATIENT)
Dept: HEALTH INFORMATION MANAGEMENT | Facility: CLINIC | Age: 81
End: 2021-06-03

## 2021-06-03 LAB — RETINOPATHY: NEGATIVE

## 2021-06-14 DIAGNOSIS — Z01.818 PRE-OP EXAM: Primary | ICD-10-CM

## 2021-06-16 NOTE — PATIENT INSTRUCTIONS

## 2021-06-16 NOTE — PROGRESS NOTES
[facility] - [place]  [address]  [facility] [address]  Phone: [phone]  Primary Provider: Mata Richardson        PREOPERATIVE EVALUATION:  Today's date: 6/25/2021    Carrillo Carranza is a 80 year old male who presents for a preoperative evaluation.    Surgical Information:  Surgery/Procedure: Left Foot Surgery- osteoarthritis /bunion like repair   Surgery Location: [facility]  Surgeon: Dr. Lorenzo  Surgery Date: 7/16/2021  Time of Surgery: unknown  Where patient plans to recover: At home with family  Fax number for surgical facility: [phone]    Type of Anesthesia Anticipated: to be determined    Assessment & Plan     The proposed surgical procedure is considered LOW risk.      ICD-10-CM    1. Preop general physical exam  Z01.818 CBC with platelets differential     Hemoglobin A1c     Basic metabolic panel   2. Bunion  M21.619 CBC with platelets differential     Hemoglobin A1c     Basic metabolic panel   3. Type 2 diabetes mellitus with diabetic nephropathy, without long-term current use of insulin (H)  E11.21 CBC with platelets differential     Hemoglobin A1c     Basic metabolic panel   4. Benign essential hypertension  I10 CBC with platelets differential     Hemoglobin A1c     Basic metabolic panel                  Medication Instructions:  Patient is to take all scheduled medications on the day of surgery EXCEPT for modifications listed below:  HOLD TENORETIC AND GLUCOPHAGE THE MORNING OF SURGERY  HOLD ALL SUPPLEMENTS AND ASPIRIN PRODUCTS 1 WEEK BEFORE SURGERY     RECOMMENDATION:  APPROVAL GIVEN to proceed with proposed procedure, without further diagnostic evaluation.                      Subjective     HPI related to upcoming procedure:   81 yO male  presents for cardiopulmonary/general clearance to undergo the above procedure.  His surgeon listed above has asked for this clearance to undergo anesthesia. Pt has had this condition for approximately over a year .    Overall pt is of good health and has not  had any perioperative complications with previous surgeries.          Preop Questions 6/25/2021   1. Have you ever had a heart attack or stroke? No   2. Have you ever had surgery on your heart or blood vessels, such as a stent placement, a coronary artery bypass, or surgery on an artery in your head, neck, heart, or legs? No   3. Do you have chest pain with activity? No   4. Do you have a history of  heart failure? No   5. Do you currently have a cold, bronchitis or symptoms of other infection? No   6. Do you have a cough, shortness of breath, or wheezing? No   7. Do you or anyone in your family have previous history of blood clots? No   8. Do you or does anyone in your family have a serious bleeding problem such as prolonged bleeding following surgeries or cuts? No   9. Have you ever had problems with anemia or been told to take iron pills? No   10. Have you had any abnormal blood loss such as black, tarry or bloody stools? No   11. Have you ever had a blood transfusion? No   12. Are you willing to have a blood transfusion if it is medically needed before, during, or after your surgery? Yes   13. Have you or any of your relatives ever had problems with anesthesia? No   14. Do you have sleep apnea, excessive snoring or daytime drowsiness? No   15. Do you have any artifical heart valves or other implanted medical devices like a pacemaker, defibrillator, or continuous glucose monitor? No   16. 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 Are you allergic to latex? No     Health Care Directive:  Patient does not have a Health Care Directive or Living Will: Discussed advance care planning with patient; however, patient declined at this time.    Preoperative Review of :   reviewed - no record of controlled substances prescribed.      Status of Chronic Conditions:  DIABETES - Patient has a longstanding history of DiabetesType Type II . Patient is being treated  with diet and oral agents and denies significant side effects. Control has been good. Complicating factors include but are not limited to: hypertension and hyperlipidemia.     HYPERTENSION - Patient has longstanding history of HTN , currently denies any symptoms referable to elevated blood pressure. Specifically denies chest pain, palpitations, dyspnea, orthopnea, PND or peripheral edema. Blood pressure readings have not been in normal range. Current medication regimen is as listed below. Patient denies any side effects of medication.       Review of Systems  Constitutional, neuro, ENT, endocrine, pulmonary, cardiac, gastrointestinal, genitourinary, musculoskeletal, integument and psychiatric systems are negative, except as otherwise noted.    Patient Active Problem List    Diagnosis Date Noted     Arthritis of first metatarsophalangeal (MTP) joint of left foot 07/22/2020     Priority: Medium     Baca's esophagus without dysplasia 05/07/2019     Priority: Medium     H/O adenomatous polyp of colon 05/07/2019     Priority: Medium     Low libido 09/14/2017     Priority: Medium     BPH with obstruction/lower urinary tract symptoms 05/16/2017     Priority: Medium     Type 2 diabetes mellitus without complication, without long-term current use of insulin (H) 12/12/2016     Priority: Medium     Essential hypertension with goal blood pressure less than 140/90 07/15/2016     Priority: Medium     Nocturia 10/09/2014     Priority: Medium     Hypomagnesemia 06/23/2014     Priority: Medium     BPPV (benign paroxysmal positional vertigo) 06/13/2014     Priority: Medium     Hypokalemia 06/02/2014     Priority: Medium     Hyperlipidemia with target LDL less than 100      Priority: Medium     Diagnosis updated by automated process. Provider to review and confirm.       Fatty liver disease, nonalcoholic      Priority: Medium     Diffuse connective tissue disease (H) 01/29/2013     Priority: Medium     Problem list name updated by  automated process. Provider to review       Advanced care planning/counseling discussion 06/12/2012     Priority: Medium      Past Medical History:   Diagnosis Date     Baca's esophagus 11/3/2011     Contact dermatitis and other eczema, due to unspecified cause 3/1/2011     Dermatophytosis of groin and perianal area 2/1/2006     Esophageal reflux 11/3/2011    GERD     Family history of colonic polyps 11/3/2011     Fatty liver disease, nonalcoholic      Generalized osteoarthrosis, involving multiple sites 3/1/2011     HTN (hypertension)      Hyperlipidaemia LDL goal < 100      Other and unspecified hyperlipidemia 11/3/2011     Other chronic nonalcoholic liver disease 11/3/2011    SANCHES (nonalcoholic steatohepatitis)     Other premature beats 11/3/2011    PVC (premature ventricular contraction)     Other specified cardiac dysrhythmias(427.89) 11/3/2011    ventricular bigeminy under anesthesia     Personal history of tobacco use, presenting hazards to health 11/3/2011    in remission     Type II or unspecified type diabetes mellitus without mention of complication, not stated as uncontrolled 2/7/2012     Umbilical hernia without mention of obstruction or gangrene 11/3/2011     Unspecified diffuse connective tissue disease 1/29/2013     Unspecified essential hypertension 11/3/2011     Past Surgical History:   Procedure Laterality Date     ADENOIDECTOMY       APPENDECTOMY       ARTHROSCOPY KNEE       arthroscopy right great toe      bunion     COLONOSCOPY  02-     COLONOSCOPY  2010,2006    repeat in 2015     COLONOSCOPY  8-     COLONOSCOPY  2015    Repeat 2020     ESOPHAGOGASTRODUODENOSCOPY      Repeat in 2015     ESOPHAGOGASTRODUODENOSCOPY  2015    Dr Funes/Vivek- Repeat 3yrs     EXCISE LESION EAR EXTERNAL Right 3/13/2018    Procedure: EXCISE LESION EAR EXTERNAL;  EXCISION OF PREAURICULAR BASAL CELL CARCINOMA WITH FROZENS, FLAP REPAIR ( 2.6 x 2cm Defect), ( 3.5 x 3.5cmTissue Rearrangement);  Surgeon:  Kyara Rojas MD;  Location: [facility] OR     HERNIA REPAIR, UMBILICAL  2012    reduction and repair of umbilical hernia     JOINT REPLACEMENT       PHACOEMULSIFICATION WITH STANDARD INTRAOCULAR LENS IMPLANT Right 6/11/2019    Procedure: COMPLEX PHACOEMULSIFICATION CATARACT EXTRACTION POSTERIOR CHAMBER LENS RIGHT 10% LINDA IFIS SOLUTION/OMIDRIA  LI61AO  21;  Surgeon: Julio Patrick MD;  Location: [facility] OR     PHACOEMULSIFICATION WITH STANDARD INTRAOCULAR LENS IMPLANT Left 6/25/2019    Procedure: PHACOEMULSIFICATION CATARACT EXTRACTION POSTERIOR CHAMBER LENS LEFT 10% LINDA, POSSIBLE IFIS OR OMIDRIA LI61AO 21m COMPLEX CATARACT;  Surgeon: Julio Patrick MD;  Location: [facility] OR     RELEASE CARPAL TUNNEL      carpal tunnel syndrome     shoulder bone spur removal       TKA       TONSILLECTOMY       UPPER GI ENDOSCOPY  2012    repeat in 2014     UPPER GI ENDOSCOPY       UPPER GI ENDOSCOPY       UPPER GI ENDOSCOPY  2010    repeat 2012     Current Outpatient Medications   Medication Sig Dispense Refill     amoxicillin (AMOXIL) 500 MG capsule 4 capsules orally  1 hour before surgery. (Patient not taking: Reported on 5/14/2021) 4 capsule 0     atenolol-chlorthalidone (TENORETIC) 50-25 MG tablet TAKE 1 TABLET DAILY 90 tablet 3     bacitracin 500 UNIT/GM external ointment Apply topically 2 times daily 113.4 g 1     blood glucose (ACCU-CHEK FASTCLIX) lancing device Device to be used with lancets. 1 each 3     blood glucose (NO BRAND SPECIFIED) test strip USE 1 STRIP TO CHECK GLUCOSE ONCE DAILY 100 strip 3     blood glucose calibration (ACCU-CHEK ANGELI) solution USE CONTROL SOLUTION AS DIRECTED ONCE MONTHLY TO CHECK ACCURACY OF METER 1 each 0     blood glucose monitoring (ACCU-CHEK ANGELI PLUS) meter device kit Use to test blood sugar 1 times daily 1 kit 0     Cholecalciferol (VITAMIN D PO) Take 1 capsule by mouth daily       docusate sodium (STOOL SOFTENER) 100 MG capsule Take 1 capsule by mouth daily        ezetimibe (ZETIA) 10 MG  tablet TAKE 1 TABLET DAILY 90 tablet 3     garlic 150 MG TABS tablet Take 100 mg by mouth daily       glimepiride (AMARYL) 1 MG tablet TAKE 1/2 (ONE-HALF) TABLET BY MOUTH ONCE DAILY IN THE MORNING BEFORE BREAKFAST 45 tablet 3     Krill Oil 500 MG CAPS Take 1 capsule by mouth       magnesium oxide 400 MG CAPS Take 400 mg by mouth 2 times daily 180 capsule 2     metFORMIN (GLUCOPHAGE-XR) 500 MG 24 hr tablet TAKE 1 TABLET BY MOUTH TWICE DAILY WITH MEALS 180 tablet 3     Multiple Vitamin (MULTIVITAMINS PO) Take 1 tablet by mouth daily       pantoprazole (PROTONIX) 40 MG EC tablet TAKE 1 TABLET DAILY 90 tablet 3     potassium chloride ER (KLOR-CON M) 20 MEQ CR tablet TAKE 1  BY MOUTH TWICE DAILY WITH MEALS 180 tablet 3     prednisoLONE acetate (PRED FORTE) 1 % ophthalmic suspension Place 1 drop into both eyes 2 times daily       STATIN NOT PRESCRIBED, INTENTIONAL, Please choose reason not prescribed, below       tamsulosin (FLOMAX) 0.4 MG capsule Take 1 capsule by mouth once daily 90 capsule 3     traZODone (DESYREL) 50 MG tablet TAKE 1 TABLET BY MOUTH AT BEDTIME 90 tablet 3     UNABLE TO FIND MEDICATION NAME: naproxyn/ibuprofen (a new med on the market) one tab         Allergies   Allergen Reactions     Atorvastatin Calcium Other (See Comments)     Lipitor - myalgia     Avodart [Dutasteride] Other (See Comments)     Sexual dsfxn     Simvastatin Other (See Comments)     Zocor - elevated liver function test        Social History     Tobacco Use     Smoking status: Former Smoker     Types: Cigarettes     Smokeless tobacco: Never Used     Tobacco comment: quit in the 80s   Substance Use Topics     Alcohol use: No     Family History   Problem Relation Age of Onset     Cerebrovascular Disease Maternal Grandmother         CVA     Heart Failure Father      History   Drug Use No         Objective     /78 (BP Location: Right arm, Patient Position: Chair, Cuff Size: Adult Large)   Pulse 68   Temp 96.8  F (36  C) (Tympanic)  "  Ht 1.778 m (5' 10\")   Wt 104.8 kg (231 lb)   SpO2 97%   BMI 33.15 kg/m      Physical Exam    GENERAL APPEARANCE: healthy, alert and no distress     EYES: EOMI,  PERRL     HENT: ear canals and TM's normal and nose and mouth without ulcers or lesions     NECK: no adenopathy, no asymmetry, masses, or scars and thyroid normal to palpation     RESP: lungs clear to auscultation - no rales, rhonchi or wheezes     CV: regular rates and rhythm, normal S1 S2, no S3 or S4 and no murmur, click or rub     ABDOMEN:  soft, nontender, no HSM or masses and bowel sounds normal     MS: extremities normal- no gross deformities noted, no evidence of inflammation in joints, FROM in all extremities.     SKIN: no suspicious lesions or rashes     NEURO: Normal strength and tone, sensory exam grossly normal, mentation intact and speech normal     PSYCH: mentation appears normal. and affect normal/bright     LYMPHATICS: No cervical adenopathy    Recent Labs   Lab Test 05/14/21  0935 02/23/21  0843 09/15/20  0847   HGB 15.8  --  15.6     --  193     --  136   POTASSIUM 3.6  --  3.5   CR 0.70  --  0.69   A1C  --  6.3* 6.6*        Diagnostics:  Recent Results (from the past 24 hour(s))   CBC with platelets differential    Collection Time: 06/25/21 10:03 AM   Result Value Ref Range    WBC 5.9 4.0 - 11.0 10e9/L    RBC Count 4.96 4.4 - 5.9 10e12/L    Hemoglobin 15.6 13.3 - 17.7 g/dL    Hematocrit 44.2 40.0 - 53.0 %    MCV 89 78 - 100 fl    MCH 31.5 26.5 - 33.0 pg    MCHC 35.3 31.5 - 36.5 g/dL    RDW 12.0 10.0 - 15.0 %    Platelet Count 199 150 - 450 10e9/L    Diff Method Automated Method     % Neutrophils 57.5 %    % Lymphocytes 29.7 %    % Monocytes 9.3 %    % Eosinophils 2.4 %    % Basophils 0.8 %    % Immature Granulocytes 0.3 %    Nucleated RBCs 0 0 /100    Absolute Neutrophil 3.4 1.6 - 8.3 10e9/L    Absolute Lymphocytes 1.8 0.8 - 5.3 10e9/L    Absolute Monocytes 0.6 0.0 - 1.3 10e9/L    Absolute Eosinophils 0.1 0.0 - 0.7 " 10e9/L    Absolute Basophils 0.1 0.0 - 0.2 10e9/L    Abs Immature Granulocytes 0.0 0 - 0.4 10e9/L    Absolute Nucleated RBC 0.0    Basic metabolic panel    Collection Time: 06/25/21 10:03 AM   Result Value Ref Range    Sodium 134 133 - 144 mmol/L    Potassium 3.8 3.4 - 5.3 mmol/L    Chloride 98 94 - 109 mmol/L    Carbon Dioxide 32 20 - 32 mmol/L    Anion Gap 4 3 - 14 mmol/L    Glucose 245 (H) 70 - 99 mg/dL    Urea Nitrogen 12 7 - 30 mg/dL    Creatinine 0.69 0.66 - 1.25 mg/dL    GFR Estimate >90 >60 mL/min/[1.73_m2]    GFR Estimate If Black >90 >60 mL/min/[1.73_m2]    Calcium 9.6 8.5 - 10.1 mg/dL   Hemoglobin A1c    Collection Time: 06/25/21 10:03 AM   Result Value Ref Range    Hemoglobin A1C 6.5 (H) 0 - 5.6 %      No EKG this visit, completed in the last 90 days.    Revised Cardiac Risk Index (RCRI):  The patient has the following serious cardiovascular risks for perioperative complications:   - No serious cardiac risks = 0 points     RCRI Interpretation: 0 points: Class I (very low risk - 0.4% complication rate)           Signed Electronically by: Mata Richardson MD  Copy of this evaluation report is provided to requesting physician.

## 2021-06-25 ENCOUNTER — OFFICE VISIT (OUTPATIENT)
Dept: FAMILY MEDICINE | Facility: OTHER | Age: 81
End: 2021-06-25
Attending: FAMILY MEDICINE
Payer: MEDICARE

## 2021-06-25 VITALS
DIASTOLIC BLOOD PRESSURE: 78 MMHG | TEMPERATURE: 96.8 F | OXYGEN SATURATION: 97 % | HEART RATE: 68 BPM | WEIGHT: 231 LBS | HEIGHT: 70 IN | SYSTOLIC BLOOD PRESSURE: 124 MMHG | BODY MASS INDEX: 33.07 KG/M2

## 2021-06-25 DIAGNOSIS — I10 BENIGN ESSENTIAL HYPERTENSION: ICD-10-CM

## 2021-06-25 DIAGNOSIS — Z01.818 PREOP GENERAL PHYSICAL EXAM: Primary | ICD-10-CM

## 2021-06-25 DIAGNOSIS — M21.619 BUNION: ICD-10-CM

## 2021-06-25 DIAGNOSIS — E11.21 TYPE 2 DIABETES MELLITUS WITH DIABETIC NEPHROPATHY, WITHOUT LONG-TERM CURRENT USE OF INSULIN (H): ICD-10-CM

## 2021-06-25 DIAGNOSIS — Z01.818 PREOP GENERAL PHYSICAL EXAM: ICD-10-CM

## 2021-06-25 LAB
ANION GAP SERPL CALCULATED.3IONS-SCNC: 4 MMOL/L (ref 3–14)
BASOPHILS # BLD AUTO: 0.1 10E9/L (ref 0–0.2)
BASOPHILS NFR BLD AUTO: 0.8 %
BUN SERPL-MCNC: 12 MG/DL (ref 7–30)
CALCIUM SERPL-MCNC: 9.6 MG/DL (ref 8.5–10.1)
CHLORIDE SERPL-SCNC: 98 MMOL/L (ref 94–109)
CO2 SERPL-SCNC: 32 MMOL/L (ref 20–32)
CREAT SERPL-MCNC: 0.69 MG/DL (ref 0.66–1.25)
DIFFERENTIAL METHOD BLD: NORMAL
EOSINOPHIL # BLD AUTO: 0.1 10E9/L (ref 0–0.7)
EOSINOPHIL NFR BLD AUTO: 2.4 %
ERYTHROCYTE [DISTWIDTH] IN BLOOD BY AUTOMATED COUNT: 12 % (ref 10–15)
EST. AVERAGE GLUCOSE BLD GHB EST-MCNC: 140 MG/DL
GFR SERPL CREATININE-BSD FRML MDRD: >90 ML/MIN/{1.73_M2}
GLUCOSE SERPL-MCNC: 245 MG/DL (ref 70–99)
HBA1C MFR BLD: 6.5 % (ref 0–5.6)
HCT VFR BLD AUTO: 44.2 % (ref 40–53)
HGB BLD-MCNC: 15.6 G/DL (ref 13.3–17.7)
IMM GRANULOCYTES # BLD: 0 10E9/L (ref 0–0.4)
IMM GRANULOCYTES NFR BLD: 0.3 %
LYMPHOCYTES # BLD AUTO: 1.8 10E9/L (ref 0.8–5.3)
LYMPHOCYTES NFR BLD AUTO: 29.7 %
MCH RBC QN AUTO: 31.5 PG (ref 26.5–33)
MCHC RBC AUTO-ENTMCNC: 35.3 G/DL (ref 31.5–36.5)
MCV RBC AUTO: 89 FL (ref 78–100)
MONOCYTES # BLD AUTO: 0.6 10E9/L (ref 0–1.3)
MONOCYTES NFR BLD AUTO: 9.3 %
NEUTROPHILS # BLD AUTO: 3.4 10E9/L (ref 1.6–8.3)
NEUTROPHILS NFR BLD AUTO: 57.5 %
NRBC # BLD AUTO: 0 10*3/UL
NRBC BLD AUTO-RTO: 0 /100
PLATELET # BLD AUTO: 199 10E9/L (ref 150–450)
POTASSIUM SERPL-SCNC: 3.8 MMOL/L (ref 3.4–5.3)
RBC # BLD AUTO: 4.96 10E12/L (ref 4.4–5.9)
SODIUM SERPL-SCNC: 134 MMOL/L (ref 133–144)
WBC # BLD AUTO: 5.9 10E9/L (ref 4–11)

## 2021-06-25 PROCEDURE — G0463 HOSPITAL OUTPT CLINIC VISIT: HCPCS

## 2021-06-25 PROCEDURE — 99214 OFFICE O/P EST MOD 30 MIN: CPT | Performed by: FAMILY MEDICINE

## 2021-06-25 PROCEDURE — 83036 HEMOGLOBIN GLYCOSYLATED A1C: CPT | Mod: ZL | Performed by: FAMILY MEDICINE

## 2021-06-25 PROCEDURE — 36415 COLL VENOUS BLD VENIPUNCTURE: CPT | Mod: ZL | Performed by: FAMILY MEDICINE

## 2021-06-25 PROCEDURE — 85025 COMPLETE CBC W/AUTO DIFF WBC: CPT | Mod: ZL | Performed by: FAMILY MEDICINE

## 2021-06-25 PROCEDURE — 80048 BASIC METABOLIC PNL TOTAL CA: CPT | Mod: ZL | Performed by: FAMILY MEDICINE

## 2021-06-25 PROCEDURE — 999N001182 HC STATISTIC ESTIMATED AVERAGE GLUCOSE: Performed by: FAMILY MEDICINE

## 2021-06-25 RX ORDER — POLYETHYLENE GLYCOL 400 AND PROPYLENE GLYCOL 4; 3 MG/ML; MG/ML
1 GEL OPHTHALMIC DAILY
COMMUNITY
End: 2024-03-04

## 2021-06-25 ASSESSMENT — MIFFLIN-ST. JEOR: SCORE: 1774.06

## 2021-06-25 ASSESSMENT — PAIN SCALES - GENERAL: PAINLEVEL: NO PAIN (0)

## 2021-06-25 NOTE — NURSING NOTE
"Chief Complaint   Patient presents with     Pre-Op Exam       Initial /78 (BP Location: Right arm, Patient Position: Chair, Cuff Size: Adult Large)   Pulse 68   Temp 96.8  F (36  C) (Tympanic)   Ht 1.778 m (5' 10\")   Wt 104.8 kg (231 lb)   SpO2 97%   BMI 33.15 kg/m   Estimated body mass index is 33.15 kg/m  as calculated from the following:    Height as of this encounter: 1.778 m (5' 10\").    Weight as of this encounter: 104.8 kg (231 lb).  Medication Reconciliation: complete  ZANE MEDINA LPN  "

## 2021-09-25 NOTE — NURSING NOTE
"Chief Complaint   Patient presents with     Surgical Followup     s/p excision lesion external ear       Initial /64 (BP Location: Right arm, Patient Position: Chair, Cuff Size: Adult Regular)  Pulse 72  Temp 97.8  F (36.6  C) (Tympanic)  Ht 5' 10\" (1.778 m)  Wt 236 lb (107 kg)  SpO2 96%  BMI 33.86 kg/m2 Estimated body mass index is 33.86 kg/(m^2) as calculated from the following:    Height as of this encounter: 5' 10\" (1.778 m).    Weight as of this encounter: 236 lb (107 kg).  Medication Reconciliation: complete       Antonietta Dickey LPN      "
No

## 2021-10-03 ENCOUNTER — HEALTH MAINTENANCE LETTER (OUTPATIENT)
Age: 81
End: 2021-10-03

## 2021-10-07 DIAGNOSIS — I10 ESSENTIAL HYPERTENSION WITH GOAL BLOOD PRESSURE LESS THAN 140/90: ICD-10-CM

## 2021-10-07 RX ORDER — POTASSIUM CHLORIDE 1500 MG/1
TABLET, EXTENDED RELEASE ORAL
Qty: 180 TABLET | Refills: 2 | Status: SHIPPED | OUTPATIENT
Start: 2021-10-07 | End: 2022-06-28

## 2021-10-26 ENCOUNTER — ALLIED HEALTH/NURSE VISIT (OUTPATIENT)
Dept: FAMILY MEDICINE | Facility: OTHER | Age: 81
End: 2021-10-26
Attending: FAMILY MEDICINE
Payer: MEDICARE

## 2021-10-26 DIAGNOSIS — Z23 NEED FOR PROPHYLACTIC VACCINATION AND INOCULATION AGAINST INFLUENZA: Primary | ICD-10-CM

## 2021-10-26 PROCEDURE — G0008 ADMIN INFLUENZA VIRUS VAC: HCPCS

## 2021-10-26 PROCEDURE — 90662 IIV NO PRSV INCREASED AG IM: CPT

## 2021-11-07 ENCOUNTER — APPOINTMENT (OUTPATIENT)
Dept: GENERAL RADIOLOGY | Facility: HOSPITAL | Age: 81
End: 2021-11-07
Attending: STUDENT IN AN ORGANIZED HEALTH CARE EDUCATION/TRAINING PROGRAM
Payer: MEDICARE

## 2021-11-07 ENCOUNTER — HOSPITAL ENCOUNTER (EMERGENCY)
Facility: HOSPITAL | Age: 81
Discharge: HOME | End: 2021-11-07
Attending: STUDENT IN AN ORGANIZED HEALTH CARE EDUCATION/TRAINING PROGRAM | Admitting: STUDENT IN AN ORGANIZED HEALTH CARE EDUCATION/TRAINING PROGRAM
Payer: MEDICARE

## 2021-11-07 VITALS
HEART RATE: 61 BPM | DIASTOLIC BLOOD PRESSURE: 79 MMHG | OXYGEN SATURATION: 95 % | SYSTOLIC BLOOD PRESSURE: 141 MMHG | TEMPERATURE: 97.4 F | WEIGHT: 226.74 LBS | BODY MASS INDEX: 32.53 KG/M2 | RESPIRATION RATE: 14 BRPM

## 2021-11-07 DIAGNOSIS — R07.9 CHEST PAIN, UNSPECIFIED TYPE: ICD-10-CM

## 2021-11-07 LAB
ANION GAP SERPL CALCULATED.3IONS-SCNC: 5 MMOL/L (ref 3–14)
BUN SERPL-MCNC: 13 MG/DL (ref 7–30)
CALCIUM SERPL-MCNC: 9.2 MG/DL (ref 8.5–10.1)
CHLORIDE BLD-SCNC: 98 MMOL/L (ref 94–109)
CO2 SERPL-SCNC: 29 MMOL/L (ref 20–32)
CREAT SERPL-MCNC: 0.69 MG/DL (ref 0.66–1.25)
ERYTHROCYTE [DISTWIDTH] IN BLOOD BY AUTOMATED COUNT: 11.9 % (ref 10–15)
GFR SERPL CREATININE-BSD FRML MDRD: >90 ML/MIN/1.73M2
GLUCOSE BLD-MCNC: 209 MG/DL (ref 70–99)
HCT VFR BLD AUTO: 44.6 % (ref 40–53)
HGB BLD-MCNC: 15.6 G/DL (ref 13.3–17.7)
MCH RBC QN AUTO: 30.9 PG (ref 26.5–33)
MCHC RBC AUTO-ENTMCNC: 35 G/DL (ref 31.5–36.5)
MCV RBC AUTO: 88 FL (ref 78–100)
PLATELET # BLD AUTO: 191 10E3/UL (ref 150–450)
POTASSIUM BLD-SCNC: 3.9 MMOL/L (ref 3.4–5.3)
RBC # BLD AUTO: 5.05 10E6/UL (ref 4.4–5.9)
SODIUM SERPL-SCNC: 132 MMOL/L (ref 133–144)
TROPONIN I SERPL-MCNC: <0.015 UG/L (ref 0–0.04)
WBC # BLD AUTO: 5.9 10E3/UL (ref 4–11)

## 2021-11-07 PROCEDURE — 36415 COLL VENOUS BLD VENIPUNCTURE: CPT | Performed by: STUDENT IN AN ORGANIZED HEALTH CARE EDUCATION/TRAINING PROGRAM

## 2021-11-07 PROCEDURE — 80048 BASIC METABOLIC PNL TOTAL CA: CPT | Performed by: STUDENT IN AN ORGANIZED HEALTH CARE EDUCATION/TRAINING PROGRAM

## 2021-11-07 PROCEDURE — 93005 ELECTROCARDIOGRAM TRACING: CPT

## 2021-11-07 PROCEDURE — 99285 EMERGENCY DEPT VISIT HI MDM: CPT | Mod: 25

## 2021-11-07 PROCEDURE — 84484 ASSAY OF TROPONIN QUANT: CPT | Performed by: STUDENT IN AN ORGANIZED HEALTH CARE EDUCATION/TRAINING PROGRAM

## 2021-11-07 PROCEDURE — 93010 ELECTROCARDIOGRAM REPORT: CPT | Performed by: INTERNAL MEDICINE

## 2021-11-07 PROCEDURE — 99284 EMERGENCY DEPT VISIT MOD MDM: CPT | Performed by: STUDENT IN AN ORGANIZED HEALTH CARE EDUCATION/TRAINING PROGRAM

## 2021-11-07 PROCEDURE — 71046 X-RAY EXAM CHEST 2 VIEWS: CPT

## 2021-11-07 PROCEDURE — 85027 COMPLETE CBC AUTOMATED: CPT | Performed by: STUDENT IN AN ORGANIZED HEALTH CARE EDUCATION/TRAINING PROGRAM

## 2021-11-07 NOTE — ED PROVIDER NOTES
History     Chief Complaint   Patient presents with     Chest Pain     HPI  Carrillo Carranza is a 80 year old male who is to the emergency department today complaining of brief moments of chest pain that are sharp, lasts a second or 2, are brought on by certain movements.  This started yesterday and is continued today from time to time.  He has no pain at this time.  No headache, cough, fever, chest pain, shortness of breath, abdominal pain, nausea and vomiting, diarrhea.  No other complaints at this time    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 05/07/2019     Priority: Medium     H/O adenomatous polyp of colon 05/07/2019     Priority: Medium     Low libido 09/14/2017     Priority: Medium     BPH with obstruction/lower urinary tract symptoms 05/16/2017     Priority: Medium     Type 2 diabetes mellitus without complication, without long-term current use of insulin (H) 12/12/2016     Priority: Medium     Essential hypertension with goal blood pressure less than 140/90 07/15/2016     Priority: Medium     Nocturia 10/09/2014     Priority: Medium     Hypomagnesemia 06/23/2014     Priority: Medium     BPPV (benign paroxysmal positional vertigo) 06/13/2014     Priority: Medium     Hypokalemia 06/02/2014     Priority: Medium     Hyperlipidemia with target LDL less than 100      Priority: Medium     Diagnosis updated by automated process. Provider to review and confirm.       Fatty liver disease, nonalcoholic      Priority: Medium     Diffuse connective tissue disease (H) 01/29/2013     Priority:  Medium     Problem list name updated by automated process. Provider to review       Advanced care planning/counseling discussion 06/12/2012     Priority: Medium        Past Medical History:    Past Medical History:   Diagnosis Date     Baca's esophagus 11/3/2011     Contact dermatitis and other eczema, due to unspecified cause 3/1/2011     Dermatophytosis of groin and perianal area 2/1/2006     Esophageal reflux 11/3/2011     Family history of colonic polyps 11/3/2011     Fatty liver disease, nonalcoholic      Generalized osteoarthrosis, involving multiple sites 3/1/2011     HTN (hypertension)      Hyperlipidaemia LDL goal < 100      Other and unspecified hyperlipidemia 11/3/2011     Other chronic nonalcoholic liver disease 11/3/2011     Other premature beats 11/3/2011     Other specified cardiac dysrhythmias(427.89) 11/3/2011     Personal history of tobacco use, presenting hazards to health 11/3/2011     Type II or unspecified type diabetes mellitus without mention of complication, not stated as uncontrolled 2/7/2012     Umbilical hernia without mention of obstruction or gangrene 11/3/2011     Unspecified diffuse connective tissue disease 1/29/2013     Unspecified essential hypertension 11/3/2011       Past Surgical History:    Past Surgical History:   Procedure Laterality Date     ADENOIDECTOMY       APPENDECTOMY       ARTHROSCOPY KNEE       arthroscopy right great toe      bunion     COLONOSCOPY  02/23/2010     COLONOSCOPY  2010,2006    repeat in 2015     COLONOSCOPY  08/10/2006     COLONOSCOPY  01/01/2015    Repeat 2020     COLONOSCOPY W/ ENDOSCOPIC US  2021     ESOPHAGOGASTRODUODENOSCOPY      Repeat in 2015     ESOPHAGOGASTRODUODENOSCOPY  01/01/2015    Dr Funes/[facility]- Repeat 3yrs     EXCISE LESION EAR EXTERNAL Right 03/13/2018    Procedure: EXCISE LESION EAR EXTERNAL;  EXCISION OF PREAURICULAR BASAL CELL CARCINOMA WITH FROZENS, FLAP REPAIR ( 2.6 x 2cm Defect), ( 3.5 x 3.5cmTissue Rearrangement);   Surgeon: Kyara Rojas MD;  Location: [facility] OR     HERNIA REPAIR, UMBILICAL  01/01/2012    reduction and repair of umbilical hernia     JOINT REPLACEMENT       PHACOEMULSIFICATION WITH STANDARD INTRAOCULAR LENS IMPLANT Right 06/11/2019    Procedure: COMPLEX PHACOEMULSIFICATION CATARACT EXTRACTION POSTERIOR CHAMBER LENS RIGHT 10% LINDA IFIS SOLUTION/OMIDRIA  LI61AO  21;  Surgeon: Julio Patrick MD;  Location: [facility] OR     PHACOEMULSIFICATION WITH STANDARD INTRAOCULAR LENS IMPLANT Left 06/25/2019    Procedure: PHACOEMULSIFICATION CATARACT EXTRACTION POSTERIOR CHAMBER LENS LEFT 10% LINDA, POSSIBLE IFIS OR OMIDRIA LI61AO 21m COMPLEX CATARACT;  Surgeon: Julio Patrick MD;  Location: [facility] OR     RELEASE CARPAL TUNNEL      carpal tunnel syndrome     shoulder bone spur removal       TKA       TONSILLECTOMY       UPPER GI ENDOSCOPY  01/01/2012    repeat in 2014     UPPER GI ENDOSCOPY       UPPER GI ENDOSCOPY       UPPER GI ENDOSCOPY  01/01/2010    repeat 2012       Family History:    Family History   Problem Relation Age of Onset     Cerebrovascular Disease Maternal Grandmother         CVA     Heart Failure Father        Social History:  Marital Status:   [2]  Social History     Tobacco Use     Smoking status: Former Smoker     Types: Cigarettes     Smokeless tobacco: Never Used     Tobacco comment: quit in the 80s   Substance Use Topics     Alcohol use: No     Drug use: No        Medications:    atenolol-chlorthalidone (TENORETIC) 50-25 MG tablet  ezetimibe (ZETIA) 10 MG tablet  glimepiride (AMARYL) 1 MG tablet  magnesium oxide 400 MG CAPS  metFORMIN (GLUCOPHAGE-XR) 500 MG 24 hr tablet  pantoprazole (PROTONIX) 40 MG EC tablet  potassium chloride ER (KLOR-CON M) 20 MEQ CR tablet  tamsulosin (FLOMAX) 0.4 MG capsule  traZODone (DESYREL) 50 MG tablet  blood glucose (ACCU-CHEK FASTCLIX) lancing device  blood glucose (NO BRAND SPECIFIED) test strip  blood glucose calibration (ACCU-CHEK ANGELI) solution  blood glucose  monitoring (ACCU-CHEK ANGELI PLUS) meter device kit  Cholecalciferol (VITAMIN D PO)  docusate sodium (STOOL SOFTENER) 100 MG capsule  garlic 150 MG TABS tablet  Krill Oil 500 MG CAPS  Multiple Vitamin (MULTIVITAMINS PO)  Polyethyl Glycol-Propyl Glycol (SYSTANE OP)  polyethyl glycol-propyl glycol (SYSTANE) 0.4-0.3 % GEL  prednisoLONE acetate (PRED FORTE) 1 % ophthalmic suspension  STATIN NOT PRESCRIBED, INTENTIONAL,  UNABLE TO FIND          Review of Systems  A complete review of systems was performed and is otherwise negative.     Physical Exam   BP: (!) 170/142 (hx hyupertension)  Pulse: 70  Temp: (!) 96.6  F (35.9  C)  Resp: 16  Weight: 102.9 kg (226 lb 11.9 oz)  SpO2: 95 %      Physical Exam  Constitutional: Alert and conversant. NAD   HENT: NCAT   Eyes: Normal pupils   Neck: supple   CV: Normal rate, regular rhythm, no murmur   Pulmonary/Chest: Non-labored respirations, clear to auscultation bilaterally   Abdominal: Soft, non-tender, non-distended   MSK: DAVIS.   Neuro: Alert and appropriate   Skin: Warm and dry. No diaphoresis. No rashes on exposed skin    Psych: Appropriate mood and affect     ED Course     ED Course as of 11/07/21 1907   Sun Nov 07, 2021 1905 Carrillo Carranza is a 80 year old male presenting with chest pain.    Differential includes but is not limited to acute coronary syndrome, pulmonary embolism, pneumonia, aortic dissection, pneumothorax, GERD, pericarditis, myocarditis, MSK/costochondritis, shingles.    Vitals wnl   Exam reassuring, asymptomatic, non toxic   EKG without signs of ischemia or arrhytjmia   CXR no concerning findings,  Labs essentially all within normal limits, sodium minimally low, troponin negative,     History and exam suggest a low likelihood of pulmonary embolism, aortic dissection, or pulmonary pathology as the etiology of this patient's pain.      Given the patient's few risk factors and atypical history for acute coronary syndrome with studies results suggesting low  suspicion of a coronary event, the patient is stable for outpatient management. The etiology of the chest pain is possibly musculoskeletal, unclear.. Given the nature of the patient's symptoms a stress echo with cardiology follow up is worth discussing with primary care.  Recommended close follow-up in the next 2 to 3 days to discuss.      Procedures              Results for orders placed or performed during the hospital encounter of 11/07/21 (from the past 24 hour(s))   Basic metabolic panel   Result Value Ref Range    Sodium 132 (L) 133 - 144 mmol/L    Potassium 3.9 3.4 - 5.3 mmol/L    Chloride 98 94 - 109 mmol/L    Carbon Dioxide (CO2) 29 20 - 32 mmol/L    Anion Gap 5 3 - 14 mmol/L    Urea Nitrogen 13 7 - 30 mg/dL    Creatinine 0.69 0.66 - 1.25 mg/dL    Calcium 9.2 8.5 - 10.1 mg/dL    Glucose 209 (H) 70 - 99 mg/dL    GFR Estimate >90 >60 mL/min/1.73m2   Troponin I   Result Value Ref Range    Troponin I <0.015 0.000 - 0.045 ug/L   CBC with platelets   Result Value Ref Range    WBC Count 5.9 4.0 - 11.0 10e3/uL    RBC Count 5.05 4.40 - 5.90 10e6/uL    Hemoglobin 15.6 13.3 - 17.7 g/dL    Hematocrit 44.6 40.0 - 53.0 %    MCV 88 78 - 100 fL    MCH 30.9 26.5 - 33.0 pg    MCHC 35.0 31.5 - 36.5 g/dL    RDW 11.9 10.0 - 15.0 %    Platelet Count 191 150 - 450 10e3/uL   XR Chest 2 Views    Narrative    XR CHEST 2 VW    HISTORY: 80 years Male chest pain    COMPARISON: None    TECHNIQUE: 2 views of the chest were obtained.    FINDINGS: Two views of the chest were obtained. Heart size and  pulmonary vascularity are within normal limits, lungs are clear on  both views. No consolidating air space opacities are present.          Impression    IMPRESSION: Clear chest.    TRUDY MENDOZA MD         SYSTEM ID:  [id number]       Medications - No data to display    Assessments & Plan (with Medical Decision Making)     I have reviewed the nursing notes.    I have reviewed the findings, diagnosis, plan and need for follow up with the  patient.      Discharge Medication List as of 11/7/2021  9:42 AM          Final diagnoses:   Chest pain, unspecified type       11/7/2021   [facility]     Andrew Dempsey MD  11/07/21 [id number]

## 2021-11-07 NOTE — ED TRIAGE NOTES
Intermittent for the past 2 days getting on and off chest pain. Today more frequently. Denies any radiation.  Denies any cardiac hx

## 2021-11-07 NOTE — ED NOTES
Pt presents with intermittent CP x 2 days.  Denies heart history, n/v, recent illness, SOB, or injury. Pt reports pain is sharp and quick, lasting only a few seconds at a time.  Pt is unsure of any correlation with activity, pt states pains start when at rest or when moving.  Pt appears anxious at bedside.      Pt to x-ray at this time.

## 2021-11-07 NOTE — ED NOTES
Patient given verbal and written discharge instructions. Patient verbalized understanding of discharge instructions. Pt to follow up with PCP this week. Denies chest pain. Afebrile. Up ambulating without difficulty.

## 2021-11-07 NOTE — DISCHARGE INSTRUCTIONS
Return to the emergency department if you have worsening of symptoms or new concerning symptoms, please follow-up with your primary care provider within the next 5 days.  Call to schedule an appointment.  You should discuss stress testing at that time.  No specific changes to your medications at this point in time.

## 2021-11-08 ENCOUNTER — TELEPHONE (OUTPATIENT)
Dept: FAMILY MEDICINE | Facility: OTHER | Age: 81
End: 2021-11-08
Payer: MEDICARE

## 2021-11-08 NOTE — TELEPHONE ENCOUNTER
Emergency Department and Urgent Care Follow-up      Reason for ER/UC visit: chest pain  o Date seen: 11/7      New or Worsening symptoms:  No change       Prescription Received/Picked up from Pharmacy?: na   o Medications started? na  o Any questions or issues regarding your prescription?: na      Follow-up Results or Labs that are pending: none      Questions or concerns?: ongoing symptoms      ER Recommends Follow-up by: 1 week      RN Recommendations: per ER  o Appointment scheduled: yes    Next 5 appointments (look out 90 days)    Nov 09, 2021  1:45 PM  (Arrive by 1:30 PM)  SHORT with Mata Richardson MD  [facility] ([facility] ) [address] [id number] [phone]     If you start feeling worse, or have any further questions, please feel free to contact Nurse Triage at [phone].  If needing immediate medical attention at any time please call 911/Go to the ER.

## 2021-11-09 ENCOUNTER — OFFICE VISIT (OUTPATIENT)
Dept: FAMILY MEDICINE | Facility: OTHER | Age: 81
End: 2021-11-09
Attending: FAMILY MEDICINE
Payer: MEDICARE

## 2021-11-09 VITALS
DIASTOLIC BLOOD PRESSURE: 62 MMHG | HEIGHT: 70 IN | HEART RATE: 73 BPM | TEMPERATURE: 97.1 F | OXYGEN SATURATION: 98 % | SYSTOLIC BLOOD PRESSURE: 138 MMHG | BODY MASS INDEX: 32.07 KG/M2 | WEIGHT: 224 LBS

## 2021-11-09 DIAGNOSIS — M94.0 COSTOCHONDRITIS: Primary | ICD-10-CM

## 2021-11-09 PROCEDURE — G0463 HOSPITAL OUTPT CLINIC VISIT: HCPCS

## 2021-11-09 PROCEDURE — 99213 OFFICE O/P EST LOW 20 MIN: CPT | Performed by: FAMILY MEDICINE

## 2021-11-09 ASSESSMENT — MIFFLIN-ST. JEOR: SCORE: 1742.31

## 2021-11-09 ASSESSMENT — PAIN SCALES - GENERAL: PAINLEVEL: NO PAIN (0)

## 2021-11-09 NOTE — PROGRESS NOTES
"  Assessment & Plan     Costochondritis  Discussed. His pain is NOT classic for angina. This is very msk in nature.  Discussed in length conservative measures of OTC medications for pain, Ice/Heat, elevation and the concept of R.I.C.E.. Continue behavioral changes and proper body mechanics to allow for healing. Follow up as directed.   NO stress test needed. Pt agrees. Handout given                BMI:   Estimated body mass index is 32.14 kg/m  as calculated from the following:    Height as of this encounter: 1.778 m (5' 10\").    Weight as of this encounter: 101.6 kg (224 lb).           No follow-ups on file.    Mata Richardson MD  [facility] - JESICA Vizcaino is a 80 year old who presents for the following health issues     HPI     ED/UC Followup:    Facility:  [facility]  Date of visit: 11/7/21  Reason for visit: chest pain  Current Status: no chest pain since      Very atypical Chest pain  Sharp and last less than 5 seconds  VERY active male and does lots of upper body exercise  No pain on exertion.    ER work up negative   ER notes reviewed       Review of Systems   Constitutional, HEENT, cardiovascular, pulmonary, gi and gu systems are negative, except as otherwise noted.      Objective    /62   Pulse 73   Temp 97.1  F (36.2  C)   Ht 1.778 m (5' 10\")   Wt 101.6 kg (224 lb)   SpO2 98%   BMI 32.14 kg/m    Body mass index is 32.14 kg/m .  Physical Exam   GENERAL: healthy, alert and no distress  NECK: no adenopathy, no asymmetry, masses, or scars and thyroid normal to palpation  RESP: lungs clear to auscultation - no rales, rhonchi or wheezes  CV: regular rate and rhythm, normal S1 S2, no S3 or S4, no murmur, click or rub, no peripheral edema and peripheral pulses strong  Tender over several sterno- costal joints on left side. Reproduces his pain  ABDOMEN: soft, nontender, no hepatosplenomegaly, no masses and bowel sounds normal  MS: no gross musculoskeletal defects noted, no " edema

## 2021-11-09 NOTE — PATIENT INSTRUCTIONS
Patient Education     Chest Wall Pain: Costochondritis    The chest pain that you have had today is caused by costochondritis. This condition is caused by an inflammation of the cartilage joining your ribs to your breastbone. It's not caused by heart or lung problems. Your healthcare team has made sure that the chest pain you feel is not from a life threatening cause of chest pain such as heart attack, collapsed lung, blood clot in the lung, tear in the aorta, or esophageal rupture. The inflammation may have been brought on by a blow to the chest, lifting heavy objects, intense exercise, or an illness that made you cough and sneeze a lot. It often occurs during times of emotional stress. It can be painful, but it's not dangerous. It usually goes away in 1 to 2 weeks. But it may happen again. Rarely, a more serious condition may cause symptoms similar to costochondritis. That s why it s important to watch for the warning signs listed below.   Home care  Follow these guidelines when caring for yourself at home:    If you feel that emotional stress is a cause of your condition, try to figure out the sources of that stress. It may not be obvious. Learn ways to deal with the stress in your life. This can include regular exercise, muscle relaxation, meditation, or simply taking time out for yourself.    You may use acetaminophen, ibuprofen, or naproxen to control pain, unless another pain medicine was prescribed. If you have liver or kidney disease or ever had a stomach ulcer, talk with your healthcare provider before using these medicines.    You can also help ease pain by using a hot, wet compress or heating pad. Use this with or without a medicated skin cream that helps relieves pain.    Do stretching exercise as advised by your provider. Typically rest is beneficial for the first few days. Avoid strenuous activity that worsens the pain.    Take any prescribed medicines as directed.  Follow-up care  Follow up with  your healthcare provider, or as advised.   When to seek medical advice  Call your healthcare provider right away if any of these occur:    A change in the type of pain. Call if it feels different, becomes more serious, lasts longer, or spreads into your shoulder, arm, neck, jaw, or back.    Shortness of breath or pain gets worse when you breathe    Weakness, dizziness, or fainting    Cough with dark-colored sputum (phlegm) or blood    Abdominal pain    Dark red or black stools    Fever of 100.4 F (38 C) or higher, or as directed by your healthcare provider  Amandeep last reviewed this educational content on 6/1/2019 2000-2021 [employer]. All rights reserved. This information is not intended as a substitute for professional medical care. Always follow your healthcare professional's instructions.           Patient Education     Costochondritis  Costochondritis is inflammation of a rib or the cartilage that connects a rib to your breastbone (sternum). It causes soreness, and may cause chest pain that can be sharp or aching or feel like pressure. The pain may get worse with deep breathing, movement, or exercise. In some cases, the pain is mistaken for a heart attack. But the condition is not serious. Read on to learn more about the condition and how it can be treated.     What causes costochondritis?  The cause is not fully known. It may happen after a chest injury, chest infection, or bout of coughing. Some physical activities may lead to costochondritis. Large-breasted women may be more likely to have the condition. Often, the cause is unknown.   Diagnosing costochondritis  There is no test for costochondritis. The condition is diagnosed by the symptoms you have. Your healthcare provider will give you a physical exam. He or she will ask you about your symptoms and examine your chest for pain. In some cases, tests are done to rule out more serious problems. These tests may include tests such as chest  X-ray, CT scan, or an ECG.   Treating costochondritis  If a cause is found, treatment for that will likely relieve the problem. Costochondritis often goes away on its own. The course of the condition varies from person to person. It usually lasts from weeks to months. In some cases, mild symptoms continue for months to years. To ease symptoms:     Take medicine as directed. These relieve pain and swelling. Ibuprofen or other NSAIDs are often advised. In some cases, you may be given prescription medicine, such as muscle relaxants.    Don't do activities that put stress on your chest or spine.    Apply a heating pad (set to warm, not high heat) to your breastbone several times a day.    Do stretching exercises as directed.  When to call the healthcare provider  Call the healthcare provider right away if you have any of these:    Pain that is not relieved by medicine    Shortness of breath    Lightheadedness, dizziness, or fainting    Feeling of irregular heartbeat or fast pulse  Anyone with chest pain should see a healthcare provider, especially older adults and people at risk for heart disease.   Guest of a Guest last reviewed this educational content on 2/1/2020 2000-2021 [employer]. All rights reserved. This information is not intended as a substitute for professional medical care. Always follow your healthcare professional's instructions.

## 2021-11-09 NOTE — NURSING NOTE
"Chief Complaint   Patient presents with     ER F/U       Initial /62   Pulse 73   Temp 97.1  F (36.2  C)   Ht 1.778 m (5' 10\")   Wt 101.6 kg (224 lb)   SpO2 98%   BMI 32.14 kg/m   Estimated body mass index is 32.14 kg/m  as calculated from the following:    Height as of this encounter: 1.778 m (5' 10\").    Weight as of this encounter: 101.6 kg (224 lb).  Medication Reconciliation: complete  Jerome Fitzgerald LPN  "

## 2021-11-10 ASSESSMENT — PATIENT HEALTH QUESTIONNAIRE - PHQ9: SUM OF ALL RESPONSES TO PHQ QUESTIONS 1-9: 0

## 2021-11-17 NOTE — PROGRESS NOTES
"  Assessment & Plan     Essential hypertension with goal blood pressure less than 140/90  Good control. Continue current medications and behavioral changes. Follow-up in 4 months  - Comprehensive metabolic panel; Future    Type 2 diabetes mellitus without complication, without long-term current use of insulin (H)  Mild elevation on A1C--  Discussed. Continue current medications and behavioral changes.   Change diet and activity to see if bring down A1C  - Comprehensive metabolic panel; Future  - Hemoglobin A1c; Future  - Lipid Profile; Future    Hyperlipidemia LDL goal <100  Stable . Continue current medications and behavioral changes.   - Comprehensive metabolic panel; Future  - Lipid Profile; Future             BMI:   Estimated body mass index is 32.43 kg/m  as calculated from the following:    Height as of this encounter: 1.778 m (5' 10\").    Weight as of this encounter: 102.5 kg (226 lb).           No follow-ups on file.    Mata Richardson MD  [facility] - JESICA Vizcaino is a 80 year old who presents for the following health issues     HPI     Diabetes Follow-up    How often are you checking your blood sugar? One time daily  What time of day are you checking your blood sugars (select all that apply)?  Before meals  Have you had any blood sugars above 200?  No  Have you had any blood sugars below 70?  No    What symptoms do you notice when your blood sugar is low?  None    What concerns do you have today about your diabetes? None     Do you have any of these symptoms? (Select all that apply)  No numbness or tingling in feet.  No redness, sores or blisters on feet.  No complaints of excessive thirst.  No reports of blurry vision.  No significant changes to weight.      Hyperlipidemia Follow-Up      Are you regularly taking any medication or supplement to lower your cholesterol?   No    Are you having muscle aches or other side effects that you think could be caused by your " "cholesterol lowering medication?  No    Hypertension Follow-up      Do you check your blood pressure regularly outside of the clinic? No     Are you following a low salt diet? Yes    Are your blood pressures ever more than 140 on the top number (systolic) OR more   than 90 on the bottom number (diastolic), for example 140/90? No    BP Readings from Last 2 Encounters:   11/09/21 138/62   11/07/21 141/79     Hemoglobin A1C (%)   Date Value   06/25/2021 6.5 (H)   02/23/2021 6.3 (H)     LDL Cholesterol Calculated (mg/dL)   Date Value   09/15/2020 103 (H)   05/07/2019 100 (H)               Review of Systems   Constitutional, HEENT, cardiovascular, pulmonary, gi and gu systems are negative, except as otherwise noted.      Objective    /80   Pulse 67   Temp 97  F (36.1  C)   Ht 1.778 m (5' 10\")   Wt 102.5 kg (226 lb)   SpO2 97%   BMI 32.43 kg/m    Body mass index is 32.43 kg/m .  Physical Exam   GENERAL: healthy, alert and no distress  NECK: no adenopathy, no asymmetry, masses, or scars and thyroid normal to palpation  RESP: lungs clear to auscultation - no rales, rhonchi or wheezes  ABDOMEN: soft, nontender, no hepatosplenomegaly, no masses and bowel sounds normal  MS: no gross musculoskeletal defects noted, no edema    Results for orders placed or performed in visit on 11/29/21   Comprehensive metabolic panel     Status: Abnormal   Result Value Ref Range    Sodium 135 133 - 144 mmol/L    Potassium 3.5 3.4 - 5.3 mmol/L    Chloride 98 94 - 109 mmol/L    Carbon Dioxide (CO2) 32 20 - 32 mmol/L    Anion Gap 5 3 - 14 mmol/L    Urea Nitrogen 15 7 - 30 mg/dL    Creatinine 0.74 0.66 - 1.25 mg/dL    Calcium 9.4 8.5 - 10.1 mg/dL    Glucose 164 (H) 70 - 99 mg/dL    Alkaline Phosphatase 66 40 - 150 U/L    AST 31 0 - 45 U/L    ALT 56 0 - 70 U/L    Protein Total 7.8 6.8 - 8.8 g/dL    Albumin 3.9 3.4 - 5.0 g/dL    Bilirubin Total 0.6 0.2 - 1.3 mg/dL    GFR Estimate 88 >60 mL/min/1.73m2   Hemoglobin A1c     Status: Abnormal "   Result Value Ref Range    Estimated Average Glucose 151 mg/dL    Hemoglobin A1C 6.9 (H) 0.0 - 5.6 %   Lipid Profile     Status: Abnormal   Result Value Ref Range    Cholesterol 182 <200 mg/dL    Triglycerides 213 (H) <150 mg/dL    Direct Measure HDL 41 >=40 mg/dL    LDL Cholesterol Calculated 98 <=100 mg/dL    Non HDL Cholesterol 141 (H) <130 mg/dL    Patient Fasting > 8hrs? Yes     Narrative    Cholesterol  Desirable:  <200 mg/dL    Triglycerides  Normal:  Less than 150 mg/dL  Borderline High:  150-199 mg/dL  High:  200-499 mg/dL  Very High:  Greater than or equal to 500 mg/dL    Direct Measure HDL  Female:  Greater than or equal to 50 mg/dL   Male:  Greater than or equal to 40 mg/dL    LDL Cholesterol  Desirable:  <100mg/dL  Above Desirable:  100-129 mg/dL   Borderline High:  130-159 mg/dL   High:  160-189 mg/dL   Very High:  >= 190 mg/dL    Non HDL Cholesterol  Desirable:  130 mg/dL  Above Desirable:  130-159 mg/dL  Borderline High:  160-189 mg/dL  High:  190-219 mg/dL  Very High:  Greater than or equal to 220 mg/dL

## 2021-11-28 ENCOUNTER — HEALTH MAINTENANCE LETTER (OUTPATIENT)
Age: 81
End: 2021-11-28

## 2021-11-28 DIAGNOSIS — I10 BENIGN ESSENTIAL HYPERTENSION: ICD-10-CM

## 2021-11-29 ENCOUNTER — OFFICE VISIT (OUTPATIENT)
Dept: FAMILY MEDICINE | Facility: OTHER | Age: 81
End: 2021-11-29
Attending: FAMILY MEDICINE
Payer: MEDICARE

## 2021-11-29 ENCOUNTER — LAB (OUTPATIENT)
Dept: LAB | Facility: OTHER | Age: 81
End: 2021-11-29
Attending: FAMILY MEDICINE
Payer: MEDICARE

## 2021-11-29 VITALS
OXYGEN SATURATION: 97 % | DIASTOLIC BLOOD PRESSURE: 80 MMHG | WEIGHT: 226 LBS | TEMPERATURE: 97 F | BODY MASS INDEX: 32.35 KG/M2 | HEIGHT: 70 IN | SYSTOLIC BLOOD PRESSURE: 124 MMHG | HEART RATE: 67 BPM

## 2021-11-29 DIAGNOSIS — I10 ESSENTIAL HYPERTENSION WITH GOAL BLOOD PRESSURE LESS THAN 140/90: ICD-10-CM

## 2021-11-29 DIAGNOSIS — E11.9 TYPE 2 DIABETES MELLITUS WITHOUT COMPLICATION, WITHOUT LONG-TERM CURRENT USE OF INSULIN (H): ICD-10-CM

## 2021-11-29 DIAGNOSIS — E78.5 HYPERLIPIDEMIA LDL GOAL <100: ICD-10-CM

## 2021-11-29 DIAGNOSIS — I10 ESSENTIAL HYPERTENSION WITH GOAL BLOOD PRESSURE LESS THAN 140/90: Primary | ICD-10-CM

## 2021-11-29 LAB
ALBUMIN SERPL-MCNC: 3.9 G/DL (ref 3.4–5)
ALP SERPL-CCNC: 66 U/L (ref 40–150)
ALT SERPL W P-5'-P-CCNC: 56 U/L (ref 0–70)
ANION GAP SERPL CALCULATED.3IONS-SCNC: 5 MMOL/L (ref 3–14)
AST SERPL W P-5'-P-CCNC: 31 U/L (ref 0–45)
BILIRUB SERPL-MCNC: 0.6 MG/DL (ref 0.2–1.3)
BUN SERPL-MCNC: 15 MG/DL (ref 7–30)
CALCIUM SERPL-MCNC: 9.4 MG/DL (ref 8.5–10.1)
CHLORIDE BLD-SCNC: 98 MMOL/L (ref 94–109)
CHOLEST SERPL-MCNC: 182 MG/DL
CO2 SERPL-SCNC: 32 MMOL/L (ref 20–32)
CREAT SERPL-MCNC: 0.74 MG/DL (ref 0.66–1.25)
EST. AVERAGE GLUCOSE BLD GHB EST-MCNC: 151 MG/DL
FASTING STATUS PATIENT QL REPORTED: YES
GFR SERPL CREATININE-BSD FRML MDRD: 88 ML/MIN/1.73M2
GLUCOSE BLD-MCNC: 164 MG/DL (ref 70–99)
HBA1C MFR BLD: 6.9 % (ref 0–5.6)
HDLC SERPL-MCNC: 41 MG/DL
LDLC SERPL CALC-MCNC: 98 MG/DL
NONHDLC SERPL-MCNC: 141 MG/DL
POTASSIUM BLD-SCNC: 3.5 MMOL/L (ref 3.4–5.3)
PROT SERPL-MCNC: 7.8 G/DL (ref 6.8–8.8)
SODIUM SERPL-SCNC: 135 MMOL/L (ref 133–144)
TRIGL SERPL-MCNC: 213 MG/DL

## 2021-11-29 PROCEDURE — G0463 HOSPITAL OUTPT CLINIC VISIT: HCPCS

## 2021-11-29 PROCEDURE — 80053 COMPREHEN METABOLIC PANEL: CPT | Mod: ZL

## 2021-11-29 PROCEDURE — 80061 LIPID PANEL: CPT | Mod: ZL

## 2021-11-29 PROCEDURE — 99214 OFFICE O/P EST MOD 30 MIN: CPT | Performed by: FAMILY MEDICINE

## 2021-11-29 PROCEDURE — 83036 HEMOGLOBIN GLYCOSYLATED A1C: CPT | Mod: ZL

## 2021-11-29 PROCEDURE — 36415 COLL VENOUS BLD VENIPUNCTURE: CPT | Mod: ZL

## 2021-11-29 ASSESSMENT — MIFFLIN-ST. JEOR: SCORE: 1751.38

## 2021-11-29 ASSESSMENT — PAIN SCALES - GENERAL: PAINLEVEL: NO PAIN (0)

## 2021-11-29 NOTE — NURSING NOTE
"Chief Complaint   Patient presents with     Diabetes       Initial /80   Pulse 67   Temp 97  F (36.1  C)   Ht 1.778 m (5' 10\")   Wt 102.5 kg (226 lb)   SpO2 97%   BMI 32.43 kg/m   Estimated body mass index is 32.43 kg/m  as calculated from the following:    Height as of this encounter: 1.778 m (5' 10\").    Weight as of this encounter: 102.5 kg (226 lb).  Medication Reconciliation: complete  Jerome Fitzgerald LPN  "

## 2021-11-30 RX ORDER — ATENOLOL AND CHLORTHALIDONE TABLET 50; 25 MG/1; MG/1
TABLET ORAL
Qty: 90 TABLET | Refills: 3 | Status: SHIPPED | OUTPATIENT
Start: 2021-11-30 | End: 2022-10-07

## 2021-11-30 NOTE — TELEPHONE ENCOUNTER
tenoretic      Last Written Prescription Date:  11-  Last Fill Quantity: 90,   # refills: 3  Last Office Visit: 11-  Future Office visit:       Routing refill request to provider for review/approval because:

## 2022-01-08 NOTE — Clinical Note
Send to [facility] Thrombocytopenia Thrombocytopenia Thrombocytopenia Thrombocytopenia Thrombocytopenia

## 2022-02-14 ENCOUNTER — OFFICE VISIT (OUTPATIENT)
Dept: FAMILY MEDICINE | Facility: OTHER | Age: 82
End: 2022-02-14
Attending: FAMILY MEDICINE
Payer: MEDICARE

## 2022-02-14 ENCOUNTER — TELEPHONE (OUTPATIENT)
Dept: FAMILY MEDICINE | Facility: OTHER | Age: 82
End: 2022-02-14

## 2022-02-14 VITALS
OXYGEN SATURATION: 98 % | WEIGHT: 224 LBS | RESPIRATION RATE: 20 BRPM | DIASTOLIC BLOOD PRESSURE: 72 MMHG | HEART RATE: 69 BPM | SYSTOLIC BLOOD PRESSURE: 139 MMHG | BODY MASS INDEX: 32.14 KG/M2

## 2022-02-14 DIAGNOSIS — E11.9 TYPE 2 DIABETES MELLITUS WITHOUT COMPLICATION, WITHOUT LONG-TERM CURRENT USE OF INSULIN (H): ICD-10-CM

## 2022-02-14 PROCEDURE — 99213 OFFICE O/P EST LOW 20 MIN: CPT | Performed by: FAMILY MEDICINE

## 2022-02-14 RX ORDER — METFORMIN HCL 500 MG
TABLET, EXTENDED RELEASE 24 HR ORAL
Qty: 180 TABLET | Refills: 3 | COMMUNITY
Start: 2022-02-14 | End: 2022-03-30

## 2022-02-14 ASSESSMENT — PAIN SCALES - GENERAL: PAINLEVEL: NO PAIN (0)

## 2022-02-14 NOTE — PROGRESS NOTES
"  Assessment & Plan     Type 2 diabetes mellitus without complication, without long-term current use of insulin (H)  Will have him go from BID Glucophage to take all at supper. No other changes. Work on fresh fruits since canned fruits probable extra surgar. See in March for to go over BS and check A1C. Continue current medications and behavioral changes.     - metFORMIN (GLUCOPHAGE-XR) 500 MG 24 hr tablet; 2 tablets orally a supper.             BMI:   Estimated body mass index is 32.14 kg/m  as calculated from the following:    Height as of 11/29/21: 1.778 m (5' 10\").    Weight as of this encounter: 101.6 kg (224 lb).           No follow-ups on file.    Mata Richardson MD  [facility] - [place] Vizcaino is a 81 year old who presents for the following health issues     HPI       Diabetes Follow-up    How often are you checking your blood sugar? A few times a week  What time of day are you checking your blood sugars (select all that apply)?  Before meals  Have you had any blood sugars above 200?  No  Have you had any blood sugars below 70?  No    What symptoms do you notice when your blood sugar is low?  None    What concerns do you have today about your diabetes? Other: blood glucose is slowly creeping up towards 200     Do you have any of these symptoms? (Select all that apply)  Numbness in feet and Excessive thirst    Just checking in the mornings   Rising from avg of 120-140  And now 160-170's at times   More canned fruits after supper than fresh is only difference     BP Readings from Last 2 Encounters:   02/14/22 (!) 142/79   11/29/21 124/80     Hemoglobin A1C POCT (%)   Date Value   06/25/2021 6.5 (H)   02/23/2021 6.3 (H)     Hemoglobin A1C (%)   Date Value   11/29/2021 6.9 (H)     LDL Cholesterol Calculated (mg/dL)   Date Value   11/29/2021 98   09/15/2020 103 (H)   05/07/2019 100 (H)                   Review of Systems   Constitutional, HEENT, cardiovascular, pulmonary, gi and gu " systems are negative, except as otherwise noted.      Objective    /72 (BP Location: Right arm, Patient Position: Sitting, Cuff Size: Adult Regular)   Pulse 69   Resp 20   Wt 101.6 kg (224 lb)   SpO2 98%   BMI 32.14 kg/m    Body mass index is 32.14 kg/m .  Physical Exam   GENERAL: healthy, alert and no distress- anxious some about numbers

## 2022-02-14 NOTE — NURSING NOTE
"Chief Complaint   Patient presents with     Diabetes       Initial /72 (BP Location: Right arm, Patient Position: Sitting, Cuff Size: Adult Regular)   Pulse 69   Resp 20   Wt 101.6 kg (224 lb)   SpO2 98%   BMI 32.14 kg/m   Estimated body mass index is 32.14 kg/m  as calculated from the following:    Height as of 11/29/21: 1.778 m (5' 10\").    Weight as of this encounter: 101.6 kg (224 lb).  Medication Reconciliation: complete  Didi Lance LPN  "

## 2022-02-14 NOTE — PROGRESS NOTES
"  {PROVIDER CHARTING PREFERENCE:246025}    Gabbi Vizcaino is a 81 year old who presents for the following health issues {ACCOMPANIED BY STATEMENT (Optional):325977}    HPI     Diabetes Follow-up      How often are you checking your blood sugar? { :457238}    What concerns do you have today about your diabetes? { :974145::\"None\"}     Do you have any of these symptoms? (Select all that apply)  { :161863}      {Reference  Diabetes Management Resources :646959}    {Reference  Diabetes Log - 7 days :837433}    Hyperlipidemia Follow-Up      Are you regularly taking any medication or supplement to lower your cholesterol?   { :608986}    Are you having muscle aches or other side effects that you think could be caused by your cholesterol lowering medication?  { :673277}    Hypertension Follow-up      Do you check your blood pressure regularly outside of the clinic? { :338760}     Are you following a low salt diet? { :484553}    Are your blood pressures ever more than 140 on the top number (systolic) OR more   than 90 on the bottom number (diastolic), for example 140/90? { :171793}    BP Readings from Last 2 Encounters:   11/29/21 124/80   11/09/21 138/62     Hemoglobin A1C POCT (%)   Date Value   06/25/2021 6.5 (H)   02/23/2021 6.3 (H)     Hemoglobin A1C (%)   Date Value   11/29/2021 6.9 (H)     LDL Cholesterol Calculated (mg/dL)   Date Value   11/29/2021 98   09/15/2020 103 (H)   05/07/2019 100 (H)         How many servings of fruits and vegetables do you eat daily?  { :023734}    On average, how many sweetened beverages do you drink each day (Examples: soda, juice, sweet tea, etc.  Do NOT count diet or artificially sweetened beverages)?   { 1-11:678569}    How many days per week do you exercise enough to make your heart beat faster? { :082188}    How many minutes a day do you exercise enough to make your heart beat faster? { :007517}    How many days per week do you miss taking your medication? {0-7 " :427632}    {additonal problems for provider to add (Optional):721926}    Review of Systems   {ROS COMP (Optional):106616}      Objective    There were no vitals taken for this visit.  There is no height or weight on file to calculate BMI.  Physical Exam   {Exam List (Optional):496739}    {Diagnostic Test Results (Optional):872496}    {AMBULATORY ATTESTATION (Optional):967187}

## 2022-02-14 NOTE — TELEPHONE ENCOUNTER
"Patient calling in regards to his blood sugars. Patient states his \"numbers are climbing everyday\". Reports blood sugar this morning is 170. Patient would like to know if he should follow up with Baylee Calixto for this or come in and see PCP. Patient states he is \"not sick or have covid\". States he eats pretty good and not a lot of sweets. Patient denies any other symptoms and is concerned with the increase in his blood sugars. Patient is scheduled this afternoon with PCP.  Next 5 appointments (look out 90 days)    Feb 14, 2022  1:15 PM  (Arrive by 1:00 PM)  SHORT with Mata Richardson MD  [facility] - [place] ([facility] - [place] ) [address]  [place] [address]  [phone]   Mar 30, 2022  8:45 AM  (Arrive by 8:30 AM)  SHORT with Mata Richardson MD  [facility] - [place] ([facility] - [place] ) [address]  [place] [address]  [phone]          "

## 2022-02-14 NOTE — NURSING NOTE
"Chief Complaint   Patient presents with     Diabetes       Initial BP (!) 142/79 (BP Location: Right arm, Patient Position: Sitting, Cuff Size: Adult Large)   Pulse 69   Resp 20   Wt 101.6 kg (224 lb)   SpO2 98%   BMI 32.14 kg/m   Estimated body mass index is 32.14 kg/m  as calculated from the following:    Height as of 11/29/21: 1.778 m (5' 10\").    Weight as of this encounter: 101.6 kg (224 lb).  Medication Reconciliation: complete  Didi Lance LPN  "

## 2022-02-20 DIAGNOSIS — K22.70 BARRETT'S ESOPHAGUS WITHOUT DYSPLASIA: ICD-10-CM

## 2022-02-21 RX ORDER — PANTOPRAZOLE SODIUM 40 MG/1
TABLET, DELAYED RELEASE ORAL
Qty: 90 TABLET | Refills: 3 | Status: SHIPPED | OUTPATIENT
Start: 2022-02-21 | End: 2023-02-22

## 2022-02-21 NOTE — TELEPHONE ENCOUNTER
Protonix       Last Written Prescription Date:  1/18/2021  Last Fill Quantity: 90,   # refills: 3  Last Office Visit: 2/14/2022  Future Office visit:    Next 5 appointments (look out 90 days)    Mar 30, 2022  8:45 AM  (Arrive by 8:30 AM)  SHORT with Mata Richardson MD  [facility] - [place] ([facility] - [place] ) [address]  [place] [address]  [phone]

## 2022-03-30 ENCOUNTER — OFFICE VISIT (OUTPATIENT)
Dept: FAMILY MEDICINE | Facility: OTHER | Age: 82
End: 2022-03-30
Attending: FAMILY MEDICINE
Payer: MEDICARE

## 2022-03-30 VITALS
WEIGHT: 222 LBS | SYSTOLIC BLOOD PRESSURE: 132 MMHG | HEART RATE: 71 BPM | RESPIRATION RATE: 18 BRPM | OXYGEN SATURATION: 98 % | TEMPERATURE: 98.3 F | BODY MASS INDEX: 31.85 KG/M2 | DIASTOLIC BLOOD PRESSURE: 68 MMHG

## 2022-03-30 DIAGNOSIS — E11.21 TYPE 2 DIABETES MELLITUS WITH DIABETIC NEPHROPATHY, WITHOUT LONG-TERM CURRENT USE OF INSULIN (H): Primary | ICD-10-CM

## 2022-03-30 DIAGNOSIS — E11.9 TYPE 2 DIABETES MELLITUS WITHOUT COMPLICATION, WITHOUT LONG-TERM CURRENT USE OF INSULIN (H): ICD-10-CM

## 2022-03-30 LAB
EST. AVERAGE GLUCOSE BLD GHB EST-MCNC: 163 MG/DL
HBA1C MFR BLD: 7.3 % (ref 0–5.6)

## 2022-03-30 PROCEDURE — 83036 HEMOGLOBIN GLYCOSYLATED A1C: CPT | Mod: ZL | Performed by: FAMILY MEDICINE

## 2022-03-30 PROCEDURE — G0463 HOSPITAL OUTPT CLINIC VISIT: HCPCS

## 2022-03-30 PROCEDURE — 36415 COLL VENOUS BLD VENIPUNCTURE: CPT | Mod: ZL | Performed by: FAMILY MEDICINE

## 2022-03-30 PROCEDURE — 99213 OFFICE O/P EST LOW 20 MIN: CPT | Performed by: FAMILY MEDICINE

## 2022-03-30 RX ORDER — METFORMIN HCL 500 MG
TABLET, EXTENDED RELEASE 24 HR ORAL
Qty: 270 TABLET | Refills: 3 | Status: SHIPPED | OUTPATIENT
Start: 2022-03-30 | End: 2022-07-11

## 2022-03-30 ASSESSMENT — PAIN SCALES - GENERAL: PAINLEVEL: NO PAIN (0)

## 2022-03-30 NOTE — NURSING NOTE
"Chief Complaint   Patient presents with     Recheck Medication       Initial /68 (BP Location: Right arm, Patient Position: Sitting, Cuff Size: Adult Large)   Pulse 71   Temp 98.3  F (36.8  C) (Tympanic)   Resp 18   Wt 100.7 kg (222 lb)   SpO2 98%   BMI 31.85 kg/m   Estimated body mass index is 31.85 kg/m  as calculated from the following:    Height as of 11/29/21: 1.778 m (5' 10\").    Weight as of this encounter: 100.7 kg (222 lb).  Medication Reconciliation: complete  Didi Lance LPN  "

## 2022-03-30 NOTE — PROGRESS NOTES
"  Assessment & Plan     Type 2 diabetes mellitus with diabetic nephropathy, without long-term current use of insulin (H)  A1C climbing some . Discussed. Send back to C and add another 500mg Glucophage to equal 1500mg.  Morning surgars are main issue. Follow-up in 4 months   - Hemoglobin A1c; Future  - Hemoglobin A1c  - Diabetes Education Referral ([place])                 BMI:   Estimated body mass index is 31.85 kg/m  as calculated from the following:    Height as of 11/29/21: 1.778 m (5' 10\").    Weight as of this encounter: 100.7 kg (222 lb).           No follow-ups on file.    Mata Richardson MD  [facility] - [place]    Subjective   Carrillo is a 81 year old who presents for the following health issues     HPI     Diabetes Follow-up    How often are you checking your blood sugar? A few times a week  What time of day are you checking your blood sugars (select all that apply)?  Before meals  Have you had any blood sugars above 200?  No  Have you had any blood sugars below 70?  No    What symptoms do you notice when your blood sugar is low?  None    What concerns do you have today about your diabetes? None     Do you have any of these symptoms? (Select all that apply)  No numbness or tingling in feet.  No redness, sores or blisters on feet.  No complaints of excessive thirst.  No reports of blurry vision.  No significant changes to weight.      BP Readings from Last 2 Encounters:   03/30/22 132/68   02/14/22 139/72     Hemoglobin A1C POCT (%)   Date Value   06/25/2021 6.5 (H)   02/23/2021 6.3 (H)     Hemoglobin A1C (%)   Date Value   11/29/2021 6.9 (H)     LDL Cholesterol Calculated (mg/dL)   Date Value   11/29/2021 98   09/15/2020 103 (H)   05/07/2019 100 (H)                 Review of Systems   Constitutional, HEENT, cardiovascular, pulmonary, gi and gu systems are negative, except as otherwise noted.      Objective    /68 (BP Location: Right arm, Patient Position: Sitting, Cuff Size: Adult " Large)   Pulse 71   Temp 98.3  F (36.8  C) (Tympanic)   Resp 18   Wt 100.7 kg (222 lb)   SpO2 98%   BMI 31.85 kg/m    Body mass index is 31.85 kg/m .  Physical Exam   GENERAL: healthy, alert and no distress  NECK: no adenopathy, no asymmetry, masses, or scars and thyroid normal to palpation  RESP: lungs clear to auscultation - no rales, rhonchi or wheezes  CV: regular rate and rhythm, normal S1 S2, no S3 or S4, no murmur, click or rub, no peripheral edema and peripheral pulses strong  ABDOMEN: soft, nontender, no hepatosplenomegaly, no masses and bowel sounds normal  MS: no gross musculoskeletal defects noted, no edema  Diabetic foot exam: normal DP and PT pulses, no trophic changes or ulcerative lesions, normal sensory exam, normal monofilament exam, DP normal and PT normal        Results for orders placed or performed in visit on 03/30/22   Hemoglobin A1c     Status: Abnormal   Result Value Ref Range    Estimated Average Glucose 163 mg/dL    Hemoglobin A1C 7.3 (H) 0.0 - 5.6 %

## 2022-04-01 ENCOUNTER — TELEPHONE (OUTPATIENT)
Dept: FAMILY MEDICINE | Facility: OTHER | Age: 82
End: 2022-04-01
Payer: MEDICARE

## 2022-04-01 ENCOUNTER — TRANSFERRED RECORDS (OUTPATIENT)
Dept: HEALTH INFORMATION MANAGEMENT | Facility: CLINIC | Age: 82
End: 2022-04-01

## 2022-04-01 LAB — RETINOPATHY: NORMAL

## 2022-04-01 NOTE — TELEPHONE ENCOUNTER
Patient stated he has diarrhea that started today. Took 3 metformin after supper on Wednesday.   Yesterday patient took 1 in the morning yesterday and 2 tablets last night.     Should patient try doing all three at supper or should patient cut back on the dosage.     Should patient stop taking stool softner as well?

## 2022-04-01 NOTE — TELEPHONE ENCOUNTER
Patient called requesting to speak with providers nurse.  He was put on Metformin yesterday and is having diarrhea and was told by provider to call back if this happens. [phone]

## 2022-04-01 NOTE — TELEPHONE ENCOUNTER
Stop stool softner - and take Glucophage at 2 tabs at supper.  Then Monday try again 3 Glucophage at supper.  Even if gets some diarrhea after starting 3 - give it a couple days to see if diarrhea improves.    IF not improve in 3-4 days. Try splitting dose to 1 in am and 2 in pm

## 2022-04-03 DIAGNOSIS — E78.5 HYPERLIPIDEMIA LDL GOAL <100: ICD-10-CM

## 2022-04-05 ENCOUNTER — HOSPITAL ENCOUNTER (OUTPATIENT)
Dept: EDUCATION SERVICES | Facility: HOSPITAL | Age: 82
Discharge: HOME | End: 2022-04-05
Attending: FAMILY MEDICINE | Admitting: FAMILY MEDICINE
Payer: MEDICARE

## 2022-04-05 DIAGNOSIS — E11.9 TYPE 2 DIABETES MELLITUS WITHOUT COMPLICATION, WITHOUT LONG-TERM CURRENT USE OF INSULIN (H): Primary | ICD-10-CM

## 2022-04-05 PROCEDURE — G0108 DIAB MANAGE TRN  PER INDIV: HCPCS

## 2022-04-05 RX ORDER — LANCETS
EACH MISCELLANEOUS
Qty: 100 EACH | Refills: 11 | Status: SHIPPED | OUTPATIENT
Start: 2022-04-05

## 2022-04-05 RX ORDER — BLOOD SUGAR DIAGNOSTIC
STRIP MISCELLANEOUS
Qty: 50 STRIP | Refills: 11 | Status: SHIPPED | OUTPATIENT
Start: 2022-04-05 | End: 2023-05-01

## 2022-04-05 ASSESSMENT — ANXIETY QUESTIONNAIRES
3. WORRYING TOO MUCH ABOUT DIFFERENT THINGS: NOT AT ALL
2. NOT BEING ABLE TO STOP OR CONTROL WORRYING: NOT AT ALL
7. FEELING AFRAID AS IF SOMETHING AWFUL MIGHT HAPPEN: NOT AT ALL
GAD7 TOTAL SCORE: 0
5. BEING SO RESTLESS THAT IT IS HARD TO SIT STILL: NOT AT ALL
4. TROUBLE RELAXING: NOT AT ALL
IF YOU CHECKED OFF ANY PROBLEMS ON THIS QUESTIONNAIRE, HOW DIFFICULT HAVE THESE PROBLEMS MADE IT FOR YOU TO DO YOUR WORK, TAKE CARE OF THINGS AT HOME, OR GET ALONG WITH OTHER PEOPLE: NOT DIFFICULT AT ALL
1. FEELING NERVOUS, ANXIOUS, OR ON EDGE: NOT AT ALL
6. BECOMING EASILY ANNOYED OR IRRITABLE: NOT AT ALL

## 2022-04-05 ASSESSMENT — PAIN SCALES - GENERAL: PAINLEVEL: NO PAIN (0)

## 2022-04-05 NOTE — PATIENT INSTRUCTIONS
Test BG every day - vary between AM and 2 hours after evening meal    I will call you next week to see how you are Metformin.    Questions/concerns: call [phone] (Taina's number)

## 2022-04-06 RX ORDER — EZETIMIBE 10 MG/1
TABLET ORAL
Qty: 90 TABLET | Refills: 3 | Status: SHIPPED | OUTPATIENT
Start: 2022-04-06 | End: 2023-02-22

## 2022-04-06 ASSESSMENT — ANXIETY QUESTIONNAIRES: GAD7 TOTAL SCORE: 0

## 2022-04-07 NOTE — PROGRESS NOTES
"Diabetes Self-Management Education & Support    Presents for: Individual review    SUBJECTIVE/OBJECTIVE:  Presents for: Individual review  Accompanied by: Self, Spouse  Diabetes education in the past 24mo: Yes  Focus of Visit: Taking Medication, Monitoring  Diabetes type: Type 2  Disease course: Stable  Diabetes management related comments/concerns: Recent higher BG readings  Transportation concerns: No  Difficulty affording diabetes medication?: No  Difficulty affording diabetes testing supplies?: No  Other concerns:: None  Cultural Influences/Ethnic Background:  American      Diabetes Symptoms & Complications:  Fatigue: No  Neuropathy: No  Polydipsia: No  Polyphagia: No  Polyuria: No  Visual change: No  Slow healing wounds: No  Symptom course: Stable  Weight trend: Stable  Complications assessed today?: Yes    Patient Problem List and Family Medical History reviewed for relevant medical history, current medical status, and diabetes risk factors.    Vitals:  /76   Pulse 64   Resp 16   Ht 1.753 m (5' 9\")   Wt 103.8 kg (228 lb 12.8 oz)   SpO2 99%   BMI 33.79 kg/m    Estimated body mass index is 33.79 kg/m  as calculated from the following:    Height as of this encounter: 1.753 m (5' 9\").    Weight as of this encounter: 103.8 kg (228 lb 12.8 oz).   Last 3 BP:   BP Readings from Last 3 Encounters:   04/05/22 152/76   03/30/22 132/68   02/14/22 139/72       History   Smoking Status     Former Smoker     Types: Cigarettes   Smokeless Tobacco     Never Used     Comment: quit in the 80s       Labs:  Lab Results   Component Value Date    A1C 7.3 03/30/2022    A1C 6.5 06/25/2021     Lab Results   Component Value Date     11/29/2021     06/25/2021     Lab Results   Component Value Date    LDL 98 11/29/2021     09/15/2020     HDL Cholesterol   Date Value Ref Range Status   09/15/2020 44 >39 mg/dL Final     Direct Measure HDL   Date Value Ref Range Status   11/29/2021 41 >=40 mg/dL Final "   ]  GFR Estimate   Date Value Ref Range Status   11/29/2021 88 >60 mL/min/1.73m2 Final     Comment:     As of July 11, 2021, eGFR is calculated by the CKD-EPI creatinine equation, without race adjustment. eGFR can be influenced by muscle mass, exercise, and diet. The reported eGFR is an estimation only and is only applicable if the renal function is stable.   06/25/2021 >90 >60 mL/min/[1.73_m2] Final     Comment:     Non  GFR Calc  Starting 12/18/2018, serum creatinine based estimated GFR (eGFR) will be   calculated using the Chronic Kidney Disease Epidemiology Collaboration   (CKD-EPI) equation.       GFR Estimate If Black   Date Value Ref Range Status   06/25/2021 >90 >60 mL/min/[1.73_m2] Final     Comment:      GFR Calc  Starting 12/18/2018, serum creatinine based estimated GFR (eGFR) will be   calculated using the Chronic Kidney Disease Epidemiology Collaboration   (CKD-EPI) equation.       Lab Results   Component Value Date    CR 0.74 11/29/2021    CR 0.69 06/25/2021     No results found for: MICROALBUMIN    Healthy Eating:  Healthy Eating Assessed Today: Yes  Cultural/Shinto diet restrictions?: No  Meal planning/habits: Heart healthy, Smaller portions  How many times a week on average do you eat food made away from home (restaurant/take-out)?: 0  Meals include: Breakfast, Lunch, Dinner  Breakfast: cheerios, banana, milk  Lunch: sandwich, chips and veggies, pickle  Dinner: hamburgers, spaghetti, fish or chicken, stirfry, roasted  Other: protein drinks, powered beet smoothies  Beverages: Water, Soda, Coffee, Juice, Milk (V8, gingerale once a week)    Being Active:  Being Active Assessed Today: Yes  [place]::  [place]    Monitoring:  Monitoring Assessed Today: Yes  Did patient bring glucose meter to appointment? : Yes  Times checking blood sugar at home (number): 3  Times checking blood sugar at home (per): Week  Blood glucose trend: Increasing        Taking  Medications:  Diabetes Medication(s)     Biguanides       metFORMIN (GLUCOPHAGE-XR) 500 MG 24 hr tablet    3 tablets orally a supper.    Sulfonylureas       glimepiride (AMARYL) 1 MG tablet    TAKE 1/2 (ONE-HALF) TABLET BY MOUTH ONCE DAILY IN THE MORNING BEFORE BREAKFAST          Taking Medication Assessed Today: Yes  Current Treatments: Oral Medication (taken by mouth)  Problems taking diabetes medications regularly?: No  Diabetes medication side effects?: Yes (Metformin when dose increased)    Problem Solving:  Problem Solving Assessed Today: No  Is the patient at risk for hypoglycemia?: No    Reducing Risks:  Has dilated eye exam at least once a year?: Yes  Feet checked by healthcare provider in the last year?: Yes    Healthy Coping:  Healthy Coping Assessed Today: Yes  Emotional response to diabetes: Acceptance, Confidence diabetes can be controlled  Informal Support system:: Spouse, Family  Stage of change: ACTION (Actively working towards change)  Support resources: Websites  Patient Activation Measure Survey Score:  JAVIER Score (Last Two) 6/21/2018   JAVIER Raw Score 40   Activation Score 100   JAVIER Level 4       Diabetes knowledge and skills assessment:   Patient is knowledgeable in diabetes management concepts related to: Healthy Eating, Being Active, Monitoring and Taking Medication    Patient needs further education on the following diabetes management concepts: Monitoring, Taking Medication and Reducing Risks    Based on learning assessment above, most appropriate setting for further diabetes education would be: Individual setting.      INTERVENTIONS:    Education provided today on:  AADE Self-Care Behaviors:  Diabetes Pathophysiology  Healthy Eating: consistency in amount, composition, and timing of food intake and portion control  Being Active: relationship to blood glucose and precautions to take  Monitoring: log and interpret results, individual blood glucose targets and frequency of monitoring  Taking  Medication: action of prescribed medication, side effects of prescribed medications and when to take medications  Reducing Risks: prevention, early diagnostic measures and treatment of complications, foot care, annual eye exam and A1C - goals, relating to blood glucose levels, how often to check    Opportunities for ongoing education and support in diabetes-self management were discussed.    Pt verbalized understanding of concepts discussed and recommendations provided today.       Education Materials Provided:  No new materials provided today      ASSESSMENT:  A1C is creeping up. Readings range as follows: fastin-142. Metformin was recently increased.    Discussed checking BG 2 hours after evening meal.    Andrew hasn't been as active during this long winter. Has been working more on food choices/portions.        Patient's most recent   Lab Results   Component Value Date    A1C 7.3 2022    A1C 6.5 2021    is meeting goal of <8.0    PLAN  See Patient Instructions for co-developed, patient-stated behavior change goals.  Test BG every day - vary between AM and 2 hours after evening meal    I will call you next week to see how you are Metformin.    Questions/concerns: call [phone] (Taina's number)  AVS printed and provided to patient today. See Follow-Up section for recommended follow-up.      Time Spent: 45 minutes  Encounter Type: Individual    Any diabetes medication dose changes were made via the CDE Protocol and Collaborative Practice Agreement with the patient's primary care provider. A copy of this encounter was shared with the provider.

## 2022-04-19 ENCOUNTER — TELEPHONE (OUTPATIENT)
Dept: EDUCATION SERVICES | Facility: HOSPITAL | Age: 82
End: 2022-04-19
Payer: MEDICARE

## 2022-04-19 NOTE — TELEPHONE ENCOUNTER
Pt's wife left a message her  would like to speak to Baylee Marily regarding his BG readings. He is taking Metformin.

## 2022-04-20 NOTE — TELEPHONE ENCOUNTER
Called Andrew. He is doing well with the Metformin XR - three 500 mg tablets (1500 mg) at supper.     Readings are trending down. Readings of the last 10 days: fastin-122 and post-supper: 152, 165, 170, 187.    Andrew has been decreasing his portions, eating more vegetables and less sweets.    Instructed Andrew to call with any questions/concerns. He will be following up with Dr. Richardson on 22. Will see what his A1C is then.

## 2022-04-27 VITALS
SYSTOLIC BLOOD PRESSURE: 152 MMHG | HEART RATE: 64 BPM | OXYGEN SATURATION: 99 % | HEIGHT: 69 IN | BODY MASS INDEX: 33.89 KG/M2 | RESPIRATION RATE: 16 BRPM | DIASTOLIC BLOOD PRESSURE: 76 MMHG | WEIGHT: 228.8 LBS

## 2022-06-25 DIAGNOSIS — I10 ESSENTIAL HYPERTENSION WITH GOAL BLOOD PRESSURE LESS THAN 140/90: ICD-10-CM

## 2022-06-28 RX ORDER — POTASSIUM CHLORIDE 1500 MG/1
TABLET, EXTENDED RELEASE ORAL
Qty: 180 TABLET | Refills: 2 | Status: SHIPPED | OUTPATIENT
Start: 2022-06-28 | End: 2023-03-22

## 2022-07-08 NOTE — PROGRESS NOTES
"  Assessment & Plan     Type 2 diabetes mellitus without complication, without long-term current use of insulin (H)  Great control. Continue current medications and behavioral changes.   Follow-up in 6 months. Monitor for low BS . No issues at this time   - Hemoglobin A1c; Future  - Basic metabolic panel; Future  - Albumin Random Urine Quantitative with Creat Ratio; Future  - metFORMIN (GLUCOPHAGE XR) 500 MG 24 hr tablet; 3 tablets orally a supper.    Essential hypertension with goal blood pressure less than 140/90  Stable   - Hemoglobin A1c; Future  - Basic metabolic panel; Future  - Albumin Random Urine Quantitative with Creat Ratio; Future    FH: cerebral aneurysm  Discussed. Will order MRA in open scanner.  - MRA Brain ([facility]) w/o Contrast; Future    Dizziness and giddiness   As above  - MRA Brain ([facility]) w/o Contrast; Future             BMI:   Estimated body mass index is 32.05 kg/m  as calculated from the following:    Height as of 4/5/22: 1.753 m (5' 9\").    Weight as of this encounter: 98.4 kg (217 lb).           No follow-ups on file.    Mata Richardson MD  [facility] - JESICA Vizcaino is a 81 year old, presenting for the following health issues:  Recheck Medication      HPI     Diabetes Follow-up    How often are you checking your blood sugar? A few times a week  What time of day are you checking your blood sugars (select all that apply)?  Before meals  Have you had any blood sugars above 200?  No  Have you had any blood sugars below 70?  No    What symptoms do you notice when your blood sugar is low?  None    What concerns do you have today about your diabetes? None     Do you have any of these symptoms? (Select all that apply)  No numbness or tingling in feet.  No redness, sores or blisters on feet.  No complaints of excessive thirst.  No reports of blurry vision.  No significant changes to weight.    Have you had a diabetic eye exam in the last 12 " months? Yes- Date of last eye exam: 04/2022,  [facility]: [facility]          Hyperlipidemia Follow-Up      Are you regularly taking any medication or supplement to lower your cholesterol?   No    Are you having muscle aches or other side effects that you think could be caused by your cholesterol lowering medication?  No    Hypertension Follow-up      Do you check your blood pressure regularly outside of the clinic? No     Are you following a low salt diet? Yes    Are your blood pressures ever more than 140 on the top number (systolic) OR more   than 90 on the bottom number (diastolic), for example 140/90? No    BP Readings from Last 2 Encounters:   07/11/22 120/76   04/05/22 152/76     Hemoglobin A1C POCT (%)   Date Value   06/25/2021 6.5 (H)   02/23/2021 6.3 (H)     Hemoglobin A1C (%)   Date Value   03/30/2022 7.3 (H)   11/29/2021 6.9 (H)     LDL Cholesterol Calculated (mg/dL)   Date Value   11/29/2021 98   09/15/2020 103 (H)   05/07/2019 100 (H)         Got a call from sister. Sister found to have brain aneurysm. Her doctor said to tell her siblings and kids to get checked.  Pt has periodic LH /dizziness. No other issues.  Very claustrophobic     Review of Systems   Constitutional, HEENT, cardiovascular, pulmonary, gi and gu systems are negative, except as otherwise noted.      Objective    /76 (BP Location: Right arm, Patient Position: Sitting, Cuff Size: Adult Large)   Pulse 68   Temp 97.7  F (36.5  C) (Tympanic)   Resp 18   Wt 98.4 kg (217 lb)   SpO2 99%   BMI 32.05 kg/m    Body mass index is 32.05 kg/m .  Physical Exam   GENERAL: healthy, alert and no distress  NECK: no adenopathy, no asymmetry, masses, or scars and thyroid normal to palpation  RESP: lungs clear to auscultation - no rales, rhonchi or wheezes  CV: regular rate and rhythm, normal S1 S2, no S3 or S4, no murmur, click or rub, no peripheral edema and peripheral pulses strong  ABDOMEN: soft, nontender, no hepatosplenomegaly, no masses and  bowel sounds normal  MS: no gross musculoskeletal defects noted, no edema    Results for orders placed or performed in visit on 07/11/22   Albumin Random Urine Quantitative with Creat Ratio     Status: None   Result Value Ref Range    Creatinine Urine mg/dL 26 mg/dL    Albumin Urine mg/L <5 mg/L    Albumin Urine mg/g Cr     Basic metabolic panel     Status: Abnormal   Result Value Ref Range    Sodium 134 133 - 144 mmol/L    Potassium 3.5 3.4 - 5.3 mmol/L    Chloride 100 94 - 109 mmol/L    Carbon Dioxide (CO2) 30 20 - 32 mmol/L    Anion Gap 4 3 - 14 mmol/L    Urea Nitrogen 17 7 - 30 mg/dL    Creatinine 0.79 0.66 - 1.25 mg/dL    Calcium 9.1 8.5 - 10.1 mg/dL    Glucose 129 (H) 70 - 99 mg/dL    GFR Estimate 90 >60 mL/min/1.73m2   Hemoglobin A1c     Status: Abnormal   Result Value Ref Range    Estimated Average Glucose 128 mg/dL    Hemoglobin A1C 6.1 (H) 0.0 - 5.6 %                     .  ..

## 2022-07-11 ENCOUNTER — LAB (OUTPATIENT)
Dept: LAB | Facility: OTHER | Age: 82
End: 2022-07-11
Attending: FAMILY MEDICINE
Payer: MEDICARE

## 2022-07-11 ENCOUNTER — OFFICE VISIT (OUTPATIENT)
Dept: FAMILY MEDICINE | Facility: OTHER | Age: 82
End: 2022-07-11
Attending: FAMILY MEDICINE
Payer: MEDICARE

## 2022-07-11 VITALS
WEIGHT: 217 LBS | HEART RATE: 68 BPM | TEMPERATURE: 97.7 F | RESPIRATION RATE: 18 BRPM | SYSTOLIC BLOOD PRESSURE: 120 MMHG | DIASTOLIC BLOOD PRESSURE: 76 MMHG | OXYGEN SATURATION: 99 % | BODY MASS INDEX: 32.05 KG/M2

## 2022-07-11 DIAGNOSIS — I10 ESSENTIAL HYPERTENSION WITH GOAL BLOOD PRESSURE LESS THAN 140/90: ICD-10-CM

## 2022-07-11 DIAGNOSIS — Z82.49 FH: CEREBRAL ANEURYSM: ICD-10-CM

## 2022-07-11 DIAGNOSIS — R42 DIZZINESS AND GIDDINESS: ICD-10-CM

## 2022-07-11 DIAGNOSIS — E11.9 TYPE 2 DIABETES MELLITUS WITHOUT COMPLICATION, WITHOUT LONG-TERM CURRENT USE OF INSULIN (H): ICD-10-CM

## 2022-07-11 DIAGNOSIS — E11.9 TYPE 2 DIABETES MELLITUS WITHOUT COMPLICATION, WITHOUT LONG-TERM CURRENT USE OF INSULIN (H): Primary | ICD-10-CM

## 2022-07-11 DIAGNOSIS — Z82.49 FH: CEREBRAL ANEURYSM: Primary | ICD-10-CM

## 2022-07-11 LAB
ANION GAP SERPL CALCULATED.3IONS-SCNC: 4 MMOL/L (ref 3–14)
BUN SERPL-MCNC: 17 MG/DL (ref 7–30)
CALCIUM SERPL-MCNC: 9.1 MG/DL (ref 8.5–10.1)
CHLORIDE BLD-SCNC: 100 MMOL/L (ref 94–109)
CO2 SERPL-SCNC: 30 MMOL/L (ref 20–32)
CREAT SERPL-MCNC: 0.79 MG/DL (ref 0.66–1.25)
CREAT UR-MCNC: 26 MG/DL
EST. AVERAGE GLUCOSE BLD GHB EST-MCNC: 128 MG/DL
GFR SERPL CREATININE-BSD FRML MDRD: 90 ML/MIN/1.73M2
GLUCOSE BLD-MCNC: 129 MG/DL (ref 70–99)
HBA1C MFR BLD: 6.1 % (ref 0–5.6)
MICROALBUMIN UR-MCNC: <5 MG/L
MICROALBUMIN/CREAT UR: NORMAL MG/G{CREAT}
POTASSIUM BLD-SCNC: 3.5 MMOL/L (ref 3.4–5.3)
SODIUM SERPL-SCNC: 134 MMOL/L (ref 133–144)

## 2022-07-11 PROCEDURE — 36415 COLL VENOUS BLD VENIPUNCTURE: CPT | Mod: ZL

## 2022-07-11 PROCEDURE — G0463 HOSPITAL OUTPT CLINIC VISIT: HCPCS

## 2022-07-11 PROCEDURE — 82310 ASSAY OF CALCIUM: CPT | Mod: ZL

## 2022-07-11 PROCEDURE — 82043 UR ALBUMIN QUANTITATIVE: CPT | Mod: ZL

## 2022-07-11 PROCEDURE — 83036 HEMOGLOBIN GLYCOSYLATED A1C: CPT | Mod: ZL

## 2022-07-11 PROCEDURE — 99213 OFFICE O/P EST LOW 20 MIN: CPT | Performed by: FAMILY MEDICINE

## 2022-07-11 RX ORDER — METFORMIN HCL 500 MG
TABLET, EXTENDED RELEASE 24 HR ORAL
Qty: 270 TABLET | Refills: 3 | Status: SHIPPED | OUTPATIENT
Start: 2022-07-11 | End: 2023-01-18

## 2022-07-11 NOTE — NURSING NOTE
"Chief Complaint   Patient presents with     Recheck Medication       Initial /76 (BP Location: Right arm, Patient Position: Sitting, Cuff Size: Adult Large)   Pulse 68   Temp 97.7  F (36.5  C) (Tympanic)   Resp 18   Wt 98.4 kg (217 lb)   SpO2 99%   BMI 32.05 kg/m   Estimated body mass index is 32.05 kg/m  as calculated from the following:    Height as of 4/5/22: 1.753 m (5' 9\").    Weight as of this encounter: 98.4 kg (217 lb).  Medication Reconciliation: complete  Didi Lance LPN  "

## 2022-07-12 ENCOUNTER — TELEPHONE (OUTPATIENT)
Dept: FAMILY MEDICINE | Facility: OTHER | Age: 82
End: 2022-07-12

## 2022-07-12 ENCOUNTER — HOSPITAL ENCOUNTER (OUTPATIENT)
Facility: HOSPITAL | Age: 82
End: 2022-07-12
Payer: MEDICARE

## 2022-07-12 NOTE — TELEPHONE ENCOUNTER
Patient called and stated he would rather do an open scan. Patient is concerned about waking up during the procedure if put under anesthesia.

## 2022-07-12 NOTE — TELEPHONE ENCOUNTER
2:18 PM    Reason for Call: Phone Call    Description: Patient requesting call back regarding the MRA orders that were placed. Please return his call     Preferred method for responding to this message: Telephone Call  What is your phone number ?[phone]    If we cannot reach you directly, may we leave a detailed response at the number you provided? Yes    Can this message wait until your PCP/provider returns, if available today? Not applicable    Ana Huerta

## 2022-07-13 RX ORDER — SODIUM CHLORIDE, SODIUM LACTATE, POTASSIUM CHLORIDE, CALCIUM CHLORIDE 600; 310; 30; 20 MG/100ML; MG/100ML; MG/100ML; MG/100ML
INJECTION, SOLUTION INTRAVENOUS CONTINUOUS
Status: CANCELLED | OUTPATIENT
Start: 2022-07-13

## 2022-07-13 RX ORDER — MEPERIDINE HYDROCHLORIDE 25 MG/ML
12.5 INJECTION INTRAMUSCULAR; INTRAVENOUS; SUBCUTANEOUS
Status: CANCELLED | OUTPATIENT
Start: 2022-07-13

## 2022-07-13 RX ORDER — ONDANSETRON 2 MG/ML
4 INJECTION INTRAMUSCULAR; INTRAVENOUS EVERY 30 MIN PRN
Status: CANCELLED | OUTPATIENT
Start: 2022-07-13

## 2022-07-13 RX ORDER — ONDANSETRON 4 MG/1
4 TABLET, ORALLY DISINTEGRATING ORAL EVERY 30 MIN PRN
Status: CANCELLED | OUTPATIENT
Start: 2022-07-13

## 2022-07-13 RX ORDER — LIDOCAINE 40 MG/G
CREAM TOPICAL
Status: CANCELLED | OUTPATIENT
Start: 2022-07-13

## 2022-07-14 NOTE — TELEPHONE ENCOUNTER
Patient has been scheduled with [facility] for 07/20 - he is wondering if he needs to take any medication before going. Please return his call [phone]

## 2022-07-15 NOTE — TELEPHONE ENCOUNTER
Yes - can take his daily scheduled meds. And should not needs valium like agent with open scanner.

## 2022-07-15 NOTE — TELEPHONE ENCOUNTER
Patient was questioning his daily medications, is it okay to take those as prescribed prior to MRA? He does not need anything extra.

## 2022-07-19 ENCOUNTER — TELEPHONE (OUTPATIENT)
Dept: FAMILY MEDICINE | Facility: OTHER | Age: 82
End: 2022-07-19

## 2022-07-19 DIAGNOSIS — F40.240 CLAUSTROPHOBIA: Primary | ICD-10-CM

## 2022-07-19 RX ORDER — DIAZEPAM 10 MG
TABLET ORAL
Qty: 1 TABLET | Refills: 0 | Status: SHIPPED | OUTPATIENT
Start: 2022-07-19 | End: 2022-10-27

## 2022-07-19 NOTE — TELEPHONE ENCOUNTER
1:20 PM    Reason for Call: Phone Call    Description: Patient requesting call back regarding his MRA - says he has some questions for the nurse     Was an appointment offered for this call? No    Preferred method for responding to this message: Telephone Call  What is your phone number ?[phone]    If we cannot reach you directly, may we leave a detailed response at the number you provided? Yes    Can this message wait until your PCP/provider returns, if available today? Not applicable    Ana Huerta

## 2022-07-19 NOTE — TELEPHONE ENCOUNTER
Patient stated that he was told he would be wearing a hockey helmet. Patient wondering if he can be given valium or some sort of medication. Patient stated that he doesn't know what to do. If he should just do the one in [place] and be put under or [place]. He canceled his scan tomorrow and rescheduled it until next week because he wanted to here from PCP.

## 2022-07-27 ENCOUNTER — TRANSFERRED RECORDS (OUTPATIENT)
Dept: HEALTH INFORMATION MANAGEMENT | Facility: CLINIC | Age: 82
End: 2022-07-27

## 2022-08-01 ENCOUNTER — TELEPHONE (OUTPATIENT)
Dept: FAMILY MEDICINE | Facility: OTHER | Age: 82
End: 2022-08-01

## 2022-08-01 NOTE — TELEPHONE ENCOUNTER
Called and informed patient that MR ANGIO BRAIN WO CON came back with no aneurysms and is normal. Patient stated understanding.

## 2022-09-04 ENCOUNTER — HEALTH MAINTENANCE LETTER (OUTPATIENT)
Age: 82
End: 2022-09-04

## 2022-10-06 DIAGNOSIS — I10 BENIGN ESSENTIAL HYPERTENSION: ICD-10-CM

## 2022-10-07 RX ORDER — ATENOLOL AND CHLORTHALIDONE TABLET 50; 25 MG/1; MG/1
TABLET ORAL
Qty: 90 TABLET | Refills: 3 | Status: SHIPPED | OUTPATIENT
Start: 2022-10-07 | End: 2023-02-22

## 2022-10-12 ENCOUNTER — IMMUNIZATION (OUTPATIENT)
Dept: FAMILY MEDICINE | Facility: OTHER | Age: 82
End: 2022-10-12
Attending: FAMILY MEDICINE
Payer: MEDICARE

## 2022-10-12 DIAGNOSIS — Z23 NEED FOR PROPHYLACTIC VACCINATION AND INOCULATION AGAINST INFLUENZA: Primary | ICD-10-CM

## 2022-10-12 PROCEDURE — G0008 ADMIN INFLUENZA VIRUS VAC: HCPCS

## 2022-10-18 ENCOUNTER — IMMUNIZATION (OUTPATIENT)
Dept: FAMILY MEDICINE | Facility: OTHER | Age: 82
End: 2022-10-18
Attending: FAMILY MEDICINE
Payer: MEDICARE

## 2022-10-18 PROCEDURE — 91312 COVID-19,PF,PFIZER BOOSTER BIVALENT: CPT

## 2022-10-27 ENCOUNTER — NURSE TRIAGE (OUTPATIENT)
Dept: FAMILY MEDICINE | Facility: OTHER | Age: 82
End: 2022-10-27

## 2022-10-27 ENCOUNTER — TELEPHONE (OUTPATIENT)
Dept: FAMILY MEDICINE | Facility: OTHER | Age: 82
End: 2022-10-27

## 2022-10-27 ENCOUNTER — VIRTUAL VISIT (OUTPATIENT)
Dept: FAMILY MEDICINE | Facility: OTHER | Age: 82
End: 2022-10-27
Attending: NURSE PRACTITIONER
Payer: MEDICARE

## 2022-10-27 ENCOUNTER — TELEPHONE (OUTPATIENT)
Dept: PHARMACY | Facility: PHYSICIAN GROUP | Age: 82
End: 2022-10-27

## 2022-10-27 DIAGNOSIS — U07.1 INFECTION DUE TO 2019 NOVEL CORONAVIRUS: Primary | ICD-10-CM

## 2022-10-27 PROCEDURE — 99213 OFFICE O/P EST LOW 20 MIN: CPT | Mod: 93 | Performed by: NURSE PRACTITIONER

## 2022-10-27 NOTE — TELEPHONE ENCOUNTER
"Pt and spouse calling.Pt started with s/s of Covid Monday.Postive today at home test.  No trouble breathing.  Updated on anti-viral d/t age,underlying conditions.  He wants PCP recommendation on this and wants to know what is a good cough syrup with the high BP.    Overall he is feeling OK.    He has been taking OTC coricidin decongestant.    Please advise.    Call back [phone]    Gabriella Crockett RN      Answer Assessment - Initial Assessment Questions  1. COVID-19 DIAGNOSIS: \"Who made your COVID-19 diagnosis?\" \"Was it confirmed by a positive lab test or self-test?\" If not diagnosed by a doctor (or NP/PA), ask \"Are there lots of cases (community spread) where you live?\" Note: See public health department website, if unsure.      no  2. COVID-19 EXPOSURE: \"Was there any known exposure to COVID before the symptoms began?\" CDC Definition of close contact: within 6 feet (2 meters) for a total of 15 minutes or more over a 24-hour period.      no  3. ONSET: \"When did the COVID-19 symptoms start?\"       Monday dry cough  4. WORST SYMPTOM: \"What is your worst symptom?\" (e.g., cough, fever, shortness of breath, muscle aches)      Nasal congestion  5. COUGH: \"Do you have a cough?\" If Yes, ask: \"How bad is the cough?\"        Sneezing more  6. FEVER: \"Do you have a fever?\" If Yes, ask: \"What is your temperature, how was it measured, and when did it start?\"      no  7. RESPIRATORY STATUS: \"Describe your breathing?\" (e.g., shortness of breath, wheezing, unable to speak)       no  8. BETTER-SAME-WORSE: \"Are you getting better, staying the same or getting worse compared to yesterday?\"  If getting worse, ask, \"In what way?\"      no  9. HIGH RISK DISEASE: \"Do you have any chronic medical problems?\" (e.g., asthma, heart or lung disease, weak immune system, obesity, etc.)      no  10. VACCINE: \"Have you had the COVID-19 vaccine?\" If Yes, ask: \"Which one, how many shots, when did you get it?\"        Yes -Phizer  11. BOOSTER: \"Have you " "received your COVID-19 booster?\" If Yes, ask: \"Which one and when did you get it?\"        bilavent  12. PREGNANCY: \"Is there any chance you are pregnant?\" \"When was your last menstrual period?\"          no  13. OTHER SYMPTOMS: \"Do you have any other symptoms?\"  (e.g., chills, fatigue, headache, loss of smell or taste, muscle pain, sore throat)       Muscle fatigue  14. O2 SATURATION MONITOR:  \"Do you use an oxygen saturation monitor (pulse oximeter) at home?\" If Yes, ask \"What is your reading (oxygen level) today?\" \"What is your usual oxygen saturation reading?\" (e.g., 95%)        95% 11am    Protocols used: CORONAVIRUS (COVID-19) DIAGNOSED OR [id number] 1.18.2022      "

## 2022-10-27 NOTE — TELEPHONE ENCOUNTER
Updated on below.Pt updated and pt in agreement.Will send to anti-viral.    Gabriella Crockett RN

## 2022-10-27 NOTE — PROGRESS NOTES
Carrillo is a 81 year old who is being evaluated via a billable telephone visit.      What phone number would you like to be contacted at? [phone]  How would you like to obtain your AVS? Mail a copy    Assessment & Plan     Infection due to 2019 novel coronavirus  Please see the CDC and/or the [place] Dept of Health websites for additional information about COVID and/or ANTIVIRAL therapy.   New medication education completed with potential risks, benefits, administration, and potential side effects.   MTM recommendations noted below reviewed with Carrillo and he has opted to hold tamsulosin, garlic, and trazodone for recommended duration noted below.   - nirmatrelvir and ritonavir (PAXLOVID) therapy pack; Take 3 tablets by mouth 2 times daily for 5 days (Take 2 Nirmatrelvir tablets and 1 Ritonavir tablet twice daily for 5 days)    Prescription drug management       No follow-ups on file.    Doreen Reddy, [facility] [place] [facility]    Subjective   Carrillo is a 81 year old accompanied by his spouse, presenting for the following health issues:  No chief complaint on file.      HPI     Underlying Medical Conditions: age, diabetes  Patient testing positive on 10/27/22   Test by:  At home covid test  Start of Symptoms: 10/24  Covid 19 Vaccination Status:  Pfizer initial vaccine X2 and booster X3  Are you pregnant, breastfeeding or trying to conceive? no    COVID-19 Symptom Review  Symptoms Start Date?  10/24     Are any of the following symptoms significant for you?    New or worsening difficulty breathing? no     Cough? No      Dry or Productive?  no    Fever?  No      Highest temp past 24 hours?  no    Chills?  No     Headache:  No     Sore throat: no    Chest pain: no    Diarrhea: no    Body aches?  yes    Nasal congestion or drainage? A little runny nose     What treatments has patient tried? Coricidin, mucinex, cough drops, tylenol   Does patient live in a nursing home, group home, or shelter? no  Does  patient have a way to get food/medications during quarantine? no     Appointment Scheduled:  Yes today with Maureen at 330    Pharmacy: [facility]    Confirmed with Pharmacy: Paxlovid & Molnupiravir in stock    Last Comprehensive Metabolic Panel:  Sodium   Date Value Ref Range Status   07/11/2022 134 133 - 144 mmol/L Final   06/25/2021 134 133 - 144 mmol/L Final     Potassium   Date Value Ref Range Status   07/11/2022 3.5 3.4 - 5.3 mmol/L Final   06/25/2021 3.8 3.4 - 5.3 mmol/L Final     Chloride   Date Value Ref Range Status   07/11/2022 100 94 - 109 mmol/L Final   06/25/2021 98 94 - 109 mmol/L Final     Carbon Dioxide   Date Value Ref Range Status   06/25/2021 32 20 - 32 mmol/L Final     Carbon Dioxide (CO2)   Date Value Ref Range Status   07/11/2022 30 20 - 32 mmol/L Final     Anion Gap   Date Value Ref Range Status   07/11/2022 4 3 - 14 mmol/L Final   06/25/2021 4 3 - 14 mmol/L Final     Glucose   Date Value Ref Range Status   07/11/2022 129 (H) 70 - 99 mg/dL Final   06/25/2021 245 (H) 70 - 99 mg/dL Final     Urea Nitrogen   Date Value Ref Range Status   07/11/2022 17 7 - 30 mg/dL Final   06/25/2021 12 7 - 30 mg/dL Final     Creatinine   Date Value Ref Range Status   07/11/2022 0.79 0.66 - 1.25 mg/dL Final   06/25/2021 0.69 0.66 - 1.25 mg/dL Final     GFR Estimate   Date Value Ref Range Status   07/11/2022 90 >60 mL/min/1.73m2 Final     Comment:     Effective December 21, 2021 eGFRcr in adults is calculated using the 2021 CKD-EPI creatinine equation which includes age and gender (Rhona et al., NEJM, DOI: 10.1056/JLQLku7207058)   06/25/2021 >90 >60 mL/min/[1.73_m2] Final     Comment:     Non  GFR Calc  Starting 12/18/2018, serum creatinine based estimated GFR (eGFR) will be   calculated using the Chronic Kidney Disease Epidemiology Collaboration   (CKD-EPI) equation.       Calcium   Date Value Ref Range Status   07/11/2022 9.1 8.5 - 10.1 mg/dL Final   06/25/2021 9.6 8.5 - 10.1 mg/dL Final  "    Liver Function Studies - Recent Labs   Lab Test 11/29/21  0825   PROTTOTAL 7.8   ALBUMIN 3.9   BILITOTAL 0.6   ALKPHOS 66   AST 31   ALT 56     Current Outpatient Medications   Medication     atenolol-chlorthalidone (TENORETIC) 50-25 MG tablet     blood glucose (ACCU-CHEK ANGELI PLUS) test strip     blood glucose (ACCU-CHEK FASTCLIX) lancing device     blood glucose (NO BRAND SPECIFIED) test strip     blood glucose calibration (ACCU-CHEK ANGELI) solution     blood glucose monitoring (ACCU-CHEK ANGELI PLUS) meter device kit     blood glucose monitoring (SOFTCLIX) lancets     Cholecalciferol (VITAMIN D PO)     diazepam (VALIUM) 10 MG tablet     docusate sodium (COLACE) 100 MG capsule     ezetimibe (ZETIA) 10 MG tablet     garlic 150 MG TABS tablet     glimepiride (AMARYL) 1 MG tablet     Krill Oil 500 MG CAPS     magnesium oxide 400 MG CAPS     metFORMIN (GLUCOPHAGE XR) 500 MG 24 hr tablet     Multiple Vitamin (MULTIVITAMINS PO)     pantoprazole (PROTONIX) 40 MG EC tablet     Polyethyl Glycol-Propyl Glycol (SYSTANE OP)     polyethyl glycol-propyl glycol (SYSTANE) 0.4-0.3 % GEL     potassium chloride ER (KLOR-CON M) 20 MEQ CR tablet     prednisoLONE acetate (PRED FORTE) 1 % ophthalmic suspension     STATIN NOT PRESCRIBED, INTENTIONAL,     tamsulosin (FLOMAX) 0.4 MG capsule     traZODone (DESYREL) 50 MG tablet     UNABLE TO FIND     No current facility-administered medications for this visit.     Billy Clifford, [facility]     ES     3:34 PM  Note  Paxlovid drug-drug interaction check:      1. Tamsulosin and Paxlovid. Paxlovid can increase the serum concentration of tamsulosin. According to the [place] \"Management of Paxlovid Drug-Drug Interactions\" document, do not use Paxlovid and tamsulosin concomitantly, consider an alternative COVID-19 treatment or hold tamsulosin if appropriate. The Paxlovid prescribing information also states to avoid concomitant use of Paxlovid and Tamsulosin. This effect is expected to last for the " "entire course of Paxlovid and for 3 days after the completion of the Paxlovid course (8 days total) - meaning that tamsulosin must be held for 8 days if Paxlovid is prescribed.      2. Garlic and Paxlovid. Garlic can decrease the serum concentration of ritonavir (in Paxlovid). Due to this interaction, garlic must be held for the entire course of Paxlovid and for 3 days after the completion of the Paxlovid course (8 days total).      3. Trazodone and Paxlovid. Paxlovid can increase the serum concentration of trazodone resulting in an increased risk for adverse effects including nausea, dizziness, hypotension, syncope, sedation, and QTc prolongation. According to the [place] \"Management of Paxlovid Drug-Drug Interactions\" document, reduce the trazodone dose by 50% due to the risk of increased trazodone concentrations, and monitor for increased trazodone effects (see above, especially sedation and QTc prolongation). This effect is expected to last for the entire course of Paxlovid and for 3 days after the completion of the Paxlovid course (8 days total) - meaning that this dose must be reduced for 8 days if Paxlovid is prescribed.      4. Ezetimibe and Paxlovid. Paxlovid can potentially reduce the serum concentration of ezetimibe. However, this induction of metabolism is expected to reach its maximum effect after several days, and given that Paxlovid is a short 5 day course, no dose adjustment of ezetimibe is necessary.      5. Paxlovid can increase blood glucose levels in patients with diabetes. Patients should check their blood glucose levels frequently to ensure that significant hyperglycemia is not occurring. No dose adjustment of diabetes medications is necessary prior to initiating Paxlovid, however if significant hyperglycemia occurs, dose adjustment of diabetes medications may be warranted.      Billy Clifford, PharmD  Medication Therapy Management Pharmacist  [facility]  Office phone: [phone] "                Review of Systems   Constitutional, HEENT, cardiovascular, pulmonary, gi and gu systems are negative, except as otherwise noted.      Objective           Vitals:  No vitals were obtained today due to virtual visit.    Physical Exam   healthy, alert and no distress  PSYCH: Alert and oriented times 3; coherent speech, normal   rate and volume, able to articulate logical thoughts, able   to abstract reason, no tangential thoughts, no hallucinations   or delusions  His affect is normal  RESP: No cough, no audible wheezing, able to talk in full sentences  Remainder of exam unable to be completed due to telephone visits            Phone call duration: 8 minutes

## 2022-10-27 NOTE — TELEPHONE ENCOUNTER
"Paxlovid drug-drug interaction check:     1. Tamsulosin and Paxlovid. Paxlovid can increase the serum concentration of tamsulosin. According to the [place] \"Management of Paxlovid Drug-Drug Interactions\" document, do not use Paxlovid and tamsulosin concomitantly, consider an alternative COVID-19 treatment or hold tamsulosin if appropriate. The Paxlovid prescribing information also states to avoid concomitant use of Paxlovid and Tamsulosin. This effect is expected to last for the entire course of Paxlovid and for 3 days after the completion of the Paxlovid course (8 days total) - meaning that tamsulosin must be held for 8 days if Paxlovid is prescribed.     2. Garlic and Paxlovid. Garlic can decrease the serum concentration of ritonavir (in Paxlovid). Due to this interaction, garlic must be held for the entire course of Paxlovid and for 3 days after the completion of the Paxlovid course (8 days total).     3. Trazodone and Paxlovid. Paxlovid can increase the serum concentration of trazodone resulting in an increased risk for adverse effects including nausea, dizziness, hypotension, syncope, sedation, and QTc prolongation. According to the [place] \"Management of Paxlovid Drug-Drug Interactions\" document, reduce the trazodone dose by 50% due to the risk of increased trazodone concentrations, and monitor for increased trazodone effects (see above, especially sedation and QTc prolongation). This effect is expected to last for the entire course of Paxlovid and for 3 days after the completion of the Paxlovid course (8 days total) - meaning that this dose must be reduced for 8 days if Paxlovid is prescribed.     4. Ezetimibe and Paxlovid. Paxlovid can potentially reduce the serum concentration of ezetimibe. However, this induction of metabolism is expected to reach its maximum effect after several days, and given that Paxlovid is a short 5 day course, no dose adjustment of ezetimibe is necessary.     5. Paxlovid can " increase blood glucose levels in patients with diabetes. Patients should check their blood glucose levels frequently to ensure that significant hyperglycemia is not occurring. No dose adjustment of diabetes medications is necessary prior to initiating Paxlovid, however if significant hyperglycemia occurs, dose adjustment of diabetes medications may be warranted.     Billy Clifford, PharmD  Medication Therapy Management Pharmacist  [facility]  Office phone: [phone]

## 2022-10-27 NOTE — TELEPHONE ENCOUNTER
Call placed to patient to discuss positive covid 19 results.   Underlying Medical Conditions: age, diabetes  Patient testing positive on 10/27/22   Test by:  At home covid test  Start of Symptoms: 10/24  Covid 19 Vaccination Status:  Pfizer initial vaccine X2 and booster X3  Are you pregnant, breastfeeding or trying to conceive? no    COVID-19 Symptom Review  Symptoms Start Date?  10/24     Are any of the following symptoms significant for you?    New or worsening difficulty breathing? no     Cough? No      Dry or Productive?  no    Fever?  No      Highest temp past 24 hours?  no    Chills?  No     Headache:  No     Sore throat: no    Chest pain: no    Diarrhea: no    Body aches?  yes    Nasal congestion or drainage? A little runny nose     What treatments has patient tried? Coricidin, mucinex, cough drops, tylenol   Does patient live in a nursing home, group home, or shelter? no  Does patient have a way to get food/medications during quarantine? no     Appointment Scheduled:  Yes today with Maureen at 330    Pharmacy: [facility]    Confirmed with Pharmacy: Paxlovid & Molnupiravir in stock

## 2022-10-28 ENCOUNTER — TELEPHONE (OUTPATIENT)
Dept: FAMILY MEDICINE | Facility: OTHER | Age: 82
End: 2022-10-28

## 2022-10-28 NOTE — TELEPHONE ENCOUNTER
Spouse calling and requesting if patient is ok to continue taking coricidin with the paxlovid.   Please advise, thank you.

## 2022-11-02 NOTE — MR AVS SNAPSHOT
After Visit Summary   2/6/2017    Carrillo Carranza    MRN: [id number]           Patient Information     Date Of Birth          1940        Visit Information        Provider Department      2/6/2017 9:00 AM Mata Richardson MD [facility]        Today's Diagnoses     Cervicalgia    -  1     Sprain of neck, initial encounter           Care Instructions      Neck Problems: Relieving Your Symptoms  The first goal of treatment is to relieve your symptoms. Your healthcare provider may recommend self-care treatments. These include resting, applying ice and heat, taking medicine, and doing exercises. Your healthcare provider may also recommend that you see a physical therapist who can teach you ways to care for and strengthen your neck.     Heat relaxes sore muscles and helps relieve spasms.   Self-care treatments  Pain can end quickly or last awhile. Either way, you ll want relief as soon as possible. Your healthcare provider can tell you which treatments to do at home to help relieve your pain.    Lying down for a short time takes pressure from the head off the neck.    Ice and heat can help reduce pain. To bring down swelling, rest an ice pack wrapped in a thin towel on your neck for 10 to 15 minutes. To relax sore muscles, apply a warm, wet towel to the area. Or you can take a warm bath or shower.    Over-the-counter medicines, such as ibuprofen, naproxen, and aspirin, can help reduce pain and swelling. Acetaminophen can help relieve pain. Use these only as directed.    Exercises can relax muscles and ease stiffness. To prepare, drape a warm, wet towel around your neck and shoulders for 5 minutes. Remove the towel. Then do any exercises recommended to you by your healthcare provider.  Physical therapy  If self-care treatments aren t helping relieve neck pain, your healthcare provider may suggest physical therapy. Physical therapy is done by a specialist trained to treat injuries. Your  Return to the clinic in 6 month/s.  Will contact with results as needed.     physical therapist (PT) will teach you how to strengthen muscles, improve the spine s alignment, and help you move properly. Treatment methods used in physical therapy may include:    Heat. A special heating pad called a neck pack may be applied to your neck.    Exercises. Your PT will teach you exercises to help strengthen your neck and improve its range of motion.    Joint mobilization. The PT gently moves your vertebrae to help restore motion in your neck joints and reduce neck pain.    Soft tissue mobilization. The PT massages and stretches the muscles in your neck and shoulders.    Electrical stimulation. Electrical impulses are sent into your neck. This helps reduce soreness and inflammation.    Education in body mechanics. The PT shows you ways to position and move your body that protect the neck.  Other treatments  If physical therapy doesn t relieve your neck pain, your healthcare provider may suggest other treatments. For example, medicines or injections can help relieve pain and swelling. In some cases, surgery may be needed to treat neck problems.    7810-7295 [employer]. [address] [place] [address]. All rights reserved. This information is not intended as a substitute for professional medical care. Always follow your healthcare professional's instructions.        Neck Sprain or Strain  A sudden force that causes turning or bending of the neck can cause sprain or strain. An example would be the force from a car accident. This can stretch or tear muscles called a strain. It can also stretch or tear ligaments called a sprain. Either of these can cause neck pain. Sometimes neck pain occurs after a simple awkward movement. In either case, muscle spasm is commonly present and contributes to the pain.    Unless you had a forceful physical injury (for example, a car accident or fall), X-rays are usually not ordered for the initial evaluation of neck pain. If pain continues and dose  not respond to medical treatment, X-rays and other tests may be performed at a later time.  Home care    You may feel more soreness and spasm the first few days after the injury. Rest until symptoms begin to improve.    When lying down, use a comfortable pillow or a rolled towel that supports the head and keeps the spine in a neutral position. The position of the head should not be tilted forward or backward.    Apply an ice pack over the injured area for 15 to 20 minutes every 3 to 6 hours. You should do this for the first 24 to 48 hours. You can make an ice pack by filling a plastic bag that seals at the top with ice cubes and then wrapping it with a thin towel. After 48 hours, apply heat (warm shower or warm bath) for 15 to 20 minutes several times a day, or alternate ice and heat.    You may use over-the-counter pain medicine to control pain, unless another pain medicine was prescribed. If you have chronic liver or kidney disease or ever had a stomach ulcer or GI bleeding, talk with your healthcare provider before using these medicines.    If a soft cervical collar was prescribed, it should be worn only for periods of increased pain. It should not be worn for more than 3 hours a day, or for a period longer than 1 to 2 weeks.  Follow-up care  Follow up with your healthcare provider as directed. Physical therapy may be needed.  Sometimes fractures don t show up on the first X-ray. Bruises and sprains can sometimes hurt as much as a fracture. These injuries can take time to heal completely. If your symptoms don t improve or they get worse, talk with your healthcare provider. You may need a repeat X-ray or other tests. If X-rays were taken, you will be told of any new findings that may affect your care.  Call 911  Call 911 if you have:     Neck swelling, difficulty or painful swallowing    Difficulty breathing    Chest pain  When to seek medical advice  Call your healthcare provider right away if any of these  occur:    Pain becomes worse or spreads into your arms    Weakness or numbness in one or both arms    2854-3239 [employer]. [address] [place] [address], [place] [address]. All rights reserved. This information is not intended as a substitute for professional medical care. Always follow your healthcare professional's instructions.              Follow-ups after your visit        Additional Services     PHYSICAL THERAPY REFERRAL       *This order will print in the [facility] Central Scheduling Office*    [facility] provides Physical Therapy evaluation and treatment and many specialty services across the [facility] system.  If requesting a specialty program, please choose from the list below.    Call [phone] to schedule [facility] at all locations, with the exception of [facility], please call [phone].     Treatment: Evaluation & Treatment  Special Instructions/Modalities: Evaluate and treat.   Special Programs: Good HEP    Please be aware that coverage of these services is subject to the terms and limitations of your health insurance plan.  Call member services at your health plan with any benefit or coverage questions.      **Note to Provider** To refer patients to therapy outside of the location list, change the order class to External Referral in the order composer.                  Your next 10 appointments already scheduled     Feb 13, 2017  1:00 PM   Evaluation with Jessica Leyva PT   [facility] ([facility] )    [address] [place] [address]   [phone]            May 16, 2017  1:30 PM   (Arrive by 1:15 PM)   SHORT with Mata Richardson MD   [facility] ([facility])    [address] [place] [address]   [phone]              Who to contact     If you have questions or need follow up information about today's clinic visit or your schedule please contact  [facility] directly at [phone].  Normal or non-critical lab and imaging results will be communicated to you by MyChart, letter or phone within 4 business days after the clinic has received the results. If you do not hear from us within 7 days, please contact the clinic through [employer] or phone. If you have a critical or abnormal lab result, we will notify you by phone as soon as possible.  Submit refill requests through [employer] or call your pharmacy and they will forward the refill request to us. Please allow 3 business days for your refill to be completed.          Additional Information About Your Visit        [employer] Information     [employer] gives you secure access to your electronic health record. If you see a primary care provider, you can also send messages to your care team and make appointments. If you have questions, please call your primary care clinic.  If you do not have a primary care provider, please call [phone] and they will assist you.        Care EveryWhere ID     This is your Care EveryWhere ID. This could be used by other organizations to access your [facility] medical records  [id number]        Your Vitals Were     Pulse Temperature Respirations             76 96.4  F (35.8  C) 17          Blood Pressure from Last 3 Encounters:   02/06/17 132/74   12/12/16 122/62   10/06/16 130/70    Weight from Last 3 Encounters:   02/06/17 230 lb (104.327 kg)   12/12/16 225 lb (102.059 kg)   10/06/16 230 lb (104.327 kg)              We Performed the Following     PHYSICAL THERAPY REFERRAL          Today's Medication Changes          These changes are accurate as of: 2/6/17  9:09 AM.  If you have any questions, ask your nurse or doctor.               Start taking these medicines.        Dose/Directions    cyclobenzaprine 5 MG tablet   Commonly known as:  FLEXERIL   Used for:  Cervicalgia, Sprain of neck, initial encounter   Started by:  Mata Richardson MD        1-2 tabs three times a  day as needed.   Quantity:  30 tablet   Refills:  1            Where to get your medicines      These medications were sent to [id number] - [place] - [address]   [id number] , [place] [address]     Phone:  [phone]    - cyclobenzaprine 5 MG tablet             Primary Care Provider Office Phone # Fax #    Mata Richardson [phone]       [facility] [address] [place] [address]  [place] [address]        Thank you!     Thank you for choosing [facility]  for your care. Our goal is always to provide you with excellent care. Hearing back from our patients is one way we can continue to improve our services. Please take a few minutes to complete the written survey that you may receive in the mail after your visit with us. Thank you!             Your Updated Medication List - Protect others around you: Learn how to safely use, store and throw away your medicines at www.disposemymeds.org.          This list is accurate as of: 2/6/17  9:09 AM.  Always use your most recent med list.                   Brand Name Dispense Instructions for use    ACCU-CHEK ANGELI PLUS test strip   Generic drug:  blood glucose monitoring     100 each    USE ONE STRIP TO CHECH GLUCOSE ONCE DAILY       ADVIL PO      Take by mouth every 4 hours as needed for moderate pain       ASPIR-81 PO      Take 1 tablet by mouth daily       atenolol-chlorthalidone 50-25 MG per tablet    TENORETIC    90 tablet    TAKE 1 TABLET DAILY       blood glucose calibration solution     1 each    USE CONTROL SOLUTION AS DIRECTED ONCE MONTHLY TO CHECK ACCURACY OF METER       blood glucose monitoring lancets     102 Box    Use to test blood sugar once daily       cyclobenzaprine 5 MG tablet    FLEXERIL    30 tablet    1-2 tabs three times a day as needed.       glimepiride 1 MG tablet    AMARYL    90 tablet    TAKE ONE TABLET BY MOUTH ONCE DAILY IN THE MORNING BEFORE  BREAKFAST       magnesium oxide 400 MG Caps     180  capsule    Take 400 mg by mouth 2 times daily       meclizine 12.5 MG tablet    ANTIVERT    30 tablet    Take 1 tablet (12.5 mg) by mouth 4 times daily as needed for dizziness       metFORMIN 500 MG 24 hr tablet    GLUCOPHAGE-XR    180 tablet    TAKE ONE TABLET BY MOUTH TWICE DAILY WITH MEALS       MULTIVITAMINS PO      Take 1 tablet by mouth daily       OMEGA-3 FISH OIL PO      Take 1 g by mouth       pantoprazole 40 MG EC tablet    PROTONIX    90 tablet    TAKE 1 TABLET DAILY       potassium chloride 20 MEQ CR tablet   Generic drug:  potassium chloride SA     180 tablet    TAKE ONE TABLET BY MOUTH TWICE DAILY WITH MEALS       STOOL SOFTENER 100 MG capsule   Generic drug:  docusate sodium      Take 1 capsule by mouth 2 times daily       traZODone 50 MG tablet    DESYREL    90 tablet    Take 1 tablet (50 mg) by mouth At Bedtime       VITAMIN D PO      Take 1 capsule by mouth daily       ZETIA 10 MG tablet   Generic drug:  ezetimibe     90 tablet    TAKE 1 TABLET DAILY

## 2023-01-15 ENCOUNTER — HEALTH MAINTENANCE LETTER (OUTPATIENT)
Age: 83
End: 2023-01-15

## 2023-01-18 ENCOUNTER — LAB (OUTPATIENT)
Dept: LAB | Facility: OTHER | Age: 83
End: 2023-01-18
Payer: MEDICARE

## 2023-01-18 ENCOUNTER — OFFICE VISIT (OUTPATIENT)
Dept: FAMILY MEDICINE | Facility: OTHER | Age: 83
End: 2023-01-18
Attending: FAMILY MEDICINE
Payer: MEDICARE

## 2023-01-18 VITALS
RESPIRATION RATE: 18 BRPM | SYSTOLIC BLOOD PRESSURE: 139 MMHG | OXYGEN SATURATION: 98 % | HEART RATE: 70 BPM | TEMPERATURE: 96.4 F | DIASTOLIC BLOOD PRESSURE: 69 MMHG

## 2023-01-18 DIAGNOSIS — E11.21 TYPE 2 DIABETES MELLITUS WITH DIABETIC NEPHROPATHY, WITHOUT LONG-TERM CURRENT USE OF INSULIN (H): ICD-10-CM

## 2023-01-18 DIAGNOSIS — I10 BENIGN ESSENTIAL HYPERTENSION: ICD-10-CM

## 2023-01-18 DIAGNOSIS — S99.922A FOOT INJURY, LEFT, INITIAL ENCOUNTER: ICD-10-CM

## 2023-01-18 DIAGNOSIS — E11.21 TYPE 2 DIABETES MELLITUS WITH DIABETIC NEPHROPATHY, WITHOUT LONG-TERM CURRENT USE OF INSULIN (H): Primary | ICD-10-CM

## 2023-01-18 DIAGNOSIS — E78.5 HYPERLIPIDEMIA WITH TARGET LDL LESS THAN 100: ICD-10-CM

## 2023-01-18 DIAGNOSIS — M51.369 DDD (DEGENERATIVE DISC DISEASE), LUMBAR: ICD-10-CM

## 2023-01-18 LAB
ALBUMIN SERPL BCG-MCNC: 4.6 G/DL (ref 3.5–5.2)
ALP SERPL-CCNC: 67 U/L (ref 40–129)
ALT SERPL W P-5'-P-CCNC: 41 U/L (ref 10–50)
ANION GAP SERPL CALCULATED.3IONS-SCNC: 11 MMOL/L (ref 7–15)
AST SERPL W P-5'-P-CCNC: 34 U/L (ref 10–50)
BILIRUB SERPL-MCNC: 0.6 MG/DL
BUN SERPL-MCNC: 18.8 MG/DL (ref 8–23)
CALCIUM SERPL-MCNC: 10 MG/DL (ref 8.8–10.2)
CHLORIDE SERPL-SCNC: 96 MMOL/L (ref 98–107)
CHOLEST SERPL-MCNC: 166 MG/DL
CREAT SERPL-MCNC: 0.74 MG/DL (ref 0.67–1.17)
DEPRECATED HCO3 PLAS-SCNC: 29 MMOL/L (ref 22–29)
EST. AVERAGE GLUCOSE BLD GHB EST-MCNC: 131 MG/DL
GFR SERPL CREATININE-BSD FRML MDRD: >90 ML/MIN/1.73M2
GLUCOSE SERPL-MCNC: 132 MG/DL (ref 70–99)
HBA1C MFR BLD: 6.2 %
HDLC SERPL-MCNC: 46 MG/DL
LDLC SERPL CALC-MCNC: 84 MG/DL
NONHDLC SERPL-MCNC: 120 MG/DL
POTASSIUM SERPL-SCNC: 3.7 MMOL/L (ref 3.4–5.3)
PROT SERPL-MCNC: 7.5 G/DL (ref 6.4–8.3)
SODIUM SERPL-SCNC: 136 MMOL/L (ref 136–145)
TRIGL SERPL-MCNC: 179 MG/DL

## 2023-01-18 PROCEDURE — 83036 HEMOGLOBIN GLYCOSYLATED A1C: CPT | Mod: ZL

## 2023-01-18 PROCEDURE — 36415 COLL VENOUS BLD VENIPUNCTURE: CPT | Mod: ZL

## 2023-01-18 PROCEDURE — 80053 COMPREHEN METABOLIC PANEL: CPT | Mod: ZL

## 2023-01-18 PROCEDURE — G0463 HOSPITAL OUTPT CLINIC VISIT: HCPCS

## 2023-01-18 PROCEDURE — 99214 OFFICE O/P EST MOD 30 MIN: CPT | Performed by: FAMILY MEDICINE

## 2023-01-18 PROCEDURE — 80061 LIPID PANEL: CPT | Mod: ZL

## 2023-01-18 RX ORDER — METFORMIN HCL 500 MG
TABLET, EXTENDED RELEASE 24 HR ORAL
Qty: 270 TABLET | Refills: 3 | Status: SHIPPED | OUTPATIENT
Start: 2023-01-18 | End: 2023-11-09

## 2023-01-18 ASSESSMENT — PAIN SCALES - GENERAL: PAINLEVEL: NO PAIN (0)

## 2023-01-18 NOTE — PROGRESS NOTES
"  Assessment & Plan     Type 2 diabetes mellitus with diabetic nephropathy, without long-term current use of insulin (H)  Great control.  Continue current medications and behavioral changes.   Will decrease Glucphage to 1 tab daily due to diarrhea . Follow-up in 6months   - Comprehensive metabolic panel; Future  - Lipid Profile; Future  - Hemoglobin A1c; Future    Benign essential hypertension  Stable. Continue current medications and behavioral changes.   - Comprehensive metabolic panel; Future  - Lipid Profile; Future  - Hemoglobin A1c; Future    Hyperlipidemia with target LDL less than 100  Stable- Continue current medications and behavioral changes.   - Comprehensive metabolic panel; Future  - Lipid Profile; Future  - Hemoglobin A1c; Future    DDD (degenerative disc disease), lumbar  Discussed. Episodic. Discussed in length conservative measures of OTC medications for pain, Ice/Heat, elevation and the concept of R.I.C.E.. Continue behavioral changes and proper body mechanics to allow for healing. Follow up as directed.   Discussed behavioral changes and proper body mechanics needed to help control patient's back pain.       Foot injury, left, initial encounter  Mild - Symptomatic treatment was discussed along when patient should call and/or come back into the clinic or go to ER/Urgent care. All questions answered.                BMI:   Estimated body mass index is 32.05 kg/m  as calculated from the following:    Height as of 4/5/22: 1.753 m (5' 9\").    Weight as of 7/11/22: 98.4 kg (217 lb).           No follow-ups on file.    Mata Richardson MD  [facility] [place] V[place]o is a 82 year old, presenting for the following health issues:  Recheck Medication      HPI     Diabetes Follow-up    How often are you checking your blood sugar? A few times a week  What time of day are you checking your blood sugars (select all that apply)?  Before meals  Have you had any blood sugars above " 200?  No  Have you had any blood sugars below 70?  No    What symptoms do you notice when your blood sugar is low?  None    What concerns do you have today about your diabetes? None     Do you have any of these symptoms? (Select all that apply)  Numbness in feet        Hyperlipidemia Follow-Up      Are you regularly taking any medication or supplement to lower your cholesterol?   Yes- krill    Are you having muscle aches or other side effects that you think could be caused by your cholesterol lowering medication?  No    Hypertension Follow-up      Do you check your blood pressure regularly outside of the clinic? No     Are you following a low salt diet? Yes    Are your blood pressures ever more than 140 on the top number (systolic) OR more   than 90 on the bottom number (diastolic), for example 140/90? No    BP Readings from Last 2 Encounters:   01/18/23 139/69   07/11/22 120/76     Hemoglobin A1C (%)   Date Value   07/11/2022 6.1 (H)   03/30/2022 7.3 (H)   06/25/2021 6.5 (H)   02/23/2021 6.3 (H)     LDL Cholesterol Calculated (mg/dL)   Date Value   11/29/2021 98   09/15/2020 103 (H)   05/07/2019 100 (H)         Pain History:  When did you first notice your pain? - Acute Pain   Have you seen anyone else for your pain? No  Where in your body do you have pain? Musculoskeletal problem/pain  Onset/Duration: 1 month  Description  Location: sciatica - right--- very episodic ///  Dropped piece of wood on left foot this last week   Joint Swelling: No  Redness: No  Pain: YES  Warmth: No  Intensity:  moderate, severe  Progression of Symptoms:  intermittent  Accompanying signs and symptoms:   Fevers: No  Numbness/tingling/weakness: No  History  Trauma to the area: No  Recent illness:  No  Previous similar problem: No  Previous evaluation:  No  Precipitating or alleviating factors:  Aggravating factors include: none  Therapies tried and outcome: nothing        Review of Systems   Constitutional, HEENT, cardiovascular,  pulmonary, gi and gu systems are negative, except as otherwise noted.      Objective    /69 (BP Location: Left arm, Patient Position: Sitting, Cuff Size: Adult Large)   Pulse 70   Temp (!) 96.4  F (35.8  C) (Tympanic)   Resp 18   SpO2 98%   There is no height or weight on file to calculate BMI.  Physical Exam   GENERAL: healthy, alert and no distress  EYES: Eyes grossly normal to inspection, PERRL and conjunctivae and sclerae normal  HENT: ear canals and TM's normal, nose and mouth without ulcers or lesions  NECK: no adenopathy, no asymmetry, masses, or scars and thyroid normal to palpation  RESP: lungs clear to auscultation - no rales, rhonchi or wheezes  CV: regular rate and rhythm, normal S1 S2, no S3 or S4, no murmur, click or rub, no peripheral edema and peripheral pulses strong  ABDOMEN: soft, nontender, no hepatosplenomegaly, no masses and bowel sounds normal   (male): normal male genitalia without lesions or urethral discharge, no hernia  MS: no gross musculoskeletal defects noted, no edema--- left foot normal   SKIN: no suspicious lesions or rashes  NEURO: Normal strength and tone, mentation intact and speech normal  PSYCH: mentation appears normal, affect normal/bright  LYMPH: no cervical, supraclavicular, axillary, or inguinal adenopathy  Diabetic foot exam: normal DP and PT pulses, no trophic changes or ulcerative lesions, normal sensory exam and normal monofilament exam    Results for orders placed or performed in visit on 01/18/23   Comprehensive metabolic panel     Status: Abnormal   Result Value Ref Range    Sodium 136 136 - 145 mmol/L    Potassium 3.7 3.4 - 5.3 mmol/L    Chloride 96 (L) 98 - 107 mmol/L    Carbon Dioxide (CO2) 29 22 - 29 mmol/L    Anion Gap 11 7 - 15 mmol/L    Urea Nitrogen 18.8 8.0 - 23.0 mg/dL    Creatinine 0.74 0.67 - 1.17 mg/dL    Calcium 10.0 8.8 - 10.2 mg/dL    Glucose 132 (H) 70 - 99 mg/dL    Alkaline Phosphatase 67 40 - 129 U/L    AST 34 10 - 50 U/L    ALT 41 10 -  50 U/L    Protein Total 7.5 6.4 - 8.3 g/dL    Albumin 4.6 3.5 - 5.2 g/dL    Bilirubin Total 0.6 <=1.2 mg/dL    GFR Estimate >90 >60 mL/min/1.73m2   Lipid Profile     Status: Abnormal   Result Value Ref Range    Cholesterol 166 <200 mg/dL    Triglycerides 179 (H) <150 mg/dL    Direct Measure HDL 46 >=40 mg/dL    LDL Cholesterol Calculated 84 <=100 mg/dL    Non HDL Cholesterol 120 <130 mg/dL    Narrative    Cholesterol  Desirable:  <200 mg/dL    Triglycerides  Normal:  Less than 150 mg/dL  Borderline High:  150-199 mg/dL  High:  200-499 mg/dL  Very High:  Greater than or equal to 500 mg/dL    Direct Measure HDL  Female:  Greater than or equal to 50 mg/dL   Male:  Greater than or equal to 40 mg/dL    LDL Cholesterol  Desirable:  <100mg/dL  Above Desirable:  100-129 mg/dL   Borderline High:  130-159 mg/dL   High:  160-189 mg/dL   Very High:  >= 190 mg/dL    Non HDL Cholesterol  Desirable:  130 mg/dL  Above Desirable:  130-159 mg/dL  Borderline High:  160-189 mg/dL  High:  190-219 mg/dL  Very High:  Greater than or equal to 220 mg/dL   Hemoglobin A1c     Status: Abnormal   Result Value Ref Range    Estimated Average Glucose 131 mg/dL    Hemoglobin A1C 6.2 (H) <5.7 %

## 2023-01-18 NOTE — PATIENT INSTRUCTIONS
Results for orders placed or performed in visit on 01/18/23   Comprehensive metabolic panel     Status: Abnormal   Result Value Ref Range    Sodium 136 136 - 145 mmol/L    Potassium 3.7 3.4 - 5.3 mmol/L    Chloride 96 (L) 98 - 107 mmol/L    Carbon Dioxide (CO2) 29 22 - 29 mmol/L    Anion Gap 11 7 - 15 mmol/L    Urea Nitrogen 18.8 8.0 - 23.0 mg/dL    Creatinine 0.74 0.67 - 1.17 mg/dL    Calcium 10.0 8.8 - 10.2 mg/dL    Glucose 132 (H) 70 - 99 mg/dL    Alkaline Phosphatase 67 40 - 129 U/L    AST 34 10 - 50 U/L    ALT 41 10 - 50 U/L    Protein Total 7.5 6.4 - 8.3 g/dL    Albumin 4.6 3.5 - 5.2 g/dL    Bilirubin Total 0.6 <=1.2 mg/dL    GFR Estimate >90 >60 mL/min/1.73m2   Lipid Profile     Status: Abnormal   Result Value Ref Range    Cholesterol 166 <200 mg/dL    Triglycerides 179 (H) <150 mg/dL    Direct Measure HDL 46 >=40 mg/dL    LDL Cholesterol Calculated 84 <=100 mg/dL    Non HDL Cholesterol 120 <130 mg/dL    Narrative    Cholesterol  Desirable:  <200 mg/dL    Triglycerides  Normal:  Less than 150 mg/dL  Borderline High:  150-199 mg/dL  High:  200-499 mg/dL  Very High:  Greater than or equal to 500 mg/dL    Direct Measure HDL  Female:  Greater than or equal to 50 mg/dL   Male:  Greater than or equal to 40 mg/dL    LDL Cholesterol  Desirable:  <100mg/dL  Above Desirable:  100-129 mg/dL   Borderline High:  130-159 mg/dL   High:  160-189 mg/dL   Very High:  >= 190 mg/dL    Non HDL Cholesterol  Desirable:  130 mg/dL  Above Desirable:  130-159 mg/dL  Borderline High:  160-189 mg/dL  High:  190-219 mg/dL  Very High:  Greater than or equal to 220 mg/dL   Hemoglobin A1c     Status: Abnormal   Result Value Ref Range    Estimated Average Glucose 131 mg/dL    Hemoglobin A1C 6.2 (H) <5.7 %

## 2023-01-30 ENCOUNTER — TELEPHONE (OUTPATIENT)
Dept: FAMILY MEDICINE | Facility: OTHER | Age: 83
End: 2023-01-30

## 2023-01-30 NOTE — TELEPHONE ENCOUNTER
To: Nurse to Dr Richardson  Patient recently started a medication that is effecting his diabetes.  Please call him back to discuss @ [phone]  Thank you

## 2023-01-30 NOTE — TELEPHONE ENCOUNTER
Called patient - Patient stating that since his recent Px (1/18/2023) and the decrease in metformin to 500mg daily that his glucose readings have been jumping back up into 130-140's     Patient inquiry to weather or not he should go back to two tablets/day

## 2023-01-31 NOTE — TELEPHONE ENCOUNTER
I think he can stay on 1 tablet if ok with him and lets just see what his A1C is in 5-6 months.   IF it bothers him that sugars are up a little--then can go back to 2 tablets.

## 2023-02-19 DIAGNOSIS — R69 DIAGNOSIS UNKNOWN: ICD-10-CM

## 2023-02-19 DIAGNOSIS — K22.70 BARRETT'S ESOPHAGUS WITHOUT DYSPLASIA: ICD-10-CM

## 2023-02-19 DIAGNOSIS — E78.5 HYPERLIPIDEMIA LDL GOAL <100: ICD-10-CM

## 2023-02-22 DIAGNOSIS — I10 BENIGN ESSENTIAL HYPERTENSION: ICD-10-CM

## 2023-02-22 RX ORDER — PANTOPRAZOLE SODIUM 40 MG/1
TABLET, DELAYED RELEASE ORAL
Qty: 90 TABLET | Refills: 3 | Status: SHIPPED | OUTPATIENT
Start: 2023-02-22 | End: 2023-11-20

## 2023-02-22 RX ORDER — ATENOLOL AND CHLORTHALIDONE TABLET 50; 25 MG/1; MG/1
1 TABLET ORAL DAILY
Qty: 90 TABLET | Refills: 3 | Status: SHIPPED | OUTPATIENT
Start: 2023-02-22 | End: 2023-04-25

## 2023-02-22 RX ORDER — EZETIMIBE 10 MG/1
TABLET ORAL
Qty: 90 TABLET | Refills: 3 | Status: SHIPPED | OUTPATIENT
Start: 2023-02-22 | End: 2024-04-08

## 2023-02-22 RX ORDER — TRAZODONE HYDROCHLORIDE 50 MG/1
TABLET, FILM COATED ORAL
Qty: 90 TABLET | Refills: 0 | Status: SHIPPED | OUTPATIENT
Start: 2023-02-22 | End: 2023-08-22

## 2023-02-22 NOTE — TELEPHONE ENCOUNTER
ezetimibe (ZETIA) 10 MG tablet 90 tablet 3 4/6/2022     pantoprazole (PROTONIX) 40 MG EC tablet 90 tablet 3 2/21/2022     lov 01/18/2023

## 2023-04-24 NOTE — PROGRESS NOTES
"  Assessment & Plan     Epidermal inclusion cyst  I and D done since pt wants removed. Used betadine and a small needle     Light-headed feeling  EKG done -- pt has fairly long pauses after PVC-- question if that is causing this. See below . Will stop Tenoretic and see improves.  Also ordered a Ziopatch   - Adult Leadless EKG Monitor 3 to 7 Days; Future  - EKG 12-lead complete w/read - Clinics    Benign essential hypertension  Stop Tenoretic and start below. See if sx above improve. Follow-up in 3-4 weeks   - lisinopril-hydrochlorothiazide (ZESTORETIC) 10-12.5 MG tablet; Take 1 tablet by mouth daily             BMI:   Estimated body mass index is 31.74 kg/m  as calculated from the following:    Height as of this encounter: 1.803 m (5' 11\").    Weight as of this encounter: 103.2 kg (227 lb 9.6 oz).           No follow-ups on file.    Mata Richardson MD  [facility] - [place] Vizcaino is a 82 year old, presenting for the following health issues:  Derm Problem (Lump on nose getting bidder)         View : No data to display.              HPI     Skin Lesion- lump   Onset/Duration: 2 months  Description  Location: nose and chin  Color: skin colored  Border description: evenly pigmented  Character: round  Itching: no  Bleeding:  No  Intensity:  No pain  Progression of Symptoms:  worsening  Accompanying signs and symptoms:   Bleeding: No  Scaling: No  Excessive sun exposure/tanning: No  Sunscreen used: YES  History:           Any previous history of skin cancer: YES  Any family history of melanoma: No  Previous episodes of similar lesion: No  Precipitating or alleviating factors: none  Therapies tried and outcome: none  Hot packing it  Some mild increase in size        Pt c/o 3-4 months episodes at rest when feels like real light headed or even mild near syncopal type episode  Lasts several seconds or more  No other sx  Never had LOC  No feeling of heart palp.  Has h/o PVCs  No change in " "meds  No issues with exertion           Review of Systems   Constitutional, HEENT, cardiovascular, pulmonary, gi and gu systems are negative, except as otherwise noted.      Objective    BP (!) 140/82 (BP Location: Right arm, Patient Position: Chair, Cuff Size: Adult Large)   Pulse 66   Temp 98.2  F (36.8  C) (Tympanic)   Resp 16   Ht 1.803 m (5' 11\")   Wt 103.2 kg (227 lb 9.6 oz)   SpO2 98%   BMI 31.74 kg/m    Body mass index is 31.74 kg/m .  Physical Exam   GENERAL: healthy, alert and no distress  SKIN: no suspicious lesions or rashes--  2 white inclusion cysts right cheek and chin - about 2mm in size   cv- irregular RRR with sounds like occ. pvc  Lungs CTA     EKG- Sinus with 1st degree block and fregu. PVC with fairly long pause before next QRS after pvc.               "

## 2023-04-25 ENCOUNTER — OFFICE VISIT (OUTPATIENT)
Dept: FAMILY MEDICINE | Facility: OTHER | Age: 83
End: 2023-04-25
Attending: FAMILY MEDICINE
Payer: MEDICARE

## 2023-04-25 VITALS
DIASTOLIC BLOOD PRESSURE: 82 MMHG | HEIGHT: 71 IN | RESPIRATION RATE: 16 BRPM | BODY MASS INDEX: 31.86 KG/M2 | OXYGEN SATURATION: 98 % | HEART RATE: 66 BPM | SYSTOLIC BLOOD PRESSURE: 140 MMHG | WEIGHT: 227.6 LBS | TEMPERATURE: 98.2 F

## 2023-04-25 DIAGNOSIS — I10 BENIGN ESSENTIAL HYPERTENSION: ICD-10-CM

## 2023-04-25 DIAGNOSIS — R42 LIGHT-HEADED FEELING: ICD-10-CM

## 2023-04-25 DIAGNOSIS — L72.0 EPIDERMAL INCLUSION CYST: Primary | ICD-10-CM

## 2023-04-25 PROCEDURE — 93005 ELECTROCARDIOGRAM TRACING: CPT

## 2023-04-25 PROCEDURE — 93010 ELECTROCARDIOGRAM REPORT: CPT | Performed by: INTERNAL MEDICINE

## 2023-04-25 PROCEDURE — G0463 HOSPITAL OUTPT CLINIC VISIT: HCPCS | Mod: 25

## 2023-04-25 PROCEDURE — 99214 OFFICE O/P EST MOD 30 MIN: CPT | Performed by: FAMILY MEDICINE

## 2023-04-25 RX ORDER — LISINOPRIL/HYDROCHLOROTHIAZIDE 10-12.5 MG
1 TABLET ORAL DAILY
Qty: 30 TABLET | Refills: 1 | Status: SHIPPED | OUTPATIENT
Start: 2023-04-25 | End: 2023-06-01

## 2023-04-25 ASSESSMENT — PAIN SCALES - GENERAL: PAINLEVEL: NO PAIN (0)

## 2023-04-26 ENCOUNTER — HOSPITAL ENCOUNTER (OUTPATIENT)
Dept: CARDIOLOGY | Facility: HOSPITAL | Age: 83
Discharge: HOME | End: 2023-04-26
Attending: FAMILY MEDICINE | Admitting: INTERNAL MEDICINE
Payer: MEDICARE

## 2023-04-26 DIAGNOSIS — R42 LIGHT-HEADED FEELING: ICD-10-CM

## 2023-04-26 PROCEDURE — 93242 EXT ECG>48HR<7D RECORDING: CPT

## 2023-04-26 PROCEDURE — 93244 EXT ECG>48HR<7D REV&INTERPJ: CPT | Performed by: INTERNAL MEDICINE

## 2023-04-26 NOTE — PROGRESS NOTES
Zio patch placed on patient in outpatient appointment today. Patient was instructed to wear patch for 7 days. Skin was prepped and patch was placed following [employer] guidelines .     Patient was instructed to push button when symptomatic and fill out diary. Patient was instructed not to submerse in water and no swimming, saunas, or hot tubs. Showers may be taken keeping back towards the water. If the area DOES become wet pat it dry with a cloth. If skin irritation occurs patient was instructed to remove patch and contact [employer].    Patient understands we do not receive results until they mail patch back inside the box. Staff reminded patient of outside billing notice which was explained to patient during the scheduling process of this monitor. Patient also instructed to contact [employer] with any questions [phone].    Patient had no further questions and agreed with plan. Patient was sent home with the box, information pamphlet and booklet to anibal any incidents in.

## 2023-05-26 ENCOUNTER — HOSPITAL ENCOUNTER (EMERGENCY)
Facility: HOSPITAL | Age: 83
Discharge: HOME | End: 2023-05-26
Attending: NURSE PRACTITIONER | Admitting: NURSE PRACTITIONER
Payer: MEDICARE

## 2023-05-26 VITALS
DIASTOLIC BLOOD PRESSURE: 78 MMHG | SYSTOLIC BLOOD PRESSURE: 136 MMHG | RESPIRATION RATE: 18 BRPM | TEMPERATURE: 97.5 F | OXYGEN SATURATION: 95 % | HEART RATE: 91 BPM

## 2023-05-26 DIAGNOSIS — S91.132A PUNCTURE WOUND OF LEFT GREAT TOE WITHOUT FOREIGN BODY WITHOUT DAMAGE TO NAIL, INITIAL ENCOUNTER: Primary | ICD-10-CM

## 2023-05-26 DIAGNOSIS — Z23 NEED FOR DIPHTHERIA-TETANUS-PERTUSSIS (TDAP) VACCINE: ICD-10-CM

## 2023-05-26 PROCEDURE — 90471 IMMUNIZATION ADMIN: CPT | Performed by: NURSE PRACTITIONER

## 2023-05-26 PROCEDURE — G0463 HOSPITAL OUTPT CLINIC VISIT: HCPCS | Mod: 25

## 2023-05-26 PROCEDURE — 99213 OFFICE O/P EST LOW 20 MIN: CPT | Performed by: NURSE PRACTITIONER

## 2023-05-26 PROCEDURE — 250N000011 HC RX IP 250 OP 636: Performed by: NURSE PRACTITIONER

## 2023-05-26 PROCEDURE — 90715 TDAP VACCINE 7 YRS/> IM: CPT | Performed by: NURSE PRACTITIONER

## 2023-05-26 RX ADMIN — CLOSTRIDIUM TETANI TOXOID ANTIGEN (FORMALDEHYDE INACTIVATED), CORYNEBACTERIUM DIPHTHERIAE TOXOID ANTIGEN (FORMALDEHYDE INACTIVATED), BORDETELLA PERTUSSIS TOXOID ANTIGEN (GLUTARALDEHYDE INACTIVATED), BORDETELLA PERTUSSIS FILAMENTOUS HEMAGGLUTININ ANTIGEN (FORMALDEHYDE INACTIVATED), BORDETELLA PERTUSSIS PERTACTIN ANTIGEN, AND BORDETELLA PERTUSSIS FIMBRIAE 2/3 ANTIGEN 0.5 ML: 5; 2; 2.5; 5; 3; 5 INJECTION, SUSPENSION INTRAMUSCULAR at 15:49

## 2023-05-26 ASSESSMENT — ENCOUNTER SYMPTOMS
WOUND: 1
CHILLS: 0
FEVER: 0

## 2023-05-26 NOTE — DISCHARGE INSTRUCTIONS
Keep the wound clean and dry.  Observe for signs of infection such as redness or swelling or abnormal discharge and return here for evaluation.    Follow-up with your doctor as needed.

## 2023-05-26 NOTE — ED TRIAGE NOTES
Patient presents to urgent care with wife for stepping on a nail this afternoon. He wants his tdap today.

## 2023-05-26 NOTE — ED TRIAGE NOTES
Patient presents with c/o stepping on a nail with left big toe. Patient reports that he is outdated on his Tetanus shot, and main reasoning for him presenting.

## 2023-05-26 NOTE — ED PROVIDER NOTES
History     Chief Complaint   Patient presents with     Puncture Wound     HPI  Carrillo Carranza is a 82 year old male who presents ambulatory to urgent care with his spouse requesting a tetanus vaccination.  Patient tells me that he was wearing some tennis shoes when he accidentally stepped on a chato nail this afternoon.  The nail went through the shoes and punctured his left great toe.  His wife states that the cleaned his toe and then called the nurse triage line.  They were advised to come in and get evaluated especially for a tetanus vaccination.  His last Tdap was in 2018.    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 05/07/2019     Priority: Medium     H/O adenomatous polyp of colon 05/07/2019     Priority: Medium     Low libido 09/14/2017     Priority: Medium     BPH with obstruction/lower urinary tract symptoms 05/16/2017     Priority: Medium     Type 2 diabetes mellitus without complication, without long-term current use of insulin (H) 12/12/2016     Priority: Medium     Essential hypertension with goal blood pressure less than 140/90 07/15/2016     Priority: Medium     Nocturia 10/09/2014     Priority: Medium     Hypomagnesemia 06/23/2014     Priority: Medium     BPPV (benign paroxysmal positional vertigo) 06/13/2014     Priority: Medium     Hypokalemia 06/02/2014     Priority: Medium     Hyperlipidemia with target LDL less than 100      Priority: Medium     Diagnosis updated by automated process. Provider to review and confirm.       Fatty liver disease, nonalcoholic      Priority:  Medium     Diffuse connective tissue disease (H) 01/29/2013     Priority: Medium     Problem list name updated by automated process. Provider to review       Advanced care planning/counseling discussion 06/12/2012     Priority: Medium        Past Medical History:    Past Medical History:   Diagnosis Date     Baca's esophagus 11/3/2011     Contact dermatitis and other eczema, due to unspecified cause 3/1/2011     Dermatophytosis of groin and perianal area 2/1/2006     Esophageal reflux 11/3/2011     Family history of colonic polyps 11/3/2011     Fatty liver disease, nonalcoholic      Generalized osteoarthrosis, involving multiple sites 3/1/2011     HTN (hypertension)      Hyperlipidaemia LDL goal < 100      Other and unspecified hyperlipidemia 11/3/2011     Other chronic nonalcoholic liver disease 11/3/2011     Other premature beats 11/3/2011     Other specified cardiac dysrhythmias(427.89) 11/3/2011     Personal history of tobacco use, presenting hazards to health 11/3/2011     Type II or unspecified type diabetes mellitus without mention of complication, not stated as uncontrolled 2/7/2012     Umbilical hernia without mention of obstruction or gangrene 11/3/2011     Unspecified diffuse connective tissue disease 1/29/2013     Unspecified essential hypertension 11/3/2011       Past Surgical History:    Past Surgical History:   Procedure Laterality Date     ADENOIDECTOMY       APPENDECTOMY       ARTHROSCOPY KNEE       arthroscopy right great toe      bunion     COLONOSCOPY  02/23/2010     COLONOSCOPY  2010,2006    repeat in 2015     COLONOSCOPY  08/10/2006     COLONOSCOPY  01/01/2015    Repeat 2020     COLONOSCOPY N/A 04/13/2021    Normal. Internal hemorrhoids noted. No further colonoscopies recommended.     COLONOSCOPY W/ ENDOSCOPIC US  2021     ESOPHAGOGASTRODUODENOSCOPY      Repeat in 2015     ESOPHAGOGASTRODUODENOSCOPY  01/01/2015    Dr Funes/Vivek- Repeat 3yrs     EXCISE LESION EAR EXTERNAL Right  03/13/2018    Procedure: EXCISE LESION EAR EXTERNAL;  EXCISION OF PREAURICULAR BASAL CELL CARCINOMA WITH FROZENS, FLAP REPAIR ( 2.6 x 2cm Defect), ( 3.5 x 3.5cmTissue Rearrangement);  Surgeon: Kyara Rojas MD;  Location: [facility] OR     HERNIA REPAIR, UMBILICAL  01/01/2012    reduction and repair of umbilical hernia     JOINT REPLACEMENT       PHACOEMULSIFICATION WITH STANDARD INTRAOCULAR LENS IMPLANT Right 06/11/2019    Procedure: COMPLEX PHACOEMULSIFICATION CATARACT EXTRACTION POSTERIOR CHAMBER LENS RIGHT 10% LINDA IFIS SOLUTION/OMIDRIA  LI61AO  21;  Surgeon: Julio Patrick MD;  Location: [facility] OR     PHACOEMULSIFICATION WITH STANDARD INTRAOCULAR LENS IMPLANT Left 06/25/2019    Procedure: PHACOEMULSIFICATION CATARACT EXTRACTION POSTERIOR CHAMBER LENS LEFT 10% LINDA, POSSIBLE IFIS OR OMIDRIA LI61AO 21m COMPLEX CATARACT;  Surgeon: Julio Patrick MD;  Location: [facility] OR     RELEASE CARPAL TUNNEL      carpal tunnel syndrome     shoulder bone spur removal       TKA       TONSILLECTOMY       UPPER GI ENDOSCOPY  01/01/2012    repeat in 2014     UPPER GI ENDOSCOPY       UPPER GI ENDOSCOPY       UPPER GI ENDOSCOPY  01/01/2010    repeat 2012       Family History:    Family History   Problem Relation Age of Onset     Cerebrovascular Disease Maternal Grandmother         CVA     Heart Failure Father        Social History:  Marital Status:   [2]  Social History     Tobacco Use     Smoking status: Former     Types: Cigarettes     Smokeless tobacco: Never     Tobacco comments:     quit in the 80s   Vaping Use     Vaping status: Never Used   Substance Use Topics     Alcohol use: No     Drug use: No        Medications:    Cholecalciferol (VITAMIN D PO)  docusate sodium (COLACE) 100 MG capsule  garlic 150 MG TABS tablet  glimepiride (AMARYL) 1 MG tablet  Krill Oil 500 MG CAPS  lisinopril-hydrochlorothiazide (ZESTORETIC) 10-12.5 MG tablet  magnesium oxide 400 MG CAPS  metFORMIN (GLUCOPHAGE XR) 500 MG 24 hr tablet  Multiple  Vitamin (MULTIVITAMINS PO)  pantoprazole (PROTONIX) 40 MG EC tablet  Polyethyl Glycol-Propyl Glycol (SYSTANE OP)  polyethyl glycol-propyl glycol (SYSTANE) 0.4-0.3 % GEL  potassium chloride ER (KLOR-CON M) 20 MEQ CR tablet  prednisoLONE acetate (PRED FORTE) 1 % ophthalmic suspension  STATIN NOT PRESCRIBED, INTENTIONAL,  tamsulosin (FLOMAX) 0.4 MG capsule  traZODone (DESYREL) 50 MG tablet  UNABLE TO FIND  blood glucose (ACCU-CHEK ANGELI PLUS) test strip  blood glucose (ACCU-CHEK FASTCLIX) lancing device  blood glucose (NO BRAND SPECIFIED) test strip  blood glucose calibration (ACCU-CHEK ANGELI) solution  blood glucose monitoring (ACCU-CHEK ANGELI PLUS) meter device kit  blood glucose monitoring (SOFTCLIX) lancets  ezetimibe (ZETIA) 10 MG tablet          Review of Systems   Constitutional: Negative for chills and fever.   Skin: Positive for wound.   All other systems reviewed and are negative.      Physical Exam   BP: 136/78  Pulse: 91  Temp: 97.5  F (36.4  C)  Resp: 18  SpO2: 95 %      Physical Exam  Vitals and nursing note reviewed.   Constitutional:       Appearance: Normal appearance. He is not ill-appearing or toxic-appearing.   Eyes:      Pupils: Pupils are equal, round, and reactive to light.   Cardiovascular:      Rate and Rhythm: Normal rate.      Pulses:           Dorsalis pedis pulses are 2+ on the left side.   Pulmonary:      Effort: Pulmonary effort is normal.   Musculoskeletal:         General: Signs of injury present.      Cervical back: Neck supple.        Feet:    Feet:      Left foot:      Skin integrity: No ulcer, blister, erythema or warmth.      Comments: Small puncture wound to the bottom of left great toe.  Bleeding controlled.  No redness or discharge.  Minimal tenderness to palpation.  Cap refill less than 2 seconds.  Skin:     General: Skin is warm and dry.      Capillary Refill: Capillary refill takes less than 2 seconds.      Findings: No bruising or erythema.   Neurological:      Mental  Status: He is alert and oriented to person, place, and time.         ED Course                 Procedures           No results found for this or any previous visit (from the past 24 hour(s)).    Medications   Tdap (tetanus-diphtheria-acell pertussis) (ADACEL) injection 0.5 mL (0.5 mLs Intramuscular $Given 5/26/23 1719)       Assessments & Plan (with Medical Decision Making)     I have reviewed the nursing notes.    This is an 82-year-old male that presented  mostly requesting a tetanus vaccination after he accidentally stepped on a chato nail while he was wearing shoes.  He does have a small puncture wound to his left great toe with no signs of infection at this time.  Moving his toes with minimal difficulty.  His Tdap was updated today.  Discussed with patient and spouse to keep the wound clean and dry and observe for signs of infection.  If he notices any signs concerning for infection or any other concerning symptoms, he should return to urgent care or emergency department for evaluation.  Follow-up with primary doctor as needed.  Patient and spouse verbalized understanding.    I have reviewed the findings, diagnosis, plan and need for follow up with the patient.  This document was prepared using a combination of typing and voice generated software.  While every attempt was made for accuracy, spelling and grammatical errors may exist.        Medical Decision Making  The patient's presentation was of straightforward complexity (a clearly self-limited or minor problem).    The patient's evaluation involved:  history and exam without other MDM data elements    The patient's management necessitated moderate risk (prescription drug management including medications given in the ED).        Discharge Medication List as of 5/26/2023  3:52 PM          Final diagnoses:   Puncture wound of left great toe without foreign body without damage to nail, initial encounter   Need for diphtheria-tetanus-pertussis (Tdap) vaccine        5/26/2023   [facility]     Mpofu, Prudence, CNP  05/26/23 8581

## 2023-06-01 ENCOUNTER — OFFICE VISIT (OUTPATIENT)
Dept: FAMILY MEDICINE | Facility: OTHER | Age: 83
End: 2023-06-01
Attending: FAMILY MEDICINE
Payer: MEDICARE

## 2023-06-01 ENCOUNTER — LAB (OUTPATIENT)
Dept: LAB | Facility: OTHER | Age: 83
End: 2023-06-01
Attending: FAMILY MEDICINE
Payer: MEDICARE

## 2023-06-01 VITALS
TEMPERATURE: 97.6 F | DIASTOLIC BLOOD PRESSURE: 64 MMHG | HEART RATE: 86 BPM | OXYGEN SATURATION: 98 % | SYSTOLIC BLOOD PRESSURE: 126 MMHG | BODY MASS INDEX: 31.38 KG/M2 | WEIGHT: 225 LBS | RESPIRATION RATE: 18 BRPM

## 2023-06-01 DIAGNOSIS — R42 LIGHT-HEADED FEELING: ICD-10-CM

## 2023-06-01 DIAGNOSIS — M54.41 CHRONIC MIDLINE LOW BACK PAIN WITH RIGHT-SIDED SCIATICA: ICD-10-CM

## 2023-06-01 DIAGNOSIS — R42 LIGHT-HEADED FEELING: Primary | ICD-10-CM

## 2023-06-01 DIAGNOSIS — G89.29 CHRONIC MIDLINE LOW BACK PAIN WITH RIGHT-SIDED SCIATICA: ICD-10-CM

## 2023-06-01 DIAGNOSIS — M25.532 LEFT WRIST PAIN: ICD-10-CM

## 2023-06-01 DIAGNOSIS — I10 BENIGN ESSENTIAL HYPERTENSION: ICD-10-CM

## 2023-06-01 DIAGNOSIS — E83.42 HYPOMAGNESEMIA: ICD-10-CM

## 2023-06-01 LAB
ANION GAP SERPL CALCULATED.3IONS-SCNC: 9 MMOL/L (ref 7–15)
BASOPHILS # BLD AUTO: 0 10E3/UL (ref 0–0.2)
BASOPHILS NFR BLD AUTO: 1 %
BUN SERPL-MCNC: 12.7 MG/DL (ref 8–23)
CALCIUM SERPL-MCNC: 10.2 MG/DL (ref 8.8–10.2)
CHLORIDE SERPL-SCNC: 97 MMOL/L (ref 98–107)
CREAT SERPL-MCNC: 0.71 MG/DL (ref 0.67–1.17)
DEPRECATED HCO3 PLAS-SCNC: 29 MMOL/L (ref 22–29)
EOSINOPHIL # BLD AUTO: 0.4 10E3/UL (ref 0–0.7)
EOSINOPHIL NFR BLD AUTO: 7 %
ERYTHROCYTE [DISTWIDTH] IN BLOOD BY AUTOMATED COUNT: 12 % (ref 10–15)
GFR SERPL CREATININE-BSD FRML MDRD: >90 ML/MIN/1.73M2
GLUCOSE SERPL-MCNC: 168 MG/DL (ref 70–99)
HCT VFR BLD AUTO: 42.9 % (ref 40–53)
HGB BLD-MCNC: 15 G/DL (ref 13.3–17.7)
IMM GRANULOCYTES # BLD: 0 10E3/UL
IMM GRANULOCYTES NFR BLD: 0 %
LYMPHOCYTES # BLD AUTO: 1.6 10E3/UL (ref 0.8–5.3)
LYMPHOCYTES NFR BLD AUTO: 29 %
MCH RBC QN AUTO: 31.6 PG (ref 26.5–33)
MCHC RBC AUTO-ENTMCNC: 35 G/DL (ref 31.5–36.5)
MCV RBC AUTO: 91 FL (ref 78–100)
MONOCYTES # BLD AUTO: 0.5 10E3/UL (ref 0–1.3)
MONOCYTES NFR BLD AUTO: 10 %
NEUTROPHILS # BLD AUTO: 2.9 10E3/UL (ref 1.6–8.3)
NEUTROPHILS NFR BLD AUTO: 53 %
NRBC # BLD AUTO: 0 10E3/UL
NRBC BLD AUTO-RTO: 0 /100
PLATELET # BLD AUTO: 198 10E3/UL (ref 150–450)
POTASSIUM SERPL-SCNC: 4.3 MMOL/L (ref 3.4–5.3)
RBC # BLD AUTO: 4.74 10E6/UL (ref 4.4–5.9)
SODIUM SERPL-SCNC: 135 MMOL/L (ref 136–145)
WBC # BLD AUTO: 5.4 10E3/UL (ref 4–11)

## 2023-06-01 PROCEDURE — 85004 AUTOMATED DIFF WBC COUNT: CPT | Mod: ZL

## 2023-06-01 PROCEDURE — 36415 COLL VENOUS BLD VENIPUNCTURE: CPT | Mod: ZL

## 2023-06-01 PROCEDURE — 99214 OFFICE O/P EST MOD 30 MIN: CPT | Performed by: FAMILY MEDICINE

## 2023-06-01 PROCEDURE — G0463 HOSPITAL OUTPT CLINIC VISIT: HCPCS

## 2023-06-01 PROCEDURE — 80048 BASIC METABOLIC PNL TOTAL CA: CPT | Mod: ZL

## 2023-06-01 RX ORDER — AMOXICILLIN 500 MG/1
CAPSULE ORAL
COMMUNITY
Start: 2023-05-08 | End: 2024-10-03

## 2023-06-01 RX ORDER — CALCIUM CARBONATE/VITAMIN D3 500-10/5ML
400 LIQUID (ML) ORAL DAILY
Qty: 180 CAPSULE | Refills: 2 | Status: SHIPPED | OUTPATIENT
Start: 2023-06-01

## 2023-06-01 RX ORDER — LISINOPRIL/HYDROCHLOROTHIAZIDE 10-12.5 MG
1 TABLET ORAL DAILY
Qty: 90 TABLET | Refills: 1 | Status: SHIPPED | OUTPATIENT
Start: 2023-06-01 | End: 2023-12-12

## 2023-06-01 ASSESSMENT — PAIN SCALES - GENERAL: PAINLEVEL: MODERATE PAIN (5)

## 2023-06-01 NOTE — PROGRESS NOTES
"  Assessment & Plan     Light-headed feeling  Resolved with change in BP meds   - CBC with Platelets & Differential; Future  - Basic metabolic panel; Future    Benign essential hypertension  BP and labs stable with new BP med  - CBC with Platelets & Differential; Future  - Basic metabolic panel; Future  - lisinopril-hydrochlorothiazide (ZESTORETIC) 10-12.5 MG tablet; Take 1 tablet by mouth daily    Chronic midline low back pain with right-sided sciatica  Failed conservative measures. Will add PT  - Physical Therapy Referral; Future    Hypomagnesemia  Decrease to daily for BID to help with diarrhea   - magnesium oxide 400 MG CAPS; Take 400 mg by mouth daily    Left wrist pain  Contusion/sprain. 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 Offer splint - declined.                BMI:   Estimated body mass index is 31.38 kg/m  as calculated from the following:    Height as of 4/25/23: 1.803 m (5' 11\").    Weight as of this encounter: 102.1 kg (225 lb).           No follow-ups on file.    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 Yes     Are you following a low salt diet? Yes    Are your blood pressures ever more than 140 on the top number (systolic) OR more   than 90 on the bottom number (diastolic), for example 140/90? No    Feels much better with new BP med   No LH    Pain History:  When did you first notice your pain?  4 weeks   Have you seen anyone else for your pain? No  How has your pain affected your ability to work? Not applicable  Where in your body do you have pain? Musculoskeletal problem/pain  Onset/Duration: 4 weeks  Description  Location: wrist - left  Joint Swelling: No  Redness: No  Pain: " YES  Warmth: No  Intensity:  mild, moderate, severe  Progression of Symptoms:  constant  Accompanying signs and symptoms:   Fevers: No  Numbness/tingling/weakness: No  History  Trauma to the area: YES- fall and caught self  Recent illness:  No  Previous similar problem: No  Previous evaluation:  No  Precipitating or alleviating factors:  Aggravating factors include: overuse  Therapies tried and outcome: NSAID - advil  Fell in woods - getting some better but now worse  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      Objective    /64 (BP Location: Right arm, Patient Position: Sitting, Cuff Size: Adult Large)   Pulse 86   Temp 97.6  F (36.4  C) (Tympanic)   Resp 18   Wt 102.1 kg (225 lb)   SpO2 98%   BMI 31.38 kg/m    Body mass index is 31.38 kg/m .  Physical Exam   GENERAL: healthy, alert and no distress  NECK: no adenopathy, no asymmetry, masses, or scars and thyroid normal to palpation  RESP: lungs clear to auscultation - no rales, rhonchi or wheezes  CV: regular rate and rhythm, normal S1 S2, no S3 or S4, no murmur, click or rub, no peripheral edema and peripheral pulses strong  MS: no gross musculoskeletal defects noted, no edema-- some tenderness on palp and rom of left wrist - no swelling /full ROM  BACK: no CVA tenderness,  paralumbar tenderness and stiffness

## 2023-06-16 ENCOUNTER — THERAPY VISIT (OUTPATIENT)
Dept: PHYSICAL THERAPY | Facility: HOSPITAL | Age: 83
End: 2023-06-16
Attending: FAMILY MEDICINE
Payer: MEDICARE

## 2023-06-16 DIAGNOSIS — G89.29 CHRONIC MIDLINE LOW BACK PAIN WITH SCIATICA, SCIATICA LATERALITY UNSPECIFIED: Primary | ICD-10-CM

## 2023-06-16 DIAGNOSIS — M54.40 CHRONIC MIDLINE LOW BACK PAIN WITH SCIATICA, SCIATICA LATERALITY UNSPECIFIED: Primary | ICD-10-CM

## 2023-06-16 DIAGNOSIS — G89.29 CHRONIC MIDLINE LOW BACK PAIN WITH RIGHT-SIDED SCIATICA: ICD-10-CM

## 2023-06-16 DIAGNOSIS — M54.41 CHRONIC MIDLINE LOW BACK PAIN WITH RIGHT-SIDED SCIATICA: ICD-10-CM

## 2023-06-16 PROCEDURE — 97161 PT EVAL LOW COMPLEX 20 MIN: CPT | Mod: GP | Performed by: PHYSICAL THERAPIST

## 2023-06-16 PROCEDURE — 97110 THERAPEUTIC EXERCISES: CPT | Mod: GP | Performed by: PHYSICAL THERAPIST

## 2023-06-16 NOTE — PROGRESS NOTES
PHYSICAL THERAPY EVALUATION  Type of Visit: Evaluation    See electronic medical record for Abuse and Falls Screening details.    Subjective     Presenting condition or subjective complaint: Patient reports that he has been experiencing low back pain for 5-6 years. Has been treated by a chiropractor with short term relief. Noted left leg pain with standing when getting out of bed and improved with walking. Recently ordered a new mattress which will be arriving this week.  Date of onset: 06/16/23    Relevant medical history: Arthritis; Cancer; Diabetes; Dizziness; High blood pressure; Implanted device; Pain at night or rest; Vision problems   Dates & types of surgery: Refer to medical history in chart.    Prior diagnostic imaging/testing results:       Prior therapy history for the same diagnosis, illness or injury: No      Prior Level of Function   Transfers: Independent  Ambulation: Independent  ADL: Independent  IADL: Independent    Living Environment  Social support: With a significant other or spouse   Type of home: House   Stairs to enter the home: Yes 4 Is there a railing: Yes   Ramp: No   Stairs inside the home: Yes 13 Is there a railing: Yes   Help at home:    Equipment owned:       Employment: No retired  Hobbies/Interests: hunting and fishing    Patient goals for therapy: Yard work, lifting, repetitive activities.         Objective    LUMBAR SPINE EVALUATION  PAIN: Pain Location: lumbar spine  Pain Quality: Aching  Pain Frequency: intermittent  Pain is Worst: daytime  Pain is Exacerbated By: forward bending, twisting, lying supine too long  Pain is Relieved By: otc medications and rest  INTEGUMENTARY (edema, incisions): WNL  POSTURE: WFL  GAIT: WFLWeightbearing Status: WBAT  Assistive Device(s): None  Gait Deviations: WFL  BALANCE/PROPRIOCEPTION: WFL  WEIGHTBEARING ALIGNMENT: WFL  NON-WEIGHTBEARING ALIGNMENT: WFL   ROM:   (Degrees) Left AROM Left PROM  Right AROM Right PROM   Hip Flexion WFL   Endrange  tightness with LBP     Hip Extension WFL - eliu right low back   WFL     Hip Abduction WFL   WFL     Hip Adduction     WFL - pulling in low back region     Hip Internal Rotation WFL   WFL     Hip External Rotation WFL   WFL     Knee Flexion 120   110     Knee Extension 0   0     Lumbar Side glide       Lumbar Flexion 50%   Lumbar Extension 0   Trunk sidebending left - pain right  Able to squat  DF in standing - can feel in low back  When lying supine: pain begins and travels up to back  Knee AROM:  Right KF: 110  Left KF: 120  STRENGTH: abdominals: 3+/5     MYOTOMES:     Left Right   T12-L3 (Hip Flexion) 5 5   L2-4 (Quads)  5 5   L4 (Ankle DF) 5 5   L5 (Great Toe Ext) 5 5   S1 (Toe Raise) 5 5      DTR S: WNL  CORD SIGNS: WNL  DERMATOMES: WNL  NEURAL TENSION: Right slump sit  FLEXIBILITY: Decreased piriformis L, Decreased hamstrings L, Decreased gastroc L, Decreased piriformis R, Decreased hip flexors R, Decreased hamstrings R, Decreased gastroc R  PELVIS/SI SPECIAL TESTS:  Negative     PALPATION:   + Tenderness At Location Left Right   Quadratus Lumborum + +   Erector Spinae + +   Piriformis  - -   PSIS - -   ASIS - -   Iliac Crest - -   Glut Medius - -   Greater Trochanter - -   Ischial Tuberosity - -   Hamstrings - HS tightness +   HS to low back   Hip Flexors -   Feels right low back +   Vertebral  - -      Assessment & Plan   CLINICAL IMPRESSIONS   Medical Diagnosis: Chronic Midline LBP with Sciatica   Treatment Diagnosis: Low back pain with decreased right knee flexion, hip abductor/extensor/ abdomnal weakness, decreased LE flexibility, poor muscle firing pattern of LE and low back.   Impression/Assessment:   Patient is a 82 year old male with low back pain complaints.  The following significant findings have been identified: Pain, Decreased ROM/flexibility, Decreased joint mobility, Decreased strength and Decreased activity tolerance. These impairments interfere with their ability to perform recreational  activities and household chores as compared to previous level of function.      Clinical Decision Making (Complexity):   Clinical Presentation: Stable/Uncomplicated  Clinical Presentation Rationale: based on medical and personal factors listed in PT evaluation  Clinical Decision Making (Complexity): Low complexity     PLAN OF CARE  Treatment Interventions:  There ex, manual therapy  Modalities: Cryotherapy, Dry Needling, E-stim, Hot Pack, Mechanical Traction, Ultrasound     Long Term Goals   PT Goal 1  Goal Identifier: STG #1  Goal Description: Patient will be compliant with HEP.  Rationale: to maximize safety and independence with performance of ADLs and functional tasks  Target Date: 07/15/23  PT Goal 2  Goal Identifier: LTG #1  Goal Description: Patient will demonstrate improved right knee flexion and bilateral LE flexibility with reports of decreased irritation to the back with activities.  Rationale: to maximize safety and independence with performance of ADLs and functional tasks  Target Date: 08/14/23  PT Goal 3  Goal Identifier: LTG#2  Goal Description: Patient will demonstrate improved Hip abductor, Hip extensor, and abdominal strength with improved muscle firing pattern for reports of decreased LBP with activities.  Rationale: to maximize safety and independence with performance of ADLs and functional tasks  Target Date: 08/14/23  PT Goal 4  Goal Identifier: LTG #3  Goal Description: Patient will understand work simplification techniques and body mechanics to decrease irritation to the low back.  Rationale: to maximize safety and independence with performance of ADLs and functional tasks  Target Date: 08/14/23      Frequency of Treatment: 1-2x/week  Duration of Treatment: 8 weeks    Recommended Referrals to Other Professionals: None  Education Assessment:   Learner/Method: Patient;No Barriers to Learning;Pictures/Video    Risks and benefits of evaluation/treatment have been explained.    Patient/Family/caregiver agrees with Plan of Care.     Evaluation Time:   PT Eval, Low Complexity Minutes (93504): 40      Signing Clinician: CAROL MUSA [facility]                                                                                   OUTPATIENT PHYSICAL THERAPY    PLAN OF TREATMENT FOR OUTPATIENT REHABILITATION   Patient's Last Name, First Name, Carrillo Santamaria YOB: 1940   Provider's Name   [facility]   Medical Record No.  [id number]     Onset Date: 06/16/23  Start of Care Date:       Medical Diagnosis:  Chronic Midline LBP with Sciatica      PT Treatment Diagnosis:  Low back pain with decreased right knee flexion, hip abductor/extensor/ abdomnal weakness, decreased LE flexibility, poor muscle firing pattern of LE and low back. Plan of Treatment  Frequency/Duration: 1-2x/week/ 8 weeks    Certification date from 06/16/23 to 08/14/23         See note for plan of treatment details and functional goals     HAL VALDOVINOS, PT                         I CERTIFY THE NEED FOR THESE SERVICES FURNISHED UNDER        THIS PLAN OF TREATMENT AND WHILE UNDER MY CARE     (Physician attestation of this document indicates review and certification of the therapy plan).                  Referring Provider:  Mata Richardson      Initial Assessment  See Epic Evaluation-

## 2023-06-16 NOTE — PROGRESS NOTES
PHYSICAL THERAPY EVALUATION  Type of Visit: Evaluation    See electronic medical record for Abuse and Falls Screening details.    Subjective   Presenting condition or subjective complaint: Patient reports that he has been experiencing low back pain for 5-6 years. Has been treated by a chiropractor with short term relief. Noted left leg pain with standing when getting out of bed and improved with walking. Recently ordered a new mattress which will be arriving this week.  Date of onset:   6-  Relevant medical history: Arthritis; Cancer; Diabetes; Dizziness; High blood pressure; Implanted device; Pain at night or rest; Vision problems   Dates & types of surgery: Refer to medical history in chart.    Prior diagnostic imaging/testing results:       Prior therapy history for the same diagnosis, illness or injury: No      Prior Level of Function   Transfers: Independent  Ambulation: Independent  ADL: Independent  IADL:  Independent    Living Environment  Social support: With a significant other or spouse   Type of home: House   Stairs to enter the home: Yes 4 Is there a railing: Yes   Ramp: No   Stairs inside the home: Yes 13 Is there a railing: Yes   Help at home:    Equipment owned:       Employment: No retired  Hobbies/Interests: hunting and fishing    Patient goals for therapy: Yard work, lifting, repetitive activities.  Better functioning       Objective   LUMBAR SPINE EVALUATION  PAIN: Pain Location: lumbar spine  Pain Quality: Aching  Pain Frequency: intermittent  Pain is Worst: daytime  Pain is Exacerbated By: forward bending, twisting, lying supine too long  Pain is Relieved By: otc medications and rest  INTEGUMENTARY (edema, incisions): WNL  POSTURE: WFL  GAIT: WFLWeightbearing Status: WBAT  Assistive Device(s): None  Gait Deviations: WFL  BALANCE/PROPRIOCEPTION: WFL  WEIGHTBEARING ALIGNMENT: WFL  NON-WEIGHTBEARING ALIGNMENT: WFL   ROM:   (Degrees) Left AROM Left PROM  Right AROM Right PROM   Hip Flexion  WFL  Endrange tightness with LBP    Hip Extension WFL - eliu right low back  WFL    Hip Abduction WFL  WFL    Hip Adduction   WFL - pulling in low back region    Hip Internal Rotation WFL  WFL    Hip External Rotation WFL  WFL    Knee Flexion 120  110    Knee Extension 0  0    Lumbar Side glide     Lumbar Flexion 50%   Lumbar Extension 0   Trunk sidebending left - pain right  Able to squat  DF in standing - can feel in low back  When lying supine: pain begins and travels up to back  Knee AROM:  Right KF: 110  Left KF: 120  STRENGTH: abdominals: 3+/5    MYOTOMES:    Left Right   T12-L3 (Hip Flexion) 5 5   L2-4 (Quads)  5 5   L4 (Ankle DF) 5 5   L5 (Great Toe Ext) 5 5   S1 (Toe Raise) 5 5     DTR S: WNL  CORD SIGNS: WNL  DERMATOMES: WNL  NEURAL TENSION: Right slump sit  FLEXIBILITY: Decreased piriformis L, Decreased hamstrings L, Decreased gastroc L, Decreased piriformis R, Decreased hip flexors R, Decreased hamstrings R, Decreased gastroc R  PELVIS/SI SPECIAL TESTS:  Negative    PALPATION:   + Tenderness At Location Left Right   Quadratus Lumborum + +   Erector Spinae + +   Piriformis  - -   PSIS - -   ASIS - -   Iliac Crest - -   Glut Medius - -   Greater Trochanter - -   Ischial Tuberosity - -   Hamstrings - HS tightness +   HS to low back   Hip Flexors -   Feels right low back +   Vertebral  - -         Assessment & Plan   CLINICAL IMPRESSIONS   Medical Diagnosis:      Treatment Diagnosis:     Impression/Assessment: Patient is a 82 year old male with low back pain complaints.  The following significant findings have been identified: Pain, Decreased ROM/flexibility, Decreased joint mobility, Decreased strength and Decreased activity tolerance. These impairments interfere with their ability to perform recreational activities and household chores as compared to previous level of function.     Clinical Decision Making (Complexity):   Clinical Presentation: Stable/Uncomplicated  Clinical Presentation Rationale: based  on medical and personal factors listed in PT evaluation  Clinical Decision Making (Complexity): Low complexity    PLAN OF CARE  Treatment Interventions:  There ex, manual therapy  Modalities: Cryotherapy, Dry Needling, E-stim, Hot Pack, Mechanical Traction, Ultrasound    Long Term Goals            Frequency of Treatment:    Duration of Treatment:      Recommended Referrals to Other Professionals: None  Education Assessment:        Risks and benefits of evaluation/treatment have been explained.   Patient/Family/caregiver agrees with Plan of Care.     Evaluation Time:            Signing Clinician: HAL VALDOVINOS PT      [facility]                                                                                   OUTPATIENT PHYSICAL THERAPY      PLAN OF TREATMENT FOR OUTPATIENT REHABILITATION   Patient's Last Name, First Name, Carrillo Santamaria YOB: 1940   Provider's Name   [facility]   Medical Record No.  [id number]     Onset Date:    Start of Care Date:       Medical Diagnosis:         PT Treatment Diagnosis:    Plan of Treatment  Frequency/Duration:  /      Certification date from   to           See note for plan of treatment details and functional goals     HAL VALDOVINOS, PT                         I CERTIFY THE NEED FOR THESE SERVICES FURNISHED UNDER        THIS PLAN OF TREATMENT AND WHILE UNDER MY CARE     (Physician attestation of this document indicates review and certification of the therapy plan).                  Referring Provider:  Mata Richardson      Initial Assessment  See Epic Evaluation-

## 2023-06-23 ENCOUNTER — THERAPY VISIT (OUTPATIENT)
Dept: PHYSICAL THERAPY | Facility: HOSPITAL | Age: 83
End: 2023-06-23
Attending: FAMILY MEDICINE
Payer: MEDICARE

## 2023-06-23 DIAGNOSIS — M54.41 CHRONIC MIDLINE LOW BACK PAIN WITH RIGHT-SIDED SCIATICA: Primary | ICD-10-CM

## 2023-06-23 DIAGNOSIS — G89.29 CHRONIC MIDLINE LOW BACK PAIN WITH RIGHT-SIDED SCIATICA: Primary | ICD-10-CM

## 2023-06-23 PROCEDURE — 97140 MANUAL THERAPY 1/> REGIONS: CPT | Mod: GP | Performed by: PHYSICAL THERAPIST

## 2023-06-23 PROCEDURE — 97110 THERAPEUTIC EXERCISES: CPT | Mod: GP | Performed by: PHYSICAL THERAPIST

## 2023-06-26 DIAGNOSIS — N40.1 BPH WITH OBSTRUCTION/LOWER URINARY TRACT SYMPTOMS: ICD-10-CM

## 2023-06-26 DIAGNOSIS — N13.8 BPH WITH OBSTRUCTION/LOWER URINARY TRACT SYMPTOMS: ICD-10-CM

## 2023-06-27 ENCOUNTER — THERAPY VISIT (OUTPATIENT)
Dept: PHYSICAL THERAPY | Facility: HOSPITAL | Age: 83
End: 2023-06-27
Attending: FAMILY MEDICINE
Payer: MEDICARE

## 2023-06-27 DIAGNOSIS — M54.40 CHRONIC MIDLINE LOW BACK PAIN WITH SCIATICA, SCIATICA LATERALITY UNSPECIFIED: Primary | ICD-10-CM

## 2023-06-27 DIAGNOSIS — G89.29 CHRONIC MIDLINE LOW BACK PAIN WITH SCIATICA, SCIATICA LATERALITY UNSPECIFIED: Primary | ICD-10-CM

## 2023-06-27 PROCEDURE — 97110 THERAPEUTIC EXERCISES: CPT | Mod: GP | Performed by: PHYSICAL THERAPIST

## 2023-06-27 PROCEDURE — 97140 MANUAL THERAPY 1/> REGIONS: CPT | Mod: GP | Performed by: PHYSICAL THERAPIST

## 2023-06-27 RX ORDER — TAMSULOSIN HYDROCHLORIDE 0.4 MG/1
CAPSULE ORAL
Qty: 90 CAPSULE | Refills: 3 | Status: SHIPPED | OUTPATIENT
Start: 2023-06-27 | End: 2024-06-24

## 2023-07-12 ENCOUNTER — THERAPY VISIT (OUTPATIENT)
Dept: PHYSICAL THERAPY | Facility: HOSPITAL | Age: 83
End: 2023-07-12
Attending: FAMILY MEDICINE
Payer: MEDICARE

## 2023-07-12 DIAGNOSIS — M54.40 CHRONIC MIDLINE LOW BACK PAIN WITH SCIATICA, SCIATICA LATERALITY UNSPECIFIED: Primary | ICD-10-CM

## 2023-07-12 DIAGNOSIS — G89.29 CHRONIC MIDLINE LOW BACK PAIN WITH SCIATICA, SCIATICA LATERALITY UNSPECIFIED: Primary | ICD-10-CM

## 2023-07-12 PROCEDURE — 97140 MANUAL THERAPY 1/> REGIONS: CPT | Mod: GP,CQ

## 2023-07-12 PROCEDURE — 97110 THERAPEUTIC EXERCISES: CPT | Mod: GP,CQ

## 2023-07-17 NOTE — PROGRESS NOTES
"  Assessment & Plan     Type 2 diabetes mellitus without complication, without long-term current use of insulin (H)  Slight elevated. Less active with back issue. Discussed. NO change in meds. Continue current medications and behavioral changes.   Follow-up in 4 months   - Adult Eye  Referral; Future  - Albumin Random Urine Quantitative with Creat Ratio; Future  - Hemoglobin A1c; Future    Chronic midline low back pain with right-sided sciatica  Worse after last PT> will stay the course but did not tolerate last new treatment . He is to discuss with PT.    Left wrist pain  Not resolved. Add splint . Give it more time . Discussed in length conservative measures of OTC medications for pain, Ice/Heat, elevation and the concept of R.I.C.E.. Continue behavioral changes and proper body mechanics to allow for healing. Follow up as directed.   - Elastic Bandages & Supports (WRIST SPLINT/COCK-UP/RIGHT L) MISC; 1 each daily           BMI:   Estimated body mass index is 30.54 kg/m  as calculated from the following:    Height as of this encounter: 1.803 m (5' 11\").    Weight as of this encounter: 99.3 kg (219 lb).           No follow-ups on file.    Mata Richardson MD  [facility] - JESICA Vizcaino is a 82 year old, presenting for the following health issues:  Diabetes        6/1/2023    10:57 AM   Additional Questions   Roomed by brendan laguna     [facility]     Diabetes Follow-up    How often are you checking your blood sugar? A few times a week  What time of day are you checking your blood sugars (select all that apply)?  Before meals  Have you had any blood sugars above 200?  No  Have you had any blood sugars below 70?  No    What symptoms do you notice when your blood sugar is low?  None    What concerns do you have today about your diabetes? None     Do you have any of these symptoms? (Select all that apply)  No numbness or tingling in feet.  No redness, sores or blisters on feet.  No " "complaints of excessive thirst.  No reports of blurry vision.  No significant changes to weight.    Have you had a diabetic eye exam in the last 12 months? No            Hyperlipidemia Follow-Up      Are you regularly taking any medication or supplement to lower your cholesterol?   No    Are you having muscle aches or other side effects that you think could be caused by your cholesterol lowering medication?  No    Hypertension Follow-up      Do you check your blood pressure regularly outside of the clinic? Yes once in a while     Are you following a low salt diet? Yes    Are your blood pressures ever more than 140 on the top number (systolic) OR more   than 90 on the bottom number (diastolic), for example 140/90? No    BP Readings from Last 2 Encounters:   07/18/23 (!) 142/78   06/01/23 126/64     Hemoglobin A1C (%)   Date Value   01/18/2023 6.2 (H)   07/11/2022 6.1 (H)   06/25/2021 6.5 (H)   02/23/2021 6.3 (H)     LDL Cholesterol Calculated (mg/dL)   Date Value   01/18/2023 84   11/29/2021 98   09/15/2020 103 (H)   05/07/2019 100 (H)                 Review of Systems   Constitutional, HEENT, cardiovascular, pulmonary, gi and gu systems are negative, except as otherwise noted.      Objective    BP (!) 142/78 (BP Location: Left arm, Patient Position: Sitting, Cuff Size: Adult Regular)   Pulse 97   Temp 97.7  F (36.5  C) (Tympanic)   Ht 1.803 m (5' 11\")   Wt 99.3 kg (219 lb)   SpO2 99%   BMI 30.54 kg/m    Body mass index is 30.54 kg/m .  Physical Exam   GENERAL: healthy, alert and no distress  NECK: no adenopathy, no asymmetry, masses, or scars and thyroid normal to palpation  RESP: lungs clear to auscultation - no rales, rhonchi or wheezes  CV: regular rate and rhythm, normal S1 S2, no S3 or S4, no murmur, click or rub, no peripheral edema and peripheral pulses strong  ABDOMEN: soft, nontender, no hepatosplenomegaly, no masses and bowel sounds normal  MS: left wrist no gross musculoskeletal defects noted, no " edema, tender and some pain with ROM        Results for orders placed or performed in visit on 07/18/23   Albumin Random Urine Quantitative with Creat Ratio     Status: None   Result Value Ref Range    Creatinine Urine mg/dL 19.5 mg/dL    Albumin Urine mg/L <12.0 mg/L    Albumin Urine mg/g Cr     Hemoglobin A1c     Status: Abnormal   Result Value Ref Range    Estimated Average Glucose 154 mg/dL    Hemoglobin A1C 7.0 (H) <5.7 %

## 2023-07-18 ENCOUNTER — OFFICE VISIT (OUTPATIENT)
Dept: FAMILY MEDICINE | Facility: OTHER | Age: 83
End: 2023-07-18
Attending: FAMILY MEDICINE
Payer: MEDICARE

## 2023-07-18 ENCOUNTER — LAB (OUTPATIENT)
Dept: LAB | Facility: OTHER | Age: 83
End: 2023-07-18
Attending: FAMILY MEDICINE
Payer: MEDICARE

## 2023-07-18 VITALS
WEIGHT: 219 LBS | HEART RATE: 97 BPM | BODY MASS INDEX: 30.66 KG/M2 | DIASTOLIC BLOOD PRESSURE: 78 MMHG | TEMPERATURE: 97.7 F | SYSTOLIC BLOOD PRESSURE: 142 MMHG | OXYGEN SATURATION: 99 % | HEIGHT: 71 IN

## 2023-07-18 DIAGNOSIS — M25.532 LEFT WRIST PAIN: ICD-10-CM

## 2023-07-18 DIAGNOSIS — G89.29 CHRONIC MIDLINE LOW BACK PAIN WITH RIGHT-SIDED SCIATICA: ICD-10-CM

## 2023-07-18 DIAGNOSIS — E11.9 TYPE 2 DIABETES MELLITUS WITHOUT COMPLICATION, WITHOUT LONG-TERM CURRENT USE OF INSULIN (H): ICD-10-CM

## 2023-07-18 DIAGNOSIS — M54.41 CHRONIC MIDLINE LOW BACK PAIN WITH RIGHT-SIDED SCIATICA: ICD-10-CM

## 2023-07-18 DIAGNOSIS — E11.9 TYPE 2 DIABETES MELLITUS WITHOUT COMPLICATION, WITHOUT LONG-TERM CURRENT USE OF INSULIN (H): Primary | ICD-10-CM

## 2023-07-18 LAB
CREAT UR-MCNC: 19.5 MG/DL
EST. AVERAGE GLUCOSE BLD GHB EST-MCNC: 154 MG/DL
HBA1C MFR BLD: 7 %
MICROALBUMIN UR-MCNC: <12 MG/L
MICROALBUMIN/CREAT UR: NORMAL MG/G{CREAT}

## 2023-07-18 PROCEDURE — G0463 HOSPITAL OUTPT CLINIC VISIT: HCPCS | Performed by: FAMILY MEDICINE

## 2023-07-18 PROCEDURE — 82570 ASSAY OF URINE CREATININE: CPT | Mod: ZL

## 2023-07-18 PROCEDURE — 83036 HEMOGLOBIN GLYCOSYLATED A1C: CPT | Mod: ZL

## 2023-07-18 PROCEDURE — 36415 COLL VENOUS BLD VENIPUNCTURE: CPT | Mod: ZL

## 2023-07-18 PROCEDURE — 99214 OFFICE O/P EST MOD 30 MIN: CPT | Performed by: FAMILY MEDICINE

## 2023-07-18 ASSESSMENT — PAIN SCALES - GENERAL: PAINLEVEL: SEVERE PAIN (6)

## 2023-07-19 ENCOUNTER — THERAPY VISIT (OUTPATIENT)
Dept: PHYSICAL THERAPY | Facility: HOSPITAL | Age: 83
End: 2023-07-19
Attending: FAMILY MEDICINE
Payer: MEDICARE

## 2023-07-19 DIAGNOSIS — M54.40 CHRONIC MIDLINE LOW BACK PAIN WITH SCIATICA, SCIATICA LATERALITY UNSPECIFIED: Primary | ICD-10-CM

## 2023-07-19 DIAGNOSIS — G89.29 CHRONIC MIDLINE LOW BACK PAIN WITH SCIATICA, SCIATICA LATERALITY UNSPECIFIED: Primary | ICD-10-CM

## 2023-07-19 PROCEDURE — 97110 THERAPEUTIC EXERCISES: CPT | Mod: GP | Performed by: PHYSICAL THERAPIST

## 2023-07-26 ENCOUNTER — THERAPY VISIT (OUTPATIENT)
Dept: PHYSICAL THERAPY | Facility: HOSPITAL | Age: 83
End: 2023-07-26
Attending: FAMILY MEDICINE
Payer: MEDICARE

## 2023-07-26 DIAGNOSIS — G89.29 CHRONIC MIDLINE LOW BACK PAIN WITH SCIATICA, SCIATICA LATERALITY UNSPECIFIED: Primary | ICD-10-CM

## 2023-07-26 DIAGNOSIS — M54.40 CHRONIC MIDLINE LOW BACK PAIN WITH SCIATICA, SCIATICA LATERALITY UNSPECIFIED: Primary | ICD-10-CM

## 2023-07-26 PROCEDURE — 97110 THERAPEUTIC EXERCISES: CPT | Mod: GP,CQ

## 2023-07-27 ENCOUNTER — TRANSFERRED RECORDS (OUTPATIENT)
Dept: HEALTH INFORMATION MANAGEMENT | Facility: CLINIC | Age: 83
End: 2023-07-27

## 2023-07-27 LAB — RETINOPATHY: NEGATIVE

## 2023-08-02 ENCOUNTER — THERAPY VISIT (OUTPATIENT)
Dept: PHYSICAL THERAPY | Facility: HOSPITAL | Age: 83
End: 2023-08-02
Attending: FAMILY MEDICINE
Payer: MEDICARE

## 2023-08-02 DIAGNOSIS — G89.29 CHRONIC MIDLINE LOW BACK PAIN WITH SCIATICA, SCIATICA LATERALITY UNSPECIFIED: Primary | ICD-10-CM

## 2023-08-02 DIAGNOSIS — M54.40 CHRONIC MIDLINE LOW BACK PAIN WITH SCIATICA, SCIATICA LATERALITY UNSPECIFIED: Primary | ICD-10-CM

## 2023-08-02 PROCEDURE — 97110 THERAPEUTIC EXERCISES: CPT | Mod: GP | Performed by: PHYSICAL THERAPIST

## 2023-08-02 NOTE — PROGRESS NOTES
08/02/23 0500   Appointment Info   Signing clinician's name / credentials Bijal Long PT, CLT-SAM   Visits Used 7 Medicare   Medical Diagnosis Chronic Midline LBP with Sciatica   PT Tx Diagnosis Low back pain with decreased right knee flexion, hip abductor/extensor/ abdomnal weakness, decreased LE flexibility, poor muscle firing pattern of LE and low back.   Precautions/Limitations exercise per  tolerance   Progress Note/Certification   Onset of illness/injury or Date of Surgery 06/16/23   Therapy Frequency 1-2x/week   Predicted Duration 8 weeks   Certification date from 06/16/23   Certification date to 08/14/23   Progress Note Due Date 07/15/23   GOALS   PT Goals 2;3;4   PT Goal 1   Goal Identifier STG #1   Goal Description Patient will be compliant with HEP.   Rationale to maximize safety and independence with performance of ADLs and functional tasks   Target Date 07/15/23   PT Goal 2   Goal Identifier LTG #1   Goal Description Patient will demonstrate improved right knee flexion and bilateral LE flexibility with reports of decreased irritation to the back with activities.   Rationale to maximize safety and independence with performance of ADLs and functional tasks   Goal Progress Goal met   Target Date 08/14/23   Date Met 08/02/23   PT Goal 3   Goal Identifier LTG#2   Goal Description Patient will demonstrate improved Hip abductor, Hip extensor, and abdominal strength with improved muscle firing pattern for reports of decreased LBP with activities.   Rationale to maximize safety and independence with performance of ADLs and functional tasks   Goal Progress Met   Target Date 08/14/23   Date Met 08/02/23   PT Goal 4   Goal Identifier LTG #3   Goal Description Patient will understand work simplification techniques and body mechanics to decrease irritation to the low back.   Rationale to maximize safety and independence with performance of ADLs and functional tasks   Goal Progress Met   Target Date 08/14/23  "  Date Met 08/02/23   Subjective Report   Subjective Report Patient reports that he has been doing very well for a week with no back pain today. Reports of compliance with his HEP.   Objective Measures   Objective Measures Objective Measure 1;Objective Measure 2;Objective Measure 3;Objective Measure 4   Objective Measure 1   Objective Measure Trunk and right knee motion   Details WFL   Objective Measure 2   Objective Measure Hip abductor, extensor and abdominal strength   Details 5/5 hip abductors and extensors. 4/5 abdominals   Objective Measure 3   Objective Measure LE muscle firing pattern   Details Patient able to fire gluteals   Objective Measure 4   Objective Measure LE flexibility   Details Improved   Treatment Interventions (PT)   Interventions Therapeutic Procedure/Exercise   Therapeutic Procedure/Exercise   Therapeutic Procedures: strength, endurance, ROM, flexibillity minutes (42559) 25   Therapeutic Procedures Ther Proc 2   Ther Proc 1 Flexibility   Ther Proc 1 - Details Wall HC stretch (1/3/10\" B)HS stretch  in supine (1/3/10\" B), Knee to chest (1/3/10\" B)   Ther Proc 2 Strength   Ther Proc 2 - Details Supine LTR, SKTC with use of the towel with nerve glides, bridging 2x10 reps, piriformis stretch bilateral, prone on two pillows tolerated without any increase in pain.   Reassessment   Education   Learner/Method Patient;No Barriers to Learning   Plan   Home program Access Code: [email or web address]  URL: [email or web address]  Date: 07/19/2023  Prepared by: Bijal Long    Exercises  - Supine Transversus Abdominis Bracing - Hands on Stomach  - 1 x daily - 3 x weekly - 1 sets - 10 reps - 5seconds hold  - Beginner Knee Fold  - 1 x daily - 3 x weekly - 1 sets - 10 reps - 5 seconds hold   Plan for next session Patient discharge. Will continue independently with HEP and symptom management.         DISCHARGE  Reason for Discharge: Patient has met all goals.    Equipment Issued: None    Discharge Plan: " Patient to continue home program.    Referring Provider:  Mata Richardson

## 2023-08-21 DIAGNOSIS — R69 DIAGNOSIS UNKNOWN: ICD-10-CM

## 2023-08-22 RX ORDER — TRAZODONE HYDROCHLORIDE 50 MG/1
TABLET, FILM COATED ORAL
Qty: 90 TABLET | Refills: 3 | Status: SHIPPED | OUTPATIENT
Start: 2023-08-22

## 2023-09-30 ENCOUNTER — IMMUNIZATION (OUTPATIENT)
Dept: FAMILY MEDICINE | Facility: OTHER | Age: 83
End: 2023-09-30
Attending: FAMILY MEDICINE
Payer: MEDICARE

## 2023-09-30 PROCEDURE — G0008 ADMIN INFLUENZA VIRUS VAC: HCPCS

## 2023-10-27 ENCOUNTER — IMMUNIZATION (OUTPATIENT)
Dept: FAMILY MEDICINE | Facility: OTHER | Age: 83
End: 2023-10-27
Attending: FAMILY MEDICINE
Payer: MEDICARE

## 2023-10-27 PROCEDURE — 91320 SARSCV2 VAC 30MCG TRS-SUC IM: CPT

## 2023-11-08 DIAGNOSIS — E11.9 TYPE 2 DIABETES MELLITUS WITHOUT COMPLICATION, WITHOUT LONG-TERM CURRENT USE OF INSULIN (H): Primary | ICD-10-CM

## 2023-11-09 RX ORDER — METFORMIN HCL 500 MG
TABLET, EXTENDED RELEASE 24 HR ORAL
Qty: 270 TABLET | Refills: 0 | Status: SHIPPED | OUTPATIENT
Start: 2023-11-09 | End: 2024-02-28

## 2023-11-09 NOTE — TELEPHONE ENCOUNTER
Metformin      Last Written Prescription Date:  1/18/23  Last Fill Quantity: 270,   # refills: 3  Last Office Visit: 7/18/23  Future Office visit:    Next 5 appointments (look out 90 days)      Nov 20, 2023  8:45 AM  (Arrive by 8:30 AM)  SHORT with Mata Richardson MD  [facility] [place] ([facility] [place] ) [address]  [place] [address]  [phone]             Routing refill request to provider for review/approval because:

## 2023-11-20 ENCOUNTER — LAB (OUTPATIENT)
Dept: LAB | Facility: OTHER | Age: 83
End: 2023-11-20
Payer: MEDICARE

## 2023-11-20 ENCOUNTER — OFFICE VISIT (OUTPATIENT)
Dept: FAMILY MEDICINE | Facility: OTHER | Age: 83
End: 2023-11-20
Attending: FAMILY MEDICINE
Payer: MEDICARE

## 2023-11-20 VITALS
SYSTOLIC BLOOD PRESSURE: 136 MMHG | HEART RATE: 96 BPM | WEIGHT: 217.2 LBS | TEMPERATURE: 97.2 F | BODY MASS INDEX: 30.29 KG/M2 | OXYGEN SATURATION: 97 % | DIASTOLIC BLOOD PRESSURE: 70 MMHG

## 2023-11-20 DIAGNOSIS — N13.8 BPH WITH OBSTRUCTION/LOWER URINARY TRACT SYMPTOMS: ICD-10-CM

## 2023-11-20 DIAGNOSIS — E78.5 HYPERLIPIDEMIA LDL GOAL <100: ICD-10-CM

## 2023-11-20 DIAGNOSIS — I10 ESSENTIAL HYPERTENSION WITH GOAL BLOOD PRESSURE LESS THAN 140/90: ICD-10-CM

## 2023-11-20 DIAGNOSIS — E11.9 TYPE 2 DIABETES MELLITUS WITHOUT COMPLICATION, WITHOUT LONG-TERM CURRENT USE OF INSULIN (H): ICD-10-CM

## 2023-11-20 DIAGNOSIS — N40.1 BPH WITH OBSTRUCTION/LOWER URINARY TRACT SYMPTOMS: ICD-10-CM

## 2023-11-20 DIAGNOSIS — K22.70 BARRETT'S ESOPHAGUS WITHOUT DYSPLASIA: ICD-10-CM

## 2023-11-20 DIAGNOSIS — E11.9 TYPE 2 DIABETES MELLITUS WITHOUT COMPLICATION, WITHOUT LONG-TERM CURRENT USE OF INSULIN (H): Primary | ICD-10-CM

## 2023-11-20 LAB
ALBUMIN SERPL BCG-MCNC: 4.7 G/DL (ref 3.5–5.2)
ALP SERPL-CCNC: 76 U/L (ref 40–150)
ALT SERPL W P-5'-P-CCNC: 29 U/L (ref 0–70)
ANION GAP SERPL CALCULATED.3IONS-SCNC: 10 MMOL/L (ref 7–15)
AST SERPL W P-5'-P-CCNC: 25 U/L (ref 0–45)
BASOPHILS # BLD AUTO: 0 10E3/UL (ref 0–0.2)
BASOPHILS NFR BLD AUTO: 1 %
BILIRUB SERPL-MCNC: 0.6 MG/DL
BUN SERPL-MCNC: 15 MG/DL (ref 8–23)
CALCIUM SERPL-MCNC: 10.3 MG/DL (ref 8.8–10.2)
CHLORIDE SERPL-SCNC: 97 MMOL/L (ref 98–107)
CHOLEST SERPL-MCNC: 170 MG/DL
CREAT SERPL-MCNC: 0.75 MG/DL (ref 0.67–1.17)
DEPRECATED HCO3 PLAS-SCNC: 28 MMOL/L (ref 22–29)
EGFRCR SERPLBLD CKD-EPI 2021: >90 ML/MIN/1.73M2
EOSINOPHIL # BLD AUTO: 0.2 10E3/UL (ref 0–0.7)
EOSINOPHIL NFR BLD AUTO: 3 %
ERYTHROCYTE [DISTWIDTH] IN BLOOD BY AUTOMATED COUNT: 12 % (ref 10–15)
EST. AVERAGE GLUCOSE BLD GHB EST-MCNC: 137 MG/DL
GLUCOSE SERPL-MCNC: 137 MG/DL (ref 70–99)
HBA1C MFR BLD: 6.4 %
HCT VFR BLD AUTO: 44.1 % (ref 40–53)
HDLC SERPL-MCNC: 47 MG/DL
HGB BLD-MCNC: 15.6 G/DL (ref 13.3–17.7)
IMM GRANULOCYTES # BLD: 0 10E3/UL
IMM GRANULOCYTES NFR BLD: 0 %
LDLC SERPL CALC-MCNC: 86 MG/DL
LYMPHOCYTES # BLD AUTO: 1.8 10E3/UL (ref 0.8–5.3)
LYMPHOCYTES NFR BLD AUTO: 30 %
MCH RBC QN AUTO: 31.6 PG (ref 26.5–33)
MCHC RBC AUTO-ENTMCNC: 35.4 G/DL (ref 31.5–36.5)
MCV RBC AUTO: 89 FL (ref 78–100)
MONOCYTES # BLD AUTO: 0.5 10E3/UL (ref 0–1.3)
MONOCYTES NFR BLD AUTO: 9 %
NEUTROPHILS # BLD AUTO: 3.3 10E3/UL (ref 1.6–8.3)
NEUTROPHILS NFR BLD AUTO: 57 %
NONHDLC SERPL-MCNC: 123 MG/DL
NRBC # BLD AUTO: 0 10E3/UL
NRBC BLD AUTO-RTO: 0 /100
PLATELET # BLD AUTO: 180 10E3/UL (ref 150–450)
POTASSIUM SERPL-SCNC: 4.3 MMOL/L (ref 3.4–5.3)
PROT SERPL-MCNC: 7.7 G/DL (ref 6.4–8.3)
PSA SERPL DL<=0.01 NG/ML-MCNC: 1.13 NG/ML
RBC # BLD AUTO: 4.94 10E6/UL (ref 4.4–5.9)
SODIUM SERPL-SCNC: 135 MMOL/L (ref 135–145)
TRIGL SERPL-MCNC: 184 MG/DL
WBC # BLD AUTO: 5.8 10E3/UL (ref 4–11)

## 2023-11-20 PROCEDURE — 80053 COMPREHEN METABOLIC PANEL: CPT | Mod: ZL

## 2023-11-20 PROCEDURE — 84153 ASSAY OF PSA TOTAL: CPT | Mod: ZL

## 2023-11-20 PROCEDURE — G0463 HOSPITAL OUTPT CLINIC VISIT: HCPCS

## 2023-11-20 PROCEDURE — 80061 LIPID PANEL: CPT | Mod: ZL

## 2023-11-20 PROCEDURE — 99214 OFFICE O/P EST MOD 30 MIN: CPT | Performed by: FAMILY MEDICINE

## 2023-11-20 PROCEDURE — 36415 COLL VENOUS BLD VENIPUNCTURE: CPT | Mod: ZL

## 2023-11-20 PROCEDURE — 83036 HEMOGLOBIN GLYCOSYLATED A1C: CPT | Mod: ZL

## 2023-11-20 PROCEDURE — 85025 COMPLETE CBC W/AUTO DIFF WBC: CPT | Mod: ZL

## 2023-11-20 RX ORDER — PANTOPRAZOLE SODIUM 40 MG/1
40 TABLET, DELAYED RELEASE ORAL DAILY
Qty: 90 TABLET | Refills: 3 | Status: SHIPPED | OUTPATIENT
Start: 2023-11-20

## 2023-11-20 ASSESSMENT — PAIN SCALES - GENERAL: PAINLEVEL: MODERATE PAIN (5)

## 2023-11-20 NOTE — PROGRESS NOTES
"  Assessment & Plan     Type 2 diabetes mellitus without complication, without long-term current use of insulin (H)  Good control. Continue current medications and behavioral changes.   Follow-up in 6 months   - Comprehensive metabolic panel; Future  - CBC with Platelets & Differential; Future  - Lipid Profile; Future  - Hemoglobin A1c; Future    Essential hypertension with goal blood pressure less than 140/90  Stable - good control   - Comprehensive metabolic panel; Future  - CBC with Platelets & Differential; Future  - Lipid Profile; Future  - Hemoglobin A1c; Future    Hyperlipidemia LDL goal <100  Stable - not able to take statin   - Comprehensive metabolic panel; Future  - CBC with Platelets & Differential; Future  - Lipid Profile; Future  - Hemoglobin A1c; Future    BPH with obstruction/lower urinary tract symptoms  Good control   - PSA tumor marker; Future    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 EGD up to date   - pantoprazole (PROTONIX) 40 MG EC tablet; Take 1 tablet (40 mg) by mouth daily             BMI:   Estimated body mass index is 30.29 kg/m  as calculated from the following:    Height as of 7/18/23: 1.803 m (5' 11\").    Weight as of this encounter: 98.5 kg (217 lb 3.2 oz).         No follow-ups on file.    Mata Richardson MD  [facility] - JESICA Vizcaino is a 82 year old, presenting for the following health issues:  Hypertension, Lipids, and Diabetes        11/20/2023     8:31 AM   Additional Questions   Roomed by Alphonse Taylor   Accompanied by Self         11/20/2023     8:31 AM   Patient Reported Additional Medications   Patient reports taking the following new medications none       HPI     Diabetes Follow-up    How often are you checking your blood sugar? A few times a week. Monday, Wednesday, and Friday   What time of day are you checking your blood sugars (select all that apply)?   In the morning   Have you had any blood sugars above 200?  No  Have " you had any blood sugars below 70?  No  What symptoms do you notice when your blood sugar is low?  None  What concerns do you have today about your diabetes? None   Do you have any of these symptoms? (Select all that apply)  Numbness in feet          Hyperlipidemia Follow-Up    Are you regularly taking any medication or supplement to lower your cholesterol?   No  Are you having muscle aches or other side effects that you think could be caused by your cholesterol lowering medication?  No    Hypertension Follow-up    Do you check your blood pressure regularly outside of the clinic? Yes once in awhile  Are you following a low salt diet? Yes  Are your blood pressures ever more than 140 on the top number (systolic) OR more   than 90 on the bottom number (diastolic), for example 140/90? No    BP Readings from Last 2 Encounters:   11/20/23 136/70   07/18/23 (!) 142/78     Hemoglobin A1C (%)   Date Value   07/18/2023 7.0 (H)   01/18/2023 6.2 (H)   06/25/2021 6.5 (H)   02/23/2021 6.3 (H)     LDL Cholesterol Calculated (mg/dL)   Date Value   01/18/2023 84   11/29/2021 98   09/15/2020 103 (H)   05/07/2019 100 (H)             Review of Systems   Constitutional, HEENT, cardiovascular, pulmonary, gi and gu systems are negative, except as otherwise noted.      Objective    /70 (BP Location: Right arm, Patient Position: Sitting, Cuff Size: Adult Large)   Pulse 96   Temp 97.2  F (36.2  C) (Tympanic)   Wt 98.5 kg (217 lb 3.2 oz)   SpO2 97%   BMI 30.29 kg/m    Body mass index is 30.29 kg/m .  Physical Exam   GENERAL: healthy, alert and no distress  NECK: no adenopathy, no asymmetry, masses, or scars and thyroid normal to palpation  RESP: lungs clear to auscultation - no rales, rhonchi or wheezes  CV: regular rate and rhythm, normal S1 S2, no S3 or S4, no murmur, click or rub, no peripheral edema and peripheral pulses strong  ABDOMEN: soft, nontender, no hepatosplenomegaly, no masses and bowel sounds normal  MS: no gross  musculoskeletal defects noted, no edema        Results for orders placed or performed in visit on 11/20/23   PSA tumor marker     Status: None   Result Value Ref Range    PSA Tumor Marker 1.13 ng/mL    Narrative    This result is obtained using the Roche Elecsys total PSA method on the enriqueta e601 immunoassay analyzer. Results obtained with different assay methods or kits cannot be used interchangeably.   Hemoglobin A1c     Status: Abnormal   Result Value Ref Range    Estimated Average Glucose 137 mg/dL    Hemoglobin A1C 6.4 (H) <5.7 %   Lipid Profile     Status: Abnormal   Result Value Ref Range    Cholesterol 170 <200 mg/dL    Triglycerides 184 (H) <150 mg/dL    Direct Measure HDL 47 >=40 mg/dL    LDL Cholesterol Calculated 86 <=100 mg/dL    Non HDL Cholesterol 123 <130 mg/dL    Narrative    Cholesterol  Desirable:  <200 mg/dL    Triglycerides  Normal:  Less than 150 mg/dL  Borderline High:  150-199 mg/dL  High:  200-499 mg/dL  Very High:  Greater than or equal to 500 mg/dL    Direct Measure HDL  Female:  Greater than or equal to 50 mg/dL   Male:  Greater than or equal to 40 mg/dL    LDL Cholesterol  Desirable:  <100mg/dL  Above Desirable:  100-129 mg/dL   Borderline High:  130-159 mg/dL   High:  160-189 mg/dL   Very High:  >= 190 mg/dL    Non HDL Cholesterol  Desirable:  130 mg/dL  Above Desirable:  130-159 mg/dL  Borderline High:  160-189 mg/dL  High:  190-219 mg/dL  Very High:  Greater than or equal to 220 mg/dL   Comprehensive metabolic panel     Status: Abnormal   Result Value Ref Range    Sodium 135 135 - 145 mmol/L    Potassium 4.3 3.4 - 5.3 mmol/L    Carbon Dioxide (CO2) 28 22 - 29 mmol/L    Anion Gap 10 7 - 15 mmol/L    Urea Nitrogen 15.0 8.0 - 23.0 mg/dL    Creatinine 0.75 0.67 - 1.17 mg/dL    GFR Estimate >90 >60 mL/min/1.73m2    Calcium 10.3 (H) 8.8 - 10.2 mg/dL    Chloride 97 (L) 98 - 107 mmol/L    Glucose 137 (H) 70 - 99 mg/dL    Alkaline Phosphatase 76 40 - 150 U/L    AST 25 0 - 45 U/L    ALT 29 0 -  70 U/L    Protein Total 7.7 6.4 - 8.3 g/dL    Albumin 4.7 3.5 - 5.2 g/dL    Bilirubin Total 0.6 <=1.2 mg/dL   CBC with platelets and differential     Status: None   Result Value Ref Range    WBC Count 5.8 4.0 - 11.0 10e3/uL    RBC Count 4.94 4.40 - 5.90 10e6/uL    Hemoglobin 15.6 13.3 - 17.7 g/dL    Hematocrit 44.1 40.0 - 53.0 %    MCV 89 78 - 100 fL    MCH 31.6 26.5 - 33.0 pg    MCHC 35.4 31.5 - 36.5 g/dL    RDW 12.0 10.0 - 15.0 %    Platelet Count 180 150 - 450 10e3/uL    % Neutrophils 57 %    % Lymphocytes 30 %    % Monocytes 9 %    % Eosinophils 3 %    % Basophils 1 %    % Immature Granulocytes 0 %    NRBCs per 100 WBC 0 <1 /100    Absolute Neutrophils 3.3 1.6 - 8.3 10e3/uL    Absolute Lymphocytes 1.8 0.8 - 5.3 10e3/uL    Absolute Monocytes 0.5 0.0 - 1.3 10e3/uL    Absolute Eosinophils 0.2 0.0 - 0.7 10e3/uL    Absolute Basophils 0.0 0.0 - 0.2 10e3/uL    Absolute Immature Granulocytes 0.0 <=0.4 10e3/uL    Absolute NRBCs 0.0 10e3/uL   CBC with Platelets & Differential     Status: None    Narrative    The following orders were created for panel order CBC with Platelets & Differential.  Procedure                               Abnormality         Status                     ---------                               -----------         ------                     CBC with platelets and d...[[id number]]                      Final result                 Please view results for these tests on the individual orders.

## 2023-12-01 ENCOUNTER — TELEPHONE (OUTPATIENT)
Dept: FAMILY MEDICINE | Facility: OTHER | Age: 83
End: 2023-12-01

## 2023-12-01 DIAGNOSIS — E11.9 TYPE 2 DIABETES MELLITUS WITHOUT COMPLICATION, WITHOUT LONG-TERM CURRENT USE OF INSULIN (H): ICD-10-CM

## 2023-12-01 NOTE — TELEPHONE ENCOUNTER
Pt's wife called pt's lancing device has broken and he needs a new one as well as  a refill on his test strips.

## 2023-12-04 RX ORDER — BLOOD SUGAR DIAGNOSTIC
STRIP MISCELLANEOUS
Qty: 50 STRIP | Refills: 11 | Status: SHIPPED | OUTPATIENT
Start: 2023-12-04

## 2023-12-04 RX ORDER — LANCING DEVICE
EACH MISCELLANEOUS
Qty: 1 EACH | Refills: 1 | Status: SHIPPED | OUTPATIENT
Start: 2023-12-04

## 2023-12-05 ENCOUNTER — TELEPHONE (OUTPATIENT)
Dept: FAMILY MEDICINE | Facility: OTHER | Age: 83
End: 2023-12-05

## 2023-12-05 DIAGNOSIS — E11.9 TYPE 2 DIABETES MELLITUS WITHOUT COMPLICATION, WITHOUT LONG-TERM CURRENT USE OF INSULIN (H): Primary | ICD-10-CM

## 2023-12-05 NOTE — TELEPHONE ENCOUNTER
Pt's wife called pt got his new lancing device, but previous lancets do not fit. Needs Rx for Accu-chek fast clix.

## 2023-12-11 DIAGNOSIS — I10 BENIGN ESSENTIAL HYPERTENSION: ICD-10-CM

## 2023-12-11 NOTE — TELEPHONE ENCOUNTER
Zestoretic      Last Written Prescription Date:  06/01/23  Last Fill Quantity: 90,   # refills: 1  Last Office Visit: 11/20/23  Future Office visit:

## 2023-12-12 RX ORDER — LISINOPRIL/HYDROCHLOROTHIAZIDE 10-12.5 MG
1 TABLET ORAL DAILY
Qty: 90 TABLET | Refills: 3 | Status: SHIPPED | OUTPATIENT
Start: 2023-12-12

## 2023-12-12 NOTE — TELEPHONE ENCOUNTER
Tamsulosin (Flomax) 0.4 MG capsule    Last Written Prescription Date:  06/21/2022  Last Fill Quantity: 90,   # refills:0  Last Office Visit: 06/01/2023     calm

## 2023-12-14 ENCOUNTER — OFFICE VISIT (OUTPATIENT)
Dept: FAMILY MEDICINE | Facility: OTHER | Age: 83
End: 2023-12-14
Attending: FAMILY MEDICINE
Payer: MEDICARE

## 2023-12-14 ENCOUNTER — TELEPHONE (OUTPATIENT)
Dept: FAMILY MEDICINE | Facility: OTHER | Age: 83
End: 2023-12-14

## 2023-12-14 VITALS
TEMPERATURE: 97.4 F | SYSTOLIC BLOOD PRESSURE: 134 MMHG | WEIGHT: 221.19 LBS | HEART RATE: 108 BPM | BODY MASS INDEX: 30.85 KG/M2 | DIASTOLIC BLOOD PRESSURE: 64 MMHG

## 2023-12-14 DIAGNOSIS — M94.0 COSTOCHONDRITIS: Primary | ICD-10-CM

## 2023-12-14 PROCEDURE — 99213 OFFICE O/P EST LOW 20 MIN: CPT | Performed by: FAMILY MEDICINE

## 2023-12-14 PROCEDURE — G0463 HOSPITAL OUTPT CLINIC VISIT: HCPCS

## 2023-12-14 ASSESSMENT — PAIN SCALES - GENERAL: PAINLEVEL: NO PAIN (0)

## 2023-12-14 NOTE — PROGRESS NOTES
"  Assessment & Plan     Costochondritis  Discussed. Discussed in length conservative measures of OTC medications for pain, Ice/Heat, elevation and the concept of R.I.C.E.. Continue behavioral changes and proper body mechanics to allow for healing. Follow up as directed.   Symptomatic treatment was discussed along when patient should call and/or come back into the clinic or go to ER/Urgent care. All questions answered.   Reassurance              BMI:   Estimated body mass index is 30.85 kg/m  as calculated from the following:    Height as of 7/18/23: 1.803 m (5' 11\").    Weight as of this encounter: 100.3 kg (221 lb 3 oz).           No follow-ups on file.    Mata Richardson MD  [facility] - JESICA Vizcaino is a 83 year old, presenting for the following health issues:  Cough        12/14/2023     9:58 AM   Additional Questions   Roomed by Jo Jones   Accompanied by None         12/14/2023     9:58 AM   Patient Reported Additional Medications   Patient reports taking the following new medications None       HPI     Concern - Cough   Onset: about 2-3 weeks ago   Description: patient reports to clinic today for a cough that has been ongoing for the last 2-3 weeks ago, patient reports pain in the center of the chest when coughing and when he moves a certain way.  Pain is not constant it comes and goes.   Progression of Symptoms:  same  Accompanying Signs & Symptoms: Cough, with pain in the center of the chest   Previous history of similar problem: none   Precipitating factors:        Worsened by: pain in chest is worsened by moving a certain way   Alleviating factors:        Improved by: none   Therapies tried and outcome: None    Some mid chest pain with cough or movement - goes away with change in positions.      Review of Systems   Constitutional, HEENT, cardiovascular, pulmonary, gi and gu systems are negative, except as otherwise noted.      Objective    /64   Pulse 108   " Temp 97.4  F (36.3  C) (Tympanic)   Wt 100.3 kg (221 lb 3 oz)   BMI 30.85 kg/m    Body mass index is 30.85 kg/m .  Physical Exam   GENERAL: healthy, alert and no distress  HENT: ear canals and TM's normal, nose and mouth without ulcers or lesions  NECK: no adenopathy, no asymmetry, masses, or scars and thyroid normal to palpation  RESP: lungs clear to auscultation - no rales, rhonchi or wheezes  CV: regular rate and rhythm, normal S1 S2, no S3 or S4, no murmur, click or rub, no peripheral edema and peripheral pulses strong  ABDOMEN: soft, nontender, no hepatosplenomegaly, no masses and bowel sounds normal  MS: no gross musculoskeletal defects noted, no edema  Chest - mild tender over costosternal junction

## 2023-12-14 NOTE — TELEPHONE ENCOUNTER
"Pt c/o dry cough  Onset few weeks to one month ago  Starting to make ribs hurt  States \"I am not sick\"  Rcvd RSV immmunizations approx one mouth ago -asking if that may be the cause?  Scheduled with PCP for today  "

## 2023-12-18 DIAGNOSIS — E11.21 TYPE 2 DIABETES MELLITUS WITH DIABETIC NEPHROPATHY, WITHOUT LONG-TERM CURRENT USE OF INSULIN (H): ICD-10-CM

## 2023-12-18 NOTE — TELEPHONE ENCOUNTER
Glimepiride  Last Written Prescription Date: 12/13/22  Last Fill Quantity: 45 # of Refills: 3  Last Office Visit: 12/14/23

## 2023-12-19 RX ORDER — GLIMEPIRIDE 1 MG/1
TABLET ORAL
Qty: 45 TABLET | Refills: 4 | Status: SHIPPED | OUTPATIENT
Start: 2023-12-19

## 2024-02-18 ENCOUNTER — HEALTH MAINTENANCE LETTER (OUTPATIENT)
Age: 84
End: 2024-02-18

## 2024-02-26 ENCOUNTER — OFFICE VISIT (OUTPATIENT)
Dept: FAMILY MEDICINE | Facility: OTHER | Age: 84
End: 2024-02-26
Attending: FAMILY MEDICINE
Payer: MEDICARE

## 2024-02-26 VITALS
BODY MASS INDEX: 30.71 KG/M2 | TEMPERATURE: 97 F | OXYGEN SATURATION: 97 % | SYSTOLIC BLOOD PRESSURE: 137 MMHG | HEART RATE: 109 BPM | RESPIRATION RATE: 16 BRPM | WEIGHT: 220.2 LBS | DIASTOLIC BLOOD PRESSURE: 77 MMHG

## 2024-02-26 DIAGNOSIS — E11.21 TYPE 2 DIABETES MELLITUS WITH DIABETIC NEPHROPATHY, WITHOUT LONG-TERM CURRENT USE OF INSULIN (H): Primary | ICD-10-CM

## 2024-02-26 DIAGNOSIS — E11.9 TYPE 2 DIABETES MELLITUS WITHOUT COMPLICATION, WITHOUT LONG-TERM CURRENT USE OF INSULIN (H): ICD-10-CM

## 2024-02-26 DIAGNOSIS — M65.30 TRIGGER FINGER, ACQUIRED: ICD-10-CM

## 2024-02-26 PROCEDURE — G0463 HOSPITAL OUTPT CLINIC VISIT: HCPCS

## 2024-02-26 PROCEDURE — 99213 OFFICE O/P EST LOW 20 MIN: CPT | Performed by: FAMILY MEDICINE

## 2024-02-26 ASSESSMENT — PAIN SCALES - GENERAL: PAINLEVEL: NO PAIN (0)

## 2024-02-26 NOTE — PROGRESS NOTES
"  Assessment & Plan     Type 2 diabetes mellitus with diabetic nephropathy, without long-term current use of insulin (H)  Pt is concerned of morning sugars. Does NOT check in afternoon. NO change in meds. Check BS in evening. Follow-up in May. Keep active     Trigger finger, acquired  Discussed- not bothersome to have surgery . Will watch. Reassurance given             BMI  Estimated body mass index is 30.71 kg/m  as calculated from the following:    Height as of 7/18/23: 1.803 m (5' 11\").    Weight as of this encounter: 99.9 kg (220 lb 3.2 oz).           No follow-ups on file.    Gabbi Vizcaino is a 83 year old, presenting for the following health issues:  Diabetes        2/26/2024     7:58 AM   Additional Questions   Roomed by Jason Bird   Accompanied by None         2/26/2024     7:58 AM   Patient Reported Additional Medications   Patient reports taking the following new medications None     HPI     Concern - Pinky concern on left hand   Onset: 1 month ago   Description:   Patient will bend his finger and then it kind of locks up and has to use his other fingers to loosen it.  Intensity: mild  Progression of Symptoms:  same  Accompanying Signs & Symptoms:  None   Previous history of similar problem:   None   Precipitating factors:   Worsened by: N/A  Alleviating factors:  Improved by: N/A      Mild locking   Irritable     Therapies Tried and outcome: None       Diabetes Follow-up    How often are you checking your blood sugar? A few times a week  What time of day are you checking your blood sugars (select all that apply)?   Morning time before breakfast   Have you had any blood sugars above 200?  No  Have you had any blood sugars below 70?  No  What symptoms do you notice when your blood sugar is low?  Not applicable  What concerns do you have today about your diabetes? Other: High blood sugar (Highest was 156)   Do you have any of these symptoms? (Select all that apply)  No numbness or tingling in " feet.  No redness, sores or blisters on feet.  No complaints of excessive thirst.  No reports of blurry vision.  No significant changes to weight.    Morning sugars little higher in last 4 weeks - 140-150  Does not check BS during the day .      BP Readings from Last 2 Encounters:   02/26/24 137/77   12/14/23 134/64     Hemoglobin A1C (%)   Date Value   11/20/2023 6.4 (H)   07/18/2023 7.0 (H)   06/25/2021 6.5 (H)   02/23/2021 6.3 (H)     LDL Cholesterol Calculated (mg/dL)   Date Value   11/20/2023 86   01/18/2023 84   09/15/2020 103 (H)   05/07/2019 100 (H)           Review of Systems  Constitutional, HEENT, cardiovascular, pulmonary, gi and gu systems are negative, except as otherwise noted.      Objective    /77 (BP Location: Right arm, Patient Position: Sitting, Cuff Size: Adult Large)   Pulse 109   Temp 97  F (36.1  C) (Tympanic)   Resp 16   Wt 99.9 kg (220 lb 3.2 oz)   SpO2 97%   BMI 30.71 kg/m    Body mass index is 30.71 kg/m .  Physical Exam   GENERAL: alert and no distress  MS: no gross musculoskeletal defects noted, no edema--- left 5th finger - locking some   PSYCH: mentation appears normal, affect normal/bright            Signed Electronically by: Mata Richardson MD

## 2024-02-27 ENCOUNTER — TELEPHONE (OUTPATIENT)
Dept: FAMILY MEDICINE | Facility: OTHER | Age: 84
End: 2024-02-27

## 2024-02-27 DIAGNOSIS — E11.9 TYPE 2 DIABETES MELLITUS WITHOUT COMPLICATION, WITHOUT LONG-TERM CURRENT USE OF INSULIN (H): Primary | ICD-10-CM

## 2024-02-27 NOTE — TELEPHONE ENCOUNTER
Metformin       Last Written Prescription Date:  11/09/2023  Last Fill Quantity: 270,   # refills: 0  Last Office Visit: 2/26/2024  Future Office visit:    Next 5 appointments (look out 90 days)      May 22, 2024  8:45 AM  (Arrive by 8:30 AM)  SHORT with Mata Richardson MD  [facility] - [place] ([facility] - [place] ) [address]  [place] [address]  [phone]

## 2024-02-27 NOTE — TELEPHONE ENCOUNTER
Pt's wife called pt's BG's have been running higher than in the past. He is unsure what his BG range should be. Pt saw Baylee Calixto yearly in the past and felt like this got him back on track. Pt would like to come in again to be seen. Please sign referral if approved.

## 2024-02-28 RX ORDER — METFORMIN HCL 500 MG
TABLET, EXTENDED RELEASE 24 HR ORAL
Qty: 270 TABLET | Refills: 3 | Status: SHIPPED | OUTPATIENT
Start: 2024-02-28

## 2024-03-04 ENCOUNTER — HOSPITAL ENCOUNTER (OUTPATIENT)
Dept: EDUCATION SERVICES | Facility: HOSPITAL | Age: 84
Discharge: HOME | End: 2024-03-04
Attending: FAMILY MEDICINE | Admitting: FAMILY MEDICINE
Payer: MEDICARE

## 2024-03-04 VITALS
OXYGEN SATURATION: 98 % | WEIGHT: 215 LBS | BODY MASS INDEX: 30.1 KG/M2 | DIASTOLIC BLOOD PRESSURE: 64 MMHG | SYSTOLIC BLOOD PRESSURE: 126 MMHG | HEART RATE: 82 BPM | HEIGHT: 71 IN | RESPIRATION RATE: 16 BRPM

## 2024-03-04 PROCEDURE — G0108 DIAB MANAGE TRN  PER INDIV: HCPCS

## 2024-03-04 ASSESSMENT — PAIN SCALES - GENERAL: PAINLEVEL: NO PAIN (0)

## 2024-03-04 NOTE — PROGRESS NOTES
Diabetes Self-Management Education & Support    Presents for: Individual review    Type of Service: In Person Visit    ASSESSMENT:  Andrew, 83 year old, returns to [facility] for diabetes support, with wife.   Shares usually had a yearly visit with [facility] RN, would like to resume [facility] visits.     A1C:  6.4   Target A1C: <8.0. BG Targets:  FBG <150  2  Hour PP  <200     Meter Report: 2 week report: 165. 135-207  FB, 157, 14, 144, 135.  156, 141, 142  Pre-meal: 199, 164, 183, 191, 207, 203.  168.     CGM: does not qualify for CGM at this time, no insulin therapy at this time.     Diabetes Medications:   Metformin  mg taking one tab twice daily instead of three tabs/day. Stopped taking 3 tabs- diarrhea. Would be open to restart- would like confirmation with PCP.   Amaryl 1 mg 1/2 tab  daily.     Healthy Eating:   Wife mostly prepares meals and has been working on healthy changes since visit with PCP. Brings with them today, Pt's initial new diabetes book and eating recommendations with Carb choice notations. Long discussion on healthy eating choices, plating method, Eating Healthy- Olesya OLED-T with carb information. Explained okay to continue with carb choice if comfortable or could simplify with counting carb/plating method.    Actively being more mindful with portion size.     Activity:    Hasn't been as active this winter given mild weather. Usually shovels snow, plows, but minimal snow.   Plans to start increasing activity. Walking with spouse. Encouraged to start slow and increase over time to help build healthy, safe habits.     Reducing Risks:   /64 Statin use no- PCP not prescribed intentional. ASA use no- PCP not prescribed intentional.   Tobacco use- former use. Foot exam denies concerns.  Eye exam- current [facility]   PCP follow up- scheduled. PCP: Dr. Richardson. Insurance Coverage: Medicare/BCBS Federal Eployee.        Labs:  CMP, Microalbumin, Lipids within the last year.     Noninvasive Liver  Fibrosis Assessment:  defer to pcp for further recommendations.   FIB-4 Calculation: 2.06 at 11/20/2023  8:30 AM  Calculated from:  SGOT/AST: 25 U/L at 11/20/2023  8:30 AM  SGPT/ALT: 29 U/L at 11/20/2023  8:30 AM  Platelets: 180 10e3/uL at 11/20/2023  8:30 AM  Age: 82 years     Allergies   Allergen Reactions    Atorvastatin Calcium Other (See Comments)     Lipitor - myalgia    Avodart [Dutasteride] Other (See Comments)     Sexual dsfxn    Simvastatin Other (See Comments)     Zocor - elevated liver function test      Patient's most recent   Lab Results   Component Value Date    A1C 6.4 11/20/2023    A1C 6.5 06/25/2021     is meeting goal of <8.0    Diabetes knowledge and skills assessment:   Patient is knowledgeable in diabetes management concepts related to: Healthy Eating, Being Active, Monitoring, and Taking Medication  Continue education and support with diabetes management concepts as indicated.   Based on learning assessment above, most appropriate setting for further diabetes education would be: Individual setting.      PLAN    Follow up with Metformin 2 tabs vs 3.   Clarify BG checks with PCP.   Continue working on diet choices  Activity.     See Care Plan for co-developed, patient-state behavior change goals.  AVS provided for patient today.    Education Materials Provided:  Plating method handout, Dig[employer] eating foldout.     SUBJECTIVE/OBJECTIVE:  Presents for: Individual review  Accompanied by: Self, Spouse  Diabetes education in the past 24mo: No  Focus of Visit: Taking Medication, Monitoring, Healthy Eating, Diabetes Pathophysiology, Being Active  Diabetes type: Type 2  Disease course: Other (see Comments)  Please elaborate:: noticing BG trends are increasing. A1C meeting target.  How confident are you filling out medical forms by yourself:: Quite a bit  Diabetes management related comments/concerns: Recent higher BG readings  Transportation concerns: No  Difficulty affording diabetes  "medication?: No  Difficulty affording diabetes testing supplies?: No  Other concerns:: None  Cultural Influences/Ethnic Background:  Not  or     Diabetes Symptoms & Complications:  Diabetes Related Symptoms: None (Podiatrist- Sarah: [facility]  Weight trend: Stable  Symptom course: Stable  Disease course: Other (see Comments)  Please elaborate:: noticing BG trends are increasing. A1C meeting target.  Complications assessed today?: Yes  Autonomic neuropathy: No  CVA: No  Heart disease: Other (HTN)  Peripheral neuropathy: No  Peripheral Vascular Disease: No  Retinopathy: No    Patient Problem List and Family Medical History reviewed for relevant medical history, current medical status, and diabetes risk factors.    Vitals:  /64   Pulse 82   Resp 16   Ht 1.803 m (5' 11\")   Wt 97.5 kg (215 lb)   SpO2 98%   BMI 29.99 kg/m    Estimated body mass index is 29.99 kg/m  as calculated from the following:    Height as of this encounter: 1.803 m (5' 11\").    Weight as of this encounter: 97.5 kg (215 lb).   Last 3 BP:   BP Readings from Last 3 Encounters:   03/04/24 126/64   02/26/24 137/77   12/14/23 134/64       History   Smoking Status    Former    Types: Cigarettes   Smokeless Tobacco    Never       Labs:  Lab Results   Component Value Date    A1C 6.4 11/20/2023    A1C 6.5 06/25/2021     Lab Results   Component Value Date     11/20/2023     07/11/2022     06/25/2021     Lab Results   Component Value Date    LDL 86 11/20/2023     09/15/2020     HDL Cholesterol   Date Value Ref Range Status   09/15/2020 44 >39 mg/dL Final     Direct Measure HDL   Date Value Ref Range Status   11/20/2023 47 >=40 mg/dL Final   ]  GFR Estimate   Date Value Ref Range Status   11/20/2023 >90 >60 mL/min/1.73m2 Final   06/25/2021 >90 >60 mL/min/[1.73_m2] Final     Comment:     Non  GFR Calc  Starting 12/18/2018, serum creatinine based estimated GFR (eGFR) will be " "  calculated using the Chronic Kidney Disease Epidemiology Collaboration   (CKD-EPI) equation.       GFR Estimate If Black   Date Value Ref Range Status   06/25/2021 >90 >60 mL/min/[1.73_m2] Final     Comment:      GFR Calc  Starting 12/18/2018, serum creatinine based estimated GFR (eGFR) will be   calculated using the Chronic Kidney Disease Epidemiology Collaboration   (CKD-EPI) equation.       Lab Results   Component Value Date    CR 0.75 11/20/2023    CR 0.69 06/25/2021     No results found for: \"MICROALBUMIN\"    Healthy Eating:  Healthy Eating Assessed Today: Yes  Cultural/Christian diet restrictions?: No  Do you have any food allergies or intolerances?: No  Meal planning/habits: Smaller portions, Heart healthy, Carb counting, Avoiding sweets (restarted within the last week.)  Who cooks/prepares meals for you?: Spouse  Who purchases food in  your home?: Self, Spouse  How many times a week on average do you eat food made away from home (restaurant/take-out)?: 1 (upto 1/week.)  Meals include: Breakfast, Lunch, Dinner  Breakfast: cheerios, banana, milk  Lunch: sandwich, chips (portion size) and veggies, peppers and radishes. and a \"goodie\".  Dinner: hamburgers, spaghetti, fish or chicken, stirfry, roasted. yesterday pork roast with portion size augration potatoe and cucumber, tomato, onion with vinegar/oil.  Snacks: hard boiled eggs, strawberry shortcake. no sugar applesauce. nevaeh dunes.  Other: chuga.  stopped eating after 6. summer- protein drinks.  Beverages: Water, Coffee, Juice, Milk (V8, zero sugar Pepsi once a week)  Has patient met with a dietitian in the past?: Yes    Being Active:  Being Active Assessed Today: Yes  Exercise:: Currently not exercising (not as active this winter.)    Monitoring:  Monitoring Assessed Today: Yes  Did patient bring glucose meter to appointment? : Yes  Blood Glucose Meter: Accu-chek (Kisha)  Times checking blood sugar at home (number): 3  Times checking blood " sugar at home (per): Week  Blood glucose trend: Increasing    Taking Medications:  Diabetes Medication(s)       Biguanides       metFORMIN (GLUCOPHAGE XR) 500 MG 24 hr tablet TAKE 3 TABLETS BY MOUTH ONCE DAILY WITH SUPPER       Sulfonylureas       glimepiride (AMARYL) 1 MG tablet TAKE 1/2 (ONE-HALF) TABLET BY MOUTH ONCE DAILY IN THE MORNING BEFORE BREAKFAST            Taking Medication Assessed Today: Yes  Current Treatments: Oral Medication (taken by mouth) (Metformin, Amaryl)  Problems taking diabetes medications regularly?: No  Diabetes medication side effects?: Yes (Metformin when dose increased)    Problem Solving:  Problem Solving Assessed Today: No  Is the patient at risk for hypoglycemia?: No  Is the patient at risk for DKA?: No    Reducing Risks:  Reducing Risks Assessed Today: Yes  Has dilated eye exam at least once a year?: Yes  Feet checked by healthcare provider in the last year?: Yes    Healthy Coping:  Healthy Coping Assessed Today: Yes  Emotional response to diabetes: Acceptance, Confidence diabetes can be controlled  Informal Support system:: Spouse, Family  Stage of change: MAINTENANCE (Working to maintain change, with risk of relapse)  Support resources: [employer]  Patient Activation Measure Survey Score:      6/21/2018     3:00 PM   JAVIER Score (Last Two)   JAVIER Raw Score 40   Activation Score 100   JAVIER Level 4     Time Spent: 30 minutes  Encounter Type: Individual    Any diabetes medication dose changes were made via the CDE Protocol per the patient's primary care provider. A copy of this encounter was shared with the provider.    Tamara Ennis RN Diabetes Educator,  [phone]

## 2024-03-04 NOTE — PATIENT INSTRUCTIONS
Recap of our visit:     Monitoring:  Your A1C was 6.4 on 11/20/23. Goal is less than <8.0    Next A1C: May    Check blood sugars 1-2 x/day.   Fasting in the morning and 2 hours after your largest meal are good times to check.    Target blood sugar: Fasting or before meals target is . 2 hours after meal <200.    We only need 1/2 of these numbers to be within target then your A1c will be within target.    Medications:   Will confirm Metformin 2 vs 3 tabs / day  Amaryl 1 mg 1/2 tab daily      Healthy Eating:      diabetesfoodhub.org     Mediterranean Lifestyle: Fish/seafood 2 times a week, vegetables, fruit, nuts, legumes, whole grains, water, regular activity.    Eat a Healthy diet  Eat more vegetables/plants in your diet  Eat healthy fats  Olive oil  Avocados  Eat healthy proteins  Poultry without the skin  Fish  Limit red meat     Limit higher carbohydrate foods such as: rice, breads, cereal, pasta, sweets. (Mindful portion sizes).   Avoid sugary drinks: regular soda/pop, juice, sports drinks. (Sugar free, zero are okay).     Snacks: Try to  work on healthy, low carbohydrate food choices.   Good snack examples: meat, cheese, cottage cheese, nuts, hard boiled egg, veggies, fruit/berries, yogurt (Greek yogurt/protein).     Target Carb:   Men 45-60 grams/meal 15-30 grams optional snacks.   Women 30-45 grams/meal 15-30 grams carbs optional snack.   Less if working towards weight loss.     Try to have a protein with carb option.     Activity/Exercise:       Any prolonged sedentary/sitting activity should be interrupted every 30 minutes   Try to walk or be active 5-10 minutes after eating meals     Continue working on healthy eating and moving (start low and slow, work up to 30 min, 5x/week).   Increase exercise as tolerated, even multiple short times during your day helps.    Adult goal is 150 minutes/week =  30 minutes 5 days of the week.   Aerobic activity 150 minute a week  2 days of resistance exercising       Healthy Coping:     Practicing health promotion can help improve diabetes (suggested by the ADA, March 2019)              Meditation                          Apps: for IPhone and Android                          -Calm                          -Headspace                          -Insight Timer              Gentle stretching              Mindful eating         Other:   Foot exam: yearly  Eye exam: yearly  Encourage regular dental check ups.      Work on (SMART) specific, measurable, attainable, realistic and Timely) goals (suggested by ADA 2023)      Follow up:  Call when ready for follow up. Plan for phone follow up in a few weeks.  Please bring your meter/reader to your appointment.     If you have any questions or concerns, please call me at [phone] (Taina, Nurse directly) or send [employer] message.    Scheduling Number: [phone]    Thank you for coming in today!  Tamara Ennis RN Diabetes Educator

## 2024-03-06 ENCOUNTER — PATIENT OUTREACH (OUTPATIENT)
Dept: EDUCATION SERVICES | Facility: HOSPITAL | Age: 84
End: 2024-03-06

## 2024-03-06 NOTE — PROGRESS NOTES
"Diabetes Self-Management Education & Support    Received call message from wife Lakshmi, Andrew had a \"high\" blood sugar this morning.     Returned call. No answer, left message. Will try calling again later. Return call information left.     Tamara Ennis RN Diabetes Educator,  [phone]  3/6/2024 at 3:02 PM    "

## 2024-03-07 NOTE — PROGRESS NOTES
Diabetes Self-Management Education & Support    Returned call to patient and wife.     Was concerned- yesterdays' FBG was 163. Took after dinner 208.   This am was 149.     Andrew will try going back up to 3 tabs/day.   1 tab in am, 2 tabs with dinner.     Will watch for Side effects.   Plans to start walking soon- with weather improving.   Working on diet changes- reminded- takes time to adjust and relearn.   Reassurance on their efforts.     DRC RN will follow up in 1-2 weeks via phone.   Tamara Ennis RN Diabetes Educator,  [phone]  3/7/2024 at 3:28 PM

## 2024-03-10 DIAGNOSIS — I10 ESSENTIAL HYPERTENSION WITH GOAL BLOOD PRESSURE LESS THAN 140/90: ICD-10-CM

## 2024-03-11 NOTE — TELEPHONE ENCOUNTER
Disp Refills Start End MOMO   potassium chloride ER (KLOR-CON M) 20 MEQ CR tablet 180 tablet 3 3/22/2023 -- No     Last Office Visit: 02/26/2024  Future Office visit:    Next 5 appointments (look out 90 days)      May 22, 2024  8:45 AM  (Arrive by 8:30 AM)  SHORT with Mata Richardson MD  [facility] [place] ([facility] [place] ) [address]  [place] [address]  [phone]             Routing refill request to provider for review/approval because:

## 2024-03-12 RX ORDER — POTASSIUM CHLORIDE 1500 MG/1
TABLET, EXTENDED RELEASE ORAL
Qty: 180 TABLET | Refills: 3 | Status: SHIPPED | OUTPATIENT
Start: 2024-03-12

## 2024-03-18 ENCOUNTER — PATIENT OUTREACH (OUTPATIENT)
Dept: EDUCATION SERVICES | Facility: HOSPITAL | Age: 84
End: 2024-03-18

## 2024-03-18 NOTE — PROGRESS NOTES
Diabetes Self-Management Education & Support    Message left for patient for return call.     Mata Richardson MD Staydohar, Jo, RN  Yes - he is very anxious and needs reassuance right now.  Check BS alternate times and before meals was discussed.  Pt needs to see what A1C is at follow-up dimitris Ennis, RN Diabetes Educator,  [phone]  3/18/2024 at 2:43 PM

## 2024-03-25 NOTE — PROGRESS NOTES
Per PCP:  As before - I am NOT concerned. I do not yung or use PP- his fating BS are at goal . His anxiety is getting the best of him. IF anything - tell him to quite checking his BS. Right now - lets see what A1C is in May. Pt to keep active and watch his diet as best as he can. In not too recent past- he was on too much meds and then running out of BS--- I rather have little higher than lower. Please reassure him. V.     Reviewed with patient and wife. Accepting of above recommendations.   Will see PCP in May as scheduled.   DRC follow up after pcp visit.     Tamara Ennis RN Diabetes Educator,  [phone]  3/25/2024 at 3:00 PM

## 2024-03-25 NOTE — PROGRESS NOTES
"Follow up call with Pt and wife:    Pt reports last week he has had increase of abdominal GI/sx. Diarrhea and cramps. Starting to improve. Pt is wondering if there is anything else he could take- except for pepto-bismol. -does not want to take that.    Encourage hydration/water.     Metformin tolerated one tab twice daily. Trial 3 tabs/day. Has tried this in the past as well and did not tolerate higher doses.   Pt is not sure if sx are from increasing or \"caught the stomach flu\".   This last week:   FB, 166, 138, 152, 164, 170, 146, 164.  PP: 243, 279, 262, 264, 167.      Weight loss 6-7 pounds     Will plan to decrease Metformin back to 2 tabs/day.   DRC RN follow up with PCP to review options.  SGLT2 vs GLP- pt prefers pill is able.   Pt is scheduled with PCP in May.  Last A1C udpate 6.4 in 2023.   Aware to work on activity levels.     Pt will switch BG times: FBG, premeal as PCP requests.     Tamara Ennis, RN Diabetes Educator,  [phone]  3/25/2024 at 1:05 PM        "

## 2024-03-25 NOTE — PROGRESS NOTES
As before - I am NOT concerned. I do not yung or use PP- his fating BS are at goal .  His anxiety is getting the best of him.  IF anything - tell him to quite checking  his  BS.  Right now - lets see what A1C is in May. Pt to keep active and watch his diet as best as he can. In not too recent past- he was on too much meds and then running out of BS--- I rather have little higher than lower.    Please reassure him.    V.

## 2024-04-08 DIAGNOSIS — E78.5 HYPERLIPIDEMIA LDL GOAL <100: ICD-10-CM

## 2024-04-08 NOTE — TELEPHONE ENCOUNTER
Reason for call:  Medication    Switching pharmacy  Have you contacted your pharmacy? Yes   If patient has contacted Pharmacy and it has been over 72hrs, continue to #2  Medication ezetimibe (ZETIA) 10 MG tablet   What Pharmacy do you use? [facility] [place]       (Please note that the turn-around-time for prescriptions is 72 business hours; I am sending your request at this time. SEND TO  [facility]  )

## 2024-04-08 NOTE — TELEPHONE ENCOUNTER
Zetia      Last Written Prescription Date:  02/22/23  Last Fill Quantity: 90,   # refills: 3  Last Office Visit: 02/26/24  Future Office visit:    Next 5 appointments (look out 90 days)      May 22, 2024  8:45 AM  (Arrive by 8:30 AM)  SHORT with Mata Richardson MD  [facility] [place] ([facility] [place] ) [address]  [place] [address]  [phone]

## 2024-04-09 ENCOUNTER — TELEPHONE (OUTPATIENT)
Dept: FAMILY MEDICINE | Facility: OTHER | Age: 84
End: 2024-04-09

## 2024-04-09 RX ORDER — EZETIMIBE 10 MG/1
10 TABLET ORAL DAILY
Qty: 90 TABLET | Refills: 3 | Status: SHIPPED | OUTPATIENT
Start: 2024-04-09

## 2024-04-09 NOTE — TELEPHONE ENCOUNTER
3:18 PM    Reason for Call: Phone Call    Description: Patient [facility] team re: a prescription that he needs, not the one that was ordered yesterday. The correct prescription was ordered from [facility] beginning of last week ago. Prescription is Ezetimibe 10 mg. Patient is requesting it to be sent to [facility].    Was an appointment offered for this call? No  If yes : Appointment type              Date    Preferred method for responding to this message: Telephone Call  What is your phone number ? [phone]    If we cannot reach you directly, may we leave a detailed response at the number you provided? Yes    Can this message wait until your PCP/provider returns, if available today? Please call patient back to confirm the prescription is ordered. It is needed badly.    Ashlyn Yuen

## 2024-04-10 NOTE — CONFIDENTIAL NOTE
Patient has got the RX now and apologized for being upset yesterday. Writer ensured patient that all was ok and thanked him. Call ended pleasantly.

## 2024-04-12 ENCOUNTER — TELEPHONE (OUTPATIENT)
Dept: EDUCATION SERVICES | Facility: HOSPITAL | Age: 84
End: 2024-04-12

## 2024-04-12 DIAGNOSIS — E78.5 HYPERLIPIDEMIA LDL GOAL <100: ICD-10-CM

## 2024-04-12 DIAGNOSIS — E11.9 TYPE 2 DIABETES MELLITUS WITHOUT COMPLICATION, WITHOUT LONG-TERM CURRENT USE OF INSULIN (H): Primary | ICD-10-CM

## 2024-04-12 RX ORDER — BLOOD SUGAR DIAGNOSTIC
STRIP MISCELLANEOUS
Qty: 100 STRIP | Refills: 1 | Status: SHIPPED | OUTPATIENT
Start: 2024-04-12 | End: 2024-09-26

## 2024-04-12 RX ORDER — LANCETS
EACH MISCELLANEOUS
Qty: 100 EACH | Refills: 1 | Status: SHIPPED | OUTPATIENT
Start: 2024-04-12

## 2024-04-12 NOTE — TELEPHONE ENCOUNTER
Disp Refills Start End MOMO   ezetimibe (ZETIA) 10 MG tablet 90 tablet 3 4/9/2024 -- No     Last Office Visit: 03/24/2024  Future Office visit:    Next 5 appointments (look out 90 days)      May 22, 2024  8:45 AM  (Arrive by 8:30 AM)  SHORT with Mata Richardson MD  [facility] [place] ([facility] [place] ) [address]  [place] [address]  [phone]             Routing refill request to provider for review/approval because:

## 2024-04-12 NOTE — TELEPHONE ENCOUNTER
Plans to  new meter on Monday.     BG trends- 150s.   Has been working on diet. More activity with warmer weather.   Weight is trending down.     Has follow up scheduled.  Tamara Ennis RN Diabetes Educator,  [phone]  4/12/2024 at 4:24 PM

## 2024-04-12 NOTE — TELEPHONE ENCOUNTER
Pt's wife called the pt is getting errors on his BG meter and would like to get a new one. Last Rx for meter was 05/15/19.

## 2024-04-17 RX ORDER — EZETIMIBE 10 MG/1
10 TABLET ORAL DAILY
Qty: 90 TABLET | Refills: 3 | OUTPATIENT
Start: 2024-04-17

## 2024-05-22 ENCOUNTER — LAB (OUTPATIENT)
Dept: LAB | Facility: OTHER | Age: 84
End: 2024-05-22
Attending: FAMILY MEDICINE
Payer: MEDICARE

## 2024-05-22 ENCOUNTER — PATIENT OUTREACH (OUTPATIENT)
Dept: EDUCATION SERVICES | Facility: HOSPITAL | Age: 84
End: 2024-05-22

## 2024-05-22 ENCOUNTER — OFFICE VISIT (OUTPATIENT)
Dept: FAMILY MEDICINE | Facility: OTHER | Age: 84
End: 2024-05-22
Attending: FAMILY MEDICINE
Payer: MEDICARE

## 2024-05-22 ENCOUNTER — TELEPHONE (OUTPATIENT)
Dept: FAMILY MEDICINE | Facility: OTHER | Age: 84
End: 2024-05-22

## 2024-05-22 VITALS
SYSTOLIC BLOOD PRESSURE: 122 MMHG | OXYGEN SATURATION: 97 % | HEIGHT: 71 IN | HEART RATE: 87 BPM | RESPIRATION RATE: 16 BRPM | DIASTOLIC BLOOD PRESSURE: 75 MMHG | BODY MASS INDEX: 28.58 KG/M2 | TEMPERATURE: 97.7 F | WEIGHT: 204.13 LBS

## 2024-05-22 DIAGNOSIS — Z71.85 IMMUNIZATION COUNSELING: ICD-10-CM

## 2024-05-22 DIAGNOSIS — K22.70 BARRETT'S ESOPHAGUS WITHOUT DYSPLASIA: ICD-10-CM

## 2024-05-22 DIAGNOSIS — I10 ESSENTIAL HYPERTENSION WITH GOAL BLOOD PRESSURE LESS THAN 140/90: ICD-10-CM

## 2024-05-22 DIAGNOSIS — E11.9 TYPE 2 DIABETES MELLITUS WITHOUT COMPLICATION, WITHOUT LONG-TERM CURRENT USE OF INSULIN (H): Primary | ICD-10-CM

## 2024-05-22 DIAGNOSIS — E11.9 TYPE 2 DIABETES MELLITUS WITHOUT COMPLICATION, WITHOUT LONG-TERM CURRENT USE OF INSULIN (H): ICD-10-CM

## 2024-05-22 LAB
ANION GAP SERPL CALCULATED.3IONS-SCNC: 10 MMOL/L (ref 7–15)
BUN SERPL-MCNC: 14.2 MG/DL (ref 8–23)
CALCIUM SERPL-MCNC: 10 MG/DL (ref 8.8–10.2)
CHLORIDE SERPL-SCNC: 97 MMOL/L (ref 98–107)
CREAT SERPL-MCNC: 0.69 MG/DL (ref 0.67–1.17)
CREAT UR-MCNC: 22.2 MG/DL
DEPRECATED HCO3 PLAS-SCNC: 28 MMOL/L (ref 22–29)
EGFRCR SERPLBLD CKD-EPI 2021: >90 ML/MIN/1.73M2
EST. AVERAGE GLUCOSE BLD GHB EST-MCNC: 157 MG/DL
GLUCOSE SERPL-MCNC: 143 MG/DL (ref 70–99)
HBA1C MFR BLD: 7.1 %
MICROALBUMIN UR-MCNC: <12 MG/L
MICROALBUMIN/CREAT UR: NORMAL MG/G{CREAT}
POTASSIUM SERPL-SCNC: 4.3 MMOL/L (ref 3.4–5.3)
SODIUM SERPL-SCNC: 135 MMOL/L (ref 135–145)

## 2024-05-22 PROCEDURE — G0463 HOSPITAL OUTPT CLINIC VISIT: HCPCS

## 2024-05-22 PROCEDURE — 82374 ASSAY BLOOD CARBON DIOXIDE: CPT | Mod: ZL

## 2024-05-22 PROCEDURE — G2211 COMPLEX E/M VISIT ADD ON: HCPCS | Performed by: FAMILY MEDICINE

## 2024-05-22 PROCEDURE — 82043 UR ALBUMIN QUANTITATIVE: CPT | Mod: ZL

## 2024-05-22 PROCEDURE — 99207 PR FOOT EXAM NO CHARGE: CPT | Performed by: FAMILY MEDICINE

## 2024-05-22 PROCEDURE — 36415 COLL VENOUS BLD VENIPUNCTURE: CPT | Mod: ZL

## 2024-05-22 PROCEDURE — 99214 OFFICE O/P EST MOD 30 MIN: CPT | Performed by: FAMILY MEDICINE

## 2024-05-22 PROCEDURE — 83036 HEMOGLOBIN GLYCOSYLATED A1C: CPT | Mod: ZL

## 2024-05-22 ASSESSMENT — PAIN SCALES - GENERAL: PAINLEVEL: NO PAIN (0)

## 2024-05-22 NOTE — PROGRESS NOTES
Diabetes Self-Management Education & Support    Reviewed, pt seen PC today with A1c update.   Stable. 7.1. Target <8.0.     No DRC appointment on file.   Recommend annually or sooner with diabetes concerns.     Tamara Ennis RN Diabetes Educator,  [phone]  5/22/2024 at 2:11 PM

## 2024-05-22 NOTE — TELEPHONE ENCOUNTER
3:14 PM    Reason for Call: Phone Call    Description: pt wife called stating that pt had RSV shot on 11-27-23 at [facility] in [place] want Dr. Richardson to know. If you have any question please call pt wife there is a consent to communicate    Was an appointment offered for this call? No  If yes : Appointment type              Date    Preferred method for responding to this message: Telephone Call  What is your phone number ? [phone]     If we cannot reach you directly, may we leave a detailed response at the number you provided? Yes    Can this message wait until your PCP/provider returns, if available today? Gilda Douglas

## 2024-05-22 NOTE — PROGRESS NOTES
"  Assessment & Plan     Type 2 diabetes mellitus without complication, without long-term current use of insulin (H)  A1C up some. Discussed. No change in meds.  Follow-up in 4-5 months ..   - Albumin Random Urine Quantitative with Creat Ratio; Future  - Hemoglobin A1c; Future  - Basic metabolic panel; Future  - MA FOOT EXAM NO CHARGE    Essential hypertension with goal blood pressure less than 140/90  Stable - Continue current medications and behavioral changes.   - Albumin Random Urine Quantitative with Creat Ratio; Future  - Hemoglobin A1c; Future  - Basic metabolic panel; Future    Immunization counseling  Discussed. RSV - pharmacy .   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 If need EGD again - coming up on 3 yrs. No note on if need follow-up or not on Care everywhere. Pt was given Tel # to call them           BMI  Estimated body mass index is 28.47 kg/m  as calculated from the following:    Height as of this encounter: 1.803 m (5' 11\").    Weight as of this encounter: 92.6 kg (204 lb 2 oz).             No follow-ups on file.    Gabbi Vizcaino is a 83 year old, presenting for the following health issues:  Diabetes and Hypertension        5/22/2024     8:32 AM   Additional Questions   Roomed by Jo Jones   Accompanied by None         5/22/2024     8:32 AM   Patient Reported Additional Medications   Patient reports taking the following new medications None     HPI     Diabetes Follow-up    How often are you checking your blood sugar? Two times daily  Blood sugar testing frequency justification:  Patient modifying lifestyle changes (diet, exercise) with blood sugars  What time of day are you checking your blood sugars (select all that apply)?  Before and after meals  Have you had any blood sugars above 200?  No  Have you had any blood sugars below 70?  No  What symptoms do you " "notice when your blood sugar is low?  Shaky, Dizzy, and Weak  What concerns do you have today about your diabetes? None   Do you have any of these symptoms? (Select all that apply)  Numbness in feet      BP Readings from Last 2 Encounters:   05/22/24 122/75   03/04/24 126/64     Hemoglobin A1C (%)   Date Value   11/20/2023 6.4 (H)   07/18/2023 7.0 (H)   06/25/2021 6.5 (H)   02/23/2021 6.3 (H)     LDL Cholesterol Calculated (mg/dL)   Date Value   11/20/2023 86   01/18/2023 84   09/15/2020 103 (H)   05/07/2019 100 (H)             Hypertension Follow-up    Do you check your blood pressure regularly outside of the clinic? Yes Occasionally   Are you following a low salt diet? Yes  Are your blood pressures ever more than 140 on the top number (systolic) OR more   than 90 on the bottom number (diastolic), for example 140/90? No          Review of Systems  Constitutional, HEENT, cardiovascular, pulmonary, gi and gu systems are negative, except as otherwise noted.      Objective    /75 (BP Location: Right arm, Patient Position: Sitting, Cuff Size: Adult Large)   Pulse 87   Temp 97.7  F (36.5  C) (Tympanic)   Resp 16   Ht 1.803 m (5' 11\")   Wt 92.6 kg (204 lb 2 oz)   SpO2 97%   BMI 28.47 kg/m    Body mass index is 28.47 kg/m .  Physical Exam   GENERAL: alert and no distress  NECK: no adenopathy, no asymmetry, masses, or scars  RESP: lungs clear to auscultation - no rales, rhonchi or wheezes  CV: regular rate and rhythm, normal S1 S2, no S3 or S4, no murmur, click or rub, no peripheral edema  ABDOMEN: soft, nontender, no hepatosplenomegaly, no masses and bowel sounds normal  MS: no gross musculoskeletal defects noted, no edema  Diabetic foot exam: normal DP and PT pulses, no trophic changes or ulcerative lesions, normal sensory exam, and normal monofilament exam    Results for orders placed or performed in visit on 05/22/24   Hemoglobin A1c     Status: Abnormal   Result Value Ref Range    Estimated Average Glucose " 157 mg/dL    Hemoglobin A1C 7.1 (H) <5.7 %   Basic metabolic panel     Status: Abnormal   Result Value Ref Range    Sodium 135 135 - 145 mmol/L    Potassium 4.3 3.4 - 5.3 mmol/L    Chloride 97 (L) 98 - 107 mmol/L    Carbon Dioxide (CO2) 28 22 - 29 mmol/L    Anion Gap 10 7 - 15 mmol/L    Urea Nitrogen 14.2 8.0 - 23.0 mg/dL    Creatinine 0.69 0.67 - 1.17 mg/dL    GFR Estimate >90 >60 mL/min/1.73m2    Calcium 10.0 8.8 - 10.2 mg/dL    Glucose 143 (H) 70 - 99 mg/dL             Signed Electronically by: Mata Richardson MD

## 2024-06-21 ENCOUNTER — TELEPHONE (OUTPATIENT)
Dept: FAMILY MEDICINE | Facility: OTHER | Age: 84
End: 2024-06-21

## 2024-06-21 NOTE — TELEPHONE ENCOUNTER
Pt states feeling generally unwell past week  Feels he may have had heat exposure  Today dizziness is resolved but still feeling mildly fatigued  Resting & drinking Pedialyte  Appetite is decreasing past few day    Scheduled with PCP for 6/24  Recommended ED/UC with any acute/rapid decline in condition.  Call back to the clinic with any further questions/concerns  Patient relayed understanding  No further concerns at calls end.

## 2024-06-21 NOTE — TELEPHONE ENCOUNTER
Symptom or reason needing to speak to RN: Dizziness and light head and loss of appetite.     Best number to return call: [phone]     Best time to return call: Anytime

## 2024-06-24 ENCOUNTER — OFFICE VISIT (OUTPATIENT)
Dept: FAMILY MEDICINE | Facility: OTHER | Age: 84
End: 2024-06-24
Attending: FAMILY MEDICINE
Payer: MEDICARE

## 2024-06-24 VITALS
WEIGHT: 200.5 LBS | HEIGHT: 71 IN | HEART RATE: 99 BPM | TEMPERATURE: 98.2 F | BODY MASS INDEX: 28.07 KG/M2 | DIASTOLIC BLOOD PRESSURE: 60 MMHG | OXYGEN SATURATION: 95 % | SYSTOLIC BLOOD PRESSURE: 126 MMHG | RESPIRATION RATE: 18 BRPM

## 2024-06-24 DIAGNOSIS — Z63.79 STRESS DUE TO ILLNESS OF FAMILY MEMBER: ICD-10-CM

## 2024-06-24 DIAGNOSIS — F41.9 ANXIETY: ICD-10-CM

## 2024-06-24 DIAGNOSIS — F43.22 ADJUSTMENT DISORDER WITH ANXIOUS MOOD: Primary | ICD-10-CM
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
## 2024-06-24 PROCEDURE — 99213 OFFICE O/P EST LOW 20 MIN: CPT | Performed by: FAMILY MEDICINE

## 2024-06-24 PROCEDURE — G2211 COMPLEX E/M VISIT ADD ON: HCPCS | Performed by: FAMILY MEDICINE

## 2024-06-24 PROCEDURE — G0463 HOSPITAL OUTPT CLINIC VISIT: HCPCS

## 2024-06-24 RX ORDER — TAMSULOSIN HYDROCHLORIDE 0.4 MG/1
CAPSULE ORAL
Qty: 90 CAPSULE | Refills: 3 | Status: SHIPPED | OUTPATIENT
Start: 2024-06-24

## 2024-06-24 ASSESSMENT — PAIN SCALES - GENERAL: PAINLEVEL: SEVERE PAIN (6)
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
## 2024-08-26 ENCOUNTER — TELEPHONE (OUTPATIENT)
Dept: FAMILY MEDICINE | Facility: OTHER | Age: 84
End: 2024-08-26

## 2024-08-26 NOTE — TELEPHONE ENCOUNTER
Attempt # 1  Outcome: Left Message   Comment: LVM for patient to call to schedule an appt with Dr. Richardson for Sept 18th or 10th (SDS).

## 2024-08-26 NOTE — TELEPHONE ENCOUNTER
8:32 AM    Reason for Call: OVERBOOK    Patient is having the following symptoms: Neck pain for 2 months.    The patient is requesting an appointment for anytime in September with Dr. Richardson.    Was an appointment offered for this call? No - Dr. Richardson's schedule is booked out until October, patient requesting to be seen sooner than that.    Preferred method for responding to this message: Telephone Call  What is your phone number ?[phone]     If we cannot reach you directly, may we leave a detailed response at the number you provided? Yes    Can this message wait until your PCP/provider returns, if unavailable today? YES, patient is aware that Dr. Richardson is out of office until next week, he understands that he may not hear back from us until then.     Ana Huerta

## 2024-09-16 ENCOUNTER — TELEPHONE (OUTPATIENT)
Dept: FAMILY MEDICINE | Facility: OTHER | Age: 84
End: 2024-09-16

## 2024-09-16 NOTE — TELEPHONE ENCOUNTER
Nice discussion with Pt & wife.   Asking for OTC med & homecare advice to treat symptoms of viral illness.     Writer recommended to treat symptoms of acute illness:  Talk with pharmacist for safe choices.  OTC multi-symptom medication.   Muscle rub &/or pain relieving patches.  Increase humidity  Nasal lavage / Saline nasal spray  Increase fluids to include low sugar electrolyte drinks  Diffuse/apply peppermint, eucalyptus, lavender, chamomile oils.  Apply vapo-rub to affected area &/or to feet then cover with socks.  Stretching/exercise as tolerated  Steam bath/shower  Heat/ice packs/compress applied to affected areas  Increase rest  Adequate protein intake    Goal: Increase comfort  Decrease pain  Decrease intensity of symptoms  Promote shortened healing time  Promote general wellness    Patient relayed understanding.  No further concerns at calls end.

## 2024-09-25 ENCOUNTER — TRANSFERRED RECORDS (OUTPATIENT)
Dept: MULTI SPECIALTY CLINIC | Facility: CLINIC | Age: 84
End: 2024-09-25

## 2024-09-25 LAB — RETINOPATHY: NORMAL

## 2024-09-26 DIAGNOSIS — E11.9 TYPE 2 DIABETES MELLITUS WITHOUT COMPLICATION, WITHOUT LONG-TERM CURRENT USE OF INSULIN (H): ICD-10-CM

## 2024-09-26 RX ORDER — BLOOD SUGAR DIAGNOSTIC
STRIP MISCELLANEOUS
Qty: 100 STRIP | Refills: 2 | Status: SHIPPED | OUTPATIENT
Start: 2024-09-26

## 2024-09-26 NOTE — TELEPHONE ENCOUNTER
Blood Glucose  (Accu-Chek Guide) test strip    Last Written Prescription Date:  04/12/2024  Last Fill Quantity: 100,   # refills: 0  Last Office Visit: 06/24/2024

## 2024-10-03 ENCOUNTER — LAB (OUTPATIENT)
Dept: LAB | Facility: OTHER | Age: 84
End: 2024-10-03
Attending: FAMILY MEDICINE
Payer: MEDICARE

## 2024-10-03 ENCOUNTER — OFFICE VISIT (OUTPATIENT)
Dept: FAMILY MEDICINE | Facility: OTHER | Age: 84
End: 2024-10-03
Attending: FAMILY MEDICINE
Payer: MEDICARE

## 2024-10-03 VITALS
OXYGEN SATURATION: 98 % | SYSTOLIC BLOOD PRESSURE: 118 MMHG | DIASTOLIC BLOOD PRESSURE: 72 MMHG | RESPIRATION RATE: 16 BRPM | TEMPERATURE: 97.5 F | WEIGHT: 199 LBS | BODY MASS INDEX: 28.49 KG/M2 | HEIGHT: 70 IN | HEART RATE: 93 BPM

## 2024-10-03 DIAGNOSIS — E11.9 TYPE 2 DIABETES MELLITUS WITHOUT COMPLICATION, WITHOUT LONG-TERM CURRENT USE OF INSULIN (H): ICD-10-CM

## 2024-10-03 DIAGNOSIS — K22.70 BARRETT'S ESOPHAGUS WITHOUT DYSPLASIA: ICD-10-CM

## 2024-10-03 DIAGNOSIS — F43.22 ADJUSTMENT DISORDER WITH ANXIOUS MOOD: ICD-10-CM

## 2024-10-03 DIAGNOSIS — I10 ESSENTIAL HYPERTENSION WITH GOAL BLOOD PRESSURE LESS THAN 140/90: ICD-10-CM

## 2024-10-03 DIAGNOSIS — E11.9 TYPE 2 DIABETES MELLITUS WITHOUT COMPLICATION, WITHOUT LONG-TERM CURRENT USE OF INSULIN (H): Primary | ICD-10-CM

## 2024-10-03 DIAGNOSIS — Z63.79 STRESS DUE TO ILLNESS OF FAMILY MEMBER: ICD-10-CM

## 2024-10-03 LAB
ANION GAP SERPL CALCULATED.3IONS-SCNC: 13 MMOL/L (ref 7–15)
BASOPHILS # BLD AUTO: 0 10E3/UL (ref 0–0.2)
BASOPHILS NFR BLD AUTO: 1 %
BUN SERPL-MCNC: 13.5 MG/DL (ref 8–23)
CALCIUM SERPL-MCNC: 9.9 MG/DL (ref 8.8–10.4)
CHLORIDE SERPL-SCNC: 97 MMOL/L (ref 98–107)
CREAT SERPL-MCNC: 0.7 MG/DL (ref 0.67–1.17)
EGFRCR SERPLBLD CKD-EPI 2021: >90 ML/MIN/1.73M2
EOSINOPHIL # BLD AUTO: 0.2 10E3/UL (ref 0–0.7)
EOSINOPHIL NFR BLD AUTO: 3 %
ERYTHROCYTE [DISTWIDTH] IN BLOOD BY AUTOMATED COUNT: 12.2 % (ref 10–15)
EST. AVERAGE GLUCOSE BLD GHB EST-MCNC: 163 MG/DL
GLUCOSE SERPL-MCNC: 148 MG/DL (ref 70–99)
HBA1C MFR BLD: 7.3 %
HCO3 SERPL-SCNC: 26 MMOL/L (ref 22–29)
HCT VFR BLD AUTO: 43.1 % (ref 40–53)
HGB BLD-MCNC: 15 G/DL (ref 13.3–17.7)
IMM GRANULOCYTES # BLD: 0 10E3/UL
IMM GRANULOCYTES NFR BLD: 0 %
LYMPHOCYTES # BLD AUTO: 1.8 10E3/UL (ref 0.8–5.3)
LYMPHOCYTES NFR BLD AUTO: 34 %
MCH RBC QN AUTO: 30.7 PG (ref 26.5–33)
MCHC RBC AUTO-ENTMCNC: 34.8 G/DL (ref 31.5–36.5)
MCV RBC AUTO: 88 FL (ref 78–100)
MONOCYTES # BLD AUTO: 0.5 10E3/UL (ref 0–1.3)
MONOCYTES NFR BLD AUTO: 10 %
NEUTROPHILS # BLD AUTO: 2.8 10E3/UL (ref 1.6–8.3)
NEUTROPHILS NFR BLD AUTO: 52 %
NRBC # BLD AUTO: 0 10E3/UL
NRBC BLD AUTO-RTO: 0 /100
PLATELET # BLD AUTO: 210 10E3/UL (ref 150–450)
POTASSIUM SERPL-SCNC: 4.2 MMOL/L (ref 3.4–5.3)
RBC # BLD AUTO: 4.89 10E6/UL (ref 4.4–5.9)
SODIUM SERPL-SCNC: 136 MMOL/L (ref 135–145)
WBC # BLD AUTO: 5.4 10E3/UL (ref 4–11)

## 2024-10-03 PROCEDURE — 83036 HEMOGLOBIN GLYCOSYLATED A1C: CPT | Mod: ZL

## 2024-10-03 PROCEDURE — G0463 HOSPITAL OUTPT CLINIC VISIT: HCPCS | Mod: 25

## 2024-10-03 PROCEDURE — 99214 OFFICE O/P EST MOD 30 MIN: CPT | Performed by: FAMILY MEDICINE

## 2024-10-03 PROCEDURE — 85018 HEMOGLOBIN: CPT | Mod: ZL

## 2024-10-03 PROCEDURE — 36415 COLL VENOUS BLD VENIPUNCTURE: CPT | Mod: ZL

## 2024-10-03 PROCEDURE — G0008 ADMIN INFLUENZA VIRUS VAC: HCPCS

## 2024-10-03 PROCEDURE — 85004 AUTOMATED DIFF WBC COUNT: CPT | Mod: ZL

## 2024-10-03 PROCEDURE — G2211 COMPLEX E/M VISIT ADD ON: HCPCS | Performed by: FAMILY MEDICINE

## 2024-10-03 PROCEDURE — 80048 BASIC METABOLIC PNL TOTAL CA: CPT | Mod: ZL

## 2024-10-03 ASSESSMENT — ANXIETY QUESTIONNAIRES
3. WORRYING TOO MUCH ABOUT DIFFERENT THINGS: NOT AT ALL
GAD7 TOTAL SCORE: 0
1. FEELING NERVOUS, ANXIOUS, OR ON EDGE: NOT AT ALL
2. NOT BEING ABLE TO STOP OR CONTROL WORRYING: NOT AT ALL
GAD7 TOTAL SCORE: 0
IF YOU CHECKED OFF ANY PROBLEMS ON THIS QUESTIONNAIRE, HOW DIFFICULT HAVE THESE PROBLEMS MADE IT FOR YOU TO DO YOUR WORK, TAKE CARE OF THINGS AT HOME, OR GET ALONG WITH OTHER PEOPLE: NOT DIFFICULT AT ALL
8. IF YOU CHECKED OFF ANY PROBLEMS, HOW DIFFICULT HAVE THESE MADE IT FOR YOU TO DO YOUR WORK, TAKE CARE OF THINGS AT HOME, OR GET ALONG WITH OTHER PEOPLE?: NOT DIFFICULT AT ALL
7. FEELING AFRAID AS IF SOMETHING AWFUL MIGHT HAPPEN: NOT AT ALL
7. FEELING AFRAID AS IF SOMETHING AWFUL MIGHT HAPPEN: NOT AT ALL
5. BEING SO RESTLESS THAT IT IS HARD TO SIT STILL: NOT AT ALL
4. TROUBLE RELAXING: NOT AT ALL
6. BECOMING EASILY ANNOYED OR IRRITABLE: NOT AT ALL
GAD7 TOTAL SCORE: 0

## 2024-10-03 ASSESSMENT — PAIN SCALES - GENERAL: PAINLEVEL: NO PAIN (0)

## 2024-10-03 ASSESSMENT — ENCOUNTER SYMPTOMS: NERVOUS/ANXIOUS: 1
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
## 2024-10-22 ENCOUNTER — OFFICE VISIT (OUTPATIENT)
Dept: FAMILY MEDICINE | Facility: OTHER | Age: 84
End: 2024-10-22
Attending: FAMILY MEDICINE
Payer: MEDICARE

## 2024-10-22 VITALS
RESPIRATION RATE: 16 BRPM | OXYGEN SATURATION: 100 % | HEART RATE: 82 BPM | DIASTOLIC BLOOD PRESSURE: 66 MMHG | WEIGHT: 201.6 LBS | BODY MASS INDEX: 28.93 KG/M2 | SYSTOLIC BLOOD PRESSURE: 134 MMHG | TEMPERATURE: 97.4 F

## 2024-10-22 DIAGNOSIS — R05.3 CHRONIC COUGH: ICD-10-CM

## 2024-10-22 DIAGNOSIS — M24.80 CREPITUS OF CERVICAL SPINE: Primary | ICD-10-CM

## 2024-10-22 PROCEDURE — G0463 HOSPITAL OUTPT CLINIC VISIT: HCPCS

## 2024-10-22 PROCEDURE — 99213 OFFICE O/P EST LOW 20 MIN: CPT | Performed by: FAMILY MEDICINE

## 2024-10-22 ASSESSMENT — PAIN SCALES - GENERAL: PAINLEVEL: NO PAIN (0)

## 2024-10-22 NOTE — PROGRESS NOTES
"  Assessment & Plan     Crepitus of cervical spine  Overall no concerning signs of pain, sensory changes, restricted ROM, neck tightness, or trauma. Likely crepitus is related to arthritic changes given patients age. Advised pt on risk-benefit of chiropractor. Reassured pt and advised no concerns or reason for further eval or XR.    Chronic cough  Overall most likely post-viral tussis given onset of symptoms with covid positive test, exacerbated by possible post-nasal drip. No signs or symptoms of chest pain, fever, wheezing, reflux, unusual SOB or dyspnea or other respiratory symptoms other than mild intermittent cough. Conservative measures like cough drops and air purifier have already helped. Advised pt that post viral cough is common and can linger for several weeks or months. Advised OTC decongestants or possible inhaler for symptoms, check for dust in house as symptoms usually worse inside.      Arnold Barnes MS3 did history and eval on 10/22/2024 under supervision of Dr Richardson         BMI  Estimated body mass index is 28.93 kg/m  as calculated from the following:    Height as of 10/3/24: 1.778 m (5' 10\").    Weight as of this encounter: 91.4 kg (201 lb 9.6 oz).         No follow-ups on file.    Gabbi Vizcaino is a 83 year old, presenting for the following health issues:  Neck Pain        10/22/2024     1:02 PM   Additional Questions   Roomed by Patricia BANKS   Accompanied by self     HPI     Neck Pain    Duration: 6 months   Description:  Location: neck   Radiation: none   Accompanying signs and symptoms: neck cracks often, no pain just cracks a lot even when he is in the shower   History (similar episodes/previous evaluation): None  Precipitating or alleviating factors: None  Therapies tried and outcome: None       No neck pain, just crackles - Just gradually came on, no inciting incident. No tightness. No numbness or tingling in arms. Cracks and pops, no pain really. Just bugs him. Saw awhile ago and " told it was just age. No ROM.    Cough - cough since 9/14, tested pos for COVID. Has some mild coughing fits every once and awhile, air purifier helps, doesn't cough at night. Says he has postnasal drip, isn't sure if worse since COVID. No hemoptysis, occasional glob of mucus once a day then subsides. Just regular clear mucus. No Chest pain/palpitations/SOB. No fever or flu-like symptoms. No pleurodynia or wheezing. Tried wintergreen lifesavers and cough drops, they help. Morning and evening when inside house is worst, outside cough is much better. Not a steady cough. Hasn't smoked in 30 years, smoked for 7-8 years.    House is 50 years old, he built it.    Has lost weight, says after he changed his whole diet, cut out sweets and carbs.    Denied covid shot, him and wife will get together.    No other issues or concerns.      Review of Systems  Constitutional, neuro, ENT, endocrine, pulmonary, cardiac, gastrointestinal, genitourinary, musculoskeletal, integument and psychiatric systems are negative, except as otherwise noted.      Objective    /66 (BP Location: Left arm, Patient Position: Chair, Cuff Size: Adult Large)   Pulse 82   Temp 97.4  F (36.3  C) (Tympanic)   Resp 16   Wt 91.4 kg (201 lb 9.6 oz)   SpO2 100%   BMI 28.93 kg/m    Body mass index is 28.93 kg/m .  Physical Exam   GENERAL: alert and no distress  EYES: Eyes grossly normal to inspection, conjunctivae and sclerae normal  HENT: ear canals and TM's normal, nose and mouth without ulcers or lesions  NECK: no adenopathy, no asymmetry, masses, or scars  RESP: lungs clear to auscultation - no rales, rhonchi or wheezes  CV: regular rate and rhythm, normal S1 S2, no S3 or S4, no murmur, click or rub, no peripheral edema  MS: no gross musculoskeletal defects noted, no edema, normal ROM of neck  NEURO: Normal strength and tone, mentation intact and speech normal          Signed Electronically by: Mata Richardson MD

## 2024-10-30 ENCOUNTER — OFFICE VISIT (OUTPATIENT)
Dept: FAMILY MEDICINE | Facility: OTHER | Age: 84
End: 2024-10-30
Attending: FAMILY MEDICINE
Payer: MEDICARE

## 2024-10-30 ENCOUNTER — TELEPHONE (OUTPATIENT)
Dept: FAMILY MEDICINE | Facility: OTHER | Age: 84
End: 2024-10-30

## 2024-10-30 VITALS
TEMPERATURE: 96.7 F | WEIGHT: 202.3 LBS | DIASTOLIC BLOOD PRESSURE: 80 MMHG | HEART RATE: 94 BPM | HEIGHT: 70 IN | SYSTOLIC BLOOD PRESSURE: 124 MMHG | OXYGEN SATURATION: 99 % | BODY MASS INDEX: 28.96 KG/M2

## 2024-10-30 DIAGNOSIS — T21.25XA SECOND DEGREE BURN OF BUTTOCK, INITIAL ENCOUNTER: Primary | ICD-10-CM

## 2024-10-30 PROCEDURE — G0463 HOSPITAL OUTPT CLINIC VISIT: HCPCS | Mod: 25

## 2024-10-30 PROCEDURE — 99213 OFFICE O/P EST LOW 20 MIN: CPT | Performed by: FAMILY MEDICINE

## 2024-10-30 RX ORDER — SILVER SULFADIAZINE 10 MG/G
CREAM TOPICAL DAILY
Qty: 50 G | Refills: 1 | Status: SHIPPED | OUTPATIENT
Start: 2024-10-30 | End: 2024-10-30

## 2024-10-30 RX ORDER — SILVER SULFADIAZINE 10 MG/G
CREAM TOPICAL 2 TIMES DAILY
Qty: 50 G | Refills: 1 | Status: SHIPPED | OUTPATIENT
Start: 2024-10-30

## 2024-10-30 ASSESSMENT — PAIN SCALES - GENERAL: PAINLEVEL_OUTOF10: NO PAIN (0)

## 2024-10-30 NOTE — PROGRESS NOTES
"  Assessment & Plan     Second degree burn of buttock, initial encounter  Pt injury consistent with 2nd degree burn. Debrided dead skin around wound- minimal . Pt is up to date on Tdap. Will prescribe Silvadene cream, instructed pt to apply twice daily for 3-5 days or until recovery, change dressings. Will follow-up if needed.  Symptomatic treatment was discussed along when patient should call and/or come back into the clinic or go to ER/Urgent care. All questions answered.       Arnold Barnes MS3 did history and eval on 10/30/2024 under supervision of Dr Richardson        BMI  Estimated body mass index is 29.03 kg/m  as calculated from the following:    Height as of this encounter: 1.778 m (5' 10\").    Weight as of this encounter: 91.8 kg (202 lb 4.8 oz).       No follow-ups on file.    Gabbi Vizcaino is a 83 year old, presenting for the following health issues:  Burn    HPI     Concern - Burn  Onset: 10/29/2024  Description: Sauna burn on  left buttock, backed into the sauna on to the stove  Intensity: moderate  Progression of Symptoms:  worsening  Accompanying Signs & Symptoms: Burn blister which opened 10/30/2024. Currently dark pink in color. Wound approx. The size of a deck of cards  Previous history of similar problem: yes  Precipitating factors:        Worsened by: na  Alleviating factors:        Improved by: cold packs  Therapies tried and outcome: Cold compresses and Ramirez-Sporin. Currently covered        Was dark pink at first. Put neosporin and cold pack on it. Felt wetter this morning, blistered and broke. Darker pink this morning. Showed picture from this morning - skin peeling, large burn.    Review of Systems  Constitutional, neuro, ENT, endocrine, pulmonary, cardiac, gastrointestinal, genitourinary, musculoskeletal, integument and psychiatric systems are negative, except as otherwise noted.      Objective    /80   Pulse 94   Temp (!) 96.7  F (35.9  C) (Tympanic)   Ht 1.778 m (5' 10\")   " Wt 91.8 kg (202 lb 4.8 oz)   SpO2 99%   BMI 29.03 kg/m    Body mass index is 29.03 kg/m .  Physical Exam   GENERAL: alert and no distress  SKIN: deck of cards sized pink-red burn on left buttock, fair amount of dead and peeling skin, no purulence          Signed Electronically by: Mata Richardson MD

## 2024-10-30 NOTE — TELEPHONE ENCOUNTER
Symptom or reason needing to speak to RN: Was in the Sauna burnt part of his buttock wondering what he should do the blister broke during the night. Patient wife Lakshmi is calling and there is a consent for communicate on file      Best number to return call: [phone]     Best time to return call: Anytime

## 2024-10-31 ENCOUNTER — TELEPHONE (OUTPATIENT)
Dept: FAMILY MEDICINE | Facility: OTHER | Age: 84
End: 2024-10-31

## 2024-10-31 NOTE — TELEPHONE ENCOUNTER
Symptom or reason needing to speak to RN: butt burn wound very red     Best number to return call: [phone]    Best time to return call: soon

## 2024-10-31 NOTE — CONFIDENTIAL NOTE
"Updated Pt with PCP's response  Patient relayed understanding.  No further concerns at calls end.      Mata Richardson MD Cooley, Lisa, RN  Stick with plan.  It is just a bad 'sunburn\"  "

## 2024-10-31 NOTE — CONFIDENTIAL NOTE
Last night, Pt applied the silvadene to affected area  Covered with clean bandage  This a.m. bandage soaked with slight pinkish exudate  Cleaned area & covered with dry non-stick pad  Did not use silvadene cream    At time of call the wound site looks bright red, fairly dry  Surrounding skin is intact  Denies fever/chills  No streaking from wound site  No increase in warmth    Writer recommended:  Wash gently with mild soap & warm water  Air dry  Apply a thin layer of silvadene  Cover with dry non-stick pad  Aleve or IBU q. Six hours  Apply a cold compress   Avoid sitting/lying on affected area as much as possible  Monitor for s/sx of infection as discussed above  Report to ED/UC after hours if needed  Call back in a.m. to PcP/Nurse with update.

## 2024-11-04 ENCOUNTER — TELEPHONE (OUTPATIENT)
Dept: FAMILY MEDICINE | Facility: OTHER | Age: 84
End: 2024-11-04

## 2024-11-04 NOTE — TELEPHONE ENCOUNTER
10:29 AM    Reason for Call: Phone Call    Description: Patient spouse called into clinic requesting to speak to care team about patients burn. States burn is healing. Has questions on wound care steps. States they have questions on how long to use cream on wound. Please reach out to spouse to discuss. There is a consent to communicate on file for spouse.     Was an appointment offered for this call? No  If yes : Appointment type              Date    Preferred method for responding to this message: Telephone Call  What is your phone number ?[phone]     If we cannot reach you directly, may we leave a detailed response at the number you provided? Yes    Can this message wait until your PCP/provider returns, if available today? No    Lisa Lemon

## 2024-11-05 NOTE — CONFIDENTIAL NOTE
Nice conversation with Pt & wife  States affected area is healing well  Wound bed is now pinkish with slight red spot in the center  Dressing removed & revealed slight clear exudate  Surrounding skin is intact  Pt denies pain, fever, fatigue,  Affected area has no increased redness, warmth, or streaking.  Writer relayed Provider noted to use silvadene creme 3-5 days or until resolved  Writers suggested after shower tonight to leave open to air  Monitor for changes or impaired healing  Suggested to try a Tegaderm patch to protect against clothing, etc.  Pt in agreement to try  Writer relayed to call anytime with further questions or concerns  Pt & wife relayed understanding  Call ended very pleasantly

## 2024-11-09 ENCOUNTER — HOSPITAL ENCOUNTER (EMERGENCY)
Facility: HOSPITAL | Age: 84
Discharge: HOME | End: 2024-11-09
Attending: NURSE PRACTITIONER | Admitting: NURSE PRACTITIONER
Payer: MEDICARE

## 2024-11-09 VITALS
SYSTOLIC BLOOD PRESSURE: 139 MMHG | RESPIRATION RATE: 18 BRPM | HEART RATE: 110 BPM | BODY MASS INDEX: 28.7 KG/M2 | WEIGHT: 200 LBS | DIASTOLIC BLOOD PRESSURE: 77 MMHG | OXYGEN SATURATION: 97 % | TEMPERATURE: 98.7 F

## 2024-11-09 DIAGNOSIS — T21.25XD SECOND DEGREE BURN OF BUTTOCK, SUBSEQUENT ENCOUNTER: ICD-10-CM

## 2024-11-09 DIAGNOSIS — Z51.89 VISIT FOR WOUND CHECK: Primary | ICD-10-CM

## 2024-11-09 PROCEDURE — 99213 OFFICE O/P EST LOW 20 MIN: CPT | Performed by: NURSE PRACTITIONER

## 2024-11-09 PROCEDURE — G0463 HOSPITAL OUTPT CLINIC VISIT: HCPCS

## 2024-11-09 RX ORDER — CEFADROXIL 500 MG/1
500 CAPSULE ORAL 2 TIMES DAILY
Qty: 14 CAPSULE | Refills: 0 | Status: SHIPPED | OUTPATIENT
Start: 2024-11-09 | End: 2024-11-16

## 2024-11-09 RX ORDER — MUPIROCIN 20 MG/G
OINTMENT TOPICAL 3 TIMES DAILY
Qty: 30 G | Refills: 0 | Status: SHIPPED | OUTPATIENT
Start: 2024-11-09

## 2024-11-09 ASSESSMENT — ENCOUNTER SYMPTOMS
FEVER: 0
WOUND: 1
CHILLS: 0

## 2024-11-09 ASSESSMENT — ACTIVITIES OF DAILY LIVING (ADL): ADLS_ACUITY_SCORE: 0

## 2024-11-09 NOTE — ED PROVIDER NOTES
History     Chief Complaint   Patient presents with    Wound Check     HPI  Carrillo Carranza is a 83 year old male who presents to urgent care with his wife for a wound check.  11 days ago patient backed into the sauna onto the stove and accidentally burned his left buttock cheek.  He was seen in the clinic the next day following the injury where it was debrided and patient was prescribed Silvadene cream.  Patient has been using the Silvadene cream as well as cleaning the wound daily.  His wife states that overall the wound appeared to be healing well.  However earlier today she noticed some bumps to the wound which concerned her and prompted this visit.  He has been having some slight yellow drainage to the wound.  No fevers or chills.    Allergies:  Allergies   Allergen Reactions    Atorvastatin Calcium Other (See Comments)     Lipitor - myalgia    Avodart [Dutasteride] Other (See Comments)     Sexual dsfxn    Simvastatin Other (See Comments)     Zocor - elevated liver function test       Problem List:    Patient Active Problem List    Diagnosis Date Noted    Chronic midline low back pain with sciatica, sciatica laterality unspecified 06/23/2023     Priority: Medium    Arthritis of first metatarsophalangeal (MTP) joint of left foot 07/22/2020     Priority: Medium    Baca's esophagus without dysplasia 05/07/2019     Priority: Medium    H/O adenomatous polyp of colon 05/07/2019     Priority: Medium    Low libido 09/14/2017     Priority: Medium    BPH with obstruction/lower urinary tract symptoms 05/16/2017     Priority: Medium    Type 2 diabetes mellitus without complication, without long-term current use of insulin (H) 12/12/2016     Priority: Medium    Essential hypertension with goal blood pressure less than 140/90 07/15/2016     Priority: Medium    Nocturia 10/09/2014     Priority: Medium    Hypomagnesemia 06/23/2014     Priority: Medium    BPPV (benign paroxysmal positional vertigo) 06/13/2014      Priority: Medium    Hypokalemia 06/02/2014     Priority: Medium    Hyperlipidemia with target LDL less than 100      Priority: Medium     Diagnosis updated by automated process. Provider to review and confirm.      Fatty liver disease, nonalcoholic      Priority: Medium    Diffuse connective tissue disease (H) 01/29/2013     Priority: Medium     Problem list name updated by automated process. Provider to review      Advanced care planning/counseling discussion 06/12/2012     Priority: Medium        Past Medical History:    Past Medical History:   Diagnosis Date    Bcaa's esophagus 11/3/2011    Contact dermatitis and other eczema, due to unspecified cause 3/1/2011    Dermatophytosis of groin and perianal area 2/1/2006    Esophageal reflux 11/3/2011    Family history of colonic polyps 11/3/2011    Fatty liver disease, nonalcoholic     Generalized osteoarthrosis, involving multiple sites 3/1/2011    HTN (hypertension)     Hyperlipidaemia LDL goal < 100     Other and unspecified hyperlipidemia 11/3/2011    Other chronic nonalcoholic liver disease 11/3/2011    Other premature beats 11/3/2011    Other specified cardiac dysrhythmias(427.89) 11/3/2011    Personal history of tobacco use, presenting hazards to health 11/3/2011    Type II or unspecified type diabetes mellitus without mention of complication, not stated as uncontrolled 2/7/2012    Umbilical hernia without mention of obstruction or gangrene 11/3/2011    Unspecified diffuse connective tissue disease 1/29/2013    Unspecified essential hypertension 11/3/2011       Past Surgical History:    Past Surgical History:   Procedure Laterality Date    ADENOIDECTOMY      APPENDECTOMY      ARTHROSCOPY KNEE      arthroscopy right great toe      bunion    COLONOSCOPY  02/23/2010    COLONOSCOPY  2010,2006    repeat in 2015    COLONOSCOPY  08/10/2006    COLONOSCOPY  01/01/2015    Repeat 2020    COLONOSCOPY N/A 04/13/2021    Normal. Internal hemorrhoids noted. No further  colonoscopies recommended.    COLONOSCOPY W/ ENDOSCOPIC US  2021    ESOPHAGOGASTRODUODENOSCOPY      Repeat in 2015    ESOPHAGOGASTRODUODENOSCOPY  01/01/2015    Dr Funes/Vivek- Repeat 3yrs    EXCISE LESION EAR EXTERNAL Right 03/13/2018    Procedure: EXCISE LESION EAR EXTERNAL;  EXCISION OF PREAURICULAR BASAL CELL CARCINOMA WITH FROZENS, FLAP REPAIR ( 2.6 x 2cm Defect), ( 3.5 x 3.5cmTissue Rearrangement);  Surgeon: Kyara Rojas MD;  Location: [facility]    HERNIA REPAIR, UMBILICAL  01/01/2012    reduction and repair of umbilical hernia    JOINT REPLACEMENT      PHACOEMULSIFICATION WITH STANDARD INTRAOCULAR LENS IMPLANT Right 06/11/2019    Procedure: COMPLEX PHACOEMULSIFICATION CATARACT EXTRACTION POSTERIOR CHAMBER LENS RIGHT 10% LINDA IFIS SOLUTION/OMIDRIA  LI61AO  21;  Surgeon: Julio Patrick MD;  Location: [facility]    PHACOEMULSIFICATION WITH STANDARD INTRAOCULAR LENS IMPLANT Left 06/25/2019    Procedure: PHACOEMULSIFICATION CATARACT EXTRACTION POSTERIOR CHAMBER LENS LEFT 10% LINDA, POSSIBLE IFIS OR OMIDRIA LI61AO 21m COMPLEX CATARACT;  Surgeon: Julio Patrick MD;  Location: [facility]    RELEASE CARPAL TUNNEL      carpal tunnel syndrome    shoulder bone spur removal      TKA      TONSILLECTOMY      UPPER GI ENDOSCOPY  01/01/2012    repeat in 2014    UPPER GI ENDOSCOPY      UPPER GI ENDOSCOPY      UPPER GI ENDOSCOPY  01/01/2010    repeat 2012       Family History:    Family History   Problem Relation Age of Onset    Cerebrovascular Disease Maternal Grandmother         CVA    Heart Failure Father        Social History:  Marital Status:   [2]  Social History     Tobacco Use    Smoking status: Former     Types: Cigarettes     Passive exposure: Past    Smokeless tobacco: Never    Tobacco comments:     quit in the 80s   Vaping Use    Vaping status: Never Used   Substance Use Topics    Alcohol use: No    Drug use: No        Medications:    blood glucose (ACCU-CHEK ANGELI PLUS) test strip  blood glucose (ACCU-CHEK  FASTCLIX) lancing device  blood glucose (ACCU-CHEK GUIDE) test strip  blood glucose (NO BRAND SPECIFIED) lancets standard  blood glucose (NO BRAND SPECIFIED) lancing device  blood glucose (NO BRAND SPECIFIED) test strip  blood glucose calibration (ACCU-CHEK ANGELI) solution  blood glucose monitoring (ACCU-CHEK ANGELI PLUS) meter device kit  blood glucose monitoring (SOFTCLIX) lancets  blood glucose monitoring (SOFTCLIX) lancets  cefadroxil (DURICEF) 500 MG capsule  Cholecalciferol (VITAMIN D PO)  ezetimibe (ZETIA) 10 MG tablet  garlic 150 MG TABS tablet  glimepiride (AMARYL) 1 MG tablet  Krill Oil 500 MG CAPS  lisinopril-hydrochlorothiazide (ZESTORETIC) 10-12.5 MG tablet  magnesium oxide 400 MG CAPS  metFORMIN (GLUCOPHAGE XR) 500 MG 24 hr tablet  Multiple Vitamin (MULTIVITAMINS PO)  mupirocin (BACTROBAN) 2 % external ointment  pantoprazole (PROTONIX) 40 MG EC tablet  potassium chloride whitley ER (KLOR-CON M20) 20 MEQ CR tablet  silver sulfADIAZINE (SILVADENE) 1 % external cream  STATIN NOT PRESCRIBED, INTENTIONAL,  tamsulosin (FLOMAX) 0.4 MG capsule  traZODone (DESYREL) 50 MG tablet  UNABLE TO FIND          Review of Systems   Constitutional:  Negative for chills and fever.   Skin:  Positive for wound.   All other systems reviewed and are negative.      Physical Exam   BP: 139/77  Pulse: 110  Temp: 98.7  F (37.1  C)  Resp: 18  Weight: 90.7 kg (200 lb)  SpO2: 97 %      Physical Exam  Vitals and nursing note reviewed.   Constitutional:       General: He is not in acute distress.     Appearance: He is well-developed. He is not diaphoretic.   HENT:      Head: Normocephalic and atraumatic.   Eyes:      Pupils: Pupils are equal, round, and reactive to light.   Cardiovascular:      Rate and Rhythm: Normal rate.   Pulmonary:      Effort: Pulmonary effort is normal.   Musculoskeletal:      Cervical back: Normal range of motion and neck supple.   Skin:     General: Skin is warm and dry.      Coloration: Skin is not pale.              Comments: Approximately 10 x 5.5 cm superficial open wound to left buttock cheek.  Does appear to have some mild blistering to the bed of the wound but otherwise appears to be healing well.  Of note there is redness at about 7/8 o'clock position of wound that is extending from the wound which patient's wife states is new as of today.    Tenderness to palpation mostly appreciated at the 3 o'clock position of the wound.  No significant tenderness palpation to the rest of the buttock cheek.  No fluctuance.  No active drainage.   Neurological:      Mental Status: He is alert and oriented to person, place, and time.         ED Course        Procedures         No results found for this or any previous visit (from the past 24 hours).    Medications - No data to display    Assessments & Plan (with Medical Decision Making)   Pleasant 83-year-old male that presented for a wound check after he developed a second-degree burn wound to his left buttock cheek about 11 days ago.  Overall the wound has been healing well with wife noticing some changes that prompted today's visit.  The wound itself appears to be healing well and therefore wife was reassured by this today.  However there is a new redness at about the 7 o'clock position of the wound that is concerning for cellulitis.  Wife notes that this just started today.    Encouraged patient and wife to continue cleaning the wound with mild soap and water, drying thoroughly.  Will prescribe mupirocin ointment that they can use over the wound.  Continue occasionally letting the wound air dry out.  Apply cushioning dressing to help with comfort when he is sitting down on his buttock cheeks.  Will treat with cefadroxil out of concern of cellulitis given the new redness to the wound.  Follow-up with primary doctor as needed.  Return to urgent care or emergency department for any worsening or concerning symptoms.    I have reviewed the nursing notes.    I have reviewed the  findings, diagnosis, plan and need for follow up with the patient.  This document was prepared using a combination of typing and voice generated software.  While every attempt was made for accuracy, spelling and grammatical errors may exist.         New Prescriptions    CEFADROXIL (DURICEF) 500 MG CAPSULE    Take 1 capsule (500 mg) by mouth 2 times daily for 7 days.    MUPIROCIN (BACTROBAN) 2 % EXTERNAL OINTMENT    Apply topically 3 times daily.       Final diagnoses:   Visit for wound check   Second degree burn of buttock, subsequent encounter       11/9/2024   [facility]       Mpofu, Kylee, CNP  11/09/24 [id number]

## 2024-11-09 NOTE — ED TRIAGE NOTES
Pt presents with burn to LT side buttocks that happened a week and a half ago. Pts wife concerned not healing properly and that it is getting worse. Pt states the pain has been increasing with a throbbing pain and discharge has been increasing from affected area. Pt denies having fevers. Pt rates current pain 4/10.

## 2024-11-09 NOTE — ED TRIAGE NOTES
MATT Toledo CNP assessed patient in triage and determined patient Urgent Care appropriate. Will be seen in Urgent Care.

## 2024-11-10 NOTE — DISCHARGE INSTRUCTIONS
The wound itself appears to be healing well.  I am concerned about the new redness outside the wound that is concerning for a skin infection.    Clean the wound with mild soap and water.  Dry thoroughly.  Apply the mupirocin ointment sparingly over the wound itself.  Apply dressing over the wound.  Occasionally leave the wound open to air dry.    Take the oral antibiotic that was prescribed for the concerning skin infection around your wound.  Continue observing for any worsening signs of infection such as worsening redness outside the wound or abnormal discharge.     Follow-up with primary doctor as needed.

## 2024-11-11 ENCOUNTER — TELEPHONE (OUTPATIENT)
Dept: FAMILY MEDICINE | Facility: OTHER | Age: 84
End: 2024-11-11

## 2024-11-11 DIAGNOSIS — K22.70 BARRETT'S ESOPHAGUS WITHOUT DYSPLASIA: ICD-10-CM

## 2024-11-11 RX ORDER — PANTOPRAZOLE SODIUM 40 MG/1
40 TABLET, DELAYED RELEASE ORAL DAILY
Qty: 90 TABLET | Refills: 0 | Status: SHIPPED | OUTPATIENT
Start: 2024-11-11

## 2024-11-11 NOTE — TELEPHONE ENCOUNTER
Symptom or reason needing to speak to RN: er/uc follow up     Best number to return call: [phone]     Best time to return call: asap

## 2024-11-12 ENCOUNTER — OFFICE VISIT (OUTPATIENT)
Dept: FAMILY MEDICINE | Facility: OTHER | Age: 84
End: 2024-11-12
Attending: FAMILY MEDICINE
Payer: MEDICARE

## 2024-11-12 VITALS
BODY MASS INDEX: 28.92 KG/M2 | WEIGHT: 202 LBS | HEART RATE: 89 BPM | DIASTOLIC BLOOD PRESSURE: 62 MMHG | TEMPERATURE: 97.6 F | HEIGHT: 70 IN | OXYGEN SATURATION: 97 % | SYSTOLIC BLOOD PRESSURE: 124 MMHG

## 2024-11-12 DIAGNOSIS — T21.25XA SECOND DEGREE BURN OF BUTTOCK, INITIAL ENCOUNTER: Primary | ICD-10-CM

## 2024-11-12 DIAGNOSIS — Z23 HIGH PRIORITY FOR 2019-NCOV VACCINE: ICD-10-CM

## 2024-11-12 PROCEDURE — G0463 HOSPITAL OUTPT CLINIC VISIT: HCPCS

## 2024-11-12 ASSESSMENT — PAIN SCALES - GENERAL: PAINLEVEL_OUTOF10: MODERATE PAIN (5)

## 2024-11-12 NOTE — PROGRESS NOTES
"  Assessment & Plan     Second degree burn of buttock, initial encounter  Healing well. Wound care instructions given. Leave open to air at night when sleeping. Bandage during day. Clean BID. Follow-up prn     High priority for 2019-nCoV vaccine  Shot needed and given           BMI  Estimated body mass index is 28.98 kg/m  as calculated from the following:    Height as of this encounter: 1.778 m (5' 10\").    Weight as of this encounter: 91.6 kg (202 lb).           No follow-ups on file.    Gabbi Vizcaino is a 83 year old, presenting for the following health issues:  Wound Check      11/12/2024     2:00 PM   Additional Questions   Roomed by Leta AHMADI   Accompanied by Spouse         11/12/2024     2:00 PM   Patient Reported Additional Medications   Patient reports taking the following new medications cefadroxil and mupirocin     HPI     Concern - Burn on left buttock  Onset: 2 weeks ago  Description: Backed up against a stove pipe in a sauna  Intensity: moderate  Progression of Symptoms:  same  Accompanying Signs & Symptoms: None  Previous history of similar problem: No  Precipitating factors:        Worsened by: Sitting  Alleviating factors:        Improved by: Prescribed creams, antibiotic, proper wound care  Therapies tried and outcome: None    Was concerned- had more blisters  Seen in [facility]  Note reviewed  Looking better  On topical and oral abx  Less drainage       Review of Systems  Constitutional, HEENT, cardiovascular, pulmonary, gi and gu systems are negative, except as otherwise noted.      Objective    /62 (BP Location: Right arm, Patient Position: Sitting, Cuff Size: Adult Large)   Pulse 89   Temp 97.6  F (36.4  C) (Tympanic)   Ht 1.778 m (5' 10\")   Wt 91.6 kg (202 lb)   SpO2 97%   BMI 28.98 kg/m    Body mass index is 28.98 kg/m .  Physical Exam   GENERAL: alert and no distress  SKIN: no suspicious lesions or rashes--- left buttocks- clean red wound. Mild skin changes of some sluff and " slight crust. Very clean. No cellulitis             Signed Electronically by: Mata Richardson MD

## 2024-11-23 DIAGNOSIS — I10 BENIGN ESSENTIAL HYPERTENSION: ICD-10-CM

## 2024-11-25 NOTE — TELEPHONE ENCOUNTER
lisinopril-hydrochlorothiazide (ZESTORETIC) 10-12.5 MG tablet 90 tablet 3 12/12/2023     Last Office Visit: 11/12/2024  Future Office visit:       Routing refill request to provider for review/approval because:

## 2024-11-26 RX ORDER — LISINOPRIL AND HYDROCHLOROTHIAZIDE 10; 12.5 MG/1; MG/1
1 TABLET ORAL DAILY
Qty: 90 TABLET | Refills: 3 | Status: SHIPPED | OUTPATIENT
Start: 2024-11-26

## 2025-01-13 ENCOUNTER — ALLIED HEALTH/NURSE VISIT (OUTPATIENT)
Dept: FAMILY MEDICINE | Facility: OTHER | Age: 85
End: 2025-01-13
Attending: FAMILY MEDICINE
Payer: MEDICARE

## 2025-01-13 VITALS — TEMPERATURE: 97.9 F

## 2025-01-13 DIAGNOSIS — Z23 ENCOUNTER FOR IMMUNIZATION: Primary | ICD-10-CM

## 2025-01-13 PROCEDURE — 90480 ADMN SARSCOV2 VAC 1/ONLY CMP: CPT

## 2025-01-13 NOTE — PROGRESS NOTES
Prior to immunization administration, verified patients identity using patient s name and date of birth. Please see Immunization Activity for additional information.     Is the patient's temperature normal (100.5 or less)? Yes     Patient MEETS CRITERIA. PROCEED with vaccine administration.      Patient instructed to remain in clinic for 15 minutes afterwards, and to report any adverse reactions.      Link to Ancillary Visit Immunization Standing Orders SmartSet     Screening performed by Cody Knox MA on 1/13/2025 at 1:24 PM.

## 2025-02-10 DIAGNOSIS — E11.21 TYPE 2 DIABETES MELLITUS WITH DIABETIC NEPHROPATHY, WITHOUT LONG-TERM CURRENT USE OF INSULIN (H): ICD-10-CM

## 2025-02-10 DIAGNOSIS — R69 DIAGNOSIS UNKNOWN: ICD-10-CM

## 2025-02-10 DIAGNOSIS — K22.70 BARRETT'S ESOPHAGUS WITHOUT DYSPLASIA: ICD-10-CM

## 2025-02-10 NOTE — TELEPHONE ENCOUNTER
Protonix      Last Written Prescription Date:  11/11/24  Last Fill Quantity: 90,   # refills: 0  Last Office Visit: 11/12/24  Future Office visit:    Next 5 appointments (look out 90 days)      Apr 22, 2025 10:45 AM  (Arrive by 10:30 AM)  [facility] with [facility]  [facility] [address]  [facility] [address]  [phone]             Routing refill request to provider for review/approval because:      Trazodone      Last Written Prescription Date:  8/22/23  Last Fill Quantity: 90,   # refills: 3  Last Office Visit: 11/12/24  Future Office visit:    Next 5 appointments (look out 90 days)      Apr 22, 2025 10:45 AM  (Arrive by 10:30 AM)  Provider Visit with Jorge Garcia MD  [facility] [address]  [facility] [address]  [phone]             Routing refill request to provider for review/approval because:      Amaryl      Last Written Prescription Date:  12/19/23  Last Fill Quantity: 45,   # refills: 4  Last Office Visit: 11/12/24  Future Office visit:    Next 5 appointments (look out 90 days)      Apr 22, 2025 10:45 AM  (Arrive by 10:30 AM)  Provider Visit with Jorge Garcia MD  [facility] ) [address]  [facility] [address]  [phone]             Routing refill request to provider for review/approval because:

## 2025-02-11 RX ORDER — GLIMEPIRIDE 1 MG/1
TABLET ORAL
Qty: 45 TABLET | Refills: 0 | Status: SHIPPED | OUTPATIENT
Start: 2025-02-11

## 2025-02-11 RX ORDER — PANTOPRAZOLE SODIUM 40 MG/1
40 TABLET, DELAYED RELEASE ORAL DAILY
Qty: 90 TABLET | Refills: 0 | Status: SHIPPED | OUTPATIENT
Start: 2025-02-11

## 2025-02-11 RX ORDER — TRAZODONE HYDROCHLORIDE 50 MG/1
TABLET ORAL
Qty: 90 TABLET | Refills: 0 | Status: SHIPPED | OUTPATIENT
Start: 2025-02-11

## 2025-03-09 ENCOUNTER — HEALTH MAINTENANCE LETTER (OUTPATIENT)
Age: 85
End: 2025-03-09

## 2025-03-19 DIAGNOSIS — I10 ESSENTIAL HYPERTENSION WITH GOAL BLOOD PRESSURE LESS THAN 140/90: ICD-10-CM

## 2025-03-19 RX ORDER — POTASSIUM CHLORIDE 1500 MG/1
20 TABLET, EXTENDED RELEASE ORAL 2 TIMES DAILY
Qty: 180 TABLET | Refills: 3 | Status: SHIPPED | OUTPATIENT
Start: 2025-03-19

## 2025-03-24 DIAGNOSIS — N13.8 BPH WITH OBSTRUCTION/LOWER URINARY TRACT SYMPTOMS: Primary | ICD-10-CM

## 2025-03-24 DIAGNOSIS — N40.1 BPH WITH OBSTRUCTION/LOWER URINARY TRACT SYMPTOMS: Primary | ICD-10-CM

## 2025-03-24 NOTE — TELEPHONE ENCOUNTER
tamsulosin (FLOMAX) 0.4 MG capsule       Last Written Prescription Date:  6/24/2024  Last Fill Quantity: 90,   # refills: 3  Last Office Visit: 11/12/2024  Future Office visit:    Next 5 appointments (look out 90 days)      Apr 22, 2025 10:45 AM  (Arrive by 10:30 AM)  Provider Visit with Jorge Garcia MD  [facility] ([facility] ) [address] [facility] [address]  [phone]

## 2025-03-25 DIAGNOSIS — E78.5 HYPERLIPIDEMIA LDL GOAL <100: ICD-10-CM

## 2025-03-25 RX ORDER — TAMSULOSIN HYDROCHLORIDE 0.4 MG/1
CAPSULE ORAL
Qty: 90 CAPSULE | Refills: 0 | Status: SHIPPED | OUTPATIENT
Start: 2025-03-25

## 2025-03-25 RX ORDER — EZETIMIBE 10 MG/1
10 TABLET ORAL DAILY
Qty: 90 TABLET | Refills: 3 | Status: SHIPPED | OUTPATIENT
Start: 2025-03-25

## 2025-03-25 NOTE — TELEPHONE ENCOUNTER
Routing refill request to provider for review/approval because:  Alpha Blockers [id number]/24/2025 03:52 PM   Protocol Details   Recent (12 mo) or future (90 days) visit within the authorizing provider's specialty    Medication indicated for associated diagnosis

## 2025-04-16 DIAGNOSIS — E11.9 TYPE 2 DIABETES MELLITUS WITHOUT COMPLICATION, WITHOUT LONG-TERM CURRENT USE OF INSULIN (H): ICD-10-CM

## 2025-04-16 NOTE — TELEPHONE ENCOUNTER
metFORMIN (GLUCOPHAGE XR) 500 MG 24 hr tablet         Last Written Prescription Date:  2/28/24  Last Fill Quantity: 270,   # refills: 3  Last Office Visit: 11/12/24- Dr. Richardson   Future Office visit:    Next 5 appointments (look out 90 days)      Apr 22, 2025 10:45 AM  (Arrive by 10:30 AM)  Provider Visit with Jorge Garcia MD  [facility] ([facility] ) [address] [place] [address]  [phone]             Routing refill request to provider for review/approval because:  Biguanide Agents [id number]/16/2025 12:26 PM   Protocol Details Patient has documented A1c within the specified period of time.    Recent (6 mo) or future (90 days) visit within the authorizing provider's specialty       Hemoglobin A1C   Date Value Ref Range Status   10/03/2024 7.3 (H) <5.7 % Final     Comment:     Normal <5.7%   Prediabetes 5.7-6.4%    Diabetes 6.5% or higher     Note: Adopted from ADA consensus guidelines.   06/25/2021 6.5 (H) 0 - 5.6 % Final     Comment:     Normal <5.7% Prediabetes 5.7-6.4%  Diabetes 6.5% or higher - adopted from ADA   consensus guidelines.

## 2025-04-17 RX ORDER — METFORMIN HYDROCHLORIDE 500 MG/1
TABLET, EXTENDED RELEASE ORAL
Qty: 270 TABLET | Refills: 0 | Status: SHIPPED | OUTPATIENT
Start: 2025-04-17

## 2025-04-17 NOTE — PROGRESS NOTES
"  Assessment & Plan     Type 2 diabetes mellitus without complication, without long-term current use of insulin (H)  Nice review.  I became his doctor today.  Update full annual labs.  No change in meds for now.  Nice review and discussion.    - Albumin Random Urine Quantitative with Creat Ratio; Future  - Comprehensive metabolic panel; Future  - Lipid Profile; Future  - LA FOOT EXAM NO CHARGE; Future  - Hemoglobin A1c; Future  - TSH with free T4 reflex; Future    Benign essential hypertension  Stable.  Update for the year without dose change.    - lisinopril-hydrochlorothiazide (ZESTORETIC) 10-12.5 MG tablet; Take 1 tablet by mouth daily.    Cervicalgia  With cracking.  Getting xray and MRI.  Xray pending.   Will review with him when done.  MRI pending and will review that as well.    - XR CERVICAL SPINE G/E 4 VIEWS (Clinic Performed); Future    BPH with obstruction/lower urinary tract symptoms  Stable sx.  No change in medicines.            BMI  Estimated body mass index is 29.13 kg/m  as calculated from the following:    Height as of 11/12/24: 1.778 m (5' 10\").    Weight as of this encounter: 92.1 kg (203 lb).             Gabbi Vizcaino is a 84 year old, presenting for the following health issues:  Diabetes        4/22/2025    10:17 AM   Additional Questions   Roomed by Yoli   Accompanied by wife     HPI      Diabetes Follow-up    How often are you checking your blood sugar? One time daily  What time of day are you checking your blood sugars (select all that apply)?  Before meals  Have you had any blood sugars above 200?  No  Have you had any blood sugars below 70?  No  What symptoms do you notice when your blood sugar is low?  None  What concerns do you have today about your diabetes? None   Do you have any of these symptoms? (Select all that apply)  No numbness or tingling in feet.  No redness, sores or blisters on feet.  No complaints of excessive thirst.  No reports of blurry vision.  No significant " changes to weight.          Hyperlipidemia Follow-Up    Are you regularly taking any medication or supplement to lower your cholesterol?   No  Are you having muscle aches or other side effects that you think could be caused by your cholesterol lowering medication?  No    Hypertension Follow-up    Do you check your blood pressure regularly outside of the clinic? No   Are you following a low salt diet? Yes  Are your blood pressures ever more than 140 on the top number (systolic) OR more   than 90 on the bottom number (diastolic), for example 140/90? N/A    BP Readings from Last 2 Encounters:   04/22/25 130/68   11/12/24 124/62     Hemoglobin A1C (%)   Date Value   10/03/2024 7.3 (H)   05/22/2024 7.1 (H)   06/25/2021 6.5 (H)   02/23/2021 6.3 (H)     LDL Cholesterol Calculated (mg/dL)   Date Value   11/20/2023 86   01/18/2023 84   09/15/2020 103 (H)   05/07/2019 100 (H)         Neck Pain    Duration: ongoing   Description:  Location: back of neck   Radiation: into head   Intensity:  moderate  Accompanying signs and symptoms: none   History (similar episodes/previous evaluation): none   Precipitating or alleviating factors: None  Therapies tried and outcome: lidocaine, pain gel       Diabetic foot exam   1. foot deformity   Yes  2. Current or previous foot ulceration     No  3. Current or previous pre-ulcerative calluses     Yes  4. previous partial amputation of one or both feet or complete amputation of one foot     No  5. peripheral neuropathy with evidence of callus formation     No  6. poor circulation     No  Approval given for 3 pairs of diabetic shoes and inserts if patient desires.        Review of Systems  CONSTITUTIONAL: NEGATIVE for fever, chills, change in weight  INTEGUMENTARY/SKIN: NEGATIVE for worrisome rashes, moles or lesions  EYES: NEGATIVE for vision changes or irritation  ENT/MOUTH: NEGATIVE for ear, mouth and throat problems  RESP: NEGATIVE for significant cough or SOB  BREAST: NEGATIVE for masses,  tenderness or discharge  CV: NEGATIVE for chest pain, palpitations or peripheral edema  GI: NEGATIVE for nausea, abdominal pain, heartburn, or change in bowel habits  : NEGATIVE for frequency, dysuria, or hematuria  MUSCULOSKELETAL: NEGATIVE for significant arthralgias or myalgia  NEURO: NEGATIVE for weakness, dizziness or paresthesias  ENDOCRINE: NEGATIVE for temperature intolerance, skin/hair changes  HEME: NEGATIVE for bleeding problems  PSYCHIATRIC: NEGATIVE for changes in mood or affect      Objective    /68   Pulse 54   Temp 98.5  F (36.9  C) (Tympanic)   Wt 92.1 kg (203 lb)   SpO2 96%   BMI 29.13 kg/m    Body mass index is 29.13 kg/m .  Physical Exam   GENERAL: alert and no distress  EYES: Eyes grossly normal to inspection, PERRL and conjunctivae and sclerae normal  HENT: ear canals and TM's normal, nose and mouth without ulcers or lesions  NECK: no adenopathy, no asymmetry, masses, or scars  RESP: lungs clear to auscultation - no rales, rhonchi or wheezes  CV: regular rate and rhythm, normal S1 S2, no S3 or S4, no murmur, click or rub, no peripheral edema  ABDOMEN: soft, nontender, no hepatosplenomegaly, no masses and bowel sounds normal  MS: no gross musculoskeletal defects noted, no edema  SKIN: no suspicious lesions or rashes  NEURO: Normal strength and tone, mentation intact and speech normal  PSYCH: mentation appears normal, affect normal/bright    Full labs pending.      The longitudinal plan of care for the diagnosis(es)/condition(s) as documented were addressed during this visit. Due to the added complexity in care, I will continue to support Carrillo in the subsequent management and with ongoing continuity of care.        Signed Electronically by: Jorge Garcia MD

## 2025-04-20 ENCOUNTER — HEALTH MAINTENANCE LETTER (OUTPATIENT)
Age: 85
End: 2025-04-20

## 2025-04-22 ENCOUNTER — ANCILLARY PROCEDURE (OUTPATIENT)
Dept: GENERAL RADIOLOGY | Facility: OTHER | Age: 85
End: 2025-04-22
Attending: FAMILY MEDICINE
Payer: MEDICARE

## 2025-04-22 ENCOUNTER — TELEPHONE (OUTPATIENT)
Dept: FAMILY MEDICINE | Facility: OTHER | Age: 85
End: 2025-04-22

## 2025-04-22 ENCOUNTER — OFFICE VISIT (OUTPATIENT)
Dept: FAMILY MEDICINE | Facility: OTHER | Age: 85
End: 2025-04-22
Attending: FAMILY MEDICINE
Payer: MEDICARE

## 2025-04-22 VITALS
HEART RATE: 54 BPM | OXYGEN SATURATION: 96 % | DIASTOLIC BLOOD PRESSURE: 68 MMHG | WEIGHT: 203 LBS | TEMPERATURE: 98.5 F | BODY MASS INDEX: 29.13 KG/M2 | SYSTOLIC BLOOD PRESSURE: 130 MMHG

## 2025-04-22 DIAGNOSIS — N40.1 BPH WITH OBSTRUCTION/LOWER URINARY TRACT SYMPTOMS: ICD-10-CM

## 2025-04-22 DIAGNOSIS — I10 BENIGN ESSENTIAL HYPERTENSION: ICD-10-CM

## 2025-04-22 DIAGNOSIS — E11.9 TYPE 2 DIABETES MELLITUS WITHOUT COMPLICATION, WITHOUT LONG-TERM CURRENT USE OF INSULIN (H): Primary | ICD-10-CM

## 2025-04-22 DIAGNOSIS — N13.8 BPH WITH OBSTRUCTION/LOWER URINARY TRACT SYMPTOMS: ICD-10-CM

## 2025-04-22 DIAGNOSIS — M54.2 CERVICALGIA: ICD-10-CM

## 2025-04-22 DIAGNOSIS — E78.5 HYPERLIPIDEMIA WITH TARGET LDL LESS THAN 100: ICD-10-CM

## 2025-04-22 LAB
ALBUMIN SERPL BCG-MCNC: 4.2 G/DL (ref 3.5–5.2)
ALP SERPL-CCNC: 70 U/L (ref 40–150)
ALT SERPL W P-5'-P-CCNC: 29 U/L (ref 0–70)
ANION GAP SERPL CALCULATED.3IONS-SCNC: 11 MMOL/L (ref 7–15)
AST SERPL W P-5'-P-CCNC: 29 U/L (ref 0–45)
BILIRUB SERPL-MCNC: 0.6 MG/DL
BUN SERPL-MCNC: 14.1 MG/DL (ref 8–23)
CALCIUM SERPL-MCNC: 9.3 MG/DL (ref 8.8–10.4)
CHLORIDE SERPL-SCNC: 95 MMOL/L (ref 98–107)
CHOLEST SERPL-MCNC: 148 MG/DL
CREAT SERPL-MCNC: 0.76 MG/DL (ref 0.67–1.17)
EGFRCR SERPLBLD CKD-EPI 2021: 90 ML/MIN/1.73M2
EST. AVERAGE GLUCOSE BLD GHB EST-MCNC: 166 MG/DL
FASTING STATUS PATIENT QL REPORTED: YES
FASTING STATUS PATIENT QL REPORTED: YES
GLUCOSE SERPL-MCNC: 166 MG/DL (ref 70–99)
HBA1C MFR BLD: 7.4 %
HCO3 SERPL-SCNC: 27 MMOL/L (ref 22–29)
HDLC SERPL-MCNC: 44 MG/DL
LDLC SERPL CALC-MCNC: 90 MG/DL
NONHDLC SERPL-MCNC: 104 MG/DL
POTASSIUM SERPL-SCNC: 5 MMOL/L (ref 3.4–5.3)
PROT SERPL-MCNC: 7.2 G/DL (ref 6.4–8.3)
SODIUM SERPL-SCNC: 133 MMOL/L (ref 135–145)
TRIGL SERPL-MCNC: 71 MG/DL
TSH SERPL DL<=0.005 MIU/L-ACNC: 0.49 UIU/ML (ref 0.3–4.2)

## 2025-04-22 PROCEDURE — 36415 COLL VENOUS BLD VENIPUNCTURE: CPT | Mod: ZL | Performed by: FAMILY MEDICINE

## 2025-04-22 PROCEDURE — G0463 HOSPITAL OUTPT CLINIC VISIT: HCPCS

## 2025-04-22 PROCEDURE — 84478 ASSAY OF TRIGLYCERIDES: CPT | Mod: ZL | Performed by: FAMILY MEDICINE

## 2025-04-22 PROCEDURE — 84155 ASSAY OF PROTEIN SERUM: CPT | Mod: ZL | Performed by: FAMILY MEDICINE

## 2025-04-22 PROCEDURE — 72050 X-RAY EXAM NECK SPINE 4/5VWS: CPT | Mod: 26 | Performed by: STUDENT IN AN ORGANIZED HEALTH CARE EDUCATION/TRAINING PROGRAM

## 2025-04-22 PROCEDURE — 72050 X-RAY EXAM NECK SPINE 4/5VWS: CPT | Mod: TC,FY

## 2025-04-22 PROCEDURE — 82947 ASSAY GLUCOSE BLOOD QUANT: CPT | Mod: ZL | Performed by: FAMILY MEDICINE

## 2025-04-22 PROCEDURE — 84443 ASSAY THYROID STIM HORMONE: CPT | Mod: ZL | Performed by: FAMILY MEDICINE

## 2025-04-22 PROCEDURE — 83036 HEMOGLOBIN GLYCOSYLATED A1C: CPT | Mod: ZL | Performed by: FAMILY MEDICINE

## 2025-04-22 RX ORDER — LISINOPRIL AND HYDROCHLOROTHIAZIDE 10; 12.5 MG/1; MG/1
1 TABLET ORAL DAILY
Qty: 90 TABLET | Refills: 3 | Status: SHIPPED | OUTPATIENT
Start: 2025-04-22

## 2025-04-22 ASSESSMENT — PAIN SCALES - GENERAL: PAINLEVEL_OUTOF10: NO PAIN (0)

## 2025-04-22 NOTE — TELEPHONE ENCOUNTER
I guess I assumed someone in your department sends that?  I'm not sure but if not can you have someone send.  I don't generally do a lot of faxing or sending like that.  Thanks.  Jorge Garcia MD

## 2025-04-23 DIAGNOSIS — E11.9 TYPE 2 DIABETES MELLITUS WITHOUT COMPLICATION, WITHOUT LONG-TERM CURRENT USE OF INSULIN (H): ICD-10-CM

## 2025-04-23 RX ORDER — METFORMIN HYDROCHLORIDE 500 MG/1
1000 TABLET, EXTENDED RELEASE ORAL 2 TIMES DAILY WITH MEALS
Qty: 360 TABLET | Refills: 3 | Status: SHIPPED | OUTPATIENT
Start: 2025-04-23

## 2025-04-23 RX ORDER — BLOOD SUGAR DIAGNOSTIC
STRIP MISCELLANEOUS
Qty: 100 STRIP | Refills: 3 | Status: SHIPPED | OUTPATIENT
Start: 2025-04-23

## 2025-04-24 ENCOUNTER — APPOINTMENT (OUTPATIENT)
Dept: LAB | Facility: OTHER | Age: 85
End: 2025-04-24
Payer: MEDICARE

## 2025-04-24 LAB
CREAT UR-MCNC: 28.5 MG/DL
MICROALBUMIN UR-MCNC: <12 MG/L
MICROALBUMIN/CREAT UR: NORMAL MG/G{CREAT}

## 2025-04-24 PROCEDURE — 82570 ASSAY OF URINE CREATININE: CPT | Mod: ZL | Performed by: FAMILY MEDICINE

## 2025-04-30 ENCOUNTER — TELEPHONE (OUTPATIENT)
Dept: FAMILY MEDICINE | Facility: OTHER | Age: 85
End: 2025-04-30

## 2025-04-30 NOTE — TELEPHONE ENCOUNTER
Pt is taking 2 tablets metformin BID and he is having stomach pain.  He tried taking 1 in the morning, 1 in the afternoon and 2 at night and it was better, can he take it like this?

## 2025-04-30 NOTE — TELEPHONE ENCOUNTER
10:07 AM    Reason for Call: Phone Call    Description: wife (Lakshmi) calling to discuss a letter she received regarding his Metformin medication. Please return her call to discuss.  Thank you    Was an appointment offered for this call? No  If yes : Appointment type              Date    Preferred method for responding to this message: Telephone Call    What is your phone number [phone]     If we cannot reach you directly, may we leave a detailed response at the number you provided? Yes    Can this message wait until your PCP/provider returns, if available today? Not applicable, doctor is in    Zuleyma Rothman

## 2025-05-16 ENCOUNTER — TELEPHONE (OUTPATIENT)
Dept: FAMILY MEDICINE | Facility: OTHER | Age: 85
End: 2025-05-16

## 2025-05-16 NOTE — TELEPHONE ENCOUNTER
8:23 AM    Reason for Call: Phone Call    Description: Carrillo would like the nurse to call him regarding his blood sugar has been dropping , today it is 98. Please call Andrew    Was an appointment offered for this call? No  If yes : Appointment type              Date    Preferred method for responding to this message: Telephone Call  What is your phone number ?  [phone]     If we cannot reach you directly, may we leave a detailed response at the number you provided? Yes    Can this message wait until your PCP/provider returns, if available today? Not applicable    Radha Galarza

## 2025-05-16 NOTE — TELEPHONE ENCOUNTER
Ok to wait until Monday.  Pt increased metformin to 4 tablets daily, he takes 1 in the morning, 1 in the afternoon and 2 after supper.  Since then he states his blood sugars are getting lower, today was the lowest at 98.  His only symptom is no energy.  Pt has appt in July.

## 2025-05-19 ENCOUNTER — TELEPHONE (OUTPATIENT)
Dept: FAMILY MEDICINE | Facility: OTHER | Age: 85
End: 2025-05-19

## 2025-05-19 DIAGNOSIS — K22.70 BARRETT'S ESOPHAGUS WITHOUT DYSPLASIA: ICD-10-CM

## 2025-05-19 RX ORDER — PANTOPRAZOLE SODIUM 40 MG/1
40 TABLET, DELAYED RELEASE ORAL DAILY
Qty: 90 TABLET | Refills: 3 | Status: SHIPPED | OUTPATIENT
Start: 2025-05-19

## 2025-05-19 NOTE — TELEPHONE ENCOUNTER
8:18 AM    Reason for Call: Phone Call    Description: pt is more worried about loose stools than his blood sugars/they will be home until 930 today and gone most of the day, can you call him back tomorrow    Was an appointment offered for this call? No  If yes : Appointment type              Date    Preferred method for responding to this message: Telephone Call  What is your phone number ?[phone]     If we cannot reach you directly, may we leave a detailed response at the number you provided? Yes    Can this message wait until your PCP/provider returns, if available today? Not applicable    Samira Martinez

## 2025-05-19 NOTE — TELEPHONE ENCOUNTER
As long as it doesn't get lower than that he can keep that going.  His body will likely get used to the lower sugars.  If he can give this new dose a couple of weeks I think it will get easier to tolerate.  Thanks.  Jorge Garcia MD

## 2025-06-09 DIAGNOSIS — E11.21 TYPE 2 DIABETES MELLITUS WITH DIABETIC NEPHROPATHY, WITHOUT LONG-TERM CURRENT USE OF INSULIN (H): ICD-10-CM

## 2025-06-09 RX ORDER — GLIMEPIRIDE 1 MG/1
TABLET ORAL
Qty: 45 TABLET | Refills: 1 | Status: SHIPPED | OUTPATIENT
Start: 2025-06-09

## 2025-07-01 DIAGNOSIS — E11.9 TYPE 2 DIABETES MELLITUS WITHOUT COMPLICATION, WITHOUT LONG-TERM CURRENT USE OF INSULIN (H): ICD-10-CM

## 2025-07-01 RX ORDER — METFORMIN HYDROCHLORIDE 500 MG/1
TABLET, EXTENDED RELEASE ORAL
Qty: 360 TABLET | Refills: 0 | Status: SHIPPED | OUTPATIENT
Start: 2025-07-01

## 2025-07-01 NOTE — TELEPHONE ENCOUNTER
Per pharmacy, pt is taking Metformin 500mg in AM, 500mg at noon, and 1000mg in the evening.  Pharmacy requesting new RX.

## 2025-07-23 ENCOUNTER — OFFICE VISIT (OUTPATIENT)
Dept: FAMILY MEDICINE | Facility: OTHER | Age: 85
End: 2025-07-23
Attending: FAMILY MEDICINE
Payer: MEDICARE

## 2025-07-23 VITALS
WEIGHT: 202 LBS | HEART RATE: 92 BPM | DIASTOLIC BLOOD PRESSURE: 66 MMHG | OXYGEN SATURATION: 98 % | SYSTOLIC BLOOD PRESSURE: 122 MMHG | TEMPERATURE: 97.6 F | BODY MASS INDEX: 28.98 KG/M2

## 2025-07-23 DIAGNOSIS — M54.12 CERVICAL RADICULOPATHY: ICD-10-CM

## 2025-07-23 DIAGNOSIS — N40.1 BPH WITH OBSTRUCTION/LOWER URINARY TRACT SYMPTOMS: ICD-10-CM

## 2025-07-23 DIAGNOSIS — I10 ESSENTIAL HYPERTENSION WITH GOAL BLOOD PRESSURE LESS THAN 140/90: ICD-10-CM

## 2025-07-23 DIAGNOSIS — N13.8 BPH WITH OBSTRUCTION/LOWER URINARY TRACT SYMPTOMS: ICD-10-CM

## 2025-07-23 DIAGNOSIS — E11.9 TYPE 2 DIABETES MELLITUS WITHOUT COMPLICATION, WITHOUT LONG-TERM CURRENT USE OF INSULIN (H): Primary | ICD-10-CM

## 2025-07-23 DIAGNOSIS — S51.012A SKIN TEAR OF LEFT ELBOW WITHOUT COMPLICATION, INITIAL ENCOUNTER: ICD-10-CM

## 2025-07-23 LAB
ANION GAP SERPL CALCULATED.3IONS-SCNC: 11 MMOL/L (ref 7–15)
BUN SERPL-MCNC: 12.9 MG/DL (ref 8–23)
CALCIUM SERPL-MCNC: 9.8 MG/DL (ref 8.8–10.4)
CHLORIDE SERPL-SCNC: 97 MMOL/L (ref 98–107)
CREAT SERPL-MCNC: 0.69 MG/DL (ref 0.67–1.17)
EGFRCR SERPLBLD CKD-EPI 2021: >90 ML/MIN/1.73M2
EST. AVERAGE GLUCOSE BLD GHB EST-MCNC: 140 MG/DL
GLUCOSE SERPL-MCNC: 156 MG/DL (ref 70–99)
HBA1C MFR BLD: 6.5 %
HCO3 SERPL-SCNC: 28 MMOL/L (ref 22–29)
POTASSIUM SERPL-SCNC: 4.4 MMOL/L (ref 3.4–5.3)
SODIUM SERPL-SCNC: 136 MMOL/L (ref 135–145)

## 2025-07-23 PROCEDURE — 80048 BASIC METABOLIC PNL TOTAL CA: CPT | Mod: ZL | Performed by: FAMILY MEDICINE

## 2025-07-23 PROCEDURE — 83036 HEMOGLOBIN GLYCOSYLATED A1C: CPT | Mod: ZL | Performed by: FAMILY MEDICINE

## 2025-07-23 PROCEDURE — 36415 COLL VENOUS BLD VENIPUNCTURE: CPT | Mod: ZL | Performed by: FAMILY MEDICINE

## 2025-07-23 PROCEDURE — G0463 HOSPITAL OUTPT CLINIC VISIT: HCPCS

## 2025-07-23 RX ORDER — TAMSULOSIN HYDROCHLORIDE 0.4 MG/1
0.4 CAPSULE ORAL 2 TIMES DAILY
Qty: 90 CAPSULE | Refills: 3 | Status: SHIPPED | OUTPATIENT
Start: 2025-07-23

## 2025-07-23 ASSESSMENT — PAIN SCALES - GENERAL: PAINLEVEL_OUTOF10: NO PAIN (0)

## 2025-07-23 NOTE — PROGRESS NOTES
"  Assessment & Plan     Type 2 diabetes mellitus without complication, without long-term current use of insulin (H)  Doing well.  Sugars down.  Update labs.  Looser stools with the metformin but acceptable and no change done in dose, etc.  Await labs.    - Basic metabolic panel; Future  - Hemoglobin A1c; Future    Essential hypertension with goal blood pressure less than 140/90  Stable.      Skin tear of left elbow without complication, initial encounter  No issues.  Continue daily dressings.  I undressed it and redressed it with band aid.      BPH with obstruction/lower urinary tract symptoms  Ongoing and worsening.   Trial of bid flomax.  Follow pending the results of this.    - tamsulosin (FLOMAX) 0.4 MG capsule; Take 1 capsule (0.4 mg) by mouth 2 times daily. TAKE 1 CAPSULE BY MOUTH ONCE DAILY    Cervical radiculopathy  Ongoing.  He never got called by [place] for MRI.  Sent specifically there.    - MR Cervical Spine w/o Contrast; Future    BMI  Estimated body mass index is 28.98 kg/m  as calculated from the following:    Height as of 11/12/24: 1.778 m (5' 10\").    Weight as of this encounter: 91.6 kg (202 lb).           Gabbi Vizcaino is a 84 year old, presenting for the following health issues:  Diabetes        7/23/2025    12:48 PM   Additional Questions   Roomed by Yoli HORAN      Diabetes Follow-up    How often are you checking your blood sugar? One time daily  What time of day are you checking your blood sugars (select all that apply)?  Before meals  Have you had any blood sugars above 200?  No  Have you had any blood sugars below 70?  No  What symptoms do you notice when your blood sugar is low?  None  What concerns do you have today about your diabetes? None   Do you have any of these symptoms? (Select all that apply)  No numbness or tingling in feet.  No redness, sores or blisters on feet.  No complaints of excessive thirst.  No reports of blurry vision.  No significant changes to weight.      BP " Readings from Last 2 Encounters:   07/23/25 122/66   04/22/25 130/68     Hemoglobin A1C (%)   Date Value   04/22/2025 7.4 (H)   10/03/2024 7.3 (H)   06/25/2021 6.5 (H)   02/23/2021 6.3 (H)     LDL Cholesterol Calculated (mg/dL)   Date Value   04/22/2025 90   11/20/2023 86   09/15/2020 103 (H)   05/07/2019 100 (H)             Hypertension Follow-up    Do you check your blood pressure regularly outside of the clinic? Yes   Are you following a low salt diet? Yes  Are your blood pressures ever more than 140 on the top number (systolic) OR more   than 90 on the bottom number (diastolic), for example 140/90? No    BP Readings from Last 2 Encounters:   07/23/25 122/66   04/22/25 130/68           Review of Systems  Constitutional, HEENT, cardiovascular, pulmonary, gi and gu systems are negative, except as otherwise noted.      Objective    /66   Pulse 92   Temp 97.6  F (36.4  C) (Tympanic)   Wt 91.6 kg (202 lb)   SpO2 98%   BMI 28.98 kg/m    Body mass index is 28.98 kg/m .  Physical Exam   GENERAL: alert and no distress  NECK: no adenopathy, no asymmetry, masses, or scars  RESP: lungs clear to auscultation - no rales, rhonchi or wheezes  CV: regular rate and rhythm, normal S1 S2, no S3 or S4, no murmur, click or rub, no peripheral edema  ABDOMEN: soft, nontender, no hepatosplenomegaly, no masses and bowel sounds normal  MS: no gross musculoskeletal defects noted, no edema    Labs pending.      The longitudinal plan of care for the diagnosis(es)/condition(s) as documented were addressed during this visit. Due to the added complexity in care, I will continue to support Carrillo in the subsequent management and with ongoing continuity of care.        Signed Electronically by: Jorge Garcia MD

## 2025-07-28 ENCOUNTER — TELEPHONE (OUTPATIENT)
Dept: FAMILY MEDICINE | Facility: OTHER | Age: 85
End: 2025-07-28

## 2025-07-28 ENCOUNTER — HOSPITAL ENCOUNTER (EMERGENCY)
Facility: HOSPITAL | Age: 85
Discharge: HOME | End: 2025-07-28
Attending: PHYSICIAN ASSISTANT | Admitting: PHYSICIAN ASSISTANT
Payer: MEDICARE

## 2025-07-28 VITALS
DIASTOLIC BLOOD PRESSURE: 60 MMHG | OXYGEN SATURATION: 97 % | RESPIRATION RATE: 18 BRPM | HEART RATE: 81 BPM | TEMPERATURE: 97.7 F | SYSTOLIC BLOOD PRESSURE: 122 MMHG

## 2025-07-28 DIAGNOSIS — L98.9 SKIN LESION: Primary | ICD-10-CM

## 2025-07-28 PROCEDURE — 99213 OFFICE O/P EST LOW 20 MIN: CPT | Performed by: PHYSICIAN ASSISTANT

## 2025-07-28 PROCEDURE — G0463 HOSPITAL OUTPT CLINIC VISIT: HCPCS | Performed by: PHYSICIAN ASSISTANT

## 2025-07-28 ASSESSMENT — COLUMBIA-SUICIDE SEVERITY RATING SCALE - C-SSRS
2. HAVE YOU ACTUALLY HAD ANY THOUGHTS OF KILLING YOURSELF IN THE PAST MONTH?: NO
1. IN THE PAST MONTH, HAVE YOU WISHED YOU WERE DEAD OR WISHED YOU COULD GO TO SLEEP AND NOT WAKE UP?: NO
6. HAVE YOU EVER DONE ANYTHING, STARTED TO DO ANYTHING, OR PREPARED TO DO ANYTHING TO END YOUR LIFE?: NO

## 2025-07-28 NOTE — DISCHARGE INSTRUCTIONS
Apply hydrocortisone cream to the area and keep bandaged.  If not improving follow-up with Dr. Garcia.

## 2025-07-28 NOTE — ED TRIAGE NOTES
"Pt is here with c/o \"rash\" on right side of neck ongoing about 1 mo. Endorses trying abx ointment with not relief notes its itchy         "

## 2025-07-28 NOTE — ED PROVIDER NOTES
History     Chief Complaint   Patient presents with    Rash     HPI  Carrillo Carranza is a 84 year old male who presents.  Skin rash.  Located on his right neck.  He has had this for approximately a month.  Has not gotten worse may have appeared to gotten better in the last few days.  No surrounding erythema states that he thought there was pustular in the beginning.  He is a try to avoid shaving the area.  Has not had any fevers or chills.    Allergies:  Allergies   Allergen Reactions    Atorvastatin Calcium Other (See Comments)     Lipitor - myalgia    Avodart [Dutasteride] Other (See Comments)     Sexual dsfxn    Simvastatin Other (See Comments)     Zocor - elevated liver function test       Problem List:    Patient Active Problem List    Diagnosis Date Noted    Chronic midline low back pain with sciatica, sciatica laterality unspecified 06/23/2023     Priority: Medium    Arthritis of first metatarsophalangeal (MTP) joint of left foot 07/22/2020     Priority: Medium    Baca's esophagus without dysplasia 05/07/2019     Priority: Medium    H/O adenomatous polyp of colon 05/07/2019     Priority: Medium    Low libido 09/14/2017     Priority: Medium    BPH with obstruction/lower urinary tract symptoms 05/16/2017     Priority: Medium    Type 2 diabetes mellitus without complication, without long-term current use of insulin (H) 12/12/2016     Priority: Medium    Essential hypertension with goal blood pressure less than 140/90 07/15/2016     Priority: Medium    Nocturia 10/09/2014     Priority: Medium    Hypomagnesemia 06/23/2014     Priority: Medium    BPPV (benign paroxysmal positional vertigo) 06/13/2014     Priority: Medium    Hypokalemia 06/02/2014     Priority: Medium    Hyperlipidemia with target LDL less than 100      Priority: Medium     Diagnosis updated by automated process. Provider to review and confirm.      Fatty liver disease, nonalcoholic      Priority: Medium    Diffuse connective tissue disease  01/29/2013     Priority: Medium     Problem list name updated by automated process. Provider to review      Advanced care planning/counseling discussion 06/12/2012     Priority: Medium        Past Medical History:    Past Medical History:   Diagnosis Date    Baca's esophagus 11/3/2011    Contact dermatitis and other eczema, due to unspecified cause 3/1/2011    Dermatophytosis of groin and perianal area 2/1/2006    Esophageal reflux 11/3/2011    Family history of colonic polyps 11/3/2011    Fatty liver disease, nonalcoholic     Generalized osteoarthrosis, involving multiple sites 3/1/2011    HTN (hypertension)     Hyperlipidaemia LDL goal < 100     Other and unspecified hyperlipidemia 11/3/2011    Other chronic nonalcoholic liver disease 11/3/2011    Other premature beats 11/3/2011    Other specified cardiac dysrhythmias(427.89) 11/3/2011    Personal history of tobacco use, presenting hazards to health 11/3/2011    Type II or unspecified type diabetes mellitus without mention of complication, not stated as uncontrolled 2/7/2012    Umbilical hernia without mention of obstruction or gangrene 11/3/2011    Unspecified diffuse connective tissue disease 1/29/2013    Unspecified essential hypertension 11/3/2011       Past Surgical History:    Past Surgical History:   Procedure Laterality Date    ADENOIDECTOMY      APPENDECTOMY      ARTHROSCOPY KNEE      arthroscopy right great toe      bunion    COLONOSCOPY  02/23/2010    COLONOSCOPY  2010,2006    repeat in 2015    COLONOSCOPY  08/10/2006    COLONOSCOPY  01/01/2015    Repeat 2020    COLONOSCOPY N/A 04/13/2021    Normal. Internal hemorrhoids noted. No further colonoscopies recommended.    COLONOSCOPY W/ ENDOSCOPIC US  2021    ESOPHAGOGASTRODUODENOSCOPY      Repeat in 2015    ESOPHAGOGASTRODUODENOSCOPY  01/01/2015    Dr Funes/Vivek- Repeat 3yrs    EXCISE LESION EAR EXTERNAL Right 03/13/2018    Procedure: EXCISE LESION EAR EXTERNAL;  EXCISION OF PREAURICULAR BASAL CELL  CARCINOMA WITH FROZENS, FLAP REPAIR ( 2.6 x 2cm Defect), ( 3.5 x 3.5cmTissue Rearrangement);  Surgeon: Kyara Rojas MD;  Location: [facility]    HERNIA REPAIR, UMBILICAL  01/01/2012    reduction and repair of umbilical hernia    JOINT REPLACEMENT      PHACOEMULSIFICATION WITH STANDARD INTRAOCULAR LENS IMPLANT Right 06/11/2019    Procedure: COMPLEX PHACOEMULSIFICATION CATARACT EXTRACTION POSTERIOR CHAMBER LENS RIGHT 10% LINDA IFIS SOLUTION/OMIDRIA  LI61AO  21;  Surgeon: Julio Patrick MD;  Location: [facility]    PHACOEMULSIFICATION WITH STANDARD INTRAOCULAR LENS IMPLANT Left 06/25/2019    Procedure: PHACOEMULSIFICATION CATARACT EXTRACTION POSTERIOR CHAMBER LENS LEFT 10% LINDA, POSSIBLE IFIS OR OMIDRIA LI61AO 21m COMPLEX CATARACT;  Surgeon: Julio Patrick MD;  Location: [facility]    RELEASE CARPAL TUNNEL      carpal tunnel syndrome    shoulder bone spur removal      TKA      TONSILLECTOMY      UPPER GI ENDOSCOPY  01/01/2012    repeat in 2014    UPPER GI ENDOSCOPY      UPPER GI ENDOSCOPY      UPPER GI ENDOSCOPY  01/01/2010    repeat 2012       Family History:    Family History   Problem Relation Age of Onset    Cerebrovascular Disease Maternal Grandmother         CVA    Heart Failure Father        Social History:  Marital Status:   [2]  Social History     Tobacco Use    Smoking status: Former     Types: Cigarettes     Passive exposure: Past    Smokeless tobacco: Never    Tobacco comments:     quit in the 80s   Vaping Use    Vaping status: Never Used   Substance Use Topics    Alcohol use: No    Drug use: No        Medications:    blood glucose (ACCU-CHEK ANGELI PLUS) test strip  blood glucose (ACCU-CHEK FASTCLIX) lancing device  Cholecalciferol (VITAMIN D PO)  ezetimibe (ZETIA) 10 MG tablet  garlic 150 MG TABS tablet  glimepiride (AMARYL) 1 MG tablet  Krill Oil 500 MG CAPS  lisinopril-hydrochlorothiazide (ZESTORETIC) 10-12.5 MG tablet  magnesium oxide 400 MG CAPS  metFORMIN (GLUCOPHAGE XR) 500 MG 24 hr  tablet  Multiple Vitamin (MULTIVITAMINS PO)  pantoprazole (PROTONIX) 40 MG EC tablet  polyethylene glycol-propylene glycol (SYSTANE ULTRA) 0.4-0.3 % SOLN ophthalmic solution  potassium chloride whitley ER (KLOR-CON M20) 20 MEQ CR tablet  STATIN NOT PRESCRIBED, INTENTIONAL,  tamsulosin (FLOMAX) 0.4 MG capsule  traZODone (DESYREL) 50 MG tablet          Review of Systems   All other systems reviewed and are negative.      Physical Exam   BP: 122/60  Pulse: 81  Temp: 97.7  F (36.5  C)  Resp: 18  SpO2: 97 %      Physical Exam  Constitutional:       General: He is not in acute distress.     Appearance: Normal appearance. He is normal weight. He is not ill-appearing, toxic-appearing or diaphoretic.   HENT:      Head: Normocephalic and atraumatic.      Right Ear: External ear normal.      Left Ear: External ear normal.   Eyes:      Extraocular Movements: Extraocular movements intact.      Conjunctiva/sclera: Conjunctivae normal.      Pupils: Pupils are equal, round, and reactive to light.   Neck:     Cardiovascular:      Rate and Rhythm: Normal rate.   Pulmonary:      Effort: Pulmonary effort is normal. No respiratory distress.   Musculoskeletal:         General: Normal range of motion.   Skin:     General: Skin is warm and dry.      Coloration: Skin is not jaundiced or pale.   Neurological:      Mental Status: He is alert and oriented to person, place, and time. Mental status is at baseline.      Cranial Nerves: No cranial nerve deficit.   Psychiatric:         Mood and Affect: Mood normal.         ED Course      Etiology entirely unclear at this time it does appear that there may be some irritation due to rubbing against his collar.  At this time I recommended him try some hydrocortisone cream.  Also recommended that he keep it covered.  If symptoms do not appear to resolve I recommend follow-up.  Procedures       \         No results found for this or any previous visit (from the past 24 hours).    Medications - No data to  display    Assessments & Plan (with Medical Decision Making)     I have reviewed the nursing notes.    I have reviewed the findings, diagnosis, plan and need for follow up with the patient.    New Prescriptions    No medications on file       Final diagnoses:   Skin lesion       7/28/2025   [facility]       Piotr Santos PA-C  07/28/25 1152

## 2025-07-28 NOTE — TELEPHONE ENCOUNTER
9:19 AM    Reason for Call: OVERBOOK    Patient is having the following symptoms:  for sore on neck/weeping/he forgot to mention it when he was here last week.    The patient is requesting an appointment for this week with Dr Garcia.    Was an appointment offered for this call? No  If yes : Appointment type              Date    Preferred method for responding to this message: Telephone Call  What is your phone number ?[phone]    If we cannot reach you directly, may we leave a detailed response at the number you provided? Yes    Can this message wait until your PCP/provider returns, if unavailable today? Not applicable    Samira Martinez

## 2025-07-29 ENCOUNTER — TELEPHONE (OUTPATIENT)
Dept: FAMILY MEDICINE | Facility: OTHER | Age: 85
End: 2025-07-29

## 2025-07-29 NOTE — TELEPHONE ENCOUNTER
9:12 AM    Reason for Call: OVERBOOK    Patient is having the following symptoms: er follow up/hibbing/sore on neck.    The patient is requesting an appointment for when he gets back with Dr Garcia.    Was an appointment offered for this call? No  If yes : Appointment type              Date    Preferred method for responding to this message: Telephone Call  What is your phone number ?[phone]    If we cannot reach you directly, may we leave a detailed response at the number you provided? Yes    Can this message wait until your PCP/provider returns, if unavailable today? Not applicable    Samira Martinez

## 2025-08-12 ENCOUNTER — OFFICE VISIT (OUTPATIENT)
Dept: FAMILY MEDICINE | Facility: OTHER | Age: 85
End: 2025-08-12
Attending: FAMILY MEDICINE
Payer: MEDICARE

## 2025-08-12 VITALS
OXYGEN SATURATION: 99 % | HEART RATE: 75 BPM | TEMPERATURE: 97.4 F | DIASTOLIC BLOOD PRESSURE: 72 MMHG | SYSTOLIC BLOOD PRESSURE: 136 MMHG | HEIGHT: 70 IN | WEIGHT: 203 LBS | BODY MASS INDEX: 29.06 KG/M2

## 2025-08-12 DIAGNOSIS — R21 RASH AND NONSPECIFIC SKIN ERUPTION: Primary | ICD-10-CM

## 2025-08-12 DIAGNOSIS — N40.1 BENIGN PROSTATIC HYPERPLASIA WITH NOCTURIA: ICD-10-CM

## 2025-08-12 DIAGNOSIS — R35.1 BENIGN PROSTATIC HYPERPLASIA WITH NOCTURIA: ICD-10-CM

## 2025-08-12 PROCEDURE — G0463 HOSPITAL OUTPT CLINIC VISIT: HCPCS

## 2025-08-12 RX ORDER — TRIAMCINOLONE ACETONIDE 5 MG/G
CREAM TOPICAL 2 TIMES DAILY
Qty: 45 G | Refills: 0 | Status: SHIPPED | OUTPATIENT
Start: 2025-08-12

## 2025-08-12 ASSESSMENT — PAIN SCALES - GENERAL: PAINLEVEL_OUTOF10: NO PAIN (0)

## (undated) DEVICE — INSTRUMENT WIPE-VISIWIPE

## (undated) DEVICE — BIN-CATARACT BIN

## (undated) DEVICE — SCD SLEEVE-KNEE REG.

## (undated) DEVICE — NDL-27G X 1 1/4" NON-SAFETY

## (undated) DEVICE — BIN-TECNIS DCB00 LENSES

## (undated) DEVICE — CYSTOTOME-IRRIGATING  25G

## (undated) DEVICE — BETADINE 5% STERILE OPHTHALMIC SOLUTION 1 OZ.

## (undated) DEVICE — PACK-PHACO STELLARIS

## (undated) DEVICE — PACK-EYE-CUSTOM

## (undated) DEVICE — KNIFE-MICRO UNITOME 5.0MM

## (undated) DEVICE — CANNULA-NUCLEUS HYDRODISSECTOR

## (undated) DEVICE — LENS DELIVERY SYSTEM-SOFPORT LI61AO (EZ-28)

## (undated) DEVICE — IRRIGATION-NACL 1000ML

## (undated) DEVICE — SUTURE-ETHILON 5-0 P-3 698G

## (undated) DEVICE — SUTURE-VICRYL 4-0 SH-1 J218H

## (undated) DEVICE — SUTURE-VICRYL 3-0 SH J416H

## (undated) DEVICE — IRRIGATION-H2O 1000ML

## (undated) DEVICE — BLADE-SCALPEL #15

## (undated) DEVICE — GLV-7.5 PROTEXIS PI CLASSIC LF/PF

## (undated) DEVICE — LABEL-STERILE PREPRINTED FOR OR

## (undated) DEVICE — PACK-SET UP-CUSTOM

## (undated) DEVICE — MALYUGIN 2.0 RING 7.0MM

## (undated) DEVICE — BIN-LENS IMPLANT CART

## (undated) DEVICE — GOWN-SURG XL LVL 3 REINFORCED

## (undated) DEVICE — CANNULA-VISCOFLOW 30G 9MM BEND

## (undated) DEVICE — CANNULA-VISCOFLOW 25G 9MM 45 DEGREE ANGLE

## (undated) DEVICE — HOLSTER-STERILE FOR CAUTERY

## (undated) DEVICE — MARKER-SKIN REG

## (undated) DEVICE — SWABSTICK-BENZOIN

## (undated) DEVICE — DRSG-KERLIX ROLL 4.5 X 4.1YD

## (undated) DEVICE — DRAPE-U DRAPE SPLIT SHEET 72" X 122"

## (undated) DEVICE — HANDPIECE-CAPSULEGUARD I/A STELLARIS

## (undated) DEVICE — CAUTERY PAD-POLYHESIVE II ADULT

## (undated) DEVICE — GLV-7.0 PROTEXIS PI CLASSIC LF/PF

## (undated) DEVICE — BLANKET-BAIR LOWER EXTREMITY

## (undated) DEVICE — SUTURE-VICRYL 5-0 P-3 J493H

## (undated) DEVICE — SUTURE-SILK 0 PSL 580H

## (undated) DEVICE — SYRINGE-10CC FINGER

## (undated) DEVICE — KNIFE-KERATOME SLIT 2.8MM

## (undated) DEVICE — DRSG-SPONGE X-RAY 4 X 4

## (undated) DEVICE — GLV-6.5 PROTEXIS PI CLASSIC LF/PF

## (undated) DEVICE — BLADE-SURG CLIPPER

## (undated) DEVICE — CAUTERY-INSULATED 2.75" EDGE

## (undated) DEVICE — SPONGE-LAPAROTOMY PADS 18 X 18

## (undated) DEVICE — CAUTERY PENCIL

## (undated) DEVICE — MARKER-SKIN FINE POINT

## (undated) DEVICE — TRAY-SKIN PREP POVIDONE/IODINE

## (undated) DEVICE — DRSG-NON ADHERING 3 X 8 TELFA

## (undated) DEVICE — DRSG-KERLIX 6 X 6 3/4 FLUFF

## (undated) DEVICE — LIGHT HANDLE COVER

## (undated) DEVICE — TUBING-SUCTION 20FT

## (undated) DEVICE — CANISTER-SUCTION 2000CC

## (undated) DEVICE — SUTURE-SILK 3-0 SH K832H

## (undated) RX ORDER — PROPOFOL 10 MG/ML
INJECTION, EMULSION INTRAVENOUS
Status: DISPENSED
Start: 2019-06-25

## (undated) RX ORDER — PROPOFOL 10 MG/ML
INJECTION, EMULSION INTRAVENOUS
Status: DISPENSED
Start: 2018-03-13

## (undated) RX ORDER — LIDOCAINE HYDROCHLORIDE 20 MG/ML
INJECTION, SOLUTION EPIDURAL; INFILTRATION; INTRACAUDAL; PERINEURAL
Status: DISPENSED
Start: 2019-06-25

## (undated) RX ORDER — FENTANYL CITRATE 50 UG/ML
INJECTION, SOLUTION INTRAMUSCULAR; INTRAVENOUS
Status: DISPENSED
Start: 2018-03-13

## (undated) RX ORDER — FENTANYL CITRATE 50 UG/ML
INJECTION, SOLUTION INTRAMUSCULAR; INTRAVENOUS
Status: DISPENSED
Start: 2019-06-25

## (undated) RX ORDER — FENTANYL CITRATE 50 UG/ML
INJECTION, SOLUTION INTRAMUSCULAR; INTRAVENOUS
Status: DISPENSED
Start: 2019-06-11

## (undated) RX ORDER — METOPROLOL TARTRATE 1 MG/ML
INJECTION, SOLUTION INTRAVENOUS
Status: DISPENSED
Start: 2018-03-13

## (undated) RX ORDER — LIDOCAINE HYDROCHLORIDE 20 MG/ML
INJECTION, SOLUTION EPIDURAL; INFILTRATION; INTRACAUDAL; PERINEURAL
Status: DISPENSED
Start: 2018-03-13